# Patient Record
Sex: FEMALE | Race: WHITE | NOT HISPANIC OR LATINO | Employment: FULL TIME | URBAN - METROPOLITAN AREA
[De-identification: names, ages, dates, MRNs, and addresses within clinical notes are randomized per-mention and may not be internally consistent; named-entity substitution may affect disease eponyms.]

---

## 2017-05-01 DIAGNOSIS — R07.9 CHEST PAIN, UNSPECIFIED TYPE: ICD-10-CM

## 2017-05-02 LAB — EKG IMPRESSION: NORMAL

## 2019-11-10 ENCOUNTER — HOSPITAL ENCOUNTER (OUTPATIENT)
Dept: RADIOLOGY | Facility: MEDICAL CENTER | Age: 54
End: 2019-11-10

## 2019-11-10 ENCOUNTER — HOSPITAL ENCOUNTER (INPATIENT)
Facility: MEDICAL CENTER | Age: 54
LOS: 5 days | DRG: 204 | End: 2019-11-16
Attending: EMERGENCY MEDICINE | Admitting: INTERNAL MEDICINE
Payer: COMMERCIAL

## 2019-11-10 ENCOUNTER — APPOINTMENT (OUTPATIENT)
Dept: RADIOLOGY | Facility: MEDICAL CENTER | Age: 54
DRG: 204 | End: 2019-11-10
Attending: EMERGENCY MEDICINE
Payer: COMMERCIAL

## 2019-11-10 DIAGNOSIS — R07.9 ACUTE CHEST PAIN: ICD-10-CM

## 2019-11-10 DIAGNOSIS — Z85.528 H/O RENAL CELL CARCINOMA: ICD-10-CM

## 2019-11-10 DIAGNOSIS — I10 ESSENTIAL HYPERTENSION: ICD-10-CM

## 2019-11-10 DIAGNOSIS — R68.83 CHILLS: ICD-10-CM

## 2019-11-10 DIAGNOSIS — R06.02 SHORTNESS OF BREATH: ICD-10-CM

## 2019-11-10 DIAGNOSIS — R91.8 LUNG MASS: ICD-10-CM

## 2019-11-10 DIAGNOSIS — J44.9 CHRONIC OBSTRUCTIVE PULMONARY DISEASE, UNSPECIFIED COPD TYPE (HCC): Primary | ICD-10-CM

## 2019-11-10 DIAGNOSIS — J96.01 ACUTE RESPIRATORY FAILURE WITH HYPOXIA (HCC): ICD-10-CM

## 2019-11-10 DIAGNOSIS — J06.9 UPPER RESPIRATORY TRACT INFECTION, UNSPECIFIED TYPE: ICD-10-CM

## 2019-11-10 PROCEDURE — 87502 INFLUENZA DNA AMP PROBE: CPT

## 2019-11-10 PROCEDURE — 93005 ELECTROCARDIOGRAM TRACING: CPT | Performed by: EMERGENCY MEDICINE

## 2019-11-10 PROCEDURE — 94760 N-INVAS EAR/PLS OXIMETRY 1: CPT

## 2019-11-10 PROCEDURE — 700105 HCHG RX REV CODE 258: Performed by: EMERGENCY MEDICINE

## 2019-11-10 PROCEDURE — 700101 HCHG RX REV CODE 250: Performed by: EMERGENCY MEDICINE

## 2019-11-10 PROCEDURE — 71045 X-RAY EXAM CHEST 1 VIEW: CPT

## 2019-11-10 PROCEDURE — 99291 CRITICAL CARE FIRST HOUR: CPT

## 2019-11-10 PROCEDURE — 700111 HCHG RX REV CODE 636 W/ 250 OVERRIDE (IP): Performed by: EMERGENCY MEDICINE

## 2019-11-10 RX ORDER — SODIUM CHLORIDE, SODIUM LACTATE, POTASSIUM CHLORIDE, CALCIUM CHLORIDE 600; 310; 30; 20 MG/100ML; MG/100ML; MG/100ML; MG/100ML
30 INJECTION, SOLUTION INTRAVENOUS ONCE
Status: COMPLETED | OUTPATIENT
Start: 2019-11-10 | End: 2019-11-11

## 2019-11-10 RX ORDER — MORPHINE SULFATE 4 MG/ML
4 INJECTION, SOLUTION INTRAMUSCULAR; INTRAVENOUS ONCE
Status: COMPLETED | OUTPATIENT
Start: 2019-11-10 | End: 2019-11-11

## 2019-11-10 RX ORDER — IPRATROPIUM BROMIDE AND ALBUTEROL SULFATE 2.5; .5 MG/3ML; MG/3ML
3 SOLUTION RESPIRATORY (INHALATION) ONCE
Status: COMPLETED | OUTPATIENT
Start: 2019-11-10 | End: 2019-11-10

## 2019-11-10 RX ADMIN — PIPERACILLIN AND TAZOBACTAM 4.5 G: 4; .5 INJECTION, POWDER, LYOPHILIZED, FOR SOLUTION INTRAVENOUS; PARENTERAL at 01:45

## 2019-11-10 RX ADMIN — IPRATROPIUM BROMIDE AND ALBUTEROL SULFATE 3 ML: .5; 3 SOLUTION RESPIRATORY (INHALATION) at 23:43

## 2019-11-11 ENCOUNTER — APPOINTMENT (OUTPATIENT)
Dept: RADIOLOGY | Facility: MEDICAL CENTER | Age: 54
DRG: 204 | End: 2019-11-11
Attending: STUDENT IN AN ORGANIZED HEALTH CARE EDUCATION/TRAINING PROGRAM
Payer: COMMERCIAL

## 2019-11-11 PROBLEM — J96.01 ACUTE RESPIRATORY FAILURE WITH HYPOXIA (HCC): Status: ACTIVE | Noted: 2019-11-11

## 2019-11-11 PROBLEM — Z85.528 H/O RENAL CELL CARCINOMA: Status: ACTIVE | Noted: 2019-11-11

## 2019-11-11 PROBLEM — D64.9 ANEMIA: Status: ACTIVE | Noted: 2019-11-11

## 2019-11-11 PROBLEM — R91.8 MASS OF LEFT LUNG: Status: ACTIVE | Noted: 2019-11-11

## 2019-11-11 PROBLEM — R31.21 ASYMPTOMATIC MICROSCOPIC HEMATURIA: Status: ACTIVE | Noted: 2019-11-11

## 2019-11-11 PROBLEM — I10 HYPERTENSION: Status: ACTIVE | Noted: 2019-11-11

## 2019-11-11 PROBLEM — K21.9 GERD (GASTROESOPHAGEAL REFLUX DISEASE): Status: ACTIVE | Noted: 2019-11-11

## 2019-11-11 PROBLEM — J98.4 CAVITATING MASS IN LEFT UPPER LUNG LOBE: Status: ACTIVE | Noted: 2019-11-11

## 2019-11-11 LAB
ALBUMIN SERPL BCP-MCNC: 3.8 G/DL (ref 3.2–4.9)
ALBUMIN/GLOB SERPL: 1.2 G/DL
ALP SERPL-CCNC: 48 U/L (ref 30–99)
ALT SERPL-CCNC: 15 U/L (ref 2–50)
ANION GAP SERPL CALC-SCNC: 11 MMOL/L (ref 0–11.9)
APPEARANCE UR: ABNORMAL
APTT PPP: 24.9 SEC (ref 24.7–36)
AST SERPL-CCNC: 24 U/L (ref 12–45)
BACTERIA #/AREA URNS HPF: ABNORMAL /HPF
BASOPHILS # BLD AUTO: 0.3 % (ref 0–1.8)
BASOPHILS # BLD: 0.03 K/UL (ref 0–0.12)
BILIRUB SERPL-MCNC: 0.6 MG/DL (ref 0.1–1.5)
BILIRUB UR QL STRIP.AUTO: ABNORMAL
BUN SERPL-MCNC: 13 MG/DL (ref 8–22)
CALCIUM SERPL-MCNC: 8.6 MG/DL (ref 8.5–10.5)
CHLORIDE SERPL-SCNC: 99 MMOL/L (ref 96–112)
CO2 SERPL-SCNC: 24 MMOL/L (ref 20–33)
COLOR UR: ABNORMAL
CORTIS SERPL-MCNC: 14.9 UG/DL (ref 0–23)
CREAT SERPL-MCNC: 1.13 MG/DL (ref 0.5–1.4)
EKG IMPRESSION: NORMAL
EOSINOPHIL # BLD AUTO: 0.01 K/UL (ref 0–0.51)
EOSINOPHIL NFR BLD: 0.1 % (ref 0–6.9)
EPI CELLS #/AREA URNS HPF: ABNORMAL /HPF
ERYTHROCYTE [DISTWIDTH] IN BLOOD BY AUTOMATED COUNT: 43.5 FL (ref 35.9–50)
FERRITIN SERPL-MCNC: 145.4 NG/ML (ref 10–291)
FLUAV RNA SPEC QL NAA+PROBE: NEGATIVE
FLUBV RNA SPEC QL NAA+PROBE: NEGATIVE
GLOBULIN SER CALC-MCNC: 3.2 G/DL (ref 1.9–3.5)
GLUCOSE SERPL-MCNC: 106 MG/DL (ref 65–99)
GLUCOSE UR STRIP.AUTO-MCNC: NEGATIVE MG/DL
HCT VFR BLD AUTO: 38.1 % (ref 37–47)
HGB BLD-MCNC: 11.6 G/DL (ref 12–16)
HGB RETIC QN AUTO: 24.8 PG/CELL (ref 29–35)
IMM GRANULOCYTES # BLD AUTO: 0.06 K/UL (ref 0–0.11)
IMM GRANULOCYTES NFR BLD AUTO: 0.6 % (ref 0–0.9)
IMM RETICS NFR: 16.7 % (ref 9.3–17.4)
INR PPP: 1.07 (ref 0.87–1.13)
IRON SATN MFR SERPL: 3 % (ref 15–55)
IRON SERPL-MCNC: 11 UG/DL (ref 40–170)
KETONES UR STRIP.AUTO-MCNC: NEGATIVE MG/DL
LACTATE BLD-SCNC: 1.3 MMOL/L (ref 0.5–2)
LACTATE BLD-SCNC: 1.3 MMOL/L (ref 0.5–2)
LACTATE BLD-SCNC: 2.3 MMOL/L (ref 0.5–2)
LDH SERPL L TO P-CCNC: 323 U/L (ref 107–266)
LEUKOCYTE ESTERASE UR QL STRIP.AUTO: ABNORMAL
LYMPHOCYTES # BLD AUTO: 1 K/UL (ref 1–4.8)
LYMPHOCYTES NFR BLD: 9.6 % (ref 22–41)
MAGNESIUM SERPL-MCNC: 1.8 MG/DL (ref 1.5–2.5)
MCH RBC QN AUTO: 25.1 PG (ref 27–33)
MCHC RBC AUTO-ENTMCNC: 30.4 G/DL (ref 33.6–35)
MCV RBC AUTO: 82.5 FL (ref 81.4–97.8)
MICRO URNS: ABNORMAL
MONOCYTES # BLD AUTO: 0.99 K/UL (ref 0–0.85)
MONOCYTES NFR BLD AUTO: 9.5 % (ref 0–13.4)
NEUTROPHILS # BLD AUTO: 8.34 K/UL (ref 2–7.15)
NEUTROPHILS NFR BLD: 79.9 % (ref 44–72)
NITRITE UR QL STRIP.AUTO: NEGATIVE
NRBC # BLD AUTO: 0 K/UL
NRBC BLD-RTO: 0 /100 WBC
NT-PROBNP SERPL IA-MCNC: 677 PG/ML (ref 0–125)
PH UR STRIP.AUTO: 5.5 [PH] (ref 5–8)
PLATELET # BLD AUTO: 261 K/UL (ref 164–446)
PMV BLD AUTO: 10.1 FL (ref 9–12.9)
POTASSIUM SERPL-SCNC: 4.9 MMOL/L (ref 3.6–5.5)
PROCALCITONIN SERPL-MCNC: 0.11 NG/ML
PROT SERPL-MCNC: 7 G/DL (ref 6–8.2)
PROT UR QL STRIP: 100 MG/DL
PROTHROMBIN TIME: 14.1 SEC (ref 12–14.6)
RBC # BLD AUTO: 4.62 M/UL (ref 4.2–5.4)
RBC # URNS HPF: ABNORMAL /HPF
RBC UR QL AUTO: ABNORMAL
RETICS # AUTO: 0.05 M/UL (ref 0.04–0.06)
RETICS/RBC NFR: 1.1 % (ref 0.8–2.1)
SODIUM SERPL-SCNC: 134 MMOL/L (ref 135–145)
SP GR UR STRIP.AUTO: 1.03
TIBC SERPL-MCNC: 388 UG/DL (ref 250–450)
TROPONIN T SERPL-MCNC: 10 NG/L (ref 6–19)
TROPONIN T SERPL-MCNC: 9 NG/L (ref 6–19)
UROBILINOGEN UR STRIP.AUTO-MCNC: 1 MG/DL
WBC # BLD AUTO: 10.4 K/UL (ref 4.8–10.8)
WBC #/AREA URNS HPF: ABNORMAL /HPF

## 2019-11-11 PROCEDURE — 85730 THROMBOPLASTIN TIME PARTIAL: CPT

## 2019-11-11 PROCEDURE — 83550 IRON BINDING TEST: CPT

## 2019-11-11 PROCEDURE — 84484 ASSAY OF TROPONIN QUANT: CPT | Mod: 91

## 2019-11-11 PROCEDURE — 700101 HCHG RX REV CODE 250: Performed by: STUDENT IN AN ORGANIZED HEALTH CARE EDUCATION/TRAINING PROGRAM

## 2019-11-11 PROCEDURE — A9270 NON-COVERED ITEM OR SERVICE: HCPCS | Performed by: STUDENT IN AN ORGANIZED HEALTH CARE EDUCATION/TRAINING PROGRAM

## 2019-11-11 PROCEDURE — 90686 IIV4 VACC NO PRSV 0.5 ML IM: CPT | Performed by: INTERNAL MEDICINE

## 2019-11-11 PROCEDURE — 81001 URINALYSIS AUTO W/SCOPE: CPT

## 2019-11-11 PROCEDURE — 700105 HCHG RX REV CODE 258: Performed by: EMERGENCY MEDICINE

## 2019-11-11 PROCEDURE — 83615 LACTATE (LD) (LDH) ENZYME: CPT

## 2019-11-11 PROCEDURE — 83735 ASSAY OF MAGNESIUM: CPT

## 2019-11-11 PROCEDURE — 700111 HCHG RX REV CODE 636 W/ 250 OVERRIDE (IP): Performed by: STUDENT IN AN ORGANIZED HEALTH CARE EDUCATION/TRAINING PROGRAM

## 2019-11-11 PROCEDURE — 3E0234Z INTRODUCTION OF SERUM, TOXOID AND VACCINE INTO MUSCLE, PERCUTANEOUS APPROACH: ICD-10-PCS | Performed by: INTERNAL MEDICINE

## 2019-11-11 PROCEDURE — 51798 US URINE CAPACITY MEASURE: CPT

## 2019-11-11 PROCEDURE — 82728 ASSAY OF FERRITIN: CPT

## 2019-11-11 PROCEDURE — 700105 HCHG RX REV CODE 258: Performed by: STUDENT IN AN ORGANIZED HEALTH CARE EDUCATION/TRAINING PROGRAM

## 2019-11-11 PROCEDURE — 87086 URINE CULTURE/COLONY COUNT: CPT

## 2019-11-11 PROCEDURE — 700102 HCHG RX REV CODE 250 W/ 637 OVERRIDE(OP): Performed by: STUDENT IN AN ORGANIZED HEALTH CARE EDUCATION/TRAINING PROGRAM

## 2019-11-11 PROCEDURE — 90471 IMMUNIZATION ADMIN: CPT

## 2019-11-11 PROCEDURE — 99223 1ST HOSP IP/OBS HIGH 75: CPT | Performed by: INTERNAL MEDICINE

## 2019-11-11 PROCEDURE — 85025 COMPLETE CBC W/AUTO DIFF WBC: CPT

## 2019-11-11 PROCEDURE — 84145 PROCALCITONIN (PCT): CPT

## 2019-11-11 PROCEDURE — 96374 THER/PROPH/DIAG INJ IV PUSH: CPT

## 2019-11-11 PROCEDURE — 770020 HCHG ROOM/CARE - TELE (206)

## 2019-11-11 PROCEDURE — 700111 HCHG RX REV CODE 636 W/ 250 OVERRIDE (IP): Performed by: INTERNAL MEDICINE

## 2019-11-11 PROCEDURE — 82533 TOTAL CORTISOL: CPT

## 2019-11-11 PROCEDURE — 83605 ASSAY OF LACTIC ACID: CPT | Mod: 91

## 2019-11-11 PROCEDURE — 85610 PROTHROMBIN TIME: CPT

## 2019-11-11 PROCEDURE — 700111 HCHG RX REV CODE 636 W/ 250 OVERRIDE (IP): Performed by: EMERGENCY MEDICINE

## 2019-11-11 PROCEDURE — 99223 1ST HOSP IP/OBS HIGH 75: CPT | Mod: GC | Performed by: INTERNAL MEDICINE

## 2019-11-11 PROCEDURE — 83540 ASSAY OF IRON: CPT

## 2019-11-11 PROCEDURE — 87040 BLOOD CULTURE FOR BACTERIA: CPT | Mod: 91

## 2019-11-11 PROCEDURE — 80053 COMPREHEN METABOLIC PANEL: CPT

## 2019-11-11 PROCEDURE — 83010 ASSAY OF HAPTOGLOBIN QUANT: CPT

## 2019-11-11 PROCEDURE — 93970 EXTREMITY STUDY: CPT

## 2019-11-11 PROCEDURE — 85046 RETICYTE/HGB CONCENTRATE: CPT

## 2019-11-11 PROCEDURE — 83880 ASSAY OF NATRIURETIC PEPTIDE: CPT

## 2019-11-11 RX ORDER — BUDESONIDE AND FORMOTEROL FUMARATE DIHYDRATE 80; 4.5 UG/1; UG/1
2 AEROSOL RESPIRATORY (INHALATION)
Status: DISCONTINUED | OUTPATIENT
Start: 2019-11-11 | End: 2019-11-11

## 2019-11-11 RX ORDER — BISACODYL 10 MG
10 SUPPOSITORY, RECTAL RECTAL
Status: DISCONTINUED | OUTPATIENT
Start: 2019-11-11 | End: 2019-11-16 | Stop reason: HOSPADM

## 2019-11-11 RX ORDER — PREDNISONE 10 MG/1
10 TABLET ORAL DAILY
Status: DISCONTINUED | OUTPATIENT
Start: 2019-11-11 | End: 2019-11-16 | Stop reason: HOSPADM

## 2019-11-11 RX ORDER — ACETAMINOPHEN 325 MG/1
650 TABLET ORAL EVERY 6 HOURS PRN
Status: DISCONTINUED | OUTPATIENT
Start: 2019-11-11 | End: 2019-11-16 | Stop reason: HOSPADM

## 2019-11-11 RX ORDER — POLYETHYLENE GLYCOL 3350 17 G/17G
1 POWDER, FOR SOLUTION ORAL
Status: DISCONTINUED | OUTPATIENT
Start: 2019-11-11 | End: 2019-11-16 | Stop reason: HOSPADM

## 2019-11-11 RX ORDER — HYDRALAZINE HYDROCHLORIDE 20 MG/ML
20 INJECTION INTRAMUSCULAR; INTRAVENOUS ONCE
Status: COMPLETED | OUTPATIENT
Start: 2019-11-11 | End: 2019-11-11

## 2019-11-11 RX ORDER — HYDROCODONE BITARTRATE AND ACETAMINOPHEN 5; 325 MG/1; MG/1
1 TABLET ORAL EVERY 4 HOURS PRN
Status: DISCONTINUED | OUTPATIENT
Start: 2019-11-11 | End: 2019-11-16 | Stop reason: HOSPADM

## 2019-11-11 RX ORDER — HYDROCODONE BITARTRATE AND ACETAMINOPHEN 5; 325 MG/1; MG/1
1 TABLET ORAL EVERY 4 HOURS PRN
Refills: 0 | COMMUNITY
Start: 2019-11-10 | End: 2023-07-21

## 2019-11-11 RX ORDER — DOXYCYCLINE 100 MG/1
100 TABLET ORAL EVERY 12 HOURS
Status: DISCONTINUED | OUTPATIENT
Start: 2019-11-11 | End: 2019-11-11

## 2019-11-11 RX ORDER — OMEPRAZOLE 20 MG/1
40 CAPSULE, DELAYED RELEASE ORAL 2 TIMES DAILY
Status: DISCONTINUED | OUTPATIENT
Start: 2019-11-11 | End: 2019-11-16 | Stop reason: HOSPADM

## 2019-11-11 RX ORDER — LISINOPRIL 20 MG/1
20 TABLET ORAL DAILY
Refills: 0 | Status: ON HOLD | COMMUNITY
Start: 2019-08-23 | End: 2023-07-25 | Stop reason: SDUPTHER

## 2019-11-11 RX ORDER — HYDROXYZINE 50 MG/1
50 TABLET, FILM COATED ORAL 3 TIMES DAILY PRN
Status: DISCONTINUED | OUTPATIENT
Start: 2019-11-11 | End: 2019-11-16 | Stop reason: HOSPADM

## 2019-11-11 RX ORDER — PROMETHAZINE HYDROCHLORIDE 25 MG/1
12.5-25 SUPPOSITORY RECTAL EVERY 4 HOURS PRN
Status: DISCONTINUED | OUTPATIENT
Start: 2019-11-11 | End: 2019-11-16 | Stop reason: HOSPADM

## 2019-11-11 RX ORDER — OMEPRAZOLE 40 MG/1
40 CAPSULE, DELAYED RELEASE ORAL DAILY
COMMUNITY

## 2019-11-11 RX ORDER — ONDANSETRON 4 MG/1
4 TABLET, ORALLY DISINTEGRATING ORAL EVERY 4 HOURS PRN
Status: DISCONTINUED | OUTPATIENT
Start: 2019-11-11 | End: 2019-11-16 | Stop reason: HOSPADM

## 2019-11-11 RX ORDER — MORPHINE SULFATE 4 MG/ML
2 INJECTION, SOLUTION INTRAMUSCULAR; INTRAVENOUS
Status: DISCONTINUED | OUTPATIENT
Start: 2019-11-11 | End: 2019-11-14

## 2019-11-11 RX ORDER — BUDESONIDE AND FORMOTEROL FUMARATE DIHYDRATE 80; 4.5 UG/1; UG/1
2 AEROSOL RESPIRATORY (INHALATION) 2 TIMES DAILY
Status: DISCONTINUED | OUTPATIENT
Start: 2019-11-11 | End: 2019-11-16 | Stop reason: HOSPADM

## 2019-11-11 RX ORDER — PROMETHAZINE HYDROCHLORIDE 25 MG/1
12.5-25 TABLET ORAL EVERY 4 HOURS PRN
Status: DISCONTINUED | OUTPATIENT
Start: 2019-11-11 | End: 2019-11-16 | Stop reason: HOSPADM

## 2019-11-11 RX ORDER — PROCHLORPERAZINE EDISYLATE 5 MG/ML
5-10 INJECTION INTRAMUSCULAR; INTRAVENOUS EVERY 4 HOURS PRN
Status: DISCONTINUED | OUTPATIENT
Start: 2019-11-11 | End: 2019-11-16 | Stop reason: HOSPADM

## 2019-11-11 RX ORDER — ONDANSETRON 2 MG/ML
4 INJECTION INTRAMUSCULAR; INTRAVENOUS EVERY 4 HOURS PRN
Status: DISCONTINUED | OUTPATIENT
Start: 2019-11-11 | End: 2019-11-16 | Stop reason: HOSPADM

## 2019-11-11 RX ORDER — LISINOPRIL 20 MG/1
20 TABLET ORAL DAILY
Status: DISCONTINUED | OUTPATIENT
Start: 2019-11-11 | End: 2019-11-16 | Stop reason: HOSPADM

## 2019-11-11 RX ORDER — LABETALOL HYDROCHLORIDE 5 MG/ML
10 INJECTION, SOLUTION INTRAVENOUS EVERY 6 HOURS PRN
Status: DISCONTINUED | OUTPATIENT
Start: 2019-11-11 | End: 2019-11-16 | Stop reason: HOSPADM

## 2019-11-11 RX ORDER — AMOXICILLIN 250 MG
2 CAPSULE ORAL 2 TIMES DAILY
Status: DISCONTINUED | OUTPATIENT
Start: 2019-11-11 | End: 2019-11-16 | Stop reason: HOSPADM

## 2019-11-11 RX ADMIN — LABETALOL HYDROCHLORIDE 10 MG: 5 INJECTION INTRAVENOUS at 05:51

## 2019-11-11 RX ADMIN — HYDRALAZINE HYDROCHLORIDE 20 MG: 20 INJECTION INTRAMUSCULAR; INTRAVENOUS at 11:24

## 2019-11-11 RX ADMIN — INFLUENZA A VIRUS A/BRISBANE/02/2018 IVR-190 (H1N1) ANTIGEN (FORMALDEHYDE INACTIVATED), INFLUENZA A VIRUS A/KANSAS/14/2017 X-327 (H3N2) ANTIGEN (FORMALDEHYDE INACTIVATED), INFLUENZA B VIRUS B/PHUKET/3073/2013 ANTIGEN (FORMALDEHYDE INACTIVATED), AND INFLUENZA B VIRUS B/MARYLAND/15/2016 BX-69A ANTIGEN (FORMALDEHYDE INACTIVATED) 0.5 ML: 15; 15; 15; 15 INJECTION, SUSPENSION INTRAMUSCULAR at 20:04

## 2019-11-11 RX ADMIN — OMEPRAZOLE 40 MG: 20 CAPSULE, DELAYED RELEASE ORAL at 05:59

## 2019-11-11 RX ADMIN — DOXYCYCLINE 100 MG: 100 TABLET, FILM COATED ORAL at 05:51

## 2019-11-11 RX ADMIN — ACETAMINOPHEN 650 MG: 325 TABLET, FILM COATED ORAL at 11:28

## 2019-11-11 RX ADMIN — ENOXAPARIN SODIUM 40 MG: 100 INJECTION SUBCUTANEOUS at 09:38

## 2019-11-11 RX ADMIN — MORPHINE SULFATE 2 MG: 4 INJECTION INTRAVENOUS at 20:03

## 2019-11-11 RX ADMIN — OMEPRAZOLE 40 MG: 20 CAPSULE, DELAYED RELEASE ORAL at 11:24

## 2019-11-11 RX ADMIN — BUDESONIDE AND FORMOTEROL FUMARATE DIHYDRATE 2 PUFF: 80; 4.5 AEROSOL RESPIRATORY (INHALATION) at 20:05

## 2019-11-11 RX ADMIN — MORPHINE SULFATE 4 MG: 4 INJECTION INTRAVENOUS at 00:48

## 2019-11-11 RX ADMIN — MORPHINE SULFATE 2 MG: 4 INJECTION INTRAVENOUS at 12:11

## 2019-11-11 RX ADMIN — SODIUM CHLORIDE, POTASSIUM CHLORIDE, SODIUM LACTATE AND CALCIUM CHLORIDE 4764 ML: 600; 310; 30; 20 INJECTION, SOLUTION INTRAVENOUS at 00:47

## 2019-11-11 RX ADMIN — PROCHLORPERAZINE EDISYLATE 10 MG: 5 INJECTION INTRAMUSCULAR; INTRAVENOUS at 12:32

## 2019-11-11 RX ADMIN — CEFTRIAXONE SODIUM 2 G: 2 INJECTION, POWDER, FOR SOLUTION INTRAMUSCULAR; INTRAVENOUS at 06:38

## 2019-11-11 RX ADMIN — LISINOPRIL 20 MG: 20 TABLET ORAL at 03:32

## 2019-11-11 RX ADMIN — LABETALOL HYDROCHLORIDE 10 MG: 5 INJECTION INTRAVENOUS at 10:12

## 2019-11-11 RX ADMIN — PREDNISONE 10 MG: 10 TABLET ORAL at 09:50

## 2019-11-11 SDOH — HEALTH STABILITY: MENTAL HEALTH: HOW OFTEN DO YOU HAVE A DRINK CONTAINING ALCOHOL?: NEVER

## 2019-11-11 ASSESSMENT — COGNITIVE AND FUNCTIONAL STATUS - GENERAL
WALKING IN HOSPITAL ROOM: A LITTLE
HELP NEEDED FOR BATHING: A LITTLE
DAILY ACTIVITIY SCORE: 22
STANDING UP FROM CHAIR USING ARMS: A LITTLE
SUGGESTED CMS G CODE MODIFIER MOBILITY: CJ
MOBILITY SCORE: 20
MOVING FROM LYING ON BACK TO SITTING ON SIDE OF FLAT BED: A LITTLE
TOILETING: A LITTLE
SUGGESTED CMS G CODE MODIFIER DAILY ACTIVITY: CJ
CLIMB 3 TO 5 STEPS WITH RAILING: A LITTLE

## 2019-11-11 ASSESSMENT — ENCOUNTER SYMPTOMS
BRUISES/BLEEDS EASILY: 1
SENSORY CHANGE: 0
GASTROINTESTINAL NEGATIVE: 1
FEVER: 0
VOMITING: 1
DEPRESSION: 0
MYALGIAS: 1
RESPIRATORY NEGATIVE: 1
HEADACHES: 1
WHEEZING: 1
FOCAL WEAKNESS: 0
DIZZINESS: 0
NECK PAIN: 0
BRUISES/BLEEDS EASILY: 0
VOMITING: 0
SPEECH CHANGE: 0
HEMOPTYSIS: 0
PND: 0
FEVER: 1
ORTHOPNEA: 1
MEMORY LOSS: 0
EYE PAIN: 0
SPUTUM PRODUCTION: 0
CONSTIPATION: 0
CLAUDICATION: 0
ABDOMINAL PAIN: 0
HEADACHES: 0
WHEEZING: 0
MUSCULOSKELETAL NEGATIVE: 1
DIAPHORESIS: 0
CHILLS: 1
CHILLS: 0
BLOOD IN STOOL: 0
ORTHOPNEA: 0
SEIZURES: 0
SHORTNESS OF BREATH: 1
NAUSEA: 0
BACK PAIN: 0
COUGH: 1
LOSS OF CONSCIOUSNESS: 0
WEAKNESS: 1
INSOMNIA: 1
EYES NEGATIVE: 1
NERVOUS/ANXIOUS: 1
PALPITATIONS: 0
DIARRHEA: 0
WEIGHT LOSS: 0
NAUSEA: 1

## 2019-11-11 ASSESSMENT — LIFESTYLE VARIABLES
SUBSTANCE_ABUSE: 0
TOTAL SCORE: 0
EVER_SMOKED: YES
EVER_SMOKED: YES
TOTAL SCORE: 0
HAVE YOU EVER FELT YOU SHOULD CUT DOWN ON YOUR DRINKING: NO
EVER HAD A DRINK FIRST THING IN THE MORNING TO STEADY YOUR NERVES TO GET RID OF A HANGOVER: NO
AVERAGE NUMBER OF DAYS PER WEEK YOU HAVE A DRINK CONTAINING ALCOHOL: 0
HAVE PEOPLE ANNOYED YOU BY CRITICIZING YOUR DRINKING: NO
EVER FELT BAD OR GUILTY ABOUT YOUR DRINKING: NO
ALCOHOL_USE: NO
ON A TYPICAL DAY WHEN YOU DRINK ALCOHOL HOW MANY DRINKS DO YOU HAVE: 0
CONSUMPTION TOTAL: NEGATIVE
HOW MANY TIMES IN THE PAST YEAR HAVE YOU HAD 5 OR MORE DRINKS IN A DAY: 0
TOTAL SCORE: 0

## 2019-11-11 ASSESSMENT — COPD QUESTIONNAIRES
DURING THE PAST 4 WEEKS HOW MUCH DID YOU FEEL SHORT OF BREATH: NONE/LITTLE OF THE TIME
COPD SCREENING SCORE: 4
DO YOU EVER COUGH UP ANY MUCUS OR PHLEGM?: NO/ONLY WITH OCCASIONAL COLDS OR INFECTIONS
HAVE YOU SMOKED AT LEAST 100 CIGARETTES IN YOUR ENTIRE LIFE: YES

## 2019-11-11 ASSESSMENT — PATIENT HEALTH QUESTIONNAIRE - PHQ9
SUM OF ALL RESPONSES TO PHQ9 QUESTIONS 1 AND 2: 0
1. LITTLE INTEREST OR PLEASURE IN DOING THINGS: NOT AT ALL
2. FEELING DOWN, DEPRESSED, IRRITABLE, OR HOPELESS: NOT AT ALL

## 2019-11-11 NOTE — H&P
Internal Medicine Admitting History and Physical    Note Author: Linh Pastrana M.D.     Name Savita Duggan       1965   Age/Sex 54 y.o. female   MRN [id number]   Code Status Full Code     After 5PM or if no immediate response to page, please call for cross-coverage  Attending/Team: Dr. Tyler / Greg See Patient List for primary contact information  Call [phone] to page    1st Call - Day Intern (R1):   Dr. Leger 2nd Call - Day Sr. Resident (R2/R3):   Dr. Taylor     Chief Complaint:  Shortness of breath and chest pain    HPI: Ms. Savita Duggan is a 54-year-old female with past medical history of renal cell carcinoma (s/p right nephrectomy), COPD (on 2 L home oxygen during nights), hypertension, GERD, prior MI, who is transferred from [place], [place], for further evaluation of shortness of breath and chest pain.  Patient complains of worsening shortness of breath while walking since 1 week.  She denies orthopnea or PND.  Associated with chest pain on left side, sharp, constant, 6-10/10 in severity, nonradiating, worsening with body positions, relieving with pain medication (ibuprofen).  She also has non-productive cough, fever and chills, h/o night sweats and generalized body aches. Denies hemoptysis, palpitations, weight loss, urinary symptoms. She initially had nausea since 3 days and one episode of vomiting this morning, but not at this time.  She has a history of smoking half to 1 pack/day for 38 years and quit since 3 years.  She does not drink alcohol.  History of IV drug use for 1 to 2 years during her teens. She was on prednisone 10 mg for 3 years for COPD, no recent antibiotic use. She was went to the ED at [place], [place], where CT chest showed 8.1 cm left upper lobe mass. She was sent to [facility] for further evaluation of left lung mass.    In the ER, patient was hypoxic at 85% on RA, requiring 3L. Labs showed Hb 11.6, normal WBC. CXR showed large opacity in left mid-lung periphery with diffuse interstitial  opacities. She was treated with Duoneb nebulizer, IV Vanc and Unasyn for possible sepsis, LR infusion, IV Morphine .    Review of Systems   Constitutional: Positive for chills and fever. Negative for weight loss.   HENT: Negative.    Eyes: Negative.    Respiratory: Positive for cough and shortness of breath. Negative for hemoptysis, sputum production and wheezing.    Cardiovascular: Positive for chest pain and leg swelling. Negative for palpitations, orthopnea and PND.   Gastrointestinal: Negative for abdominal pain, diarrhea, nausea and vomiting.   Genitourinary: Negative.    Musculoskeletal: Positive for myalgias.   Skin: Negative for itching and rash.   Neurological: Negative for dizziness, sensory change, speech change, focal weakness, seizures, loss of consciousness and headaches.   Endo/Heme/Allergies: Negative.    Psychiatric/Behavioral: Negative for depression, substance abuse and suicidal ideas. The patient is nervous/anxious.         Past Medical History (Chronic medical problem, known complications and current treatment)    Hypertension on lisinopril 20 mg daily  GERD on Omeprazole 40 mg BID  COPD on 2L home oxygen during night   Chronic body aches on Norco PRN    Past Surgical History:  Right nephrectomy for Renal cell carcinoma 3 years ago  Cholecystectomy    Current Outpatient Medications:  Home Medications     Reviewed by Karel Arvizu ([facility]) on 19 at 0014  Med List Status: Complete   Medication Last Dose Status   HYDROcodone-acetaminophen (NORCO) 5-325 MG Tab per tablet  Active   lisinopril (PRINIVIL) 20 MG Tab  Active   omeprazole (PRILOSEC) 40 MG delayed-release capsule 2019 Active              Medication Allergy/Sensitivities:  Allergies   Allergen Reactions   • Codeine Itching   • Penicillins Itching and Swelling     Reports tolerating amoxicillin     Family History (mandatory)   Father  of heart attack at an older age, mother had alcoholic cirrhosis. No significant  "family history of lung cancer or any other cancers.    Social History (mandatory)   Social History     Socioeconomic History   • Marital status: Single     Spouse name: Not on file   • Number of children: Not on file   • Years of education: Not on file   • Highest education level: Not on file   Occupational History   • Not on file   Social Needs   • Financial resource strain: Not on file   • Food insecurity:     Worry: Not on file     Inability: Not on file   • Transportation needs:     Medical: Not on file     Non-medical: Not on file   Tobacco Use   • Smoking status: Not on file   Substance and Sexual Activity   • Alcohol use: Not on file   • Drug use: Not on file   • Sexual activity: Not on file   Lifestyle   • Physical activity:     Days per week: Not on file     Minutes per session: Not on file   • Stress: Not on file   Relationships   • Social connections:     Talks on phone: Not on file     Gets together: Not on file     Attends Taoism service: Not on file     Active member of club or organization: Not on file     Attends meetings of clubs or organizations: Not on file     Relationship status: Not on file   • Intimate partner violence:     Fear of current or ex partner: Not on file     Emotionally abused: Not on file     Physically abused: Not on file     Forced sexual activity: Not on file   Other Topics Concern   • Not on file   Social History Narrative   • Not on file     Living situation: Lives at her home in [place], [place] with her 11 yr old.  PCP : No primary care provider on file.    Physical Exam     Vitals:    11/10/19 2254 11/10/19 2259   BP:  (!) 167/76   Pulse:  72   Resp:  20   Temp:  36.9 °C (98.4 °F)   TempSrc:  Temporal   SpO2:  96%   Weight: (!) 158.8 kg (350 lb)    Height: 1.753 m (5' 9\")      Body mass index is 51.69 kg/m².  O2 therapy: Pulse Oximetry: 96 %    Physical Exam   Constitutional: She is oriented to person, place, and time.   Patient is cooperative, anxious and morbidly obese "   HENT:   Head: Normocephalic and atraumatic.   Mouth/Throat: Oropharynx is clear and moist.   Eyes: Pupils are equal, round, and reactive to light. Conjunctivae and EOM are normal.   Neck: Normal range of motion. Neck supple. No JVD present.   Cardiovascular: Normal rate, regular rhythm and normal heart sounds.   No murmur heard.  Pulmonary/Chest: Effort normal. No respiratory distress. She has no wheezes. She has no rales. She exhibits tenderness.   Tenderness on left lower side of sternum. Diminished breath sounds   Abdominal: Soft. Bowel sounds are normal. There is no tenderness.   Right hypochondriac well healed scar present   Musculoskeletal:         General: No tenderness.      Comments: Bilateral pedal edema +   Lymphadenopathy:     She has no cervical adenopathy.   Neurological: She is alert and oriented to person, place, and time. She has normal reflexes. No cranial nerve deficit. Gait normal.   Skin: Skin is warm and dry. No erythema.   Psychiatric: Mood, memory, affect and judgment normal.     Data Review       Old Records Request:   Deferred  Current Records review/summary: Completed    Lab Data Review:  Recent Results (from the past 24 hour(s))   EKG    Collection Time: 11/10/19 11:11 PM   Result Value Ref Range    Report       [facility] Emergency Dept.    Test Date:  2019-11-10  Pt Name:    DESTINEE VAUGHAN                    Department: ER  MRN:        [id number]                      Room:        21  Gender:     Female                       Technician: [id number]  :        1965                   Requested By:SANDI FRAZIER  Order #:    [id number]                    Reading MD:    Measurements  Intervals                                Axis  Rate:       75                           P:          -3  MA:         136                          QRS:        22  QRSD:       82                           T:          35  QT:         384  QTc:        429    Interpretive Statements  SINUS  RHYTHM  BASELINE WANDER IN LEAD(S) V1  Compared to ECG 04/05/2017 03:00:50  Myocardial infarct finding no longer present         Imaging/Procedures Review:    Independant Imaging Review: Completed  OUTSIDE IMAGES-CT CHEST   Final Result      DX-CHEST-PORTABLE (1 VIEW)   Final Result      Large opacity in the left midlung periphery. Differential possibilities would be focal pneumonia vs a pulmonary mass. Additionally, there are diffuse interstitial opacities, suggesting mild pulmonary edema.        EKG:   EKG Independent Review: Completed  QTc:429, HR: 75, Normal Sinus Rhythm, no ST/T changes     Records reviewed and summarized in current documentation :  Yes  [employer] teaching service handout given to patient:  No       Assessment/Plan     * Acute respiratory failure with hypoxia (HCC)- (present on admission)  Assessment & Plan  - Patient with H/o COPD on 2L home oxygen during nights, on prednisone for 3 years, renal cell carcinoma, presented with shortness of breath and chest pain, with oxygen saturation at 85% on RA. Afebrile with normal WBC count. CXR: Left large mid-lung opacity in the periphery with diffuse interstitial opacities. CT chest at [place], [place] showed a left upper lobe lung mass of 8.1 cm  - COPD exacerbation vs possible malignancy with h/o smoking and RCC vs pneumonia    PLAN:  - Admit to inpatient   - On Telemetry  - On RT protocol with duonebs  - Prednisone 40 mg daily  - Started on ceftriaxone and doxycycline for CAP   - CT chest pending  - Consider Pulmonology consult   - NPO for possible bronchoscopy in AM      H/O renal cell carcinoma s/p nephrectomy- (present on admission)  Assessment & Plan  - Nephrectomy 3 years ago  - Avoid nephrotoxins    Hypertension- (present on admission)  Assessment & Plan  - Continue on lisinopril 20 mg daily    Anemia- (present on admission)  Assessment & Plan  - Hb 11.6  - No h/o bleeding  - Anemia work up: LDH, iron/TIB, ferritin pending  - will monitor    GERD  (gastroesophageal reflux disease)- (present on admission)  Assessment & Plan  - Continue on Omeprazole 40 mg BID      Anticipated Hospital stay:  >2 midnights    Quality Measures  Quality-Core Measures   Reviewed items::  Labs reviewed, EKG reviewed, Medications reviewed and Radiology images reviewed  Krueger catheter::  No Krueger  DVT prophylaxis - mechanical:  SCDs  Ulcer Prophylaxis: On Omperazole.    PCP: No primary care provider on file.

## 2019-11-11 NOTE — DISCHARGE PLANNING
Care Transition Team Assessment    Assessment Complete. RN CM met with the patient at bedside. The patient verified the demographic information in the Facesheet. The patient is admitted for shortness of breath.     The patinet lives with her grandchildren and roommate in a one-story house in [place], [place].  The patient is independent with ADLs. Patient states she wears 2L of oxygen at night but has been using her roommates oxygen. She does not have a DME provider.        Patient uses the [facility] pharmacy in [place], [place].  The patient's PCP is KEVIN Orozco. At this time, the patient's anticipated discharge disposition is home.    Information Source  Orientation : Oriented x 4  Information Given By: Patient  Who is responsible for making decisions for patient? : Patient    Readmission Evaluation  Is this a readmission?: No    Elopement Risk  Legal Hold: No  Ambulatory or Self Mobile in Wheelchair: No-Not an Elopement Risk  Disoriented: No  Psychiatric Symptoms: None  History of Wandering: No  Elopement this Admit: No  Vocalizing Wanting to Leave: No  Displays Behaviors, Body Language Wanting to Leave: No-Not at Risk for Elopement  Elopement Risk: Not at Risk for Elopement    Interdisciplinary Discharge Planning  Does Admitting Nurse Feel This Could be a Complex Discharge?: No  Primary Care Physician: KEVIN Orozco  Lives with - Patient's Self Care Capacity: Child Less than 18 Years of Age, Unrelated Adult  Patient or legal guardian wants to designate a caregiver (see row info): No  Support Systems: Family Member(s), Friends / Neighbors  Housing / Facility: 1 Story House  Do You Take your Prescribed Medications Regularly: Yes  Able to Return to Previous ADL's: Yes  Mobility Issues: No  Prior Services: None  Patient Expects to be Discharged to:: Home  Assistance Needed: Unknown at this Time  Durable Medical Equipment: Home Oxygen  DME Provider / Phone: NA(Patient has been using her roommates oxygen.)    Discharge  Preparedness  What is your plan after discharge?: Home with help  What are your discharge supports?: Child, Other (comment)(Roommate)  Prior Functional Level: Ambulatory, Drives Self, Independent with Activities of Daily Living, Independent with Medication Management  Difficulity with ADLs: None  Difficulity with IADLs: None    Functional Assesment  Prior Functional Level: Ambulatory, Drives Self, Independent with Activities of Daily Living, Independent with Medication Management    Finances  Financial Barriers to Discharge: Yes(Possibly)  Prescription Coverage: Yes    Vision / Hearing Impairment  Vision Impairment : No  Hearing Impairment : No    Values / Beliefs / Concerns  Values / Beliefs Concerns : No    Advance Directive  Advance Directive?: None  Advance Directive offered?: AD Booklet refused    Domestic Abuse  Have you ever been the victim of abuse or violence?: No  Physical Abuse or Sexual Abuse: No  Verbal Abuse or Emotional Abuse: No  Possible Abuse Reported to:: Not Applicable    Psychological Assessment  History of Substance Abuse: None  History of Psychiatric Problems: No  Non-compliant with Treatment: No  Newly Diagnosed Illness: No    Discharge Risks or Barriers  Discharge risks or barriers?: [place]comment[place]  Patient risk factors: Lives alone and no community support, Medicaid pending    Anticipated Discharge Information  Anticipated discharge disposition: Home  Discharge Address:  [address], [place] [address]  Discharge Contact Phone Number: [phone]

## 2019-11-11 NOTE — CARE PLAN
Problem: Safety  Goal: Will remain free from falls  Note:   Educated pt on need to call for assistance to ambulate or use restroom        Problem: Venous Thromboembolism (VTW)/Deep Vein Thrombosis (DVT) Prevention:  Goal: Patient will participate in Venous Thrombosis (VTE)/Deep Vein Thrombosis (DVT)Prevention Measures  Flowsheets (Taken 11/11/2019 0756)  Mechanical Prophylaxis : SCDs, Sequential Compression Device

## 2019-11-11 NOTE — ASSESSMENT & PLAN NOTE
Presented with acute hypoxic respiratory failure saturating 85% on RA, now saturating well on 2L. History of using 02 only at night from a friend. Imaging on presentation was remarkable for a new lung mass. Lung biopsy results returned as reactive changes without acute inflammatory process or malignancy identified. Patient now able to be discharged to have outpatient PET scan with pulmonology. She will need 02 at home which has been put in for her.     Plan:   -Outpatient followup with pulmonology for PET scan   -Sp02, titrate to keep >88%  -Home 02

## 2019-11-11 NOTE — ED NOTES
Placed patient on 3 liters nasal cannula. Room O2 saturation was 85%. Patient states she wears O2 at night when she sleeps.

## 2019-11-11 NOTE — ED NOTES
Report called to floor. Per Ronny RN on floor, plan now to find bariatric bed for patient and appropriate room.

## 2019-11-11 NOTE — ED NOTES
Med rec updated and complete. Allergies reviewed.  Met with pt at beside. No antibiotic use in last 14 days.  Home pharmacy  [facility].

## 2019-11-11 NOTE — PROGRESS NOTES
2 RN skin check complete.   Devices in place oxygen.  Skin assessed under devices intact.  Confirmed pressure ulcers found on n/a.  New potential pressure ulcers noted on n/a. Wound consult placed n/a.  Skin intact, bottom pink but blanching

## 2019-11-11 NOTE — SENIOR ADMIT NOTE
"SENIOR ADMIT NOTE:    Di Hooker  Date & Time note created:    11/11/2019   12:45 AM       Chief Complaint:  Shortness of breath  Chest pain    History of Present Illness:    The patient is a 54 year old female with a history of hypertension, prior MI, renal cell carcinoma s/p nephrectomy (never had chemo or immunotherapy) 3 years ago. She presented to [facility] with a 1 week history of fevers, chills, night sweats. She states that she has had this worsening shortness of breath and cough over the last week that has been progressively been getting worse. She was then nauseous and vomiting for the last 3 days and states that she has not had any appetite to eat. She denies any weight loss, but states that at night time she gets night sweats (does not soak through her sheets, however) and chills. The patient states that she is chronically on prednisone 10mg for COPD. She also has a 38 pack/year smoking history. She denies any hemoptysis. The patient denies any travel outside of the country. She states that she works in [place], [place] and may have been exposed to bird droppings since she is a  at a hotel.     In [place], a CT scan was done that showed an 8.1 cm left upper lobe mass versus pneumonia.  The patient is chronically on prednisone 10 mg, lisinopril, metoprolol, omeprazole.  In the ED here, the patient was requiring 3 L of oxygen.  She states that she only uses 2 L at home at nighttime.  Her labs did not show an elevated white count.  But she is anemic with a hemoglobin of 11.6.    Physical Exam:  Weight/BMI: Body mass index is 51.69 kg/m².  BP (!) 167/76   Pulse 72   Temp 36.9 °C (98.4 °F) (Temporal)   Resp 20   Ht 1.753 m (5' 9\")   Wt (!) 158.8 kg (350 lb)   SpO2 96%   Vitals:    11/10/19 2254 11/10/19 2259   BP:  (!) 167/76   Pulse:  72   Resp:  20   Temp:  36.9 °C (98.4 °F)   TempSrc:  Temporal   SpO2:  96%   Weight: (!) 158.8 kg (350 lb)    Height: 1.753 m (5' 9\")      Oxygen Therapy:  " Pulse Oximetry: 96 %    Physical exam:  Constitutional:  No acute distress. A&O x3  HENMT:  Normocephalic, Atraumatic  Eyes:  PERRLA, EOMI, Conjunctiva normal  Neck:  Supple, Full range of motion, No stridor  Cardiovascular:  Regular rate and rhythm, No murmurs, No rubs, No gallops.   Lungs:  Diminished breath sounds. No wheezing heard, but breath sounds are distant.   Extremities: Good pedal pulses b/l, 1+ edema, 5/5 strength.  Abdomen: Bowel sounds x4, Soft, Non-tender, Non-distended, No guarding, No rebound, No masses.  Neurologic: Good sensations, Cranial nerves II through XII grossly intact. No focal deficits noted.    Imaging  OUTSIDE IMAGES-CT CHEST   Final Result      DX-CHEST-PORTABLE (1 VIEW)   Final Result      Large opacity in the left midlung periphery. Differential possibilities would be focal pneumonia vs a pulmonary mass. Additionally, there are diffuse interstitial opacities, suggesting mild pulmonary edema.            ASSESSMENT/PLAN:  #Acute hypoxic respiratory failure  -Secondary to pneumonia versus lung mass.  Given the patient's history of renal cell carcinoma, no white count, no sputum production, what was seen on the patient's CT is likely malignancy.  However given that she is on chronic immunosuppressive therapy cannot entirely rule out an atypical infection such as aspergillosis, but this is less likely and the patient does not appear that toxic.  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  Given that she has a history of COPD, this could be causing an exacerbation we will treat as such. Since she is not that toxic appearing, I am not inclined to start antifungal therapy at this time.   -RT protocol with duo nebs  -Prednisone 40 mg daily  -Patient was being treated in the emergency room with vancomycin and Zosyn.  We will treat this like a COPD exacerbation secondary to likely community-acquired  pneumonia and will start her on ceftriaxone and doxycycline.  -Would highly recommend a team to consult pulmonology for possible bronch.  - Ordered LDH, iron/TIB, ferritin for anemia workup.  - Lactic acid was high, but the patient has some pulmonary edema on CXR with an elevated BNP so would hold off on fluids for now and encourage PO intake. She already received a sepsis bolus in the ED. The patient does not appear septic and meets 0/4 SIRS.   -N.p.o. at midnight for possible procedure  -Hold DVT prophylaxis for possible procedure    For full details please refer to H&P done by Dr. Zeina Hooker M.D.

## 2019-11-11 NOTE — PROGRESS NOTES
[facility] paged at [phone] regarding persistent HTN despite two PRNs. [facility] returned page at [phone]. Critical care consult placed.

## 2019-11-11 NOTE — ED NOTES
Patient is hard stick for for IV's. Another RN requested to start ultrasound IV to draw 1st set of blood cultures

## 2019-11-11 NOTE — PROGRESS NOTES
[facility] paged at [phone] regarding patient's HTN despite PRN BP medication. [facility] returned page at [phone]. Orders received, see MAR.

## 2019-11-11 NOTE — ASSESSMENT & PLAN NOTE
Hb 11.6, no known baseline. Iron studies show low %sat/iron level with normal ferritin. ?Mixed anemia? She has had heavy periods for a few months which could account for her loss.   -Monitoring H&H, currently stable

## 2019-11-11 NOTE — DIETARY
NUTRITION SERVICES: BMI - Pt with BMI >40 (=Body mass index is 54.5 kg/m².), morbid obesity. Weight loss counseling not appropriate in acute care setting. RECOMMEND - Referral to outpatient nutrition services for weight management after D/C.     RD to monitor per department guidelines.

## 2019-11-11 NOTE — PROGRESS NOTES
Nicardipine not needed. Critical care consult canceled. Tele orders remain in place. [facility] updated.

## 2019-11-11 NOTE — NON-PROVIDER
"      Internal Medicine Medical Student Admitting History and Physical  Note Author: Chhaya Newsome, Student    Name Savita Duggan       1965   Age/Sex 54 y.o. female   MRN [id number]   Code Status full       Chief Complaint:  \"I have a pain behind my left breast\"    HPI:  Savita Duggan is a 55yo F w/ COPD, HTN, morbid obesity and prior MI, renal carcinoma w/ R nephrectomy; who was transferred to [facility] ED from [place] 11/10/19 because of worsening pain \"behind my left breast\" she has also been feeling acutely ill with flu-like symptoms including cough and SOB worse than her baseline COPD; nausea + vomiting; lack of appetite and inability to sleep; whole body aches; hematuria; and new onset dependent edema.    Her flu-like symptoms started about 1 week ago at which time she went to the doctor who said it was not the flu. She then returned to the [facility] ED several times. [facility] ED CT scan was completed 11/10 showing a 8.1cm MANDO lung mass; she was then transferred to [facility].    Her last PCP apt was a month ago and at that time her doctor was concerned about Savita's intermittent hematuria. Despite hx of renal carcinoma and unilateral nephrectomy, this hematuria is new and outside of Savita's menstrual cycle. She has hematuria currently which she says started yesterday.    She has been on 10mg prednisone daily for 3yrs for her COPD, she said \"I use my roomates O2 at home at night.\" She also takes omeprazole for GERD, Lisinopril for HTN. She has chronic \"body aches\" for which she takes Norco 5-325. She attributes her easy bruising to her prednisone as this has been a chronic sx for her. She states that she has never had edema before.      Review of Systems   Constitutional: Positive for malaise/fatigue. Negative for chills, diaphoresis, fever and weight loss.   HENT: Negative.    Eyes: Negative.    Respiratory: Positive for cough, shortness of breath and wheezing.    Cardiovascular: Positive for chest pain, orthopnea and leg " "swelling.   Gastrointestinal: Positive for nausea and vomiting. Negative for blood in stool, constipation and diarrhea.   Genitourinary: Positive for hematuria.   Musculoskeletal: Positive for myalgias.   Skin: Negative.    Neurological: Positive for weakness and headaches. Negative for focal weakness and loss of consciousness.   Endo/Heme/Allergies: Bruises/bleeds easily.   Psychiatric/Behavioral: Negative for depression and memory loss. The patient is nervous/anxious and has insomnia.              Past Medical History  - COPD; 10mg prednisone/d  - Hypertension; lisinopril 20mg/d  - Chronic body aches; norco PRN  - GERD; Omeprazole 40mg BID  - Renal Cell carcinoma    Past Surgical History:  - R Nephrectomy 2/2 RCC  - Cholecystectomy    Medication Allergy/Sensitivities:  - codeine and penicillins: itching  - reports tolerating penicillins      Family History:  - mother  of liver cirrhosis 2/2 chronic ETOH abuse  - father  for heart attack at 89yo  - she is one of 7 children; she has one sister left. One sister  of drug OD, one sister  of liver cirrhosis, one brother  from 4 brennan accident.  - no family hx of cancer  - she currently cares for her nieces children at home  - she is a     Social History:  Smokinpk yr, quit 3 yr ago  Alcohol: no  Illictis: 2yr IV rx use 30yr ago  Other: works in an office  Living situation: has a roommate and 3 of her niece's children living at home      Physical Exam     Vitals:    19 0519 19 0528 19 0533 19 0658   BP: (!) 186/81   155/58   Pulse: 82 80  75   Resp: (!) 21 (!) 22     Temp: 37.8 °C (100 °F)      TempSrc: Temporal      SpO2: 96% 98%  99%   Weight:   (!) 167.4 kg (369 lb 0.8 oz)    Height:         Body mass index is 54.5 kg/m².  /58   Pulse 75   Temp 37.8 °C (100 °F) (Temporal)   Resp (!) 22   Ht 1.753 m (5' 9\")   Wt (!) 167.4 kg (369 lb 0.8 oz)   SpO2 99%   BMI 54.50 kg/m²   O2 therapy: Pulse Oximetry: 99 " %, O2 (LPM): 3, O2 Delivery: Nasal Cannula    Physical Exam   Constitutional: She is oriented to person, place, and time. She appears distressed.   HENT:   Head: Normocephalic and atraumatic.   Eyes: Pupils are equal, round, and reactive to light. Conjunctivae and EOM are normal.   Neck: Normal range of motion.   Cardiovascular: Normal rate and regular rhythm. Exam reveals distant heart sounds and decreased pulses.   Pulmonary/Chest: She has wheezes. She exhibits tenderness.   Abdominal: Soft. Bowel sounds are normal.   Musculoskeletal: Normal range of motion.         General: Tenderness and edema present.   Neurological: She is alert and oriented to person, place, and time.   Skin: Skin is warm and dry.   Psychiatric: Memory, affect and judgment normal. Her mood appears anxious.       Most Recent Labs:    Lab Results   Component Value Date/Time    WBC 10.4 11/11/2019 12:34 AM    RBC 4.62 11/11/2019 12:34 AM    HEMOGLOBIN 11.6 (L) 11/11/2019 12:34 AM    HEMATOCRIT 38.1 11/11/2019 12:34 AM    MCV 82.5 11/11/2019 12:34 AM    MCH 25.1 (L) 11/11/2019 12:34 AM    MCHC 30.4 (L) 11/11/2019 12:34 AM    MPV 10.1 11/11/2019 12:34 AM    NEUTSPOLYS 79.90 (H) 11/11/2019 12:34 AM    LYMPHOCYTES 9.60 (L) 11/11/2019 12:34 AM    MONOCYTES 9.50 11/11/2019 12:34 AM    EOSINOPHILS 0.10 11/11/2019 12:34 AM    BASOPHILS 0.30 11/11/2019 12:34 AM      Lab Results   Component Value Date/Time    SODIUM 134 (L) 11/11/2019 12:34 AM    POTASSIUM 4.9 11/11/2019 12:34 AM    CHLORIDE 99 11/11/2019 12:34 AM    CO2 24 11/11/2019 12:34 AM    GLUCOSE 106 (H) 11/11/2019 12:34 AM    BUN 13 11/11/2019 12:34 AM    CREATININE 1.13 11/11/2019 12:34 AM      Lab Results   Component Value Date/Time    ALTSGPT 15 11/11/2019 12:34 AM    ASTSGOT 24 11/11/2019 12:34 AM    ALKPHOSPHAT 48 11/11/2019 12:34 AM    TBILIRUBIN 0.6 11/11/2019 12:34 AM    ALBUMIN 3.8 11/11/2019 12:34 AM    GLOBULIN 3.2 11/11/2019 12:34 AM    INR 1.07 11/11/2019 12:34 AM     Lab Results      Component Value Date/Time    PROTHROMBTM 14.1 11/11/2019 12:34 AM    INR 1.07 11/11/2019 12:34 AM          Assessment/Plan     Acute respiratory failure:  Differential includes: cancer vs. Infection vs. COPD exacerbation + Obesity hypoventilation syndrome vs. CHF vs. Sepsis  - R/O sepsis: Unlikely as lactic acid remains low at 1.3 and she does not meet SIRS criteria, additionally her SOFA score = 0. WBC remains WNL, this could be suppressed 2/2 chronic steroids; however she is hypertensive and afebrile as well as alert and oriented    - R/O CHF: she has a prior MI and distant heart sounds on exam w/ new onset pulmonary edema and 1+ dependent edema to ankles; ordered ECHO to evaluate EF as ECG, WNL; showed sinus rhythm w/ rate 75 and showed no changes from prior MI. Despite proBNP @677, LFTs are WNL    - COPD +/- obesity hypoventilation syndrome: Likely a underlying contributing factor to her current respiratory distress regardless of additional insults (cancer vs infection). Wheeze on expiration on exam; SpO2 85% on room air during admission. She is also unable to breathe deeply 2/2 body habitus; BMI 54.50, wt:167.4kg     - Cancer Vs. Infection:   - CT scan showing 8.1cm infiltrate in MANDO near L pulm artery with fluid overload and pulmonary edema.    - She has many risk factors for cancer: smoking hx, prior renal carcinoma; she has new onset painless hematuria.   - She has had sick contacts and could have a blunted immune response causing normal WBC and afebrile status 2/2 long-term steroid use. Ordered procalcitonin to continue to evaluate pneumonia, procalcitonin was 0.11.     Plan:  - ECHO  - consulted pulm  - RT COPD protocol  - DVT prophylaxis w/ lovenox; hold overnight for pulm procedure

## 2019-11-11 NOTE — PROGRESS NOTES
Internal Medicine Interval Note  Note Author: Laquita Leger M.D.     Name Savita Duggan 1965   Age/Sex 54 y.o. female   MRN [id number]   Code Status FULL     After 5PM or if no immediate response to page, please call for cross-coverage  Attending/Team: Dr. Stern - Red Team See Patient List for primary contact information  Call [phone] to page    1st Call - Day Intern (R1):   Dr. Leger 2nd Call - Day Sr. Resident (R2/R3):   Dr. Taylor     Reason for interval visit    Shortness of Breath    Interval Problem Daily Status Update    -Patient admitted last night. Hypertensive overnight and through this morning that responded to IV antihypertensives   -Has complaints of shortness of breath and pleuritic chest pain this AM, but feeling better since she came into the hospital. EKG is non-ischemic, troponin trend negative   -New lung consolidation/mass is currently being worked up. Pulmonary is following and recommend stopping antibiotics and a CT guided mass    Review of Systems   Constitutional: Positive for malaise/fatigue. Negative for chills and fever.   HENT: Negative for ear pain and tinnitus.    Eyes: Negative for pain.   Respiratory: Positive for cough and shortness of breath. Negative for sputum production.    Cardiovascular: Positive for chest pain (pleuritic). Negative for palpitations, orthopnea, claudication and leg swelling.   Gastrointestinal: Negative for abdominal pain, blood in stool, constipation, diarrhea, melena, nausea and vomiting.   Genitourinary: Negative for dysuria and urgency.   Musculoskeletal: Positive for myalgias. Negative for back pain and neck pain.   Skin: Negative for itching and rash.   Neurological: Negative for dizziness, loss of consciousness and headaches.   Endo/Heme/Allergies: Negative for environmental allergies. Does not bruise/bleed easily.   Psychiatric/Behavioral: Negative for depression and suicidal ideas.   All other systems reviewed and are  negative.    Disposition/Barriers to discharge:   Continued inpatient evaluation and treatment at this time    Consultants/Specialty  Pulmonary  PCP: In [place], [place]    Quality Measures  Quality-Core Measures   Krueger catheter::  No Krueger  DVT prophylaxis pharmacological::  Enoxaparin (Lovenox)  Antibiotics:  Treating active infection/contamination beyond 24 hours perioperative coverage    Physical Exam     Vitals:    11/11/19 0519 11/11/19 0528 11/11/19 0533 11/11/19 0658   BP: (!) 186/81   155/58   Pulse: 82 80  75   Resp: (!) 21 (!) 22     Temp: 37.8 °C (100 °F)      TempSrc: Temporal      SpO2: 96% 98%  99%   Weight:   (!) 167.4 kg (369 lb 0.8 oz)    Height:         Body mass index is 54.5 kg/m². Weight: (!) 167.4 kg (369 lb 0.8 oz)  Oxygen Therapy:  Pulse Oximetry: 99 %, O2 (LPM): 3, O2 Delivery: Nasal Cannula    Physical Exam   Constitutional: She is oriented to person, place, and time. No distress.   HENT:   Head: Normocephalic and atraumatic.   Mucous membranes are dry     Eyes: Right eye exhibits no discharge. Left eye exhibits no discharge. No scleral icterus.   Conjunctiva are injected   Neck: No JVD present. No tracheal deviation present. No thyromegaly present.   Cardiovascular: Normal rate and regular rhythm.   No murmur heard.  Distant heart sounds   Pulmonary/Chest: Effort normal and breath sounds normal. No respiratory distress. She has no wheezes. She has no rales.   Abdominal: Soft. Bowel sounds are normal. She exhibits no distension. There is no tenderness. There is no rebound and no guarding.   Obese   Musculoskeletal:         General: Edema (Trace ankle edema) present.   Lymphadenopathy:     She has no cervical adenopathy.   Neurological: She is alert and oriented to person, place, and time. No cranial nerve deficit.   Skin: Skin is warm and dry. No rash noted.   Psychiatric: Affect normal.   Nursing note and vitals reviewed.      Assessment/Plan     * Acute respiratory failure with hypoxia (HCC)-  (present on admission)  Assessment & Plan  Presented with acute hypoxic respiratory failure. History of COPD and uses 02 only at night. She presented saturating 85% on RA. Reports cough and malaise x2 days. No WBC count, procalcitonin is 0.11. Imaging is remarkable for a new lung mass vs. large consolidation. Pulmonary has been consulted. We are working her up for a potential lung mass and treating for a COPD exacerbation.     Plan:  -Discontinuing antibiotics per pulmonology recommendation  -CT guided biopsy of lung mass per pulmonology recommendation   -On RT protocol with ventura for COPD exacerbation  -Prednisone 40 mg daily for COPD exacerbation  -BLLE US to assess for DVT   -Echo to rule out new heart failure  -Close monitoring of her respiratory status off antibiotics    H/O renal cell carcinoma s/p nephrectomy- (present on admission)  Assessment & Plan  - Nephrectomy 3 years ago for RCC    Hypertension- (present on admission)  Assessment & Plan  - Continue on lisinopril 20 mg daily    Asymptomatic microscopic hematuria  Assessment & Plan  History of RCC. Hematuria by history x2 months and confirmed urinalysis on admission.   -Monitoring inpatient, may need further workup outpatient given her history    Anemia- (present on admission)  Assessment & Plan  Hb 11.6, no known baseline. Iron studies show low %sat/iron level with normal ferritin. ?Mixed anemia? She has had hematuria for a few months which could account for a possible loss.   -Monitoring H&H    GERD (gastroesophageal reflux disease)- (present on admission)  Assessment & Plan  - Continue on Omeprazole 40 mg BID

## 2019-11-11 NOTE — ED PROVIDER NOTES
ED PROVIDER NOTE     Scribed for Moises Rockwell M.D. by Aurelia Georges. 11/10/2019, 10:55 PM.    CHIEF COMPLAINT  Chief Complaint   Patient presents with   • Shortness of Breath     SOB last few days, fever, chills. Seen at ED @ [facility] and sent here for eval for mass in lung   • Chest Pain       HPI  Primary care provider: None noted.     Means of arrival: Med Flight  History obtained from: Patient  History limited by: None    Savita Duggan is a 54 y.o. female, with a history of hypertension and prior MI, who presents as a transfer from [place] for further evaluation of worsening shortness of breath and chest pain. She additionally endorses fevers, chills, dyspnea, and lower extremity edema. Chest pain is achy, retrosternal, nonradiating, worse when coughing. Chart review indicated patient has been seen 3 times at the [place] ED in the last 24 hours. She was seen for respiratory symptoms and treated for a URI; she returned that day for chest pain and was discharged; she returned again for new onset of fever. CT of chest revealed an 8.1 cm left upper lobe mass. She denies any hemoptysis. She reports she is compliant with medication including metoprolol, lisinopril, and omeprazole. She notes she used to take ibuprofen, but was advised to discontinue use. No alleviating measures noted today. She additionally notes she has been on 10 mg of prednisone for 3 years for copd. She reports an allergy to penicillin and codeine.     REVIEW OF SYSTEMS  Constitutional: Positive: fevers and chills.  Respiratory: Positive: shortness of breath and dyspnea. Negative: hemoptysis   Cardiovascular: Positive: Chest pain. Negative for syncope.   Gastrointestinal: Negative for abdominal pain or vomiting.   Musculoskeletal:  Positive: lower extremity edema  All other systems reviewed and are negative.    PAST MEDICAL HISTORY   History of hypertension, COPD, and MI (no stents).     PAST FAMILY HISTORY  Denies any pertinent past family  "history.     SOCIAL HISTORY  Former smoker. Denies any alcohol use.     SURGICAL HISTORY  patient denies any surgical history    CURRENT MEDICATIONS  Metoprolol  Lisinopril  Omeprazole  Ibuprofen  Prednisone    ALLERGIES  Allergies   Allergen Reactions   • Codeine Itching   • Penicillins Itching and Swelling     Reports tolerating amoxicillin       PHYSICAL EXAM  VITAL SIGNS: BP (!) 167/76   Pulse 72   Temp 36.9 °C (98.4 °F) (Temporal)   Resp 20   Ht 1.753 m (5' 9\")   Wt (!) 158.8 kg (350 lb)   SpO2 96%   BMI 51.69 kg/m²    Pulse ox interpretation: On room air, I interpret this pulse ox as normal.  Constitutional: Chronically ill appearing sitting up in mild distress.   HEENT: Poor dentition, no exudate, Normocephalic, atraumatic. Posterior pharynx clear, mucous membranes dry.  Eyes:  EOMI. Normal sclerae. Pale conjunctivae.   Neck: Supple, nontender.  Chest/Pulmonary: Lungs diminished bilaterally, no focal wheezing or rhonchi  Cardiovascular: Heart sounds distant but regular  Abdomen: Soft, nontender; no rebound, guarding, or masses.  Back: No CVA or midline tenderness.   Musculoskeletal: 1+ symetric lower extremity edema. No tenderness.  Neuro: Clear speech, normal coordination, cranial nerves II-XII grossly intact, no focal asymmetry or sensory deficits.   Psych: Flat affect but cooperative.  Skin: No rashes, warm and dry.      DIAGNOSTIC STUDIES / PROCEDURES    LABS & EKG  Results for orders placed or performed during the hospital encounter of 11/10/19   CBC WITH DIFFERENTIAL   Result Value Ref Range    WBC 10.4 4.8 - 10.8 K/uL    RBC 4.62 4.20 - 5.40 M/uL    Hemoglobin 11.6 (L) 12.0 - 16.0 g/dL    Hematocrit 38.1 37.0 - 47.0 %    MCV 82.5 81.4 - 97.8 fL    MCH 25.1 (L) 27.0 - 33.0 pg    MCHC 30.4 (L) 33.6 - 35.0 g/dL    RDW 43.5 35.9 - 50.0 fL    Platelet Count 261 164 - 446 K/uL    MPV 10.1 9.0 - 12.9 fL    Neutrophils-Polys 79.90 (H) 44.00 - 72.00 %    Lymphocytes 9.60 (L) 22.00 - 41.00 %    Monocytes " 9.50 0.00 - 13.40 %    Eosinophils 0.10 0.00 - 6.90 %    Basophils 0.30 0.00 - 1.80 %    Immature Granulocytes 0.60 0.00 - 0.90 %    Nucleated RBC 0.00 /100 WBC    Neutrophils (Absolute) 8.34 (H) 2.00 - 7.15 K/uL    Lymphs (Absolute) 1.00 1.00 - 4.80 K/uL    Monos (Absolute) 0.99 (H) 0.00 - 0.85 K/uL    Eos (Absolute) 0.01 0.00 - 0.51 K/uL    Baso (Absolute) 0.03 0.00 - 0.12 K/uL    Immature Granulocytes (abs) 0.06 0.00 - 0.11 K/uL    NRBC (Absolute) 0.00 K/uL   COMP METABOLIC PANEL   Result Value Ref Range    Sodium 134 (L) 135 - 145 mmol/L    Potassium 4.9 3.6 - 5.5 mmol/L    Chloride 99 96 - 112 mmol/L    Co2 24 20 - 33 mmol/L    Anion Gap 11.0 0.0 - 11.9    Glucose 106 (H) 65 - 99 mg/dL    Bun 13 8 - 22 mg/dL    Creatinine 1.13 0.50 - 1.40 mg/dL    Calcium 8.6 8.5 - 10.5 mg/dL    AST(SGOT) 24 12 - 45 U/L    ALT(SGPT) 15 2 - 50 U/L    Alkaline Phosphatase 48 30 - 99 U/L    Total Bilirubin 0.6 0.1 - 1.5 mg/dL    Albumin 3.8 3.2 - 4.9 g/dL    Total Protein 7.0 6.0 - 8.2 g/dL    Globulin 3.2 1.9 - 3.5 g/dL    A-G Ratio 1.2 g/dL   LACTIC ACID   Result Value Ref Range    Lactic Acid 2.3 (H) 0.5 - 2.0 mmol/L   LACTIC ACID   Result Value Ref Range    Lactic Acid 1.3 0.5 - 2.0 mmol/L   LACTIC ACID   Result Value Ref Range    Lactic Acid 1.3 0.5 - 2.0 mmol/L   proBrain Natriuretic Peptide, NT   Result Value Ref Range    NT-proBNP 677 (H) 0 - 125 pg/mL   CORTISOL   Result Value Ref Range    Cortisol 14.9 0.0 - 23.0 ug/dL   MAGNESIUM   Result Value Ref Range    Magnesium 1.8 1.5 - 2.5 mg/dL   TROPONIN   Result Value Ref Range    Troponin T 10 6 - 19 ng/L   APTT   Result Value Ref Range    APTT 24.9 24.7 - 36.0 sec   PROTHROMBIN TIME   Result Value Ref Range    PT 14.1 12.0 - 14.6 sec    INR 1.07 0.87 - 1.13   URINALYSIS   Result Value Ref Range    Color DK Yellow     Character Turbid (A)     Specific Gravity 1.031 <1.035    Ph 5.5 5.0 - 8.0    Glucose Negative Negative mg/dL    Ketones Negative Negative mg/dL    Protein 100  (A) Negative mg/dL    Bilirubin Small (A) Negative    Urobilinogen, Urine 1.0 Negative    Nitrite Negative Negative    Leukocyte Esterase Trace (A) Negative    Occult Blood Moderate (A) Negative    Micro Urine Req Microscopic    Influenza A/B By PCR (Adult - Flu Only)   Result Value Ref Range    Influenza virus A RNA Negative Negative    Influenza virus B, PCR Negative Negative   PROCALCITONIN   Result Value Ref Range    Procalcitonin 0.11 <0.25 ng/mL   ESTIMATED GFR   Result Value Ref Range    GFR If African American >60 >60 mL/min/1.73 m 2    GFR If Non African American 50 (A) >60 mL/min/1.73 m 2   LDH   Result Value Ref Range    LDH Total 323 (H) 107 - 266 U/L   IRON/TOTAL IRON BIND   Result Value Ref Range    Iron 11 (L) 40 - 170 ug/dL    Total Iron Binding 388 250 - 450 ug/dL    % Saturation 3 (L) 15 - 55 %   FERRITIN   Result Value Ref Range    Ferritin 145.4 10.0 - 291.0 ng/mL   RETICULOCYTES COUNT   Result Value Ref Range    Reticulocyte Count 1.1 0.8 - 2.1 %    Retic, Absolute 0.05 0.04 - 0.06 M/uL    Imm. Reticulocyte Fraction 16.7 9.3 - 17.4 %    Retic Hgb Equivalent 24.8 (L) 29.0 - 35.0 pg/cell   URINE MICROSCOPIC (W/UA)   Result Value Ref Range    WBC 20-50 (A) /hpf    RBC 10-20 (A) /hpf    Bacteria Moderate (A) None /hpf    Epithelial Cells Many (A) /hpf   TROPONIN   Result Value Ref Range    Troponin T 9 6 - 19 ng/L   EKG   Result Value Ref Range    Report       [facility] Emergency Dept.    Test Date:  2019-11-10  Pt Name:    DESTINEE VAUGHAN                    Department: ER  MRN:        [id number]                      Room:       [facility]  Gender:     Female                       Technician: [id number]  :        1965                   Requested By:MOISES FRAZIER  Order #:    [id number]                    Marya MD: Moises Frazier MD    Measurements  Intervals                                Axis  Rate:       75                           P:          -3  ND:         136                           QRS:        22  QRSD:       82                           T:          35  QT:         384  QTc:        429    Interpretive Statements  SINUS RHYTHM  BASELINE WANDER IN LEAD(S) V1  Compared to ECG 04/05/2017 03:00:50  Myocardial infarct finding no longer present  No STEMI or strain or dysrhythmia  Electronically Signed On 11- 11:33:01 PST by Moises Rockwell MD           RADIOLOGY  OUTSIDE IMAGES-CT CHEST   Final Result      DX-CHEST-PORTABLE (1 VIEW)   Final Result      Large opacity in the left midlung periphery. Differential possibilities would be focal pneumonia vs a pulmonary mass. Additionally, there are diffuse interstitial opacities, suggesting mild pulmonary edema.      EC-ECHOCARDIOGRAM COMPLETE W/O CONT    (Results Pending)   US-EXTREMITY VENOUS LOWER BILAT    (Results Pending)   CT-NEEDLE BX-LUNG-MEDIASTINUM LEFT    (Results Pending)       COURSE & MEDICAL DECISION MAKING    This is a 54 y.o. female who presents with shortness of breath and chest pain.    Differential Diagnosis includes but is not limited to:  Sepsis, pneumoia, malignancy, fungal infection    ED Course:  10:55 PM - Evaluated patient at bedside. I informed the patient the need for labs and radiology to rule out any emergent processes. Currently awaiting results before deciding if intervention is necessary. Patient verbalizes understanding and agreement to this plan of care. Patient will be treated with Duoneb nebulizer solution, lactated ringers infusion, morphine 4 mg/mL, Zosyn 4.5 g in  mL IVPB, and vancomycin 3 g in  mL. Ordered for DX-chest, lactic acid, influenza A/B, prothrombin time, blood culture, UA, urine culture, culture respiratory with gram stain, CBC with diff, CMP, lactic acid, probrain natriuretic peptide, cortisol, magnesium, troponin, APTT, procalcitonin and EKG to evaluate her symptoms.     The patient will receive IV fluids for concerns of dehydration and NPO in case a surgical process is  identified on workup.     Labs mostly stable, outside imaging reviewed shows large lung mass. O2 required to keep sats consistently over 90%. EKG nonischemic trop negative. Given immunosuppressed on prednisone, concern for infection vs malignancy, broad spectrum antibiotics, requires biopsy and possible ID consult. [facility] team consulted, they will kindly admit for further workup and treatment. The patient is hemodynamically stable for admission in guarded condition. Prior to admit patient reassessed, feeling better with fluids, thus I feel she's having a positive response to parenteral rehydration.      Medications   senna-docusate (PERICOLACE or SENOKOT S) 8.6-50 MG per tablet 2 Tab (2 Tabs Oral Refused 11/11/19 0600)     And   polyethylene glycol/lytes (MIRALAX) PACKET 1 Packet (has no administration in time range)     And   magnesium hydroxide (MILK OF MAGNESIA) suspension 30 mL (has no administration in time range)     And   bisacodyl (DULCOLAX) suppository 10 mg (has no administration in time range)   Respiratory Care per Protocol (has no administration in time range)   acetaminophen (TYLENOL) tablet 650 mg (650 mg Oral Given 11/11/19 1128)   ondansetron (ZOFRAN) syringe/vial injection 4 mg (has no administration in time range)   ondansetron (ZOFRAN ODT) dispertab 4 mg (has no administration in time range)   promethazine (PHENERGAN) tablet 12.5-25 mg (has no administration in time range)   promethazine (PHENERGAN) suppository 12.5-25 mg (has no administration in time range)   prochlorperazine (COMPAZINE) injection 5-10 mg (has no administration in time range)   HYDROcodone-acetaminophen (NORCO) 5-325 MG per tablet 1 Tab (has no administration in time range)   lisinopril (PRINIVIL) tablet 20 mg ( Oral Canceled Entry 11/11/19 0600)   omeprazole (PRILOSEC) capsule 40 mg (40 mg Oral Given 11/11/19 1124)   Influenza Vaccine Quad pf injection 0.5 mL (has no administration in time range)    budesonide-formoterol (SYMBICORT) 80-4.5 MCG/ACT inhaler 2 Puff (2 Puffs Inhalation Refused 11/11/19 0600)   albuterol (PROVENTIL) 2.5mg/0.5ml nebulizer solution 2.5 mg (has no administration in time range)   labetalol (NORMODYNE/TRANDATE) injection 10 mg (10 mg Intravenous Given 11/11/19 1012)   enoxaparin (LOVENOX) inj 40 mg (40 mg Subcutaneous Given 11/11/19 0938)   predniSONE (DELTASONE) tablet 10 mg (10 mg Oral Given 11/11/19 0950)   lactated ringers infusion (BOLUS) (0 mL/kg × 158.8 kg Intravenous Stopped 11/11/19 0331)   morphine (pf) 4 MG/ML injection 4 mg (4 mg Intravenous Given 11/11/19 0048)   ipratropium-albuterol (DUONEB) nebulizer solution (3 mL Nebulization Given 11/10/19 2343)   hydrALAZINE (APRESOLINE) injection 20 mg (20 mg Intravenous Given 11/11/19 1124)     FINAL IMPRESSION  1. Upper respiratory tract infection, unspecified type    2. Lung mass    3. Acute respiratory failure with hypoxia (HCC)    4. Essential hypertension    5. H/O renal cell carcinoma s/p nephrectomy    6. Chills    7. Shortness of breath    8. Acute chest pain          -ADMIT-    Pertinent Labs & Imaging studies reviewed and verified by myself, as well as nursing notes and the patient's past medical, family, and social histories (See chart for details).    Results, exam findings, clinical impression, presumed diagnosis, treatment options, and plan for admission were discussed with the patient, and they verbalized understanding, agreed with, and appreciated the plan of care.    Aurelia WADE (Mihaela), am scribing for, and in the presence of, Moises Rockwell M.D..    Electronically signed by: Aurelia Dinh), 11/10/2019    Moises WADE M.D. personally performed the services described in this documentation, as scribed by Aurelia Georges in my presence, and it is both accurate and complete. C    Portions of this record were made with voice recognition software.  Despite my review,  spelling/grammar/context errors may still remain.  Interpretation of this chart should be taken in this context.    The note accurately reflects work and decisions made by me.  Moises Rockwell  11/11/2019  11:41 AM

## 2019-11-11 NOTE — DISCHARGE SUMMARY
Internal Medicine Discharge Summary  Note Author: Garo Taylor M.D.       Name Savita Duggan 1965   Age/Sex 54 y.o. female   MRN [id number]     Admit Date:  11/10/2019       Discharge Date:   19    Service:   R Internal Medicine Red Team  Attending Physician(s):   Dr. Stern       Senior Resident(s):   Dr. Taylor  Otilio Resident(s):   Dr. Leger  PCP: SAMUEL Larios    Primary Diagnosis:   Benign Lung Mass    Secondary Diagnoses:                Principal Problem (Resolved):    Acute respiratory failure with hypoxia (HCC) POA: Yes  Active Problems:    Asymptomatic microscopic hematuria POA: Unknown    Hypertension POA: Yes    H/O renal cell carcinoma s/p nephrectomy POA: Yes    Mass of left lung POA: Unknown    COPD (chronic obstructive pulmonary disease) (HCC) POA: Unknown    GERD (gastroesophageal reflux disease) POA: Yes    Anemia POA: Yes  Resolved Problems:    Constipation POA: Unknown    Situational anxiety POA: Unknown    Hospital Summary (Brief Narrative):         54F, PMH of renal cell carcinoma (s/p right nephrectomy), COPD (on 2 L home oxygen during nights), hypertension, GERD, prior MI; initially transferred from [place], [place], for further evaluation of shortness of breath/Acute respiratory failure with hypoxia. CT done at [facility] showing left upper lobe mass of 8.1cm. CXR here confirming opacity. Patient with 85% O2 in ED and required supplemental O2. Patient initially treated for COPD and PNA with RT/O2 protocol, duonebs, prednisone, ceftriaxone and doxycyline (1 day of abx total). Opacity wedge shaped with concern for PE, US of b/l LE obtained ruling out DVT. The main concern was for malignancy and so pulmonology consulted, recommending CT guided biopsy performed on 19 showing reactive changes without acute inflammatory process or malignancy identified. Patient is to have outpatient PET scan with pulmonology to definitively exclude malignancy. Patient also with  complaints of hematuria so given her history of RCC CT renal study was obtained that showed no new kidney masses. Suspect uterine source of her hematuria as she is currently on her period and it correlates with her period by history. Patient did require supplemental oxygen this hospitalization of 2L constantly, home 02 evaluation revealed a need for ongoing therapy. Previously she borrowed a roommate's oxygen. She is now discharged home on home oxygen. She is obese (54 BMI) leaving obesity hypoventilation syndrome as a possible contributor to etiology. She will have outpatient pulmonary follow up for a PET scan and to possibly investigate this further.     Medication reconciliation as below, follow up arranged with pulmonogy; discussed with patient. Patient discharged in stable condition.    Patient /Hospital Summary (Details -- Problem Oriented) :          Asymptomatic microscopic hematuria  Assessment & Plan  History of RCC. Hematuria by history x2 months and confirmed urinalysis on admission.   -CT scan negative for new mass. Suspect uterine bleeding may be the source of her hematuria as she states usually her hematuria correlates with her period. Patient stable for discharge with outpatient followup.       * Acute respiratory failure with hypoxia (HCC)-resolved as of 11/16/2019  Assessment & Plan  Presented with acute hypoxic respiratory failure saturating 85% on RA, now saturating well on 2L. History of using 02 only at night from a friend. Imaging on presentation was remarkable for a new lung mass. Lung biopsy results returned as reactive changes without acute inflammatory process or malignancy identified. Patient now able to be discharged to have outpatient PET scan with pulmonology. She will need 02 at home which has been put in for her.     Plan:   -Outpatient followup with pulmonology for PET scan   -Sp02, titrate to keep >88%  -Home 02     H/O renal cell carcinoma s/p nephrectomy  Assessment & Plan  -  Nephrectomy 3 years ago for RCC    Hypertension  Assessment & Plan  - Continue on lisinopril 20 mg daily    Constipation-resolved as of 11/16/2019  Assessment & Plan  Resolved yesterday. Patient tolerating a regular diet.   -Will continue bowel protocol and monitoring    Anemia  Assessment & Plan  Hb 11.6, no known baseline. Iron studies show low %sat/iron level with normal ferritin. ?Mixed anemia? She has had heavy periods for a few months which could account for her loss.   -Monitoring H&H, currently stable    GERD (gastroesophageal reflux disease)  Assessment & Plan  -Continue on Omeprazole 40 mg BID    Situational anxiety-resolved as of 11/16/2019  Assessment & Plan  Patient appropriately anxious about hospitalization. Improved now.   -Reassurance    Consultants:     Pulmonology    Procedures:        CT guided lung biopsy done by IR 11/12/19    Imaging/ Testing:      CT-RENAL WITH & W/O   Final Result      No left renal mass is identified.      No renal calculi are seen. No hydronephrosis is noted.      Status post right nephrectomy. No recurrence or metastatic disease is identified.      Status post cholecystectomy.      Small right abdominal incisional hernia containing bowel without evidence of obstruction.      Trace free fluid in the pelvis. The pelvis was not entirely included on the current examination, limiting evaluation.      Atherosclerotic plaque status post cholecystectomy.      Pulmonary nodules on the right for which follow-up CT chest is recommended.                  EZ-FWEAFXR-4 VIEW   Final Result      Nonobstructive bowel gas pattern. Small amount of stool throughout the colon.      EC-ECHOCARDIOGRAM COMPLETE W/O CONT   Final Result      DX-CHEST-PORTABLE (1 VIEW)   Final Result      1.  No pneumothorax.   2.  Persistent ill-defined LEFT mid lung opacity.   3.  Prominence of the pulmonary interstitium again seen.      DX-CHEST-PORTABLE (1 VIEW)   Final Result         No appreciable  pneumothorax status post left lung biopsy.      CT-NEEDLE BX-LUNG-MEDIASTINUM LEFT   Final Result      1.  CT guided left mid lung lung mass biopsy.   2.  A follow up chest radiograph is forthcoming in 3 hours..      US-EXTREMITY VENOUS LOWER BILAT   Final Result      OUTSIDE IMAGES-CT CHEST   Final Result      DX-CHEST-PORTABLE (1 VIEW)   Final Result      Large opacity in the left midlung periphery. Differential possibilities would be focal pneumonia vs a pulmonary mass. Additionally, there are diffuse interstitial opacities, suggesting mild pulmonary edema.        Discharge Medications:         Medication Reconciliation: Completed       Medication List      CONTINUE taking these medications      Instructions   HYDROcodone-acetaminophen 5-325 MG Tabs per tablet  Commonly known as:  NORCO   Take 1 Tab by mouth every four hours as needed. PRN PAIN  Dose:  1 Tab     lisinopril 20 MG Tabs  Commonly known as:  PRINIVIL   Take 20 mg by mouth every day.  Dose:  20 mg     omeprazole 40 MG delayed-release capsule  Commonly known as:  PRILOSEC   Take 40 mg by mouth every day.  Dose:  40 mg          Disposition:   Discharge home in stable condition    Diet:   Regular    Activity:   As tolerated    Instructions:      Follow up with pulmonary      The patient was instructed to return to the ER in the event of worsening symptoms. I have counseled the patient on the importance of compliance and the patient has agreed to proceed with all medical recommendations and follow up plan indicated above.   The patient understands that all medications come with benefits and risks. Risks may include permanent injury or death and these risks can be minimized with close reassessment and monitoring.        Primary Care Provider:    HAM Larios.  Discharge summary faxed to primary care provider:  Completed  Copy of discharge summary given to the patient: Completed      Follow up appointment details :      Pulmonary outpatient to be  arranged by pulmonologist     Pending Studies:        PET scan to be done outpatient    Time spent on discharge day patient visit, preparing discharge paperwork and arranging for patient follow up.    Summary of follow up issues:   Patient to follow-up outpatient for PET scan    Discharge Time (Minutes) :    >35 minutes  Hospital Course Type:  Inpatient Stay >2 midnights      Condition on Discharge  Stable  ______________________________________________________________________    Interval history/exam for day of discharge:     Interval Problem Daily Status Update   -Patient without acute events overnight. Afebrile with stable vital signs.   -Lung biopsy results returned as reactive changes without acute inflammatory process or malignancy identified. Patient to have outpatient followup now pulmonology for a  PET scan.   -CT yesterday showed no renal tumors  -Home 02 evaluation revealed patient does need 02 at home   -Stable for discharge home today     Physical Exam   Constitutional: She is oriented to person, place, and time and well-developed, well-nourished, and in no distress. No distress.   HENT:   Head: Normocephalic and atraumatic.   Eyes: Right eye exhibits no discharge. Left eye exhibits no discharge. No scleral icterus.   Neck: No JVD present. No tracheal deviation present.   Cardiovascular: Normal rate and regular rhythm.   No murmur heard.  Pulmonary/Chest: Effort normal and breath sounds normal. No respiratory distress. She has no wheezes. She has no rales.   Abdominal: Soft. She exhibits no distension. There is no tenderness.   Obese   Musculoskeletal:         General: No edema.   Neurological: She is alert and oriented to person, place, and time. No cranial nerve deficit.   Skin: Skin is warm and dry. No rash noted.   Psychiatric: Affect normal.   Nursing note and vitals reviewed.    Most Recent Labs:    Lab Results   Component Value Date/Time    WBC 10.4 11/11/2019 12:34 AM    RBC 4.62 11/11/2019  12:34 AM    HEMOGLOBIN 11.6 (L) 11/11/2019 12:34 AM    HEMATOCRIT 38.1 11/11/2019 12:34 AM    MCV 82.5 11/11/2019 12:34 AM    MCH 25.1 (L) 11/11/2019 12:34 AM    MCHC 30.4 (L) 11/11/2019 12:34 AM    MPV 10.1 11/11/2019 12:34 AM    NEUTSPOLYS 79.90 (H) 11/11/2019 12:34 AM    LYMPHOCYTES 9.60 (L) 11/11/2019 12:34 AM    MONOCYTES 9.50 11/11/2019 12:34 AM    EOSINOPHILS 0.10 11/11/2019 12:34 AM    BASOPHILS 0.30 11/11/2019 12:34 AM      Lab Results   Component Value Date/Time    SODIUM 134 (L) 11/11/2019 12:34 AM    POTASSIUM 4.9 11/11/2019 12:34 AM    CHLORIDE 99 11/11/2019 12:34 AM    CO2 24 11/11/2019 12:34 AM    GLUCOSE 106 (H) 11/11/2019 12:34 AM    BUN 13 11/11/2019 12:34 AM    CREATININE 1.13 11/11/2019 12:34 AM      Lab Results   Component Value Date/Time    ALTSGPT 15 11/11/2019 12:34 AM    ASTSGOT 24 11/11/2019 12:34 AM    ALKPHOSPHAT 48 11/11/2019 12:34 AM    TBILIRUBIN 0.6 11/11/2019 12:34 AM    ALBUMIN 3.8 11/11/2019 12:34 AM    GLOBULIN 3.2 11/11/2019 12:34 AM    INR 1.07 11/11/2019 12:34 AM     Lab Results   Component Value Date/Time    PROTHROMBTM 14.1 11/11/2019 12:34 AM    INR 1.07 11/11/2019 12:34 AM

## 2019-11-11 NOTE — ASSESSMENT & PLAN NOTE
History of RCC. Hematuria by history x2 months and confirmed urinalysis on admission.   -CT scan negative for new mass. Suspect uterine bleeding may be the source of her hematuria as she states usually her hematuria correlates with her period. Patient stable for discharge with outpatient followup.

## 2019-11-11 NOTE — PROGRESS NOTES
Patient oriented x4. Pain level 5 out of 10 chest pain under breast, states sharp pain. Per patient has been having this pain for a couple of days. per patient better than on admit. mild SOB with movement. Pt on 3L NC.  Fall risk interventions in place, bed in low position. Assessment completed.

## 2019-11-12 ENCOUNTER — APPOINTMENT (OUTPATIENT)
Dept: RADIOLOGY | Facility: MEDICAL CENTER | Age: 54
DRG: 204 | End: 2019-11-12
Attending: STUDENT IN AN ORGANIZED HEALTH CARE EDUCATION/TRAINING PROGRAM
Payer: COMMERCIAL

## 2019-11-12 ENCOUNTER — APPOINTMENT (OUTPATIENT)
Dept: RADIOLOGY | Facility: MEDICAL CENTER | Age: 54
DRG: 204 | End: 2019-11-12
Attending: RADIOLOGY
Payer: COMMERCIAL

## 2019-11-12 ENCOUNTER — APPOINTMENT (OUTPATIENT)
Dept: CARDIOLOGY | Facility: MEDICAL CENTER | Age: 54
DRG: 204 | End: 2019-11-12
Attending: STUDENT IN AN ORGANIZED HEALTH CARE EDUCATION/TRAINING PROGRAM
Payer: COMMERCIAL

## 2019-11-12 PROBLEM — F41.8 SITUATIONAL ANXIETY: Status: ACTIVE | Noted: 2019-11-12

## 2019-11-12 LAB
ANION GAP SERPL CALC-SCNC: 9 MMOL/L (ref 0–11.9)
BASOPHILS # BLD AUTO: 0.4 % (ref 0–1.8)
BASOPHILS # BLD: 0.03 K/UL (ref 0–0.12)
BUN SERPL-MCNC: 13 MG/DL (ref 8–22)
CALCIUM SERPL-MCNC: 8.8 MG/DL (ref 8.5–10.5)
CHLORIDE SERPL-SCNC: 100 MMOL/L (ref 96–112)
CO2 SERPL-SCNC: 27 MMOL/L (ref 20–33)
COMMENT 1642: NORMAL
CREAT SERPL-MCNC: 0.94 MG/DL (ref 0.5–1.4)
EOSINOPHIL # BLD AUTO: 0.08 K/UL (ref 0–0.51)
EOSINOPHIL NFR BLD: 1 % (ref 0–6.9)
ERYTHROCYTE [DISTWIDTH] IN BLOOD BY AUTOMATED COUNT: 44.3 FL (ref 35.9–50)
GLUCOSE SERPL-MCNC: 104 MG/DL (ref 65–99)
HAPTOGLOB SERPL-MCNC: 440 MG/DL (ref 30–200)
HCT VFR BLD AUTO: 37.4 % (ref 37–47)
HGB BLD-MCNC: 11 G/DL (ref 12–16)
HYPOCHROMIA BLD QL SMEAR: ABNORMAL
IMM GRANULOCYTES # BLD AUTO: 0.03 K/UL (ref 0–0.11)
IMM GRANULOCYTES NFR BLD AUTO: 0.4 % (ref 0–0.9)
LYMPHOCYTES # BLD AUTO: 0.72 K/UL (ref 1–4.8)
LYMPHOCYTES NFR BLD: 8.7 % (ref 22–41)
MCH RBC QN AUTO: 24.3 PG (ref 27–33)
MCHC RBC AUTO-ENTMCNC: 29.4 G/DL (ref 33.6–35)
MCV RBC AUTO: 82.7 FL (ref 81.4–97.8)
MONOCYTES # BLD AUTO: 0.94 K/UL (ref 0–0.85)
MONOCYTES NFR BLD AUTO: 11.3 % (ref 0–13.4)
MORPHOLOGY BLD-IMP: NORMAL
NEUTROPHILS # BLD AUTO: 6.51 K/UL (ref 2–7.15)
NEUTROPHILS NFR BLD: 78.2 % (ref 44–72)
NRBC # BLD AUTO: 0 K/UL
NRBC BLD-RTO: 0 /100 WBC
OVALOCYTES BLD QL SMEAR: NORMAL
PATHOLOGY CONSULT NOTE: NORMAL
PLATELET # BLD AUTO: 275 K/UL (ref 164–446)
PLATELET BLD QL SMEAR: NORMAL
PMV BLD AUTO: 10.5 FL (ref 9–12.9)
POIKILOCYTOSIS BLD QL SMEAR: NORMAL
POTASSIUM SERPL-SCNC: 4.1 MMOL/L (ref 3.6–5.5)
RBC # BLD AUTO: 4.52 M/UL (ref 4.2–5.4)
RBC BLD AUTO: PRESENT
SODIUM SERPL-SCNC: 136 MMOL/L (ref 135–145)
WBC # BLD AUTO: 8.3 K/UL (ref 4.8–10.8)

## 2019-11-12 PROCEDURE — 0BBL3ZX EXCISION OF LEFT LUNG, PERCUTANEOUS APPROACH, DIAGNOSTIC: ICD-10-PCS | Performed by: RADIOLOGY

## 2019-11-12 PROCEDURE — 85025 COMPLETE CBC W/AUTO DIFF WBC: CPT

## 2019-11-12 PROCEDURE — 88360 TUMOR IMMUNOHISTOCHEM/MANUAL: CPT

## 2019-11-12 PROCEDURE — 88342 IMHCHEM/IMCYTCHM 1ST ANTB: CPT

## 2019-11-12 PROCEDURE — 71045 X-RAY EXAM CHEST 1 VIEW: CPT

## 2019-11-12 PROCEDURE — 80048 BASIC METABOLIC PNL TOTAL CA: CPT

## 2019-11-12 PROCEDURE — 700111 HCHG RX REV CODE 636 W/ 250 OVERRIDE (IP): Performed by: INTERNAL MEDICINE

## 2019-11-12 PROCEDURE — 700102 HCHG RX REV CODE 250 W/ 637 OVERRIDE(OP): Performed by: RADIOLOGY

## 2019-11-12 PROCEDURE — 88305 TISSUE EXAM BY PATHOLOGIST: CPT

## 2019-11-12 PROCEDURE — 99233 SBSQ HOSP IP/OBS HIGH 50: CPT | Mod: GC | Performed by: INTERNAL MEDICINE

## 2019-11-12 PROCEDURE — 700102 HCHG RX REV CODE 250 W/ 637 OVERRIDE(OP): Performed by: STUDENT IN AN ORGANIZED HEALTH CARE EDUCATION/TRAINING PROGRAM

## 2019-11-12 PROCEDURE — A9270 NON-COVERED ITEM OR SERVICE: HCPCS | Performed by: STUDENT IN AN ORGANIZED HEALTH CARE EDUCATION/TRAINING PROGRAM

## 2019-11-12 PROCEDURE — 700111 HCHG RX REV CODE 636 W/ 250 OVERRIDE (IP): Performed by: STUDENT IN AN ORGANIZED HEALTH CARE EDUCATION/TRAINING PROGRAM

## 2019-11-12 PROCEDURE — 88341 IMHCHEM/IMCYTCHM EA ADD ANTB: CPT

## 2019-11-12 PROCEDURE — 700111 HCHG RX REV CODE 636 W/ 250 OVERRIDE (IP)

## 2019-11-12 PROCEDURE — A9270 NON-COVERED ITEM OR SERVICE: HCPCS | Performed by: RADIOLOGY

## 2019-11-12 PROCEDURE — 770020 HCHG ROOM/CARE - TELE (206)

## 2019-11-12 PROCEDURE — 32405 CT-BIOPSY-LUNG/MEDIASTINUM: CPT | Mod: LT

## 2019-11-12 RX ORDER — ONDANSETRON 2 MG/ML
4 INJECTION INTRAMUSCULAR; INTRAVENOUS PRN
Status: DISCONTINUED | OUTPATIENT
Start: 2019-11-12 | End: 2019-11-12 | Stop reason: HOSPADM

## 2019-11-12 RX ORDER — OXYCODONE HYDROCHLORIDE 5 MG/1
5 TABLET ORAL
Status: DISPENSED | OUTPATIENT
Start: 2019-11-12 | End: 2019-11-13

## 2019-11-12 RX ORDER — ONDANSETRON 2 MG/ML
4 INJECTION INTRAMUSCULAR; INTRAVENOUS EVERY 8 HOURS PRN
Status: ACTIVE | OUTPATIENT
Start: 2019-11-12 | End: 2019-11-13

## 2019-11-12 RX ORDER — ACETAMINOPHEN 500 MG
1000 TABLET ORAL EVERY 6 HOURS
Status: DISPENSED | OUTPATIENT
Start: 2019-11-12 | End: 2019-11-13

## 2019-11-12 RX ORDER — MIDAZOLAM HYDROCHLORIDE 1 MG/ML
.5-2 INJECTION INTRAMUSCULAR; INTRAVENOUS PRN
Status: DISCONTINUED | OUTPATIENT
Start: 2019-11-12 | End: 2019-11-12 | Stop reason: HOSPADM

## 2019-11-12 RX ORDER — MIDAZOLAM HYDROCHLORIDE 1 MG/ML
INJECTION INTRAMUSCULAR; INTRAVENOUS
Status: COMPLETED
Start: 2019-11-12 | End: 2019-11-12

## 2019-11-12 RX ORDER — SODIUM CHLORIDE 9 MG/ML
500 INJECTION, SOLUTION INTRAVENOUS
Status: DISCONTINUED | OUTPATIENT
Start: 2019-11-12 | End: 2019-11-12 | Stop reason: HOSPADM

## 2019-11-12 RX ORDER — OXYCODONE HYDROCHLORIDE 10 MG/1
10 TABLET ORAL
Status: ACTIVE | OUTPATIENT
Start: 2019-11-12 | End: 2019-11-13

## 2019-11-12 RX ORDER — NALOXONE HYDROCHLORIDE 0.4 MG/ML
INJECTION, SOLUTION INTRAMUSCULAR; INTRAVENOUS; SUBCUTANEOUS
Status: COMPLETED
Start: 2019-11-12 | End: 2019-11-12

## 2019-11-12 RX ADMIN — OMEPRAZOLE 40 MG: 20 CAPSULE, DELAYED RELEASE ORAL at 17:00

## 2019-11-12 RX ADMIN — ACETAMINOPHEN 650 MG: 325 TABLET, FILM COATED ORAL at 04:23

## 2019-11-12 RX ADMIN — OMEPRAZOLE 40 MG: 20 CAPSULE, DELAYED RELEASE ORAL at 04:23

## 2019-11-12 RX ADMIN — ACETAMINOPHEN 1000 MG: 500 TABLET ORAL at 17:00

## 2019-11-12 RX ADMIN — PREDNISONE 10 MG: 10 TABLET ORAL at 04:23

## 2019-11-12 RX ADMIN — BUDESONIDE AND FORMOTEROL FUMARATE DIHYDRATE 2 PUFF: 80; 4.5 AEROSOL RESPIRATORY (INHALATION) at 04:23

## 2019-11-12 RX ADMIN — LISINOPRIL 20 MG: 20 TABLET ORAL at 04:23

## 2019-11-12 RX ADMIN — MIDAZOLAM HYDROCHLORIDE 1 MG: 1 INJECTION INTRAMUSCULAR; INTRAVENOUS at 11:14

## 2019-11-12 RX ADMIN — MORPHINE SULFATE 2 MG: 4 INJECTION INTRAVENOUS at 00:58

## 2019-11-12 RX ADMIN — MIDAZOLAM HYDROCHLORIDE 1 MG: 1 INJECTION, SOLUTION INTRAMUSCULAR; INTRAVENOUS at 11:14

## 2019-11-12 RX ADMIN — FENTANYL CITRATE 25 MCG: 50 INJECTION, SOLUTION INTRAMUSCULAR; INTRAVENOUS at 11:14

## 2019-11-12 RX ADMIN — BUDESONIDE AND FORMOTEROL FUMARATE DIHYDRATE 2 PUFF: 80; 4.5 AEROSOL RESPIRATORY (INHALATION) at 17:02

## 2019-11-12 RX ADMIN — MORPHINE SULFATE 2 MG: 4 INJECTION INTRAVENOUS at 04:00

## 2019-11-12 RX ADMIN — SENNOSIDES AND DOCUSATE SODIUM 2 TABLET: 8.6; 5 TABLET ORAL at 04:23

## 2019-11-12 RX ADMIN — HYDROCODONE BITARTRATE AND ACETAMINOPHEN 1 TABLET: 5; 325 TABLET ORAL at 19:25

## 2019-11-12 ASSESSMENT — ENCOUNTER SYMPTOMS
NAUSEA: 0
LOSS OF CONSCIOUSNESS: 0
SPUTUM PRODUCTION: 0
ABDOMINAL PAIN: 0
VOMITING: 0
COUGH: 1
CHILLS: 0
BLOOD IN STOOL: 0
PALPITATIONS: 0
DIZZINESS: 0
DIARRHEA: 0
FEVER: 0
SHORTNESS OF BREATH: 1
EYE PAIN: 0
NECK PAIN: 0
BRUISES/BLEEDS EASILY: 0
DEPRESSION: 0
CONSTIPATION: 0
BACK PAIN: 0
HEADACHES: 0
NERVOUS/ANXIOUS: 1

## 2019-11-12 ASSESSMENT — PATIENT HEALTH QUESTIONNAIRE - PHQ9
SUM OF ALL RESPONSES TO PHQ9 QUESTIONS 1 AND 2: 0
2. FEELING DOWN, DEPRESSED, IRRITABLE, OR HOPELESS: NOT AT ALL
1. LITTLE INTEREST OR PLEASURE IN DOING THINGS: NOT AT ALL

## 2019-11-12 NOTE — PROGRESS NOTES
Report given to IR. Pt to go to CT 4 when IR personnel comes to ICU to get patient. Charge RN to go on transport for Tele monitored patient.

## 2019-11-12 NOTE — PROGRESS NOTES
Patient wishes to not have IV pain medication, and stick to just oral if possible to control any pain.

## 2019-11-12 NOTE — NON-PROVIDER
Internal Medicine Medical Student Note  Note Author: Chhaya Newsome, Student    Name Savita Duggan 1965   Age/Sex 54 y.o. female   MRN [id number]   Code Status full       Reason for interval visit  (Principal Problem)   Acute respiratory failure with hypoxia (HCC)    Interval Problem Daily Status Update    - Continues to be anxious but is has improved since yesterday  - T of 100.6 @0400 which resolved w/ acetaminophen  - BP spike appeared to be 2/2 anxiety attack and pain and has been well controled with lisinopril, hydroxyxzine for anxiety, morphine for pain, and Nicardipine bolus.       Physical Exam       Vitals:    19 2300 19 0000 19 0400 19 0800   BP: 146/58 158/68 157/70    Pulse: 79 83 100    Resp:       Temp:  37.2 °C (98.9 °F) (!) 38.1 °C (100.6 °F)    TempSrc:  Temporal Temporal Temporal   SpO2: 100% 98% 97%    Weight:       Height:         Body mass index is 54.5 kg/m².   Oxygen Therapy:  Pulse Oximetry: 97 %, O2 (LPM): 2, O2 Delivery: Silicone Nasal Cannula    Physical Exam   Constitutional: She is oriented to person, place, and time. Vital signs are normal.   HENT:   Head: Normocephalic and atraumatic.   Eyes: Pupils are equal, round, and reactive to light. Conjunctivae and EOM are normal.   Neck: Normal range of motion.   Cardiovascular: Normal rate and regular rhythm.   Pulmonary/Chest:   Limited by body habitus and inability to move for auscultation of back. No respirations appreciated on side/front.   Abdominal: Soft. Bowel sounds are normal.   Musculoskeletal: Normal range of motion.   Neurological: She is alert and oriented to person, place, and time.   Skin: Skin is warm, dry and intact.   Psychiatric: Her mood appears anxious.       Assessment/Plan     Pulm masses:  - IR biopsy at noon today; will wait on results to change current care  - if she has another fever before biopsy results will consider adding in abx

## 2019-11-12 NOTE — PROGRESS NOTES
Internal Medicine Interval Note  Note Author: Laquita Leger M.D.     Name Savita Duggan 1965   Age/Sex 54 y.o. female   MRN [id number]   Code Status FULL     After 5PM or if no immediate response to page, please call for cross-coverage  Attending/Team: Dr. Stern - Red Team See Patient List for primary contact information  Call [phone] to page    1st Call - Day Intern (R1):   Dr. Leger 2nd Call - Day Sr. Resident (R2/R3):   Dr. Taylor     Reason for interval visit    Shortness of Breath    Interval Problem Daily Status Update    -Patient without acute events overnight. Temperature up to 100.6F that responded to Tylenol, otherwise vitals were stable.    -Pending CT guided lung biopsy today   -Feels appropriately anxious about the situation today, but overall better with less shortness of breath     Review of Systems   Constitutional: Negative for chills and fever.   HENT: Negative for ear pain and tinnitus.    Eyes: Negative for pain.   Respiratory: Positive for cough and shortness of breath. Negative for sputum production.    Cardiovascular: Negative for chest pain, palpitations and leg swelling.   Gastrointestinal: Negative for abdominal pain, blood in stool, constipation, diarrhea, melena, nausea and vomiting.   Genitourinary: Negative for dysuria and urgency.   Musculoskeletal: Negative for back pain and neck pain.   Skin: Negative for rash.   Neurological: Negative for dizziness, loss of consciousness and headaches.   Endo/Heme/Allergies: Negative for environmental allergies. Does not bruise/bleed easily.   Psychiatric/Behavioral: Negative for depression and suicidal ideas. The patient is nervous/anxious.    All other systems reviewed and are negative.    Disposition/Barriers to discharge:   Continued inpatient evaluation and treatment    Consultants/Specialty  Pulmonology   PCP: Radhika Johnson, F.N.P.    Quality Measures  Quality-Core Measures   Krueger catheter::  No Krueger  DVT prophylaxis -  mechanical:  SCDs    Physical Exam     Vitals:    11/11/19 2100 11/11/19 2300 11/12/19 0000 11/12/19 0400   BP:  146/58 158/68 157/70   Pulse:  79 83 100   Resp:       Temp:   37.2 °C (98.9 °F) (!) 38.1 °C (100.6 °F)   TempSrc:   Temporal Temporal   SpO2: 97% 100% 98% 97%   Weight:       Height:         Body mass index is 54.5 kg/m².   Oxygen Therapy:  Pulse Oximetry: 97 %, O2 (LPM): 2, O2 Delivery: Silicone Nasal Cannula    Physical Exam   Constitutional: She is oriented to person, place, and time and well-developed, well-nourished, and in no distress. No distress.   HENT:   Head: Normocephalic and atraumatic.   Injected sclera   Eyes: Right eye exhibits no discharge. Left eye exhibits no discharge. No scleral icterus.   Injected sclera   Neck: No JVD present. No tracheal deviation present.   Cardiovascular: Normal rate and regular rhythm.   No murmur heard.  Pulmonary/Chest: Effort normal and breath sounds normal. No respiratory distress. She has no wheezes. She has no rales.   Abdominal: Soft. She exhibits no distension. There is no tenderness. There is no rebound.   Obese   Musculoskeletal:         General: No edema.   Lymphadenopathy:     She has no cervical adenopathy.   Neurological: She is alert and oriented to person, place, and time. No cranial nerve deficit.   Skin: Skin is warm and dry. No rash noted.   Psychiatric:   Anxious   Nursing note and vitals reviewed.      Assessment/Plan     * Acute respiratory failure with hypoxia (HCC)- (present on admission)  Assessment & Plan  Presented with acute hypoxic respiratory failure. History of COPD and uses 02 only at night. She presented saturating 85% on RA. Reports cough and malaise x2 days. No WBC count and procalcitonin is 0.11, making infection less likely. Imaging is remarkable for a new lung mass vs. large consolidation. We are working her up for a potential lung mass and treating for a COPD exacerbation. Pending a biopsy today.     Plan:  -CT guided  biopsy of lung mass, pending today   -On RT protocol with ventura for COPD exacerbation  -Prednisone 40 mg daily for COPD exacerbation  -Echo to rule out new heart failure  -Close monitoring of her respiratory status off antibiotics, will add back for persistent fevers    H/O renal cell carcinoma s/p nephrectomy- (present on admission)  Assessment & Plan  - Nephrectomy 3 years ago for RCC    Hypertension- (present on admission)  Assessment & Plan  - Continue on lisinopril 20 mg daily    Asymptomatic microscopic hematuria  Assessment & Plan  History of RCC. Hematuria by history x2 months and confirmed urinalysis on admission.   -Monitoring inpatient, may need further workup outpatient given her history    Situational anxiety  Assessment & Plan  Patient appropriately anxious about possibly cancer diagnosis    Plan  -Reassurance  -Atarax TID prn    Anemia- (present on admission)  Assessment & Plan  Hb 11.6, no known baseline. Iron studies show low %sat/iron level with normal ferritin. ?Mixed anemia? She has had hematuria for a few months which could account for a possible loss.   -Monitoring H&H, currently stable    GERD (gastroesophageal reflux disease)- (present on admission)  Assessment & Plan  - Continue on Omeprazole 40 mg BID

## 2019-11-12 NOTE — PROGRESS NOTES
CT Nursing Note:    Patient consented by Dr. Mchugh, all questions answered. Dr. Mchugh performed Left Lung Mass Biopsy with imaging guidance.  Cores x 3 taken by Bladimir HAAS.  The pt appeared to tolerate the procedure well; ETCo2 baseline 39, with consistent waveform during the procedure.  Gauze and tegaderm applied to left armpit, CDI and soft.  Pt alert and verbally appropriate post procedure, vital signs stable during procedure and transport, see flow sheet for vital signs.  Report given to SARAH Posadas.  RN transported pt to [facility] with ICU monitor.

## 2019-11-12 NOTE — CONSULTS
Pulmonary Consultation    Date of consult: 11/11/2019    Referring Physician  Regino Tyler M.D.    Reason for Consultation  Dr. Taylor    History of Presenting Illness  54 y.o. female who presented 11/10/2019 with history of renal cell carcinoma status post right nephrectomy 3 years ago.  Told she did not need any follow-up.Hx of previous MI and HTN.  She also has known COPD on 2 L home oxygen at night as well as reflux.  Presented form [facility] for left lung mass 8.1 cm.  She was also found to be hypoxic at 85% on room air requiring 3 L of oxygen.  Initially presented with left-sided chest pain had progressed over a week and was associated with shortness of breath.  She has had some weight loss and loss of appetite.  No hemoptysis..  Looks after her 3 grandkids  Full-time and is a supervisor for housekeeping.  No pets.  No history of trauma.  Not a current smoker XT pack-year history of smoking quit 3 years ago  Previous IV drug use as a teenager.  No cough.  Procalcitonin 0.11.  Code Status  Full Code    Review of Systems  Review of Systems   Constitutional: Positive for malaise/fatigue.        Loss of appetite   HENT: Negative.    Eyes: Negative.    Respiratory: Negative.    Cardiovascular: Positive for chest pain.        Under her left breast radiating to her arm pit   Gastrointestinal: Negative.    Genitourinary: Negative.    Musculoskeletal: Negative.    Skin: Negative.    Neurological: Positive for weakness.   Endo/Heme/Allergies: Negative.    Psychiatric/Behavioral: The patient is nervous/anxious.        Past Medical History  Reflux  COPD nighttime oxygen  Renal cell carcinoma status post right nephrectomy no chemotherapy    Surgical History  As above    Family History  No family history of lung cancer    Social History   reports that she quit smoking about 3 years ago. Her smoking use included cigarettes. She has a 38.00 pack-year smoking history. She has never used smokeless tobacco. She reports that she  does not drink alcohol.    Medications  Home Medications     Reviewed by Karel Arvizu (Pharmacy [facility]) on 11/11/19 at 0014  Med List Status: Complete   Medication Last Dose Status   HYDROcodone-acetaminophen (NORCO) 5-325 MG Tab per tablet  Active   lisinopril (PRINIVIL) 20 MG Tab  Active   omeprazole (PRILOSEC) 40 MG delayed-release capsule 11/11/2019 Active              Current Facility-Administered Medications   Medication Dose Route Frequency Provider Last Rate Last Dose   • senna-docusate (PERICOLACE or SENOKOT S) 8.6-50 MG per tablet 2 Tab  2 Tab Oral BID Linh Pastrana M.D.        And   • polyethylene glycol/lytes (MIRALAX) PACKET 1 Packet  1 Packet Oral QDAY PRN Linh Pastrana M.D.        And   • magnesium hydroxide (MILK OF MAGNESIA) suspension 30 mL  30 mL Oral QDAY PRN Linh Pastrana M.D.        And   • bisacodyl (DULCOLAX) suppository 10 mg  10 mg Rectal QDAY PRN Linh Pastrana M.D.       • Respiratory Care per Protocol   Nebulization Continuous RT Linh Pastrana M.D.       • acetaminophen (TYLENOL) tablet 650 mg  650 mg Oral Q6HRS PRN Linh Pastrana M.D.   650 mg at 11/11/19 1128   • ondansetron (ZOFRAN) syringe/vial injection 4 mg  4 mg Intravenous Q4HRS PRN Linh Pastrana M.D.       • ondansetron (ZOFRAN ODT) dispertab 4 mg  4 mg Oral Q4HRS PRN Linh Pastrana M.D.       • promethazine (PHENERGAN) tablet 12.5-25 mg  12.5-25 mg Oral Q4HRS PRN Linh Pastrana M.D.       • promethazine (PHENERGAN) suppository 12.5-25 mg  12.5-25 mg Rectal Q4HRS PRN Linh Pastrana M.D.       • prochlorperazine (COMPAZINE) injection 5-10 mg  5-10 mg Intravenous Q4HRS PRN Linh Pastrana M.D.   10 mg at 11/11/19 1232   • HYDROcodone-acetaminophen (NORCO) 5-325 MG per tablet 1 Tab  1 Tab Oral Q4HRS PRN Linh Pastrana M.D.       • lisinopril (PRINIVIL) tablet 20 mg  20 mg Oral DAILY Linh Pastrana M.D.   20 mg at 11/11/19 0332   • omeprazole (PRILOSEC) capsule 40 mg  40 mg Oral BID Linh Pastrana  M.D.   40 mg at 11/11/19 1124   • Influenza Vaccine Quad pf injection 0.5 mL  0.5 mL Intramuscular Once PRN Chris Ruiz M.D.       • budesonide-formoterol (SYMBICORT) 80-4.5 MCG/ACT inhaler 2 Puff  2 Puff Inhalation BID (RT) Linh Pastrana M.D.       • albuterol (PROVENTIL) 2.5mg/0.5ml nebulizer solution 2.5 mg  2.5 mg Nebulization Q4H PRN (RT) Linh Pastrana M.D.       • labetalol (NORMODYNE/TRANDATE) injection 10 mg  10 mg Intravenous Q6HRS PRN Linh Pastrana M.D.   10 mg at 11/11/19 1012   • predniSONE (DELTASONE) tablet 10 mg  10 mg Oral DAILY Randy Nelson M.D.   10 mg at 11/11/19 0950   • morphine (pf) 4 MG/ML injection 2 mg  2 mg Intravenous Q3HRS PRN Laquita Leger M.D.   2 mg at 11/11/19 1211   • hydrOXYzine HCl (ATARAX) tablet 50 mg  50 mg Oral TID PRN Laquita Leger M.D.           Allergies  Allergies   Allergen Reactions   • Codeine Itching   • Penicillins Itching and Swelling     Reports tolerating amoxicillin       Vital Signs last 24 hours  Temp:  [36.5 °C (97.7 °F)-38.2 °C (100.7 °F)] 36.6 °C (97.8 °F)  Pulse:  [] 92  Resp:  [20-22] 20  BP: (140-203)/(57-88) 140/57  SpO2:  [95 %-100 %] 96 %    Physical Exam  Physical Exam  Constitutional:       Appearance: Normal appearance.   HENT:      Head: Normocephalic and atraumatic.      Nose: No congestion or rhinorrhea.      Mouth/Throat:      Mouth: Mucous membranes are moist.      Pharynx: Oropharynx is clear. No oropharyngeal exudate or posterior oropharyngeal erythema.   Eyes:      General: No scleral icterus.        Right eye: No discharge.         Left eye: No discharge.      Extraocular Movements: Extraocular movements intact.      Conjunctiva/sclera: Conjunctivae normal.      Pupils: Pupils are equal, round, and reactive to light.   Neck:      Musculoskeletal: Normal range of motion and neck supple.   Cardiovascular:      Rate and Rhythm: Normal rate and regular rhythm.   Pulmonary:      Effort: Pulmonary effort is normal.       Comments: Diminished bilaterally and equal  Abdominal:      General: Bowel sounds are normal.      Comments: Obese unable to palpate any masses   Musculoskeletal:         General: Tenderness present.      Comments: She is tender across her left breast chest wall area  No Erythema   Skin:     General: Skin is warm and dry.   Neurological:      General: No focal deficit present.      Mental Status: She is alert and oriented to person, place, and time.   Psychiatric:         Judgment: Judgment normal.      Comments: Very emotional         Fluids    Intake/Output Summary (Last 24 hours) at 2019 1648  Last data filed at 2019 1400  Gross per 24 hour   Intake 1450 ml   Output 450 ml   Net 1000 ml       Laboratory  Recent Results (from the past 48 hour(s))   Influenza A/B By PCR (Adult - Flu Only)    Collection Time: 11/10/19 12:55 AM   Result Value Ref Range    Influenza virus A RNA Negative Negative    Influenza virus B, PCR Negative Negative   EKG    Collection Time: 11/10/19 11:11 PM   Result Value Ref Range    Report       [facility] Emergency Dept.    Test Date:  2019-11-10  Pt Name:    DESTINEE VAUGHAN                    Department: ER  MRN:        [id number]                      Room:       [facility]  Gender:     Female                       Technician: [id number]  :        1965                   Requested By:MOISES FRAZIER  Order #:    [id number]                    Reading MD: Moises Frazier MD    Measurements  Intervals                                Axis  Rate:       75                           P:          -3  LA:         136                          QRS:        22  QRSD:       82                           T:          35  QT:         384  QTc:        429    Interpretive Statements  SINUS RHYTHM  BASELINE WANDER IN LEAD(S) V1  Compared to ECG 2017 03:00:50  Myocardial infarct finding no longer present  No STEMI or strain or dysrhythmia  Electronically Signed On 2019 11:33:01  PST by Moises Rockwell MD     CBC WITH DIFFERENTIAL    Collection Time: 11/11/19 12:34 AM   Result Value Ref Range    WBC 10.4 4.8 - 10.8 K/uL    RBC 4.62 4.20 - 5.40 M/uL    Hemoglobin 11.6 (L) 12.0 - 16.0 g/dL    Hematocrit 38.1 37.0 - 47.0 %    MCV 82.5 81.4 - 97.8 fL    MCH 25.1 (L) 27.0 - 33.0 pg    MCHC 30.4 (L) 33.6 - 35.0 g/dL    RDW 43.5 35.9 - 50.0 fL    Platelet Count 261 164 - 446 K/uL    MPV 10.1 9.0 - 12.9 fL    Neutrophils-Polys 79.90 (H) 44.00 - 72.00 %    Lymphocytes 9.60 (L) 22.00 - 41.00 %    Monocytes 9.50 0.00 - 13.40 %    Eosinophils 0.10 0.00 - 6.90 %    Basophils 0.30 0.00 - 1.80 %    Immature Granulocytes 0.60 0.00 - 0.90 %    Nucleated RBC 0.00 /100 WBC    Neutrophils (Absolute) 8.34 (H) 2.00 - 7.15 K/uL    Lymphs (Absolute) 1.00 1.00 - 4.80 K/uL    Monos (Absolute) 0.99 (H) 0.00 - 0.85 K/uL    Eos (Absolute) 0.01 0.00 - 0.51 K/uL    Baso (Absolute) 0.03 0.00 - 0.12 K/uL    Immature Granulocytes (abs) 0.06 0.00 - 0.11 K/uL    NRBC (Absolute) 0.00 K/uL   COMP METABOLIC PANEL    Collection Time: 11/11/19 12:34 AM   Result Value Ref Range    Sodium 134 (L) 135 - 145 mmol/L    Potassium 4.9 3.6 - 5.5 mmol/L    Chloride 99 96 - 112 mmol/L    Co2 24 20 - 33 mmol/L    Anion Gap 11.0 0.0 - 11.9    Glucose 106 (H) 65 - 99 mg/dL    Bun 13 8 - 22 mg/dL    Creatinine 1.13 0.50 - 1.40 mg/dL    Calcium 8.6 8.5 - 10.5 mg/dL    AST(SGOT) 24 12 - 45 U/L    ALT(SGPT) 15 2 - 50 U/L    Alkaline Phosphatase 48 30 - 99 U/L    Total Bilirubin 0.6 0.1 - 1.5 mg/dL    Albumin 3.8 3.2 - 4.9 g/dL    Total Protein 7.0 6.0 - 8.2 g/dL    Globulin 3.2 1.9 - 3.5 g/dL    A-G Ratio 1.2 g/dL   LACTIC ACID    Collection Time: 11/11/19 12:34 AM   Result Value Ref Range    Lactic Acid 2.3 (H) 0.5 - 2.0 mmol/L   proBrain Natriuretic Peptide, NT    Collection Time: 11/11/19 12:34 AM   Result Value Ref Range    NT-proBNP 677 (H) 0 - 125 pg/mL   CORTISOL    Collection Time: 11/11/19 12:34 AM   Result Value Ref Range    Cortisol  14.9 0.0 - 23.0 ug/dL   MAGNESIUM    Collection Time: 11/11/19 12:34 AM   Result Value Ref Range    Magnesium 1.8 1.5 - 2.5 mg/dL   TROPONIN    Collection Time: 11/11/19 12:34 AM   Result Value Ref Range    Troponin T 10 6 - 19 ng/L   APTT    Collection Time: 11/11/19 12:34 AM   Result Value Ref Range    APTT 24.9 24.7 - 36.0 sec   PROTHROMBIN TIME    Collection Time: 11/11/19 12:34 AM   Result Value Ref Range    PT 14.1 12.0 - 14.6 sec    INR 1.07 0.87 - 1.13   PROCALCITONIN    Collection Time: 11/11/19 12:34 AM   Result Value Ref Range    Procalcitonin 0.11 <0.25 ng/mL   ESTIMATED GFR    Collection Time: 11/11/19 12:34 AM   Result Value Ref Range    GFR If African American >60 >60 mL/min/1.73 m 2    GFR If Non African American 50 (A) >60 mL/min/1.73 m 2   LDH    Collection Time: 11/11/19 12:34 AM   Result Value Ref Range    LDH Total 323 (H) 107 - 266 U/L   IRON/TOTAL IRON BIND    Collection Time: 11/11/19 12:34 AM   Result Value Ref Range    Iron 11 (L) 40 - 170 ug/dL    Total Iron Binding 388 250 - 450 ug/dL    % Saturation 3 (L) 15 - 55 %   FERRITIN    Collection Time: 11/11/19 12:34 AM   Result Value Ref Range    Ferritin 145.4 10.0 - 291.0 ng/mL   RETICULOCYTES COUNT    Collection Time: 11/11/19 12:34 AM   Result Value Ref Range    Reticulocyte Count 1.1 0.8 - 2.1 %    Retic, Absolute 0.05 0.04 - 0.06 M/uL    Imm. Reticulocyte Fraction 16.7 9.3 - 17.4 %    Retic Hgb Equivalent 24.8 (L) 29.0 - 35.0 pg/cell   LACTIC ACID    Collection Time: 11/11/19  3:05 AM   Result Value Ref Range    Lactic Acid 1.3 0.5 - 2.0 mmol/L   URINALYSIS    Collection Time: 11/11/19  3:40 AM   Result Value Ref Range    Color DK Yellow     Character Turbid (A)     Specific Gravity 1.031 <1.035    Ph 5.5 5.0 - 8.0    Glucose Negative Negative mg/dL    Ketones Negative Negative mg/dL    Protein 100 (A) Negative mg/dL    Bilirubin Small (A) Negative    Urobilinogen, Urine 1.0 Negative    Nitrite Negative Negative    Leukocyte Esterase  Trace (A) Negative    Occult Blood Moderate (A) Negative    Micro Urine Req Microscopic    URINE MICROSCOPIC (W/UA)    Collection Time: 11/11/19  3:40 AM   Result Value Ref Range    WBC 20-50 (A) /hpf    RBC 10-20 (A) /hpf    Bacteria Moderate (A) None /hpf    Epithelial Cells Many (A) /hpf   LACTIC ACID    Collection Time: 11/11/19  4:26 AM   Result Value Ref Range    Lactic Acid 1.3 0.5 - 2.0 mmol/L   TROPONIN    Collection Time: 11/11/19  5:55 AM   Result Value Ref Range    Troponin T 9 6 - 19 ng/L       Imaging  Outside CT imaging 11/10/2019 reviewed with the 8 cm left upper lobe mass    Assessment/Plan  Mass of left lung  Assessment & Plan  8 cm lung mass associated with chest pain and minimal symptoms supportive of pneumonia.  With her history of smoking as well as her previous history of malignancy very concerning for malignancy but this would not be norm for renal cell carcinoma to be so large as a metastasis.  Concerning for lung cancer versus sarcoma.  Would recommend CT-guided biopsy due to the peripheral nature of the mass  Reviewed with patient as well as Dr. Taylor  We will follow-up post biopsy  This does not appear pneumonic with no air bronchograms coming from the airway.  Does not appear to be an abscess.  Thus do not recommend antibiotics      Discussed patient condition with Dr. Taylor

## 2019-11-12 NOTE — OR SURGEON
Immediate Post- Operative Note        PostOp Diagnosis: LEFT LUNG MASS      Procedure(s): CT BIOPSY    Estimated Blood Loss: Less than 5 ml        Complications: None            11/12/2019     11:29 AM     Bret Mchugh

## 2019-11-12 NOTE — FLOWSHEET NOTE
Respiratory Therapy Update    Interdisciplinary Plan of Care-Goals (Indications)  Bronchodilator Indications: History / Diagnosis (11/11/19 0528)                                              O2 (LPM): 2 (11/12/19 0800)  O2 Daily Delivery Respiratory : Room Air with O2 Available (11/12/19 0750)    Breath Sounds  RUL Breath Sounds: Clear (11/11/19 2000)  RML Breath Sounds: Diminished (11/11/19 2000)  RLL Breath Sounds: Diminished (11/11/19 2000)  MANDO Breath Sounds: Diminished (11/11/19 2000)  LLL Breath Sounds: Diminished (11/11/19 2000)      Events/Summary/Plan: MDI was given by RN at 0423.  A spacer was given and all parts were tagged (11/12/19 0750)

## 2019-11-12 NOTE — ASSESSMENT & PLAN NOTE
"8 cm lung mass associated with chest pain and minimal symptoms supportive of pneumonia.  With her history of smoking as well as her previous history of malignancy very concerning for malignancy but this would not be norm for renal cell carcinoma to be so large as a metastasis.  Concerning for lung cancer versus sarcoma.  But bx proved otherwise  \"Cores of lung parenchyma demonstrating a benign          lymphohistiocytic inflammatory infiltrate, fibrin deposition          and other reactive-type changes (see comment).         Fragments of benign skeletal muscle also present.         No acute inflammatory process or malignancy identified.    Comment: The differential diagnosis for these histologic findings  includes, but is not limited to, a resolving infectious or hemorrhagic  process. \"    The concerning part is she has NO history of trauma  I reviewed with the patient that this is somewhat unusual and would need to ensure not enlarging and assess if she needs surgical intervention or if there are  Components that are more prominent on PET scan that should be bx  She is being discharged tomorrow so will set up follow up in clinic with a PET scan    D/w Dr. Leger  "

## 2019-11-13 ENCOUNTER — APPOINTMENT (OUTPATIENT)
Dept: RADIOLOGY | Facility: MEDICAL CENTER | Age: 54
DRG: 204 | End: 2019-11-13
Attending: STUDENT IN AN ORGANIZED HEALTH CARE EDUCATION/TRAINING PROGRAM
Payer: COMMERCIAL

## 2019-11-13 ENCOUNTER — APPOINTMENT (OUTPATIENT)
Dept: CARDIOLOGY | Facility: MEDICAL CENTER | Age: 54
DRG: 204 | End: 2019-11-13
Attending: STUDENT IN AN ORGANIZED HEALTH CARE EDUCATION/TRAINING PROGRAM
Payer: COMMERCIAL

## 2019-11-13 PROBLEM — K59.00 CONSTIPATION: Status: ACTIVE | Noted: 2019-11-13

## 2019-11-13 LAB
ANION GAP SERPL CALC-SCNC: 8 MMOL/L (ref 0–11.9)
BACTERIA UR CULT: NORMAL
BASOPHILS # BLD AUTO: 0.5 % (ref 0–1.8)
BASOPHILS # BLD: 0.03 K/UL (ref 0–0.12)
BUN SERPL-MCNC: 12 MG/DL (ref 8–22)
CALCIUM SERPL-MCNC: 8.5 MG/DL (ref 8.5–10.5)
CHLORIDE SERPL-SCNC: 101 MMOL/L (ref 96–112)
CO2 SERPL-SCNC: 27 MMOL/L (ref 20–33)
CREAT SERPL-MCNC: 0.89 MG/DL (ref 0.5–1.4)
EOSINOPHIL # BLD AUTO: 0.21 K/UL (ref 0–0.51)
EOSINOPHIL NFR BLD: 3.2 % (ref 0–6.9)
ERYTHROCYTE [DISTWIDTH] IN BLOOD BY AUTOMATED COUNT: 44.4 FL (ref 35.9–50)
GLUCOSE SERPL-MCNC: 111 MG/DL (ref 65–99)
HCT VFR BLD AUTO: 36.3 % (ref 37–47)
HGB BLD-MCNC: 10.9 G/DL (ref 12–16)
IMM GRANULOCYTES # BLD AUTO: 0.03 K/UL (ref 0–0.11)
IMM GRANULOCYTES NFR BLD AUTO: 0.5 % (ref 0–0.9)
LV EJECT FRACT  99904: 65
LYMPHOCYTES # BLD AUTO: 0.91 K/UL (ref 1–4.8)
LYMPHOCYTES NFR BLD: 14 % (ref 22–41)
MCH RBC QN AUTO: 24.7 PG (ref 27–33)
MCHC RBC AUTO-ENTMCNC: 30 G/DL (ref 33.6–35)
MCV RBC AUTO: 82.1 FL (ref 81.4–97.8)
MONOCYTES # BLD AUTO: 0.8 K/UL (ref 0–0.85)
MONOCYTES NFR BLD AUTO: 12.3 % (ref 0–13.4)
NEUTROPHILS # BLD AUTO: 4.5 K/UL (ref 2–7.15)
NEUTROPHILS NFR BLD: 69.5 % (ref 44–72)
NRBC # BLD AUTO: 0 K/UL
NRBC BLD-RTO: 0 /100 WBC
PLATELET # BLD AUTO: 275 K/UL (ref 164–446)
PMV BLD AUTO: 10.4 FL (ref 9–12.9)
POTASSIUM SERPL-SCNC: 3.7 MMOL/L (ref 3.6–5.5)
RBC # BLD AUTO: 4.42 M/UL (ref 4.2–5.4)
SIGNIFICANT IND 70042: NORMAL
SITE SITE: NORMAL
SODIUM SERPL-SCNC: 136 MMOL/L (ref 135–145)
SOURCE SOURCE: NORMAL
WBC # BLD AUTO: 6.5 K/UL (ref 4.8–10.8)

## 2019-11-13 PROCEDURE — 85025 COMPLETE CBC W/AUTO DIFF WBC: CPT

## 2019-11-13 PROCEDURE — 93306 TTE W/DOPPLER COMPLETE: CPT | Mod: 26 | Performed by: INTERNAL MEDICINE

## 2019-11-13 PROCEDURE — 700111 HCHG RX REV CODE 636 W/ 250 OVERRIDE (IP): Performed by: STUDENT IN AN ORGANIZED HEALTH CARE EDUCATION/TRAINING PROGRAM

## 2019-11-13 PROCEDURE — 99232 SBSQ HOSP IP/OBS MODERATE 35: CPT | Mod: GC | Performed by: INTERNAL MEDICINE

## 2019-11-13 PROCEDURE — A9270 NON-COVERED ITEM OR SERVICE: HCPCS | Performed by: RADIOLOGY

## 2019-11-13 PROCEDURE — 700111 HCHG RX REV CODE 636 W/ 250 OVERRIDE (IP): Performed by: INTERNAL MEDICINE

## 2019-11-13 PROCEDURE — 93306 TTE W/DOPPLER COMPLETE: CPT

## 2019-11-13 PROCEDURE — 700102 HCHG RX REV CODE 250 W/ 637 OVERRIDE(OP): Performed by: STUDENT IN AN ORGANIZED HEALTH CARE EDUCATION/TRAINING PROGRAM

## 2019-11-13 PROCEDURE — 770006 HCHG ROOM/CARE - MED/SURG/GYN SEMI*

## 2019-11-13 PROCEDURE — 80048 BASIC METABOLIC PNL TOTAL CA: CPT

## 2019-11-13 PROCEDURE — 700102 HCHG RX REV CODE 250 W/ 637 OVERRIDE(OP): Performed by: RADIOLOGY

## 2019-11-13 PROCEDURE — 700101 HCHG RX REV CODE 250: Performed by: STUDENT IN AN ORGANIZED HEALTH CARE EDUCATION/TRAINING PROGRAM

## 2019-11-13 PROCEDURE — 74018 RADEX ABDOMEN 1 VIEW: CPT

## 2019-11-13 PROCEDURE — A9270 NON-COVERED ITEM OR SERVICE: HCPCS | Performed by: STUDENT IN AN ORGANIZED HEALTH CARE EDUCATION/TRAINING PROGRAM

## 2019-11-13 RX ORDER — LIDOCAINE 50 MG/G
1 PATCH TOPICAL EVERY 24 HOURS
Status: DISCONTINUED | OUTPATIENT
Start: 2019-11-13 | End: 2019-11-16 | Stop reason: HOSPADM

## 2019-11-13 RX ORDER — GUAIFENESIN 600 MG/1
600 TABLET, EXTENDED RELEASE ORAL EVERY 12 HOURS
Status: DISCONTINUED | OUTPATIENT
Start: 2019-11-13 | End: 2019-11-16 | Stop reason: HOSPADM

## 2019-11-13 RX ADMIN — ACETAMINOPHEN 500 MG: 500 TABLET ORAL at 04:39

## 2019-11-13 RX ADMIN — PREDNISONE 10 MG: 10 TABLET ORAL at 06:27

## 2019-11-13 RX ADMIN — SENNOSIDES AND DOCUSATE SODIUM 2 TABLET: 8.6; 5 TABLET ORAL at 17:40

## 2019-11-13 RX ADMIN — ENOXAPARIN SODIUM 40 MG: 100 INJECTION SUBCUTANEOUS at 07:33

## 2019-11-13 RX ADMIN — LISINOPRIL 20 MG: 20 TABLET ORAL at 06:27

## 2019-11-13 RX ADMIN — HYDROCODONE BITARTRATE AND ACETAMINOPHEN 1 TABLET: 5; 325 TABLET ORAL at 06:31

## 2019-11-13 RX ADMIN — ACETAMINOPHEN 1000 MG: 500 TABLET ORAL at 09:36

## 2019-11-13 RX ADMIN — OMEPRAZOLE 40 MG: 20 CAPSULE, DELAYED RELEASE ORAL at 17:39

## 2019-11-13 RX ADMIN — BUDESONIDE AND FORMOTEROL FUMARATE DIHYDRATE 2 PUFF: 80; 4.5 AEROSOL RESPIRATORY (INHALATION) at 17:42

## 2019-11-13 RX ADMIN — HYDROCODONE BITARTRATE AND ACETAMINOPHEN 1 TABLET: 5; 325 TABLET ORAL at 00:38

## 2019-11-13 RX ADMIN — LIDOCAINE 1 PATCH: 50 PATCH TOPICAL at 09:36

## 2019-11-13 RX ADMIN — HYDROCODONE BITARTRATE AND ACETAMINOPHEN 1 TABLET: 5; 325 TABLET ORAL at 15:46

## 2019-11-13 RX ADMIN — OMEPRAZOLE 40 MG: 20 CAPSULE, DELAYED RELEASE ORAL at 06:27

## 2019-11-13 RX ADMIN — BUDESONIDE AND FORMOTEROL FUMARATE DIHYDRATE 2 PUFF: 80; 4.5 AEROSOL RESPIRATORY (INHALATION) at 06:28

## 2019-11-13 ASSESSMENT — ENCOUNTER SYMPTOMS
FEVER: 0
NAUSEA: 0
BLOOD IN STOOL: 0
HEADACHES: 0
SHORTNESS OF BREATH: 1
COUGH: 1
EYE PAIN: 0
CHILLS: 0
DIZZINESS: 0
VOMITING: 0
PALPITATIONS: 0
LOSS OF CONSCIOUSNESS: 0
ABDOMINAL PAIN: 0
BACK PAIN: 0
CONSTIPATION: 0
SPUTUM PRODUCTION: 0
DIARRHEA: 0

## 2019-11-13 NOTE — PROGRESS NOTES
Patient with medical transfer orders. Transferring to room S624-2. Report with SARAH Carter. All belongings sent with patient. Patient called and notified family members about changing rooms.

## 2019-11-13 NOTE — NON-PROVIDER
"       Internal Medicine Medical Student Note  Note Author: Chhaya Newsome, Student    Name Savita Duggan       1965   Age/Sex 54 y.o. female   MRN [id number]   Code Status Full       Reason for interval visit   Acute respiratory failure with hypoxia (HCC)    Interval Problem Daily Status Update   - Green sputum production at night and midline-central chest \"tightness\" relieved by cough  - brief epitaxis related to NC O2  - otherwise feeling better than yesterday  - able to talk with family yesterday  - has not had a BM while in hospital; 1725mL urine output  - pt refused morphine overnight because she was beginning to feel dependent    Physical Exam       Vitals:    19 1925 19 2000 19 0700 19 0730   BP:  146/69 138/64 139/62   Pulse: 93 81 75 76   Resp: 20 20     Temp:  36.6 °C (97.8 °F)  36.9 °C (98.4 °F)   TempSrc:  Temporal  Temporal   SpO2:  94%  93%   Weight:       Height:         Body mass index is 54.5 kg/m².   Oxygen Therapy:  Pulse Oximetry: 93 %, O2 (LPM): 2, O2 Delivery: Silicone Nasal Cannula    Physical Exam   Constitutional: She is oriented to person, place, and time and well-developed, well-nourished, and in no distress.   HENT:   Head: Normocephalic and atraumatic.   Eyes: Pupils are equal, round, and reactive to light. Conjunctivae and EOM are normal.   Neck: Normal range of motion.   Cardiovascular: Normal rate and regular rhythm.   Pulmonary/Chest: Effort normal and breath sounds normal.   Abdominal: Soft. Bowel sounds are normal.   Musculoskeletal: Normal range of motion.   Neurological: She is alert and oriented to person, place, and time. GCS score is 15.   Skin: Skin is warm and dry.   Psychiatric: Mood, memory, affect and judgment normal.   Nursing note and vitals reviewed.      Assessment/Plan     - started guaifenesin ER to loosen sputum and ease passage  - requested humidified NC O2  - ordered Lidocaine patch for biopsy area as morphine wears off.  - she is " going to advance diet and will start laxative protocol if she does not have BM w/in 24hr of increased solid food intake.  - able to take off tele, as she has been stable, will move her to medicine floor  - current management remains until biopsy results

## 2019-11-13 NOTE — PROGRESS NOTES
Internal Medicine Interval Note  Note Author: Laquita Leger M.D.     Name Savita Duggan 1965   Age/Sex 54 y.o. female   MRN [id number]   Code Status FULL     After 5PM or if no immediate response to page, please call for cross-coverage  Attending/Team: Dr. Stern - Red Team See Patient List for primary contact information  Call [phone] to page    1st Call - Day Intern (R1):   Dr. Leger 2nd Call - Day Sr. Resident (R2/R3):   Dr. Taylor     Reason for interval visit   Shortness of Breath    Interval Problem Daily Status Update  -Patient without acute events overnight. Afebrile with stable vital signs.   -S/p lung biopsy yesterday with results pending at this time     -Complaints of pain at the biopsy site and a productive cough today, otherwise feels overall improved since coming to the hospital with less shortness of breath  -Has not had a bowel movement in many days, but has had very poor PO intake which is improving now    Review of Systems   Constitutional: Negative for chills and fever.   HENT: Negative for ear pain.    Eyes: Negative for pain.   Respiratory: Positive for cough and shortness of breath. Negative for sputum production.    Cardiovascular: Negative for chest pain, palpitations and leg swelling.   Gastrointestinal: Negative for abdominal pain, blood in stool, constipation, diarrhea, melena, nausea and vomiting.   Genitourinary: Negative for dysuria.   Musculoskeletal: Negative for back pain.   Skin: Negative for rash.   Neurological: Negative for dizziness, loss of consciousness and headaches.   All other systems reviewed and are negative.    Disposition/Barriers to discharge:   Continued inpatient evaluation and treatment     Consultants/Specialty  Pulmonology  PCP: Radhika Johnson, F.N.P.    Quality Measures  Quality-Core Measures   Krueger catheter::  No Krueger  DVT prophylaxis pharmacological::  Enoxaparin (Lovenox)  DVT prophylaxis - mechanical:  SCDs    Physical Exam     Vitals:     11/12/19 1800 11/12/19 1900 11/12/19 1925 11/12/19 2000   BP:    146/69   Pulse: 86 93 93 81   Resp:   20 20   Temp: 37.2 °C (99 °F)   36.6 °C (97.8 °F)   TempSrc: Temporal   Temporal   SpO2: 96% 95%  94%   Weight:       Height:         Body mass index is 54.5 kg/m².   Oxygen Therapy:  Pulse Oximetry: 94 %, O2 (LPM): 2, O2 Delivery: Silicone Nasal Cannula    Physical Exam   Constitutional: She is oriented to person, place, and time and well-developed, well-nourished, and in no distress. No distress.   HENT:   Head: Normocephalic and atraumatic.   Mouth/Throat: Oropharynx is clear and moist. No oropharyngeal exudate.   Eyes: Right eye exhibits no discharge. Left eye exhibits no discharge. No scleral icterus.   Neck: No JVD present. No tracheal deviation present.   Cardiovascular: Normal rate and regular rhythm.   No murmur heard.  Pulmonary/Chest: Effort normal and breath sounds normal. No respiratory distress. She has no wheezes. She has no rales.   Abdominal: Soft. She exhibits no distension. There is no tenderness.   Obese   Musculoskeletal:         General: No edema.   Lymphadenopathy:     She has no cervical adenopathy.   Neurological: She is alert and oriented to person, place, and time. No cranial nerve deficit.   Skin: Skin is warm and dry. No rash noted.   Psychiatric: Mood, memory, affect and judgment normal.   Nursing note and vitals reviewed.      Assessment/Plan     * Acute respiratory failure with hypoxia (HCC)- (present on admission)  Assessment & Plan  Presented with acute hypoxic respiratory failure with cough and malaise x2 days. History of COPD and uses 02 only at night. She presented saturating 85% on RA. No WBC count and procalcitonin is 0.11, making infection less likely. Patient has been observed off antibiotics and is doing well. Imaging is remarkable for a new lung mass vs. large consolidation. She is now s/p lung biopsy with results pending at this time.     Plan:   -Pending lung biopsy  results  -Continue RT protocol, ventura for COPD  -Continue home prednisone 10 mg    -Mucinex for cough  -Doing well off antibiotics. Will add back for persistent fevers    H/O renal cell carcinoma s/p nephrectomy- (present on admission)  Assessment & Plan  - Nephrectomy 3 years ago for RCC    Hypertension- (present on admission)  Assessment & Plan  - Continue on lisinopril 20 mg daily    Constipation  Assessment & Plan  No bowel movement for several days in the setting of poor PO intake due to decreased appetite. Appetite is now improving with improved PO intake.     Plan:  -KUB to assess stool burdern  -Will begin bowel regimen depending on the results     Asymptomatic microscopic hematuria  Assessment & Plan  History of RCC. Hematuria by history x2 months and confirmed urinalysis on admission.   -Monitoring inpatient, may need further workup outpatient given her history    Situational anxiety  Assessment & Plan  Patient appropriately anxious about possibly cancer diagnosis    Plan  -Reassurance  -Atarax TID prn    Anemia- (present on admission)  Assessment & Plan  Hb 11.6, no known baseline. Iron studies show low %sat/iron level with normal ferritin. ?Mixed anemia? She has had hematuria for a few months which could account for a possible loss.   -Monitoring H&H, currently stable    GERD (gastroesophageal reflux disease)- (present on admission)  Assessment & Plan  - Continue on Omeprazole 40 mg BID

## 2019-11-13 NOTE — ASSESSMENT & PLAN NOTE
Resolved yesterday. Patient tolerating a regular diet.   -Will continue bowel protocol and monitoring

## 2019-11-13 NOTE — PROGRESS NOTES
Patient's PIV infiltrated. I removed patient's only PIV and asked another nurse to try to get an ultrasound IV. 2 RNs attempted and were unsuccessful at obtaining PIV access. Dr. Leger notified. Instructed it is okay for patient to be without PIV.

## 2019-11-13 NOTE — CARE PLAN
Problem: Knowledge Deficit  Goal: Knowledge of disease process/condition, treatment plan, diagnostic tests, and medications will improve  Outcome: PROGRESSING AS EXPECTED  Goal: Knowledge of the prescribed therapeutic regimen will improve  Outcome: PROGRESSING AS EXPECTED     Problem: Pain Management  Goal: Pain level will decrease to patient's comfort goal  Outcome: PROGRESSING AS EXPECTED     Problem: Respiratory:  Goal: Respiratory status will improve  Outcome: PROGRESSING AS EXPECTED

## 2019-11-14 LAB
ERYTHROCYTE [DISTWIDTH] IN BLOOD BY AUTOMATED COUNT: 41.3 FL (ref 35.9–50)
HCT VFR BLD AUTO: 36 % (ref 37–47)
HGB BLD-MCNC: 11.3 G/DL (ref 12–16)
MCH RBC QN AUTO: 24.9 PG (ref 27–33)
MCHC RBC AUTO-ENTMCNC: 31.4 G/DL (ref 33.6–35)
MCV RBC AUTO: 79.5 FL (ref 81.4–97.8)
PLATELET # BLD AUTO: 301 K/UL (ref 164–446)
PMV BLD AUTO: 10.2 FL (ref 9–12.9)
RBC # BLD AUTO: 4.53 M/UL (ref 4.2–5.4)
WBC # BLD AUTO: 6.4 K/UL (ref 4.8–10.8)

## 2019-11-14 PROCEDURE — 36415 COLL VENOUS BLD VENIPUNCTURE: CPT

## 2019-11-14 PROCEDURE — 700111 HCHG RX REV CODE 636 W/ 250 OVERRIDE (IP): Performed by: STUDENT IN AN ORGANIZED HEALTH CARE EDUCATION/TRAINING PROGRAM

## 2019-11-14 PROCEDURE — 700111 HCHG RX REV CODE 636 W/ 250 OVERRIDE (IP): Performed by: INTERNAL MEDICINE

## 2019-11-14 PROCEDURE — 770006 HCHG ROOM/CARE - MED/SURG/GYN SEMI*

## 2019-11-14 PROCEDURE — 85027 COMPLETE CBC AUTOMATED: CPT

## 2019-11-14 PROCEDURE — 700102 HCHG RX REV CODE 250 W/ 637 OVERRIDE(OP): Performed by: STUDENT IN AN ORGANIZED HEALTH CARE EDUCATION/TRAINING PROGRAM

## 2019-11-14 PROCEDURE — A9270 NON-COVERED ITEM OR SERVICE: HCPCS | Performed by: STUDENT IN AN ORGANIZED HEALTH CARE EDUCATION/TRAINING PROGRAM

## 2019-11-14 PROCEDURE — 700101 HCHG RX REV CODE 250: Performed by: STUDENT IN AN ORGANIZED HEALTH CARE EDUCATION/TRAINING PROGRAM

## 2019-11-14 PROCEDURE — 99232 SBSQ HOSP IP/OBS MODERATE 35: CPT | Mod: GC | Performed by: INTERNAL MEDICINE

## 2019-11-14 RX ORDER — NYSTATIN 100000 [USP'U]/G
POWDER TOPICAL 2 TIMES DAILY
Status: DISCONTINUED | OUTPATIENT
Start: 2019-11-14 | End: 2019-11-16 | Stop reason: HOSPADM

## 2019-11-14 RX ADMIN — HYDROCODONE BITARTRATE AND ACETAMINOPHEN 1 TABLET: 5; 325 TABLET ORAL at 20:19

## 2019-11-14 RX ADMIN — PREDNISONE 10 MG: 10 TABLET ORAL at 06:03

## 2019-11-14 RX ADMIN — OMEPRAZOLE 40 MG: 20 CAPSULE, DELAYED RELEASE ORAL at 06:03

## 2019-11-14 RX ADMIN — BUDESONIDE AND FORMOTEROL FUMARATE DIHYDRATE 2 PUFF: 80; 4.5 AEROSOL RESPIRATORY (INHALATION) at 06:03

## 2019-11-14 RX ADMIN — OMEPRAZOLE 40 MG: 20 CAPSULE, DELAYED RELEASE ORAL at 17:37

## 2019-11-14 RX ADMIN — HYDROCODONE BITARTRATE AND ACETAMINOPHEN 1 TABLET: 5; 325 TABLET ORAL at 06:07

## 2019-11-14 RX ADMIN — SENNOSIDES AND DOCUSATE SODIUM 2 TABLET: 8.6; 5 TABLET ORAL at 17:37

## 2019-11-14 RX ADMIN — ACETAMINOPHEN 650 MG: 325 TABLET, FILM COATED ORAL at 12:04

## 2019-11-14 RX ADMIN — SENNOSIDES AND DOCUSATE SODIUM 2 TABLET: 8.6; 5 TABLET ORAL at 06:03

## 2019-11-14 RX ADMIN — BUDESONIDE AND FORMOTEROL FUMARATE DIHYDRATE 2 PUFF: 80; 4.5 AEROSOL RESPIRATORY (INHALATION) at 17:39

## 2019-11-14 RX ADMIN — NYSTATIN: 100000 POWDER TOPICAL at 17:39

## 2019-11-14 RX ADMIN — LISINOPRIL 20 MG: 20 TABLET ORAL at 06:03

## 2019-11-14 RX ADMIN — ENOXAPARIN SODIUM 40 MG: 100 INJECTION SUBCUTANEOUS at 06:03

## 2019-11-14 RX ADMIN — LIDOCAINE 1 PATCH: 50 PATCH TOPICAL at 08:45

## 2019-11-14 RX ADMIN — HYDROCODONE BITARTRATE AND ACETAMINOPHEN 1 TABLET: 5; 325 TABLET ORAL at 02:02

## 2019-11-14 ASSESSMENT — ENCOUNTER SYMPTOMS
BLOOD IN STOOL: 0
SPUTUM PRODUCTION: 0
CHILLS: 0
SHORTNESS OF BREATH: 0
CONSTIPATION: 0
DIZZINESS: 0
NAUSEA: 0
LOSS OF CONSCIOUSNESS: 0
COUGH: 1
FEVER: 0
PALPITATIONS: 0
HEADACHES: 0
DIARRHEA: 0
BACK PAIN: 0
VOMITING: 0
EYE PAIN: 0
ABDOMINAL PAIN: 0

## 2019-11-14 NOTE — CARE PLAN
Problem: Knowledge Deficit  Goal: Knowledge of disease process/condition, treatment plan, diagnostic tests, and medications will improve  Outcome: PROGRESSING AS EXPECTED  Note:   Patient verbalizes understanding for the plan of care for results from biopsy      Problem: Pain Management  Goal: Pain level will decrease to patient's comfort goal  Outcome: PROGRESSING AS EXPECTED  Note:   Norco PRN, patient has lidocaine patch on, tylenol given

## 2019-11-14 NOTE — PROGRESS NOTES
Internal Medicine Interval Note  Note Author: Laquita Leger M.D.     Name Savita Duggan 1965   Age/Sex 54 y.o. female   MRN [id number]   Code Status FULl     After 5PM or if no immediate response to page, please call for cross-coverage  Attending/Team: Dr. Stern - Red Team See Patient List for primary contact information  Call [phone] to page    1st Call - Day Intern (R1):   Dr. Leger 2nd Call - Day Sr. Resident (R2/R3):   Dr. Taylor     Reason for interval visit   Shortness of Breath    Interval Problem Daily Status Update   -Patient without acute events overnight. Afebrile with stable vital signs.   -S/p lung biopsy yesterday with results pending at this time, will attempt to check in with pathology today   -States Lidoderm patch improves the pain from her biopsy site. Cough is improving. She otherwise has no complaints this AM. No shortness of breath today.   -Had a bowel movement yesterday and is tolerating a regular diet without difficulty     Review of Systems   Constitutional: Negative for chills and fever.   HENT: Negative for ear pain.    Eyes: Negative for pain.   Respiratory: Positive for cough. Negative for sputum production and shortness of breath.    Cardiovascular: Positive for chest pain (From biopsy site). Negative for palpitations and leg swelling.   Gastrointestinal: Negative for abdominal pain, blood in stool, constipation, diarrhea, melena, nausea and vomiting.   Genitourinary: Negative for dysuria.   Musculoskeletal: Negative for back pain.   Skin: Negative for rash.   Neurological: Negative for dizziness, loss of consciousness and headaches.   All other systems reviewed and are negative.    Disposition/Barriers to discharge:   Continued inpatient evaluation and treatment, waiting on pathology results     Consultants/Specialty  Pulmonology   PCP: Radhika Johnson F.N.P.    Quality Measures  Quality-Core Measures   Krueger catheter::  No Krueger  DVT prophylaxis pharmacological::   Enoxaparin (Lovenox)  Ulcer Prophylaxis::  No      Physical Exam     Vitals:    11/13/19 1546 11/13/19 1700 11/13/19 1859 11/14/19 0348   BP: 140/63 149/79 129/57 155/72   Pulse: 71 73 86 73   Resp: 19 17 19 19   Temp: 35.9 °C (96.6 °F) 36.2 °C (97.2 °F) 36.6 °C (97.8 °F) 36.5 °C (97.7 °F)   TempSrc: Temporal Temporal Temporal Temporal   SpO2: 94% 95% 98% 97%   Weight:       Height:         Body mass index is 54.5 kg/m².   Oxygen Therapy:  Pulse Oximetry: 97 %, O2 (LPM): 2.5, O2 Delivery: Silicone Nasal Cannula    Physical Exam   Constitutional: She is oriented to person, place, and time and well-developed, well-nourished, and in no distress. No distress.   HENT:   Head: Normocephalic and atraumatic.   Eyes: Right eye exhibits no discharge. Left eye exhibits no discharge. No scleral icterus.   Neck: No JVD present. No tracheal deviation present.   Cardiovascular: Normal rate and regular rhythm.   No murmur heard.  Pulmonary/Chest: Effort normal and breath sounds normal. No respiratory distress. She has no wheezes. She has no rales.   Abdominal: Soft. She exhibits no distension. There is no tenderness.   Obese   Musculoskeletal:         General: No edema.   Neurological: She is alert and oriented to person, place, and time. No cranial nerve deficit.   Skin: Skin is warm and dry. No rash noted.   Psychiatric:   Anxious about situation, but easily redirected    Nursing note and vitals reviewed.      Assessment/Plan     * Acute respiratory failure with hypoxia (HCC)- (present on admission)  Assessment & Plan  Presented with acute hypoxic respiratory failure. She presented saturating 85% on RA, now saturating well on 2L. History of using 02 only at night. Imaging is remarkable for a new lung mass vs. large consolidation. Concern for malignancy given her history of RCC. Patient has been observed off antibiotics and other treatment and is doing well. Low concern for infection at this point. She is s/p lung biopsy with  results pending at this time.      Plan:   -Pending lung biopsy results  -Continue RT protocol, duonebs for COPD as needed  -Continue home prednisone 10 mg    -Doing well off antibiotics. Will add back for persistent fevers    H/O renal cell carcinoma s/p nephrectomy- (present on admission)  Assessment & Plan  - Nephrectomy 3 years ago for RCC    Hypertension- (present on admission)  Assessment & Plan  - Continue on lisinopril 20 mg daily    Constipation  Assessment & Plan  Resolved yesterday. Patient tolerating a regular diet.   -Will continue bowel protocol and monitoring    Asymptomatic microscopic hematuria  Assessment & Plan  History of RCC. Hematuria by history x2 months and confirmed urinalysis on admission.   -Monitoring inpatient, concern for recurrence given her new lung mass. Biopsy results pending    Situational anxiety  Assessment & Plan  Patient appropriately anxious about possibly cancer diagnosis    Plan  -Reassurance  -Atarax TID prn    Anemia- (present on admission)  Assessment & Plan  Hb 11.6, no known baseline. Iron studies show low %sat/iron level with normal ferritin. ?Mixed anemia? She has had hematuria for a few months which could account for a possible loss.   -Monitoring H&H, currently stable    GERD (gastroesophageal reflux disease)- (present on admission)  Assessment & Plan  -Continue on Omeprazole 40 mg BID

## 2019-11-14 NOTE — PROGRESS NOTES
"Patient has pain 6/10 today after two Norco's last night, educated patient to call us when she is in pain. Patient states \"I don't want to  bother anyone\" Encouraged patient to call for help and that it is our job to help her and that the longer she waits for pain medications the longer it will take for the medication to help relieve the pain. Lidocaine patch ordered at 9 and patient says that helps the best. Bed locked and in lowest position, all needs met at this time, will continue to hourly round   "

## 2019-11-14 NOTE — CARE PLAN
Problem: Communication  Goal: The ability to communicate needs accurately and effectively will improve  Outcome: PROGRESSING AS EXPECTED  Note:   Pt able to effectively communicate needs to staff members. Continue to encourage communication with staff as well as anticipate possible future needs       Problem: Knowledge Deficit  Goal: Knowledge of disease process/condition, treatment plan, diagnostic tests, and medications will improve  Outcome: PROGRESSING AS EXPECTED  Note:   Pt able to express knowledge of disease process, treatment plan and medications. Continue to assess for learning opportunities on these topics and educate as necessary

## 2019-11-14 NOTE — PROGRESS NOTES
Patient transferred to floor, no complaints of pain, lidocaine patch removed during skin check. All needs met at this time, bed locked and in lowest position

## 2019-11-14 NOTE — PROGRESS NOTES
2 RN skin check with Zainab   Skin intact, generalized bruising/ redness  Buttocks red blanchable   No new pressure ulcers

## 2019-11-14 NOTE — NON-PROVIDER
Internal Medicine Medical Student Note  Note Author: Chhaya Newsome, Student    Name Savita Duggan       1965   Age/Sex 54 y.o. female   MRN [id number]   Code Status full       Reason for interval visit   Acute respiratory failure with hypoxia (HCC)    Interval Problem Daily Status Update   - moved to medicine floor - waiting for biopsy results to change current management  - pt refused guaifenesin for mucous saying it dries her out too much  - pt reports excellent pain relief w/ lido patch but said it rolled off before the 12hr rylee and so she may need them at a greater frequency  - continues to require 2L O2 to maintain SpO2 @ 95%  - BM overnight  - pt is otherwise comfortable and doing well       Physical Exam       Vitals:    19 1700 19 1859 19 0348 19 0710   BP: 149/79 129/57 155/72 156/75   Pulse: 73 86 73 80   Resp:    Temp: 36.2 °C (97.2 °F) 36.6 °C (97.8 °F) 36.5 °C (97.7 °F) 36.2 °C (97.1 °F)   TempSrc: Temporal Temporal Temporal Temporal   SpO2: 95% 98% 97% 95%   Weight:       Height:         Body mass index is 54.5 kg/m².   Oxygen Therapy:  Pulse Oximetry: 95 %, O2 (LPM): 2, O2 Delivery: Silicone Nasal Cannula    Physical Exam   Constitutional: She is oriented to person, place, and time and well-developed, well-nourished, and in no distress.   HENT:   Head: Normocephalic and atraumatic.   Eyes: Pupils are equal, round, and reactive to light. Conjunctivae and EOM are normal.   Neck: Normal range of motion.   Cardiovascular: Normal rate and regular rhythm.   Pulmonary/Chest: Effort normal and breath sounds normal.   Abdominal: Soft. Bowel sounds are normal.   Musculoskeletal: Normal range of motion.   Neurological: She is alert and oriented to person, place, and time. GCS score is 15.   Skin: Skin is warm, dry and intact.   Psychiatric: Mood, memory, affect and judgment normal.   Nursing note and vitals reviewed.      Assessment/Plan     Left lung mass: waiting  on biopsy; continue symptomatic tx at this time

## 2019-11-15 ENCOUNTER — TELEPHONE (OUTPATIENT)
Dept: SLEEP MEDICINE | Facility: MEDICAL CENTER | Age: 54
End: 2019-11-15

## 2019-11-15 ENCOUNTER — APPOINTMENT (OUTPATIENT)
Dept: RADIOLOGY | Facility: MEDICAL CENTER | Age: 54
DRG: 204 | End: 2019-11-15
Attending: STUDENT IN AN ORGANIZED HEALTH CARE EDUCATION/TRAINING PROGRAM
Payer: COMMERCIAL

## 2019-11-15 DIAGNOSIS — R91.8 LUNG MASS: ICD-10-CM

## 2019-11-15 PROCEDURE — 700101 HCHG RX REV CODE 250: Performed by: STUDENT IN AN ORGANIZED HEALTH CARE EDUCATION/TRAINING PROGRAM

## 2019-11-15 PROCEDURE — 700117 HCHG RX CONTRAST REV CODE 255: Performed by: STUDENT IN AN ORGANIZED HEALTH CARE EDUCATION/TRAINING PROGRAM

## 2019-11-15 PROCEDURE — 700111 HCHG RX REV CODE 636 W/ 250 OVERRIDE (IP): Performed by: INTERNAL MEDICINE

## 2019-11-15 PROCEDURE — 99232 SBSQ HOSP IP/OBS MODERATE 35: CPT | Mod: GC | Performed by: INTERNAL MEDICINE

## 2019-11-15 PROCEDURE — 700102 HCHG RX REV CODE 250 W/ 637 OVERRIDE(OP): Performed by: STUDENT IN AN ORGANIZED HEALTH CARE EDUCATION/TRAINING PROGRAM

## 2019-11-15 PROCEDURE — 99231 SBSQ HOSP IP/OBS SF/LOW 25: CPT | Performed by: INTERNAL MEDICINE

## 2019-11-15 PROCEDURE — 700111 HCHG RX REV CODE 636 W/ 250 OVERRIDE (IP): Performed by: STUDENT IN AN ORGANIZED HEALTH CARE EDUCATION/TRAINING PROGRAM

## 2019-11-15 PROCEDURE — 770006 HCHG ROOM/CARE - MED/SURG/GYN SEMI*

## 2019-11-15 PROCEDURE — A9270 NON-COVERED ITEM OR SERVICE: HCPCS | Performed by: STUDENT IN AN ORGANIZED HEALTH CARE EDUCATION/TRAINING PROGRAM

## 2019-11-15 PROCEDURE — 74170 CT ABD WO CNTRST FLWD CNTRST: CPT

## 2019-11-15 RX ADMIN — BUDESONIDE AND FORMOTEROL FUMARATE DIHYDRATE 2 PUFF: 80; 4.5 AEROSOL RESPIRATORY (INHALATION) at 17:28

## 2019-11-15 RX ADMIN — LIDOCAINE 1 PATCH: 50 PATCH TOPICAL at 09:08

## 2019-11-15 RX ADMIN — PREDNISONE 10 MG: 10 TABLET ORAL at 04:05

## 2019-11-15 RX ADMIN — IOHEXOL 75 ML: 350 INJECTION, SOLUTION INTRAVENOUS at 17:05

## 2019-11-15 RX ADMIN — HYDROCODONE BITARTRATE AND ACETAMINOPHEN 1 TABLET: 5; 325 TABLET ORAL at 03:46

## 2019-11-15 RX ADMIN — SENNOSIDES AND DOCUSATE SODIUM 2 TABLET: 8.6; 5 TABLET ORAL at 17:27

## 2019-11-15 RX ADMIN — LISINOPRIL 20 MG: 20 TABLET ORAL at 04:11

## 2019-11-15 RX ADMIN — OMEPRAZOLE 40 MG: 20 CAPSULE, DELAYED RELEASE ORAL at 17:28

## 2019-11-15 RX ADMIN — HYDROCODONE BITARTRATE AND ACETAMINOPHEN 1 TABLET: 5; 325 TABLET ORAL at 20:57

## 2019-11-15 RX ADMIN — BUDESONIDE AND FORMOTEROL FUMARATE DIHYDRATE 2 PUFF: 80; 4.5 AEROSOL RESPIRATORY (INHALATION) at 04:08

## 2019-11-15 RX ADMIN — OMEPRAZOLE 40 MG: 20 CAPSULE, DELAYED RELEASE ORAL at 04:06

## 2019-11-15 RX ADMIN — SENNOSIDES AND DOCUSATE SODIUM 2 TABLET: 8.6; 5 TABLET ORAL at 04:11

## 2019-11-15 RX ADMIN — ENOXAPARIN SODIUM 40 MG: 100 INJECTION SUBCUTANEOUS at 04:06

## 2019-11-15 ASSESSMENT — COGNITIVE AND FUNCTIONAL STATUS - GENERAL
SUGGESTED CMS G CODE MODIFIER DAILY ACTIVITY: CI
SUGGESTED CMS G CODE MODIFIER MOBILITY: CK
MOBILITY SCORE: 19
CLIMB 3 TO 5 STEPS WITH RAILING: A LITTLE
MOVING TO AND FROM BED TO CHAIR: A LITTLE
DAILY ACTIVITIY SCORE: 23
MOVING FROM LYING ON BACK TO SITTING ON SIDE OF FLAT BED: A LITTLE
DRESSING REGULAR LOWER BODY CLOTHING: A LITTLE
STANDING UP FROM CHAIR USING ARMS: A LITTLE
TURNING FROM BACK TO SIDE WHILE IN FLAT BAD: A LITTLE

## 2019-11-15 ASSESSMENT — ENCOUNTER SYMPTOMS
LOSS OF CONSCIOUSNESS: 0
CONSTIPATION: 0
GASTROINTESTINAL NEGATIVE: 1
SHORTNESS OF BREATH: 1
BLOOD IN STOOL: 0
PALPITATIONS: 0
DIARRHEA: 0
COUGH: 0
EYE PAIN: 0
FEVER: 0
DIZZINESS: 0
EYES NEGATIVE: 1
NAUSEA: 0
VOMITING: 0
ABDOMINAL PAIN: 0
PSYCHIATRIC NEGATIVE: 1
HEADACHES: 0
SPUTUM PRODUCTION: 0
BACK PAIN: 0
NEUROLOGICAL NEGATIVE: 1
SHORTNESS OF BREATH: 0
CONSTITUTIONAL NEGATIVE: 1
CHILLS: 0

## 2019-11-15 NOTE — DISCHARGE PLANNING
Anticipated Discharge Disposition: home no needs    Action: Reviewed pt chart. Pt ambulatory with no assistance required. Per [facility] medical team further acute care POC dependent on biopsy results. Result still pending as of 11/15 at 1015.     Barriers to Discharge: biopsy results     Plan: continue to access pt for DC needs     Length of Stay: 4

## 2019-11-15 NOTE — PROGRESS NOTES
Patient alert/oriented x4,on 2L of oxygen pnc,ambulatory,denies pain,assumed care given at start of the shift,call light and personal  Belongings within reach.

## 2019-11-15 NOTE — PROGRESS NOTES
Spiritual Care Note    Patient Information     Patient's Name: Savita Duggan   MRN: [id number]    YOB: 1965   Age and Gender: 54 y.o. female   Service Area: Orthopedics   Room (and Bed): [place] [address]   Ethnicity or Nationality:     Primary Language: English   Scientology/Spiritual preference: Gnosticist   Place of Residence: [place], [place]   Family/Friends/Others Present: No   Clinical Team Present: No   Medical Diagnosis(-es)/Procedure(s): Shortness of Breath   Code Status: Full Code    Date of Admission: 11/10/2019   Length of Stay: 4 days        Spiritual Care Provider Information:  Name of Spiritual Care Provider: Marissa Coronado  Title of Spiritual Care Provider: Associate   Phone Number: [phone]  E-mail: [email or web address]  Total time : 15 minutes    Spiritual Screen Results:    Gen Nursing  Spiritual Screen  Is your spiritual health or inner well-being important to you as you cope with your medical condition?: No  Would you like to receive a visit from our Spiritual Care team or your own Jainism or spiritual leader?: No  Was spiritual care education provided to the patient?: Declined     Palliative Care  PC Scientology/Spiritual Screening  Was spiritual care education provided to the patient?: Declined      Encounter/Request Information  Encounter/Request Type   Visited With: Patient  Nature of the Visit: Initial  General Visit: Yes  Referral From/ Origin of Request: Epic nursing    Religous Needs/Values  Scientology Needs Visit  Scientology Needs: Prayer    Spiritual Assessment     Spiritual Care Encounters    Observations/Symptoms: Accepting, Thankfulness    Interaction/Conversation: Pt said she hadn't requested a spiritual-care visit, but was happy to have one.  She recently learned that she does not have lung cancer and is very grateful.  The  offered a prayer of thanksgiving, and the pt thanked her.    Assessment: Need    Need: Seeking Spiritual Assistance and  Support    Interventions: Prayer, conversation.    Outcomes: Connectedness with the Holy/with God, Hope/Peace/Daniela/Trust/Gratitude/Beauty, Spiritual Comfort    Plan: Visit Upon Request    Notes:

## 2019-11-15 NOTE — PROGRESS NOTES
Patient been sitting in the chair at bedside d/t patient is very uncomfortable in bed with oxygen on,call light and personal belongings within reach.

## 2019-11-15 NOTE — CARE PLAN
Problem: Communication  Goal: The ability to communicate needs accurately and effectively will improve  Outcome: PROGRESSING AS EXPECTED     Problem: Safety  Goal: Will remain free from falls  Outcome: PROGRESSING AS EXPECTED   Call light and personal belongings within reach and bed in low position.  Problem: Respiratory:  Goal: Respiratory status will improve  Outcome: PROGRESSING SLOWER THAN EXPECTED

## 2019-11-15 NOTE — PROGRESS NOTES
Assumed care of PT A&O 4. Pt sitting up in chair at bedside with no signs of labored breathing. On 2L via NC. Call light within reach, bed in lowest position, upper bed rails up. Pt was updated on plan of care for the day. Pt slightly anxious about diagnosis per NOC RN; placed spiritual care consult. Will continue to monitor.

## 2019-11-15 NOTE — PROGRESS NOTES
Internal Medicine Interval Note  Note Author: Laquita Leger M.D.     Name Savita Duggan 1965   Age/Sex 54 y.o. female   MRN [id number]   Code Status FULL     After 5PM or if no immediate response to page, please call for cross-coverage  Attending/Team: Dr. Taylor  See Patient List for primary contact information  Call [phone] to page    1st Call - Day Intern (R1):   Dr. Leger 2nd Call - Day Sr. Resident (R2/R3):   Dr. Taylor     Reason for interval visit    Shortness of Breath    Interval Problem Daily Status Update   -Patient without acute events overnight. Afebrile with stable vital signs.   -Lung biopsy results returned as reactive changes without acute inflammatory process or malignancy identified. Patient to have outpatient followup now pulmonology for a  PET scan.   -Pending CT today for evaluation of her hematuria   -Pending home 02 evaluation today     Review of Systems   Constitutional: Negative for chills and fever.   HENT: Negative for ear pain.    Eyes: Negative for pain.   Respiratory: Negative for cough, sputum production and shortness of breath.    Cardiovascular: Negative for chest pain, palpitations and leg swelling.   Gastrointestinal: Negative for abdominal pain, blood in stool, constipation, diarrhea, melena, nausea and vomiting.   Genitourinary: Negative for dysuria.   Musculoskeletal: Negative for back pain.   Skin: Negative for rash.   Neurological: Negative for dizziness, loss of consciousness and headaches.   All other systems reviewed and are negative.    Disposition/Barriers to discharge:   Pending CT scan today for evaluation of hematuria. If clear and patient's home 02 is set up, she can be discharged tomorrow with outpatient follow up.     Consultants/Specialty  Pulmonology   PCP: Radhika Johnson F.N.P.    Quality Measures  Quality-Core Measures   Krueger catheter::  No Krueger  DVT prophylaxis pharmacological::  Enoxaparin (Lovenox)    Physical Exam       Vitals:     11/14/19 0710 11/14/19 1736 11/14/19 1935 11/15/19 0325   BP: 156/75  131/72 106/75   Pulse: 80  75 77   Resp: 17  18 16   Temp: 36.2 °C (97.1 °F)  36.5 °C (97.7 °F) 36.7 °C (98 °F)   TempSrc: Temporal  Temporal Temporal   SpO2: 95% 94% 94% 96%   Weight:       Height:         Body mass index is 54.5 kg/m².   Oxygen Therapy:  Pulse Oximetry: 96 %, O2 (LPM): 2, O2 Delivery: Silicone Nasal Cannula    Physical Exam   Constitutional: She is oriented to person, place, and time and well-developed, well-nourished, and in no distress. No distress.   HENT:   Head: Normocephalic and atraumatic.   Eyes: Right eye exhibits no discharge. Left eye exhibits no discharge. No scleral icterus.   Neck: No JVD present. No tracheal deviation present.   Cardiovascular: Normal rate and regular rhythm.   No murmur heard.  Pulmonary/Chest: Effort normal and breath sounds normal. No respiratory distress. She has no wheezes. She has no rales.   Abdominal: Soft. She exhibits no distension. There is no tenderness.   Obese   Musculoskeletal:         General: No edema.   Lymphadenopathy:     She has no cervical adenopathy.   Neurological: She is alert and oriented to person, place, and time. No cranial nerve deficit.   Skin: Skin is warm and dry. No rash noted.   Psychiatric: Affect normal.       Assessment/Plan     * Acute respiratory failure with hypoxia (HCC)- (present on admission)  Assessment & Plan  Presented with acute hypoxic respiratory failure saturating 85% on RA, now saturating well on 2L. History of using 02 only at night from a friend. Imaging on presentation was remarkable for a new lung mass. Lung biopsy results returned as reactive changes without acute inflammatory process or malignancy identified. Patient now able to be discharged to have outpatient PET scan with pulmonology. We will also do a home 02 evaluation to see if she needs to be sent home with oxygen.     Plan:   -Outpatient followup with pulmonology for PET scan    -Sp02 as needed, titrate to keep >88%  -Home 02 evaluation today    Asymptomatic microscopic hematuria  Assessment & Plan  History of RCC. Hematuria by history x2 months and confirmed urinalysis on admission.   -CT scan today to evaluate for new mass. If negative then patient will be able to be discharged.     H/O renal cell carcinoma s/p nephrectomy- (present on admission)  Assessment & Plan  - Nephrectomy 3 years ago for RCC    Hypertension- (present on admission)  Assessment & Plan  - Continue on lisinopril 20 mg daily    Constipation  Assessment & Plan  Resolved yesterday. Patient tolerating a regular diet.   -Will continue bowel protocol and monitoring    Situational anxiety  Assessment & Plan  Patient appropriately anxious about hospitalization. Improved now.   -Reassurance    Anemia- (present on admission)  Assessment & Plan  Hb 11.6, no known baseline. Iron studies show low %sat/iron level with normal ferritin. ?Mixed anemia? She has had hematuria for a few months which could account for a possible loss.   -Monitoring H&H, currently stable    GERD (gastroesophageal reflux disease)- (present on admission)  Assessment & Plan  -Continue on Omeprazole 40 mg BID

## 2019-11-16 VITALS
OXYGEN SATURATION: 95 % | HEART RATE: 74 BPM | DIASTOLIC BLOOD PRESSURE: 67 MMHG | TEMPERATURE: 97 F | WEIGHT: 293 LBS | BODY MASS INDEX: 43.4 KG/M2 | SYSTOLIC BLOOD PRESSURE: 146 MMHG | HEIGHT: 69 IN | RESPIRATION RATE: 18 BRPM

## 2019-11-16 PROBLEM — F41.8 SITUATIONAL ANXIETY: Status: RESOLVED | Noted: 2019-11-12 | Resolved: 2019-11-16

## 2019-11-16 PROBLEM — J44.9 COPD (CHRONIC OBSTRUCTIVE PULMONARY DISEASE) (HCC): Status: ACTIVE | Noted: 2019-11-16

## 2019-11-16 PROBLEM — K59.00 CONSTIPATION: Status: RESOLVED | Noted: 2019-11-13 | Resolved: 2019-11-16

## 2019-11-16 PROBLEM — J96.01 ACUTE RESPIRATORY FAILURE WITH HYPOXIA (HCC): Status: RESOLVED | Noted: 2019-11-11 | Resolved: 2019-11-16

## 2019-11-16 LAB
BACTERIA BLD CULT: NORMAL
BACTERIA BLD CULT: NORMAL
SIGNIFICANT IND 70042: NORMAL
SIGNIFICANT IND 70042: NORMAL
SITE SITE: NORMAL
SITE SITE: NORMAL
SOURCE SOURCE: NORMAL
SOURCE SOURCE: NORMAL

## 2019-11-16 PROCEDURE — A9270 NON-COVERED ITEM OR SERVICE: HCPCS | Performed by: STUDENT IN AN ORGANIZED HEALTH CARE EDUCATION/TRAINING PROGRAM

## 2019-11-16 PROCEDURE — 700102 HCHG RX REV CODE 250 W/ 637 OVERRIDE(OP): Performed by: STUDENT IN AN ORGANIZED HEALTH CARE EDUCATION/TRAINING PROGRAM

## 2019-11-16 PROCEDURE — 700111 HCHG RX REV CODE 636 W/ 250 OVERRIDE (IP): Performed by: INTERNAL MEDICINE

## 2019-11-16 PROCEDURE — 99239 HOSP IP/OBS DSCHRG MGMT >30: CPT | Mod: GC | Performed by: INTERNAL MEDICINE

## 2019-11-16 PROCEDURE — 700111 HCHG RX REV CODE 636 W/ 250 OVERRIDE (IP): Performed by: STUDENT IN AN ORGANIZED HEALTH CARE EDUCATION/TRAINING PROGRAM

## 2019-11-16 RX ADMIN — HYDROCODONE BITARTRATE AND ACETAMINOPHEN 1 TABLET: 5; 325 TABLET ORAL at 05:14

## 2019-11-16 RX ADMIN — OMEPRAZOLE 40 MG: 20 CAPSULE, DELAYED RELEASE ORAL at 05:06

## 2019-11-16 RX ADMIN — ENOXAPARIN SODIUM 40 MG: 100 INJECTION SUBCUTANEOUS at 05:07

## 2019-11-16 RX ADMIN — PREDNISONE 10 MG: 10 TABLET ORAL at 05:06

## 2019-11-16 RX ADMIN — LISINOPRIL 20 MG: 20 TABLET ORAL at 05:06

## 2019-11-16 RX ADMIN — BUDESONIDE AND FORMOTEROL FUMARATE DIHYDRATE 2 PUFF: 80; 4.5 AEROSOL RESPIRATORY (INHALATION) at 05:05

## 2019-11-16 ASSESSMENT — ENCOUNTER SYMPTOMS
LOSS OF CONSCIOUSNESS: 0
DIZZINESS: 0
HEADACHES: 0
COUGH: 0
SHORTNESS OF BREATH: 0
NAUSEA: 0
ABDOMINAL PAIN: 0
CHILLS: 0
BACK PAIN: 0
FEVER: 0
PALPITATIONS: 0
DIARRHEA: 0
SPUTUM PRODUCTION: 0
CONSTIPATION: 0
VOMITING: 0
BLOOD IN STOOL: 0
EYE PAIN: 0

## 2019-11-16 NOTE — DISCHARGE PLANNING
Anticipated Discharge Disposition: home with 02    Action: Spoke with pt at bedside and obtained choice for DME: [facility]. Faxed to [facility] at [phone].     Barriers to Discharge: DME acceptance and delivery.     Plan: Follow up with DME [facility] for acceptance and delivery. Pt planning to discharge today.

## 2019-11-16 NOTE — PROGRESS NOTES
Patient alert/oriented x4,ambulatory ,pain med given,educated patient how to use incentive spirometer and the purpose,patient verbalized understanding,call light and personal belongings within reach and bed in low position,patient walked around half of the unit without oxygen,tolerated it well but got tired and went back to her room,oxygen saturation was 92% room air,after the walk,patient was c/o sob ,patient was using oxygen 2L  after walking and went to bed.

## 2019-11-16 NOTE — PROGRESS NOTES
"Pulmonary Progress Note    Date of admission  11/10/2019    Chief Complaint  54 y.o. female admitted 11/10/2019 with SOB and chest pain    Hospital Course    54 y.o. female who presented 11/10/2019 with history of renal cell carcinoma status post right nephrectomy 3 years ago.  Told she did not need any follow-up.Hx of previous MI and HTN.  She also has known COPD on 2 L home oxygen at night as well as reflux.  Presented form [facility] for left lung mass 8.1 cm.  She was also found to be hypoxic at 85% on room air requiring 3 L of oxygen.  Initially presented with left-sided chest pain had progressed over a week and was associated with shortness of breath.  She has had some weight loss and loss of appetite.  No hemoptysis..  Looks after her 3 grandkids  Full-time and is a supervisor for housekeeping.  No pets.  No history of trauma.  Not a current smoker 30 pack-year history of smoking quit 3 years ago  Previous IV drug use as a teenager.  No cough.  Procalcitonin 0.11.        CT scan done on 11/10/19 8 cm lung mass associated with chest pain and minimal symptoms supportive of pneumonia.  With her history of smoking as well as her previous history of malignancy very concerning for malignancy but this would not be norm for renal cell carcinoma to be so large as a metastasis.  Concerning for lung cancer versus sarcoma.    Fortunately biopsy proved otherwise  \"Cores of lung parenchyma demonstrating a benign          lymphohistiocytic inflammatory infiltrate, fibrin deposition          and other reactive-type changes (see comment).         Fragments of benign skeletal muscle also present.         No acute inflammatory process or malignancy identified.    Comment: The differential diagnosis for these histologic findings  includes, but is not limited to, a resolving infectious or hemorrhagic  process. This case has been reviewed in conjunction with Dr. Alvarado,  who agrees with the above diagnosis.\"    Interval Problem " Update  Reviewed last 24 hour events:  As above    Review of Systems  Review of Systems   Constitutional: Negative.    HENT: Negative.    Eyes: Negative.    Respiratory: Positive for shortness of breath.    Cardiovascular: Positive for chest pain.        Left side but says its better   Gastrointestinal: Negative.    Genitourinary: Negative.    Musculoskeletal: Positive for joint pain.   Skin: Negative.    Neurological: Negative.    Endo/Heme/Allergies: Negative.    Psychiatric/Behavioral: Negative.    All other systems reviewed and are negative.       Vital Signs for last 24 hours   Temp:  [36.1 °C (96.9 °F)-36.7 °C (98 °F)] 36.6 °C (97.9 °F)  Pulse:  [72-86] 86  Resp:  [16-20] 18  BP: (106-158)/(72-79) 158/79  SpO2:  [94 %-96 %] 96 %          Physical Exam   Physical Exam  Constitutional:       Appearance: She is obese. She is ill-appearing.   HENT:      Head: Normocephalic and atraumatic.      Mouth/Throat:      Mouth: Mucous membranes are dry.      Pharynx: Oropharynx is clear.   Eyes:      Extraocular Movements: Extraocular movements intact.      Conjunctiva/sclera: Conjunctivae normal.      Pupils: Pupils are equal, round, and reactive to light.   Neck:      Musculoskeletal: Normal range of motion and neck supple.   Cardiovascular:      Rate and Rhythm: Normal rate.      Heart sounds: No murmur.   Pulmonary:      Effort: Pulmonary effort is normal.      Breath sounds: Normal breath sounds.      Comments:  over left chest wall  Abdominal:      General: Bowel sounds are normal.      Palpations: Abdomen is soft.   Musculoskeletal: Normal range of motion.      Comments: Trace edema b/l   Skin:     General: Skin is warm and dry.   Neurological:      Mental Status: She is alert.   Psychiatric:         Mood and Affect: Mood normal.         Thought Content: Thought content normal.         Judgment: Judgment normal.         Medications  Current Facility-Administered Medications   Medication Dose Route  Frequency Provider Last Rate Last Dose   • nystatin (MYCOSTATIN) powder   Topical BID Laquita Leger M.D.       • enoxaparin (LOVENOX) inj 40 mg  40 mg Subcutaneous DAILY Laquita Leger M.D.   40 mg at 11/15/19 0406   • guaiFENesin ER (MUCINEX) tablet 600 mg  600 mg Oral Q12HRS Laquita Leger M.D.       • lidocaine (LIDODERM) 5 % 1 Patch  1 Patch Transdermal Q24HR Laquita Leger M.D.   1 Patch at 11/15/19 0908   • senna-docusate (PERICOLACE or SENOKOT S) 8.6-50 MG per tablet 2 Tab  2 Tab Oral BID Linh Pastrana M.D.   2 Tab at 11/15/19 0411    And   • polyethylene glycol/lytes (MIRALAX) PACKET 1 Packet  1 Packet Oral QDAY PRN Linh Pastrana M.D.        And   • magnesium hydroxide (MILK OF MAGNESIA) suspension 30 mL  30 mL Oral QDAY PRN Linh Pastrana M.D.        And   • bisacodyl (DULCOLAX) suppository 10 mg  10 mg Rectal QDAY PRN Linh Pastrana M.D.       • Respiratory Care per Protocol   Nebulization Continuous RT Linh Pastrana M.D.       • acetaminophen (TYLENOL) tablet 650 mg  650 mg Oral Q6HRS PRN Linh Pastrana M.D.   650 mg at 11/14/19 1204   • ondansetron (ZOFRAN) syringe/vial injection 4 mg  4 mg Intravenous Q4HRS PRN Linh Pastrana M.D.       • ondansetron (ZOFRAN ODT) dispertab 4 mg  4 mg Oral Q4HRS PRN Linh Pastrana M.D.       • promethazine (PHENERGAN) tablet 12.5-25 mg  12.5-25 mg Oral Q4HRS PRN Linh Pastrana M.D.       • promethazine (PHENERGAN) suppository 12.5-25 mg  12.5-25 mg Rectal Q4HRS PRN Linh Pastrana M.D.       • prochlorperazine (COMPAZINE) injection 5-10 mg  5-10 mg Intravenous Q4HRS PRN Linh Pastrana M.D.   10 mg at 11/11/19 1232   • HYDROcodone-acetaminophen (NORCO) 5-325 MG per tablet 1 Tab  1 Tab Oral Q4HRS PRN Linh Pastrana M.D.   1 Tab at 11/15/19 0346   • lisinopril (PRINIVIL) tablet 20 mg  20 mg Oral DAILY Linh Pastrana M.D.   20 mg at 11/15/19 0411   • omeprazole (PRILOSEC) capsule 40 mg  40 mg Oral BID Linh Pastrana M.D.   40 mg at 11/15/19  "0406   • albuterol (PROVENTIL) 2.5mg/0.5ml nebulizer solution 2.5 mg  2.5 mg Nebulization Q4H PRN (RT) Linh Pastrana M.D.       • labetalol (NORMODYNE/TRANDATE) injection 10 mg  10 mg Intravenous Q6HRS PRN Linh Pastrana M.D.   10 mg at 11/11/19 1012   • predniSONE (DELTASONE) tablet 10 mg  10 mg Oral DAILY Randy Nelson M.D.   10 mg at 11/15/19 0405   • hydrOXYzine HCl (ATARAX) tablet 50 mg  50 mg Oral TID PRN Laquita Leger M.D.       • budesonide-formoterol (SYMBICORT) 80-4.5 MCG/ACT inhaler 2 Puff  2 Puff Inhalation BID Linh Pastrana M.D.   2 Puff at 11/15/19 0408       Fluids    Intake/Output Summary (Last 24 hours) at 11/15/2019 1706  Last data filed at 11/15/2019 1230  Gross per 24 hour   Intake 1200 ml   Output --   Net 1200 ml       Laboratory          Recent Labs     11/13/19  0625   SODIUM 136   POTASSIUM 3.7   CHLORIDE 101   CO2 27   BUN 12   CREATININE 0.89   CALCIUM 8.5     Recent Labs     11/13/19  0625   GLUCOSE 111*     Recent Labs     11/13/19  0625 11/14/19  0235   WBC 6.5 6.4   NEUTSPOLYS 69.50  --    LYMPHOCYTES 14.00*  --    MONOCYTES 12.30  --    EOSINOPHILS 3.20  --    BASOPHILS 0.50  --      Recent Labs     11/13/19  0625 11/14/19  0235   RBC 4.42 4.53   HEMOGLOBIN 10.9* 11.3*   HEMATOCRIT 36.3* 36.0*   PLATELETCT 275 301        Assessment/Plan  Mass of left lung  Assessment & Plan  8 cm lung mass associated with chest pain and minimal symptoms supportive of pneumonia.  With her history of smoking as well as her previous history of malignancy very concerning for malignancy but this would not be norm for renal cell carcinoma to be so large as a metastasis.  Concerning for lung cancer versus sarcoma.  But bx proved otherwise  \"Cores of lung parenchyma demonstrating a benign          lymphohistiocytic inflammatory infiltrate, fibrin deposition          and other reactive-type changes (see comment).         Fragments of benign skeletal muscle also present.         No acute inflammatory " "process or malignancy identified.    Comment: The differential diagnosis for these histologic findings  includes, but is not limited to, a resolving infectious or hemorrhagic  process. \"    The concerning part is she has NO history of trauma  I reviewed with the patient that this is somewhat unusual and would need to ensure not enlarging and assess if she needs surgical intervention or if there are  Components that are more prominent on PET scan that should be bx  She is being discharged tomorrow so will set up follow up in clinic with a PET scan    D/w Dr. Leger     "

## 2019-11-16 NOTE — PROGRESS NOTES
Internal Medicine Interval Note  Note Author: Laquita Leger M.D.     Name Savita Duggan 1965   Age/Sex 54 y.o. female   MRN [id number]   Code Status FULL     After 5PM or if no immediate response to page, please call for cross-coverage  Attending/Team: Dr. Stern - Red Team See Patient List for primary contact information  Call [phone] to page    1st Call - Day Intern (R1):   Dr. Leger 2nd Call - Day Sr. Resident (R2/R3):   Dr. Taylor     Reason for interval visit    Shortness of Breath    Interval Problem Daily Status Update   -Patient without acute events overnight. Afebrile with stable vital signs.   -Lung biopsy results returned as reactive changes without acute inflammatory process or malignancy identified. Patient to have outpatient followup now pulmonology for a  PET scan.   -CT yesterday showed no renal tumors  -Home 02 evaluation revealed patient does need 02 at home   -Stable for discharge home today     Review of Systems   Constitutional: Negative for chills and fever.   HENT: Negative for ear pain.    Eyes: Negative for pain.   Respiratory: Negative for cough, sputum production and shortness of breath.    Cardiovascular: Negative for chest pain, palpitations and leg swelling.   Gastrointestinal: Negative for abdominal pain, blood in stool, constipation, diarrhea, melena, nausea and vomiting.   Genitourinary: Negative for dysuria.   Musculoskeletal: Negative for back pain.   Skin: Negative for rash.   Neurological: Negative for dizziness, loss of consciousness and headaches.   All other systems reviewed and are negative.    Disposition/Barriers to discharge:   Stable for discharge home today    Consultants/Specialty  Pulmonology   PCP: Radhika Johnson, F.N.P.    Quality Measures  Quality-Core Measures   Krueger catheter::  No Krueger  DVT prophylaxis pharmacological::  Enoxaparin (Lovenox)    Physical Exam       Vitals:    11/15/19 1530 11/15/19 1908 11/15/19 2038 19 0319   BP: 158/79  (!) 163/92 144/80 150/74   Pulse: 86 89 78 80   Resp: 18 18  18   Temp: 36.6 °C (97.9 °F) 36.3 °C (97.3 °F)  36.9 °C (98.5 °F)   TempSrc: Temporal Temporal  Temporal   SpO2: 96% 91% 93% 97%   Weight:       Height:         Body mass index is 54.5 kg/m².   Oxygen Therapy:  Pulse Oximetry: 97 %, O2 (LPM): 1, O2 Delivery: Silicone Nasal Cannula    Physical Exam   Constitutional: She is oriented to person, place, and time and well-developed, well-nourished, and in no distress. No distress.   HENT:   Head: Normocephalic and atraumatic.   Eyes: Right eye exhibits no discharge. Left eye exhibits no discharge. No scleral icterus.   Neck: No JVD present. No tracheal deviation present.   Cardiovascular: Normal rate and regular rhythm.   No murmur heard.  Pulmonary/Chest: Effort normal and breath sounds normal. No respiratory distress. She has no wheezes. She has no rales.   Abdominal: Soft. She exhibits no distension. There is no tenderness.   Obese   Musculoskeletal:         General: No edema.   Neurological: She is alert and oriented to person, place, and time. No cranial nerve deficit.   Skin: Skin is warm and dry. No rash noted.   Psychiatric: Affect normal.   Nursing note and vitals reviewed.      Assessment/Plan     * Acute respiratory failure with hypoxia (HCC)- (present on admission)  Assessment & Plan  Presented with acute hypoxic respiratory failure saturating 85% on RA, now saturating well on 2L. History of using 02 only at night from a friend. Imaging on presentation was remarkable for a new lung mass. Lung biopsy results returned as reactive changes without acute inflammatory process or malignancy identified. Patient now able to be discharged to have outpatient PET scan with pulmonology. She will need 02 at home which has been put in for her.     Plan:   -Outpatient followup with pulmonology for PET scan   -Sp02, titrate to keep >88%  -Home 02     Asymptomatic microscopic hematuria  Assessment & Plan  History of  RCC. Hematuria by history x2 months and confirmed urinalysis on admission.   -CT scan negative for new mass. Suspect uterine bleeding may be the source of her hematuria as she states usually her hematuria correlates with her period. Patient stable for discharge with outpatient followup.       H/O renal cell carcinoma s/p nephrectomy- (present on admission)  Assessment & Plan  - Nephrectomy 3 years ago for RCC    Hypertension- (present on admission)  Assessment & Plan  - Continue on lisinopril 20 mg daily    Constipation  Assessment & Plan  Resolved yesterday. Patient tolerating a regular diet.   -Will continue bowel protocol and monitoring    Situational anxiety  Assessment & Plan  Patient appropriately anxious about hospitalization. Improved now.   -Reassurance    Anemia- (present on admission)  Assessment & Plan  Hb 11.6, no known baseline. Iron studies show low %sat/iron level with normal ferritin. ?Mixed anemia? She has had heavy periods for a few months which could account for her loss.   -Monitoring H&H, currently stable    GERD (gastroesophageal reflux disease)- (present on admission)  Assessment & Plan  -Continue on Omeprazole 40 mg BID

## 2019-11-16 NOTE — DISCHARGE PLANNING
Received Choice form at 0800  Agency/Facility Name: [facility]  Referral sent per Choice form @ 0850     This [facility] paged on-call  notifying them of referral. Requested a call back.

## 2019-11-16 NOTE — CARE PLAN
Problem: Communication  Goal: The ability to communicate needs accurately and effectively will improve  Outcome: MET     Problem: Safety  Goal: Will remain free from injury  Outcome: MET  Goal: Will remain free from falls  Outcome: MET     Problem: Infection  Goal: Will remain free from infection  Outcome: MET     Problem: Venous Thromboembolism (VTW)/Deep Vein Thrombosis (DVT) Prevention:  Goal: Patient will participate in Venous Thrombosis (VTE)/Deep Vein Thrombosis (DVT)Prevention Measures  Outcome: MET     Problem: Bowel/Gastric:  Goal: Normal bowel function is maintained or improved  Outcome: MET  Goal: Will not experience complications related to bowel motility  Outcome: MET     Problem: Knowledge Deficit  Goal: Knowledge of disease process/condition, treatment plan, diagnostic tests, and medications will improve  Outcome: MET  Goal: Knowledge of the prescribed therapeutic regimen will improve  Outcome: MET     Problem: Discharge Barriers/Planning  Goal: Patient's continuum of care needs will be met  Outcome: MET     Problem: Pain Management  Goal: Pain level will decrease to patient's comfort goal  Outcome: MET     Problem: Respiratory:  Goal: Respiratory status will improve  Outcome: MET  Home with home 02     Problem: Fluid Volume:  Goal: Will maintain balanced intake and output  Outcome: MET     Problem: Psychosocial Needs:  Goal: Level of anxiety will decrease  Outcome: MET     Problem: Skin Integrity  Goal: Risk for impaired skin integrity will decrease  Outcome: MET

## 2019-11-16 NOTE — DISCHARGE INSTRUCTIONS
Discharge Instructions    Discharged to home by medical transportation with escort. Discharged via wheelchair, hospital escort: Yes.  Special equipment needed: Oxygen    Be sure to schedule a follow-up appointment with your primary care doctor or any specialists as instructed.     Discharge Plan:   Influenza Vaccine Indication: Indicated: 9 to 64 years of age  Influenza Vaccine Given - only chart on this line when given: Influenza Vaccine Given (See MAR)    I understand that a diet low in cholesterol, fat, and sodium is recommended for good health. Unless I have been given specific instructions below for another diet, I accept this instruction as my diet prescription.   Other diet: Advance as tolerated    Discharge Instructions    Discharged to home by car with relative. Discharged via wheelchair, hospital escort: Yes.  Special equipment needed: Oxygen    Be sure to schedule a follow-up appointment with your primary care doctor or any specialists as instructed.     Discharge Plan:   Influenza Vaccine Indication: Indicated: 9 to 64 years of age  Influenza Vaccine Given - only chart on this line when given: Influenza Vaccine Given (See MAR)    I understand that a diet low in cholesterol, fat, and sodium is recommended for good health. Unless I have been given specific instructions below for another diet, I accept this instruction as my diet prescription.   Other diet: na    Special Instructions: None    · Is patient discharged on Warfarin / Coumadin?   No     Depression / Suicide Risk    As you are discharged from this [facility] facility, it is important to learn how to keep safe from harming yourself.    Recognize the warning signs:  · Abrupt changes in personality, positive or negative- including increase in energy   · Giving away possessions  · Change in eating patterns- significant weight changes-  positive or negative  · Change in sleeping patterns- unable to sleep or sleeping all the time   · Unwillingness  or inability to communicate  · Depression  · Unusual sadness, discouragement and loneliness  · Talk of wanting to die  · Neglect of personal appearance   · Rebelliousness- reckless behavior  · Withdrawal from people/activities they love  · Confusion- inability to concentrate     If you or a loved one observes any of these behaviors or has concerns about self-harm, here's what you can do:  · Talk about it- your feelings and reasons for harming yourself  · Remove any means that you might use to hurt yourself (examples: pills, rope, extension cords, firearm)  · Get professional help from the community (Mental Health, Substance Abuse, psychological counseling)  · Do not be alone:Call your Safe Contact- someone whom you trust who will be there for you.  · Call your local CRISIS HOTLINE 083-7262 or 675-022-4271  · Call your local Children's Mobile Crisis Response Team [place] (289) 722-5217 or www.[employer]  · Call the toll free National Suicide Prevention Hotlines   · National Suicide Prevention Lifeline 758-503-KWJV (9571)  · National Hope Line Network 800-SUICIDE (294-6979)        Special Instructions: None    · Is patient discharged on Warfarin / Coumadin?   No       Acute Respiratory Failure, Adult  Acute respiratory failure occurs when there is not enough oxygen passing from your lungs to your body. When this happens, your lungs have trouble removing carbon dioxide from the blood. This causes your blood oxygen level to drop too low as carbon dioxide builds up.  Acute respiratory failure is a medical emergency. It can develop quickly, but it is temporary if treated promptly. Your lung capacity, or how much air your lungs can hold, may improve with time, exercise, and treatment.  What are the causes?  There are many possible causes of acute respiratory failure, including:  · Lung injury.  · Chest injury or damage to the ribs or tissues near the lungs.  · Lung conditions that affect the flow of air and  blood into and out of the lungs, such as pneumonia, acute respiratory distress syndrome, and cystic fibrosis.  · Medical conditions, such as strokes or spinal cord injuries, that affect the muscles and nerves that control breathing.  · Blood infection (sepsis).  · Inflammation of the pancreas (pancreatitis).  · A blood clot in the lungs (pulmonary embolism).  · A large-volume blood transfusion.  · Burns.  · Near-drowning.  · Seizure.  · Smoke inhalation.  · Reaction to medicines.  · Alcohol or drug overdose.  What increases the risk?  This condition is more likely to develop in people who have:  · A blocked airway.  · Asthma.  · A condition or disease that damages or weakens the muscles, nerves, bones, or tissues that are involved in breathing.  · A serious infection.  · A health problem that blocks the unconscious reflex that is involved in breathing, such as hypothyroidism or sleep apnea.  · A lung injury or trauma.  What are the signs or symptoms?  Trouble breathing is the main symptom of acute respiratory failure. Symptoms may also include:  · Rapid breathing.  · Restlessness or anxiety.  · Skin, lips, or fingernails that appear blue (cyanosis).  · Rapid heart rate.  · Abnormal heart rhythms (arrhythmias).  · Confusion or changes in behavior.  · Tiredness or loss of energy.  · Feeling sleepy or having a loss of consciousness.  How is this diagnosed?  Your health care provider can diagnose acute respiratory failure with a medical history and physical exam. During the exam, your health care provider will listen to your heart and check for crackling or wheezing sounds in your lungs. Your may also have tests to confirm the diagnosis and determine what is causing respiratory failure. These tests may include:  · Measuring the amount of oxygen in your blood (pulse oximetry). The measurement comes from a small device that is placed on your finger, earlobe, or toe.  · Other blood tests to measure blood gases and to look  for signs of infection.  · Sampling your cerebral spinal fluid or tracheal fluid to check for infections.  · Chest X-ray to look for fluid in spaces that should be filled with air.  · Electrocardiogram (ECG) to look at the heart's electrical activity.  How is this treated?  Treatment for this condition usually takes places in a hospital intensive care unit (ICU). Treatment depends on what is causing the condition. It may include one or more treatments until your symptoms improve. Treatment may include:  · Supplemental oxygen. Extra oxygen is given through a tube in the nose, a face mask, or a reyes.  · A device such as a continuous positive airway pressure (CPAP) or bi-level positive airway pressure (BiPAP or BPAP) machine. This treatment uses mild air pressure to keep the airways open. A mask or other device will be placed over your nose or mouth. A tube that is connected to a motor will deliver oxygen through the mask.  · Ventilator. This treatment helps move air into and out of the lungs. This may be done with a bag and mask or a machine. For this treatment, a tube is placed in your windpipe (trachea) so air and oxygen can flow to the lungs.  · Extracorporeal membrane oxygenation (ECMO). This treatment temporarily takes over the function of the heart and lungs, supplying oxygen and removing carbon dioxide. ECMO gives the lungs a chance to recover. It may be used if a ventilator is not effective.  · Tracheostomy. This is a procedure that creates a hole in the neck to insert a breathing tube.  · Receiving fluids and medicines.  · Rocking the bed to help breathing.  Follow these instructions at home:  · Take over-the-counter and prescription medicines only as told by your health care provider.  · Return to normal activities as told by your health care provider. Ask your health care provider what activities are safe for you.  · Keep all follow-up visits as told by your health care provider. This is important.  How is  this prevented?  Treating infections and medical conditions that may lead to acute respiratory failure can help prevent the condition from developing.  Contact a health care provider if:  · You have a fever.  · Your symptoms do not improve or they get worse.  Get help right away if:  · You are having trouble breathing.  · You lose consciousness.  · Your have cyanosis or turn blue.  · You develop a rapid heart rate.  · You are confused.  These symptoms may represent a serious problem that is an emergency. Do not wait to see if the symptoms will go away. Get medical help right away. Call your local emergency services (911 in the [place] Do not drive yourself to the hospital.   This information is not intended to replace advice given to you by your health care provider. Make sure you discuss any questions you have with your health care provider.  Document Released: 12/23/2014 Document Revised: 07/15/2017 Document Reviewed: 07/05/2017  [employer] Patient Education © 2017 [employer]      Depression / Suicide Risk    As you are discharged from this [facility] facility, it is important to learn how to keep safe from harming yourself.    Recognize the warning signs:  · Abrupt changes in personality, positive or negative- including increase in energy   · Giving away possessions  · Change in eating patterns- significant weight changes-  positive or negative  · Change in sleeping patterns- unable to sleep or sleeping all the time   · Unwillingness or inability to communicate  · Depression  · Unusual sadness, discouragement and loneliness  · Talk of wanting to die  · Neglect of personal appearance   · Rebelliousness- reckless behavior  · Withdrawal from people/activities they love  · Confusion- inability to concentrate     If you or a loved one observes any of these behaviors or has concerns about self-harm, here's what you can do:  · Talk about it- your feelings and reasons for harming yourself  · Remove any  means that you might use to hurt yourself (examples: pills, rope, extension cords, firearm)  · Get professional help from the community (Mental Health, Substance Abuse, psychological counseling)  · Do not be alone:Call your Safe Contact- someone whom you trust who will be there for you.  · Call your local CRISIS HOTLINE 507-6876 or 782-951-5147  · Call your local Children's Mobile Crisis Response Team [place] (578) 735-7431 or www.[employer]  · Call the toll free National Suicide Prevention Hotlines   · National Suicide Prevention Lifeline 708-345-EOXX (9861)  · National Hope Line Network 800-SUICIDE (354-5054)

## 2019-11-16 NOTE — CARE PLAN
Problem: Communication  Goal: The ability to communicate needs accurately and effectively will improve  Outcome: PROGRESSING AS EXPECTED     Problem: Pain Management  Goal: Pain level will decrease to patient's comfort goal  Outcome: PROGRESSING AS EXPECTED   Pain meds given with relief  Problem: Respiratory:  Goal: Respiratory status will improve  Outcome: PROGRESSING SLOWER THAN EXPECTED   Patient is on 2L of oxygen ,incentive spirometer given with education.

## 2019-11-16 NOTE — DISCHARGE PLANNING
Agency/Facility Name: [facility]  Spoke To: Nic   Outcome: Patient approved. Ok to release portable tanks. Will contact patient to coordinate concentrator delivery.     This CCA contacted respiratory [facility] [phone]  to have portable tanks delivered to bedside. Chuyita checked with RT, two tanks will last patient 8 hours.     SARAH Gabriel notified.

## 2019-11-16 NOTE — FACE TO FACE
"Face to Face Note  -  Durable Medical Equipment    Laquita Leger M.D. - NPI: [id number]  I certify that this patient is under my care and that they had a durable medical equipment(DME)face to face encounter by myself that meets the physician DME face-to-face encounter requirements with this patient on:    Date of encounter:   Patient:                    MRN:                       YOB: 2020  Savita Duggan  [id number]  1965     The encounter with the patient was in whole, or in part, for the following medical condition, which is the primary reason for durable medical equipment:  COPD    I certify that, based on my findings, the following durable medical equipment is medically necessary:  Oxygen.    HOME O2 Saturation Measurements:(Values must be present for Home Oxygen orders)    Room air sat at rest: 94  Room air sat with amb: 83  With liters of O2: 2, O2 sat at rest with O2: 96  With Liters of O2: 2, O2 sat with amb with O2 : 95  Is the patient mobile?: Yes    My Clinical findings support the need for the above equipment due to:  Hypoxia    Supporting Symptoms: The patient requires supplemental oxygen, as the following interventions have been tried with limited or no improvement: \"Bronchodilators and/or steroid inhalers, \"Positive expiratory pressure therapies, \"Oral and/or IV steroids, \"Ambulation with oximetry and \"Incentive spirometry    "

## 2019-11-16 NOTE — PROGRESS NOTES
Discharge instructions reviewed with patient. No new medications prescribed, continue taking medications as prior to hospitalization. 2 tanks from [facility]. Patient educated on use. WC assist to exit where family is waiting to transport home.

## 2023-07-21 ENCOUNTER — HOSPITAL ENCOUNTER (INPATIENT)
Facility: MEDICAL CENTER | Age: 58
LOS: 4 days | DRG: 291 | End: 2023-07-25
Attending: EMERGENCY MEDICINE | Admitting: STUDENT IN AN ORGANIZED HEALTH CARE EDUCATION/TRAINING PROGRAM
Payer: COMMERCIAL

## 2023-07-21 ENCOUNTER — APPOINTMENT (OUTPATIENT)
Dept: RADIOLOGY | Facility: MEDICAL CENTER | Age: 58
DRG: 291 | End: 2023-07-21
Attending: EMERGENCY MEDICINE
Payer: COMMERCIAL

## 2023-07-21 DIAGNOSIS — I48.91 ATRIAL FIBRILLATION WITH RAPID VENTRICULAR RESPONSE (HCC): ICD-10-CM

## 2023-07-21 DIAGNOSIS — I50.21 ACUTE SYSTOLIC HEART FAILURE (HCC): ICD-10-CM

## 2023-07-21 DIAGNOSIS — E83.42 HYPOMAGNESEMIA: ICD-10-CM

## 2023-07-21 DIAGNOSIS — J96.01 ACUTE RESPIRATORY FAILURE WITH HYPOXIA (HCC): ICD-10-CM

## 2023-07-21 DIAGNOSIS — J81.0 ACUTE PULMONARY EDEMA (HCC): ICD-10-CM

## 2023-07-21 DIAGNOSIS — M19.90 INFLAMMATORY ARTHRITIS: ICD-10-CM

## 2023-07-21 DIAGNOSIS — R10.84 GENERALIZED ABDOMINAL PAIN: ICD-10-CM

## 2023-07-21 PROBLEM — J81.1 PULMONARY EDEMA: Status: ACTIVE | Noted: 2023-07-21

## 2023-07-21 PROBLEM — I16.1 HYPERTENSIVE EMERGENCY: Status: ACTIVE | Noted: 2019-11-11

## 2023-07-21 LAB
ALBUMIN SERPL BCP-MCNC: 3.6 G/DL (ref 3.2–4.9)
ALBUMIN/GLOB SERPL: 1.1 G/DL
ALP SERPL-CCNC: 58 U/L (ref 30–99)
ALT SERPL-CCNC: 30 U/L (ref 2–50)
ANION GAP SERPL CALC-SCNC: 12 MMOL/L (ref 7–16)
ANISOCYTOSIS BLD QL SMEAR: ABNORMAL
APTT PPP: 26.3 SEC (ref 24.7–36)
AST SERPL-CCNC: 18 U/L (ref 12–45)
BASOPHILS # BLD AUTO: 0.3 % (ref 0–1.8)
BASOPHILS # BLD: 0.03 K/UL (ref 0–0.12)
BILIRUB SERPL-MCNC: 0.6 MG/DL (ref 0.1–1.5)
BLOOD CULTURE HOLD CXBCH: NORMAL
BUN SERPL-MCNC: 25 MG/DL (ref 8–22)
CALCIUM ALBUM COR SERPL-MCNC: 9.5 MG/DL (ref 8.5–10.5)
CALCIUM SERPL-MCNC: 9.2 MG/DL (ref 8.5–10.5)
CHLORIDE SERPL-SCNC: 102 MMOL/L (ref 96–112)
CO2 SERPL-SCNC: 27 MMOL/L (ref 20–33)
COMMENT 1642: NORMAL
CREAT SERPL-MCNC: 0.96 MG/DL (ref 0.5–1.4)
EKG IMPRESSION: NORMAL
EOSINOPHIL # BLD AUTO: 0.05 K/UL (ref 0–0.51)
EOSINOPHIL NFR BLD: 0.5 % (ref 0–6.9)
ERYTHROCYTE [DISTWIDTH] IN BLOOD BY AUTOMATED COUNT: 45.6 FL (ref 35.9–50)
GFR SERPLBLD CREATININE-BSD FMLA CKD-EPI: 68 ML/MIN/1.73 M 2
GLOBULIN SER CALC-MCNC: 3.3 G/DL (ref 1.9–3.5)
GLUCOSE SERPL-MCNC: 123 MG/DL (ref 65–99)
HCT VFR BLD AUTO: 40.5 % (ref 37–47)
HGB BLD-MCNC: 12.1 G/DL (ref 12–16)
HYPOCHROMIA BLD QL SMEAR: ABNORMAL
IMM GRANULOCYTES # BLD AUTO: 0.03 K/UL (ref 0–0.11)
IMM GRANULOCYTES NFR BLD AUTO: 0.3 % (ref 0–0.9)
INR PPP: 1.29 (ref 0.87–1.13)
LYMPHOCYTES # BLD AUTO: 0.86 K/UL (ref 1–4.8)
LYMPHOCYTES NFR BLD: 9.1 % (ref 22–41)
MCH RBC QN AUTO: 23.8 PG (ref 27–33)
MCHC RBC AUTO-ENTMCNC: 29.9 G/DL (ref 32.2–35.5)
MCV RBC AUTO: 79.6 FL (ref 81.4–97.8)
MICROCYTES BLD QL SMEAR: ABNORMAL
MONOCYTES # BLD AUTO: 0.32 K/UL (ref 0–0.85)
MONOCYTES NFR BLD AUTO: 3.4 % (ref 0–13.4)
MORPHOLOGY BLD-IMP: NORMAL
NEUTROPHILS # BLD AUTO: 8.14 K/UL (ref 1.82–7.42)
NEUTROPHILS NFR BLD: 86.4 % (ref 44–72)
NRBC # BLD AUTO: 0 K/UL
NRBC BLD-RTO: 0 /100 WBC (ref 0–0.2)
NT-PROBNP SERPL IA-MCNC: 2592 PG/ML (ref 0–125)
OVALOCYTES BLD QL SMEAR: NORMAL
PLATELET # BLD AUTO: 337 K/UL (ref 164–446)
PLATELET BLD QL SMEAR: NORMAL
PMV BLD AUTO: 10.3 FL (ref 9–12.9)
POIKILOCYTOSIS BLD QL SMEAR: NORMAL
POTASSIUM SERPL-SCNC: 4.1 MMOL/L (ref 3.6–5.5)
PROCALCITONIN SERPL-MCNC: <0.05 NG/ML
PROT SERPL-MCNC: 6.9 G/DL (ref 6–8.2)
PROTHROMBIN TIME: 15.9 SEC (ref 12–14.6)
RBC # BLD AUTO: 5.09 M/UL (ref 4.2–5.4)
RBC BLD AUTO: PRESENT
SODIUM SERPL-SCNC: 141 MMOL/L (ref 135–145)
TROPONIN T SERPL-MCNC: 10 NG/L (ref 6–19)
WBC # BLD AUTO: 9.4 K/UL (ref 4.8–10.8)

## 2023-07-21 PROCEDURE — 96375 TX/PRO/DX INJ NEW DRUG ADDON: CPT

## 2023-07-21 PROCEDURE — 770020 HCHG ROOM/CARE - TELE (206)

## 2023-07-21 PROCEDURE — 93005 ELECTROCARDIOGRAM TRACING: CPT

## 2023-07-21 PROCEDURE — 99223 1ST HOSP IP/OBS HIGH 75: CPT | Performed by: STUDENT IN AN ORGANIZED HEALTH CARE EDUCATION/TRAINING PROGRAM

## 2023-07-21 PROCEDURE — 80053 COMPREHEN METABOLIC PANEL: CPT

## 2023-07-21 PROCEDURE — 99285 EMERGENCY DEPT VISIT HI MDM: CPT

## 2023-07-21 PROCEDURE — 71045 X-RAY EXAM CHEST 1 VIEW: CPT

## 2023-07-21 PROCEDURE — 85730 THROMBOPLASTIN TIME PARTIAL: CPT

## 2023-07-21 PROCEDURE — 700105 HCHG RX REV CODE 258: Performed by: STUDENT IN AN ORGANIZED HEALTH CARE EDUCATION/TRAINING PROGRAM

## 2023-07-21 PROCEDURE — 96366 THER/PROPH/DIAG IV INF ADDON: CPT

## 2023-07-21 PROCEDURE — 83880 ASSAY OF NATRIURETIC PEPTIDE: CPT

## 2023-07-21 PROCEDURE — 36415 COLL VENOUS BLD VENIPUNCTURE: CPT

## 2023-07-21 PROCEDURE — A9270 NON-COVERED ITEM OR SERVICE: HCPCS

## 2023-07-21 PROCEDURE — 85025 COMPLETE CBC W/AUTO DIFF WBC: CPT

## 2023-07-21 PROCEDURE — 84145 PROCALCITONIN (PCT): CPT

## 2023-07-21 PROCEDURE — A9270 NON-COVERED ITEM OR SERVICE: HCPCS | Performed by: STUDENT IN AN ORGANIZED HEALTH CARE EDUCATION/TRAINING PROGRAM

## 2023-07-21 PROCEDURE — 74177 CT ABD & PELVIS W/CONTRAST: CPT

## 2023-07-21 PROCEDURE — 700102 HCHG RX REV CODE 250 W/ 637 OVERRIDE(OP): Performed by: STUDENT IN AN ORGANIZED HEALTH CARE EDUCATION/TRAINING PROGRAM

## 2023-07-21 PROCEDURE — 84484 ASSAY OF TROPONIN QUANT: CPT

## 2023-07-21 PROCEDURE — 700117 HCHG RX CONTRAST REV CODE 255: Performed by: EMERGENCY MEDICINE

## 2023-07-21 PROCEDURE — 700102 HCHG RX REV CODE 250 W/ 637 OVERRIDE(OP)

## 2023-07-21 PROCEDURE — 700105 HCHG RX REV CODE 258: Performed by: EMERGENCY MEDICINE

## 2023-07-21 PROCEDURE — 85610 PROTHROMBIN TIME: CPT

## 2023-07-21 PROCEDURE — 700111 HCHG RX REV CODE 636 W/ 250 OVERRIDE (IP): Mod: JZ | Performed by: EMERGENCY MEDICINE

## 2023-07-21 PROCEDURE — 700111 HCHG RX REV CODE 636 W/ 250 OVERRIDE (IP): Mod: JZ | Performed by: STUDENT IN AN ORGANIZED HEALTH CARE EDUCATION/TRAINING PROGRAM

## 2023-07-21 PROCEDURE — 96365 THER/PROPH/DIAG IV INF INIT: CPT

## 2023-07-21 RX ORDER — ONDANSETRON 2 MG/ML
4 INJECTION INTRAMUSCULAR; INTRAVENOUS EVERY 4 HOURS PRN
Status: DISCONTINUED | OUTPATIENT
Start: 2023-07-21 | End: 2023-07-25 | Stop reason: HOSPADM

## 2023-07-21 RX ORDER — OXYCODONE HYDROCHLORIDE 5 MG/1
2.5 TABLET ORAL
Status: DISCONTINUED | OUTPATIENT
Start: 2023-07-21 | End: 2023-07-22

## 2023-07-21 RX ORDER — ONDANSETRON 4 MG/1
4 TABLET, ORALLY DISINTEGRATING ORAL EVERY 4 HOURS PRN
Status: DISCONTINUED | OUTPATIENT
Start: 2023-07-21 | End: 2023-07-25 | Stop reason: HOSPADM

## 2023-07-21 RX ORDER — OXYCODONE HYDROCHLORIDE 5 MG/1
5 TABLET ORAL
Status: DISCONTINUED | OUTPATIENT
Start: 2023-07-21 | End: 2023-07-22

## 2023-07-21 RX ORDER — OMEPRAZOLE 20 MG/1
40 CAPSULE, DELAYED RELEASE ORAL EVERY EVENING
Status: DISCONTINUED | OUTPATIENT
Start: 2023-07-21 | End: 2023-07-25 | Stop reason: HOSPADM

## 2023-07-21 RX ORDER — FUROSEMIDE 10 MG/ML
40 INJECTION INTRAMUSCULAR; INTRAVENOUS
Status: DISCONTINUED | OUTPATIENT
Start: 2023-07-22 | End: 2023-07-23

## 2023-07-21 RX ORDER — AMOXICILLIN 250 MG
2 CAPSULE ORAL 2 TIMES DAILY
Status: DISCONTINUED | OUTPATIENT
Start: 2023-07-21 | End: 2023-07-25 | Stop reason: HOSPADM

## 2023-07-21 RX ORDER — HYDROMORPHONE HYDROCHLORIDE 1 MG/ML
0.25 INJECTION, SOLUTION INTRAMUSCULAR; INTRAVENOUS; SUBCUTANEOUS
Status: DISCONTINUED | OUTPATIENT
Start: 2023-07-21 | End: 2023-07-22

## 2023-07-21 RX ORDER — POLYETHYLENE GLYCOL 3350 17 G/17G
1 POWDER, FOR SOLUTION ORAL
Status: DISCONTINUED | OUTPATIENT
Start: 2023-07-21 | End: 2023-07-25 | Stop reason: HOSPADM

## 2023-07-21 RX ORDER — PROMETHAZINE HYDROCHLORIDE 25 MG/1
12.5-25 SUPPOSITORY RECTAL EVERY 4 HOURS PRN
Status: DISCONTINUED | OUTPATIENT
Start: 2023-07-21 | End: 2023-07-25 | Stop reason: HOSPADM

## 2023-07-21 RX ORDER — FUROSEMIDE 10 MG/ML
40 INJECTION INTRAMUSCULAR; INTRAVENOUS ONCE
Status: COMPLETED | OUTPATIENT
Start: 2023-07-21 | End: 2023-07-21

## 2023-07-21 RX ORDER — ACETAMINOPHEN 325 MG/1
650 TABLET ORAL EVERY 6 HOURS PRN
Status: DISCONTINUED | OUTPATIENT
Start: 2023-07-21 | End: 2023-07-25 | Stop reason: HOSPADM

## 2023-07-21 RX ORDER — LABETALOL HYDROCHLORIDE 5 MG/ML
10 INJECTION, SOLUTION INTRAVENOUS EVERY 4 HOURS PRN
Status: DISCONTINUED | OUTPATIENT
Start: 2023-07-21 | End: 2023-07-25 | Stop reason: HOSPADM

## 2023-07-21 RX ORDER — METOPROLOL TARTRATE 50 MG/1
100 TABLET, FILM COATED ORAL 2 TIMES DAILY
Status: DISCONTINUED | OUTPATIENT
Start: 2023-07-21 | End: 2023-07-22

## 2023-07-21 RX ORDER — PROCHLORPERAZINE EDISYLATE 5 MG/ML
5-10 INJECTION INTRAMUSCULAR; INTRAVENOUS EVERY 4 HOURS PRN
Status: DISCONTINUED | OUTPATIENT
Start: 2023-07-21 | End: 2023-07-25 | Stop reason: HOSPADM

## 2023-07-21 RX ORDER — PREDNISONE 10 MG/1
15 TABLET ORAL DAILY
Status: DISCONTINUED | OUTPATIENT
Start: 2023-07-21 | End: 2023-07-21

## 2023-07-21 RX ORDER — PROMETHAZINE HYDROCHLORIDE 25 MG/1
12.5-25 TABLET ORAL EVERY 4 HOURS PRN
Status: DISCONTINUED | OUTPATIENT
Start: 2023-07-21 | End: 2023-07-25 | Stop reason: HOSPADM

## 2023-07-21 RX ORDER — BISACODYL 10 MG
10 SUPPOSITORY, RECTAL RECTAL
Status: DISCONTINUED | OUTPATIENT
Start: 2023-07-21 | End: 2023-07-25 | Stop reason: HOSPADM

## 2023-07-21 RX ORDER — METOPROLOL TARTRATE 100 MG/1
100 TABLET ORAL 2 TIMES DAILY
Status: ON HOLD | COMMUNITY
End: 2023-07-25

## 2023-07-21 RX ORDER — LISINOPRIL 20 MG/1
20 TABLET ORAL EVERY EVENING
Status: DISCONTINUED | OUTPATIENT
Start: 2023-07-21 | End: 2023-07-25 | Stop reason: HOSPADM

## 2023-07-21 RX ORDER — FUROSEMIDE 10 MG/ML
40 INJECTION INTRAMUSCULAR; INTRAVENOUS
Status: DISCONTINUED | OUTPATIENT
Start: 2023-07-22 | End: 2023-07-21

## 2023-07-21 RX ORDER — PREDNISONE 5 MG/1
15 TABLET ORAL DAILY
Status: ON HOLD | COMMUNITY
End: 2023-07-25

## 2023-07-21 RX ORDER — ONDANSETRON 4 MG/1
4 TABLET, ORALLY DISINTEGRATING ORAL EVERY 6 HOURS PRN
COMMUNITY

## 2023-07-21 RX ADMIN — METOPROLOL TARTRATE 100 MG: 50 TABLET, FILM COATED ORAL at 18:12

## 2023-07-21 RX ADMIN — FUROSEMIDE 40 MG: 10 INJECTION INTRAMUSCULAR; INTRAVENOUS at 17:21

## 2023-07-21 RX ADMIN — DILTIAZEM HYDROCHLORIDE 10 MG/HR: 5 INJECTION INTRAVENOUS at 20:16

## 2023-07-21 RX ADMIN — IOHEXOL 80 ML: 350 INJECTION, SOLUTION INTRAVENOUS at 16:21

## 2023-07-21 RX ADMIN — OXYCODONE HYDROCHLORIDE 5 MG: 5 TABLET ORAL at 22:39

## 2023-07-21 RX ADMIN — OMEPRAZOLE 40 MG: 20 CAPSULE, DELAYED RELEASE ORAL at 18:12

## 2023-07-21 RX ADMIN — APIXABAN 5 MG: 5 TABLET, FILM COATED ORAL at 18:13

## 2023-07-21 RX ADMIN — LISINOPRIL 20 MG: 20 TABLET ORAL at 18:12

## 2023-07-21 RX ADMIN — DILTIAZEM HYDROCHLORIDE 10 MG/HR: 5 INJECTION INTRAVENOUS at 12:48

## 2023-07-21 ASSESSMENT — ENCOUNTER SYMPTOMS
HEADACHES: 0
SORE THROAT: 0
BLURRED VISION: 0
SENSORY CHANGE: 1
DEPRESSION: 1
DOUBLE VISION: 0
SHORTNESS OF BREATH: 1
ABDOMINAL PAIN: 1
VOMITING: 0
MYALGIAS: 0
ORTHOPNEA: 1
NAUSEA: 0
PALPITATIONS: 1
NERVOUS/ANXIOUS: 0
WEAKNESS: 1
FEVER: 0
DIARRHEA: 0

## 2023-07-21 ASSESSMENT — CHA2DS2 SCORE
CHA2DS2 VASC SCORE: 5
PRIOR STROKE OR TIA OR THROMBOEMBOLISM: YES
VASCULAR DISEASE: YES
HYPERTENSION: YES
DIABETES: YES
AGE 75 OR GREATER: NO
AGE 65 TO 74: NO
CHF OR LEFT VENTRICULAR DYSFUNCTION: NO
SEX: MALE

## 2023-07-21 ASSESSMENT — LIFESTYLE VARIABLES: SUBSTANCE_ABUSE: 0

## 2023-07-21 ASSESSMENT — PAIN DESCRIPTION - PAIN TYPE: TYPE: ACUTE PAIN;CHRONIC PAIN

## 2023-07-21 NOTE — ED NOTES
Pt arrived with diltiazem running at 10 mg/hr in L forearm PIV. PIV found to be infiltrated. Pharmacist and ERP notified. Pt not complaining of increased pain at site. Per pharmacist, no further actions are needed regarding infiltration. New PIV established. Blood collected and sent.

## 2023-07-21 NOTE — ED NOTES
Pt resting in gurney on 2L O2.  Pt connected to monitor, pt hypertensive and tachycardic.  MD aware.  No acute distress at this time. Call light in reach.

## 2023-07-21 NOTE — ED NOTES
Med Rec completed per patient   Allergies reviewed  No ORAL antibiotics in last 30 days    Patient states that she takes her Eliquis ONCE a day

## 2023-07-21 NOTE — ED NOTES
Pt sleeping in gurney.  Visible chest rise noted.  Pt connected to monitor. No acute distress at this time.

## 2023-07-21 NOTE — ED PROVIDER NOTES
ED Provider Note    CHIEF COMPLAINT  Chief Complaint   Patient presents with    Abdominal Pain     Pt went to outside facility for abdominal pain and dyspnea, diagnosed with Afib -170    Shortness of Breath       EXTERNAL RECORDS REVIEWED  Limited records, reviewed laboratory and records from transferring facility    HPI/ROS  LIMITATION TO HISTORY   None  OUTSIDE HISTORIAN(S):  EMS provided additional history    Savita Duggan is a 57 y.o. female who presents from [facility] as a transfer for higher level of care.  The patient is a very chronically ill complex 57-year-old female with a history of diabetes hypertension atrial fibrillation morbid obesity deconditioning who presented to [facility] last night with abdominal pain and was noted to be in rapid atrial fibrillation she also had evidence of acute heart failure.  She had extensive evaluation including blood test chest x-ray and was started on a diltiazem drip for rapid atrial fibrillation.  Due to the complexity of her case the patient was excepted as a transfer here.  She arrives with a diltiazem drip infusing.  Patient is on chronic steroids for inflammatory arthritis as well as Eliquis for atrial fibrillation.  She has not seen a cardiologist for several years.  On arrival here she reports her abdominal pain is improved.  No black or bloody stools hematemesis or hematochezia    PAST MEDICAL HISTORY   Hypertension diabetes inflammatory arthritis atrial fibrillation morbid obesity chronic CHF GERD obstructive sleep apnea    SURGICAL HISTORY  patient denies any surgical history  Right nephrectomy appendectomy cholecystectomy multiple lung biopsies  FAMILY HISTORY  No family history on file.    SOCIAL HISTORY  Social History     Tobacco Use    Smoking status: Former     Packs/day: 1.00     Years: 38.00     Pack years: 38.00     Types: Cigarettes     Quit date: 2016     Years since quittin.6    Smokeless tobacco: Never   Vaping  "Use    Vaping Use: Never used   Substance and Sexual Activity    Alcohol use: Never    Drug use: Not on file    Sexual activity: Not on file     Lives in [place]  CURRENT MEDICATIONS  Patient arrives on a diltiazem infusion.  She already received metoprolol IV Lasix.  Metoprolol 50 mg daily Eliquis 5 mg twice daily 40 mg omeprazole daily lisinopril 20 mg daily albuterol as needed nitroglycerin as needed, famotidine as needed, Carafate as needed      ALLERGIES  Allergies   Allergen Reactions    Codeine Itching    Penicillins Itching and Swelling     Reports tolerating amoxicillin       PHYSICAL EXAM  VITAL SIGNS: BP (!) 163/124   Pulse (!) 101   Temp 36.1 °C (96.9 °F) (Temporal)   Resp 20   Ht 1.753 m (5' 9.02\")   Wt (!) 167 kg (368 lb 2.7 oz)   SpO2 99%   BMI 54.34 kg/m²    Pulse ox interpretation: I interpret this pulse ox as normal.  Constitutional: Alert and oriented x 3, moderate distress patient appears acutely ill  HEENT: Atraumatic normocephalic, pupils are equal round reactive to light extraocular movements are intact. The nares is clear, external ears are normal, mouth shows moist mucous membranes normal dentition for age  Neck: Supple, no JVD no tracheal deviation  Cardiovascular: Tachycardic irregularly irregular no murmur rub or gallop 2+ pulses peripherally x4  Thorax & Lungs: Bibasilar rales  GI: Diffuse moderate tenderness without rebound or guarding no palpable hernia or mass  Skin: Warm dry no acute rash or lesion  Musculoskeletal: Moving all extremities with full range and 5 of 5 strength no acute  deformity  Neurologic: Cranial nerves III through XII are grossly intact no sensory deficit no cerebellar dysfunction   Psychiatric: Appropriate affect for situation at this time          DIAGNOSTIC STUDIES / PROCEDURES      LABS  Results for orders placed or performed during the hospital encounter of 07/21/23   CBC WITH DIFFERENTIAL   Result Value Ref Range    WBC 9.4 4.8 - 10.8 K/uL    RBC 5.09 " 4.20 - 5.40 M/uL    Hemoglobin 12.1 12.0 - 16.0 g/dL    Hematocrit 40.5 37.0 - 47.0 %    MCV 79.6 (L) 81.4 - 97.8 fL    MCH 23.8 (L) 27.0 - 33.0 pg    MCHC 29.9 (L) 32.2 - 35.5 g/dL    RDW 45.6 35.9 - 50.0 fL    Platelet Count 337 164 - 446 K/uL    MPV 10.3 9.0 - 12.9 fL    Neutrophils-Polys 86.40 (H) 44.00 - 72.00 %    Lymphocytes 9.10 (L) 22.00 - 41.00 %    Monocytes 3.40 0.00 - 13.40 %    Eosinophils 0.50 0.00 - 6.90 %    Basophils 0.30 0.00 - 1.80 %    Immature Granulocytes 0.30 0.00 - 0.90 %    Nucleated RBC 0.00 0.00 - 0.20 /100 WBC    Neutrophils (Absolute) 8.14 (H) 1.82 - 7.42 K/uL    Lymphs (Absolute) 0.86 (L) 1.00 - 4.80 K/uL    Monos (Absolute) 0.32 0.00 - 0.85 K/uL    Eos (Absolute) 0.05 0.00 - 0.51 K/uL    Baso (Absolute) 0.03 0.00 - 0.12 K/uL    Immature Granulocytes (abs) 0.03 0.00 - 0.11 K/uL    NRBC (Absolute) 0.00 K/uL    Hypochromia 1+     Anisocytosis 1+     Microcytosis 1+    Comp Metabolic Panel   Result Value Ref Range    Sodium 141 135 - 145 mmol/L    Potassium 4.1 3.6 - 5.5 mmol/L    Chloride 102 96 - 112 mmol/L    Co2 27 20 - 33 mmol/L    Anion Gap 12.0 7.0 - 16.0    Glucose 123 (H) 65 - 99 mg/dL    Bun 25 (H) 8 - 22 mg/dL    Creatinine 0.96 0.50 - 1.40 mg/dL    Calcium 9.2 8.5 - 10.5 mg/dL    Correct Calcium 9.5 8.5 - 10.5 mg/dL    AST(SGOT) 18 12 - 45 U/L    ALT(SGPT) 30 2 - 50 U/L    Alkaline Phosphatase 58 30 - 99 U/L    Total Bilirubin 0.6 0.1 - 1.5 mg/dL    Albumin 3.6 3.2 - 4.9 g/dL    Total Protein 6.9 6.0 - 8.2 g/dL    Globulin 3.3 1.9 - 3.5 g/dL    A-G Ratio 1.1 g/dL   TROPONIN   Result Value Ref Range    Troponin T 10 6 - 19 ng/L   Prothrombin Time   Result Value Ref Range    PT 15.9 (H) 12.0 - 14.6 sec    INR 1.29 (H) 0.87 - 1.13   APTT   Result Value Ref Range    APTT 26.3 24.7 - 36.0 sec   proBrain Natriuretic Peptide, NT   Result Value Ref Range    NT-proBNP 2592 (H) 0 - 125 pg/mL   ESTIMATED GFR   Result Value Ref Range    GFR (CKD-EPI) 68 >60 mL/min/1.73 m 2   PLATELET  ESTIMATE   Result Value Ref Range    Plt Estimation Normal    MORPHOLOGY   Result Value Ref Range    RBC Morphology Present     Poikilocytosis 1+     Ovalocytes 1+    PERIPHERAL SMEAR REVIEW   Result Value Ref Range    Peripheral Smear Review see below    DIFFERENTIAL COMMENT   Result Value Ref Range    Comments-Diff see below    EKG   Result Value Ref Range    Report       [facility] Emergency Dept.    Test Date:  2023  Pt Name:    DESTINEE VAUGHAN                    Department: ER  MRN:        [id number]                      Room:        04  Gender:     Female                       Technician: [id number]  :        [id number]                   Requested By:KATRIN BURRELL  Order #:    [id number]                    Reading MD:    Measurements  Intervals                                Axis  Rate:       133                          P:          0  MS:         0                            QRS:        18  QRSD:       78                           T:          43  QT:         318  QTc:        474    Interpretive Statements  Atrial fibrillation  Probable anteroseptal infarct, old  Compared to ECG 11/10/2019 23:11:35  Myocardial infarct finding now present  Sinus rhythm no longer present        12-lead EKG interpretation by me rate 133 rhythm atrial fibrillation old anteroseptal infarct.  No pathological T wave inversions or classic STEMI ST segment elevation.  No evidence of heart block.    RADIOLOGY  I have independently interpreted the diagnostic imaging associated with this visit and am waiting the final reading from the radiologist.   My preliminary interpretation is as follows: Heart failure pattern noted  Radiologist interpretation:   CT-ABDOMEN-PELVIS WITH   Final Result      1.  Patchy bilateral lower lobe groundglass opacities which could represent edema and/or infection.   2.  Hepatomegaly.   3.  Apparent surgical absence of the right kidney.   4.  Atherosclerosis including coronary artery disease.    5.  Status post cholecystectomy.      DX-CHEST-PORTABLE (1 VIEW)   Final Result      Patchy bilateral midlung and lower lobe interstitial opacities which could be seen in the setting of edema and/or infection.      EC-ECHOCARDIOGRAM COMPLETE W/ CONT    (Results Pending)       COURSE & MEDICAL DECISION MAKING    ED Observation Status? No; Patient does not meet criteria for ED Observation.     INITIAL ASSESSMENT, COURSE AND PLAN  Care Narrative:     This is a very pleasant but ill-appearing 57-year-old transferred from [facility] for abdominal pain acute heart failure rapid atrial fibrillation.  Differential diagnosis was extensive.  She initially presented with abdominal pain to the outlying facility.  Her laboratory studies are reassuring but she had rapid atrial fibrillation that required boluses of metoprolol and subsequently started on a diltiazem drip.  She did seem to tolerate the diltiazem drip and therefore we reinitiated it.  Here her CBC was repeated as well as metabolic panel which is reassuring.  I did perform a CT scan of the abdomen pelvis which did not demonstrate any acute surgical process such as mesenteric ischemia bowel obstruction abscess etc.  I will administer some IV Lasix and continue her on a diltiazem drip.  She will be admitted to the telemetry unit possible upgrade to stepdown unit.  The patient has real chance of deterioration      ADDITIONAL PROBLEM LIST    DISPOSITION AND DISCUSSIONS  I have discussed management of the patient with the following physicians and MAME's: Discussed with admitting hospitalist    Discussion of management with other Q or appropriate source(s): Discussed with pharmacist    Escalation of care considered, and ultimately not performed: Not applicable    Barriers to care at this time, including but not limited to: None none    Decision tools and prescription drugs considered including, but not limited to: none    FINAL DIAGNOSIS  1. Atrial fibrillation  with rapid ventricular response (HCC)    2. Acute systolic heart failure (HCC)    3. Acute respiratory failure with hypoxia (HCC)         CRITICAL CARE  The very real possibilty of a deterioration of this patient's condition required the highest level of my preparedness for sudden, emergent intervention.  I provided critical care services, which included medication orders, frequent reevaluations of the patient's condition and response to treatment, ordering and reviewing test results, and discussing the case with various consultants.  The critical care time associated with the care of the patient was 40 minutes. Review chart for interventions. This time is exclusive of any other billable procedures.     Electronically signed by: Dillon Patel M.D., 7/21/2023 12:34 PM

## 2023-07-21 NOTE — ED NOTES
Pt updated on POC. Pt reports continued pain in hands.  No new needs or acute distress at this time.

## 2023-07-21 NOTE — ED NOTES
Pt resting in [facility]. Reports having difficulty breathing, O2 restarted at 2L.  After reports increased comfort with breathing. No acute distress at this time.

## 2023-07-21 NOTE — ED TRIAGE NOTES
Chief Complaint   Patient presents with    Abdominal Pain     Pt went to outside facility for abdominal pain and dyspnea, diagnosed with Afib -170    Shortness of Breath     Pt BIBA for above complaint.  Sudden onset of epigastric pain, with SOB. She was also found to have bloating. Pt found to have Afib with RVR in the 120-170 range, pt has history of Afib.  Pt received 100mg metoprolol, 10mg diltiazem, 20mg IV lasix. Pt has archibald catheter and 22G PIV in R forearm and L chest, both removed on arrival.

## 2023-07-22 ENCOUNTER — APPOINTMENT (OUTPATIENT)
Dept: RADIOLOGY | Facility: MEDICAL CENTER | Age: 58
DRG: 291 | End: 2023-07-22
Attending: HOSPITALIST
Payer: COMMERCIAL

## 2023-07-22 ENCOUNTER — APPOINTMENT (OUTPATIENT)
Dept: CARDIOLOGY | Facility: MEDICAL CENTER | Age: 58
DRG: 291 | End: 2023-07-22
Attending: STUDENT IN AN ORGANIZED HEALTH CARE EDUCATION/TRAINING PROGRAM
Payer: COMMERCIAL

## 2023-07-22 PROBLEM — E83.42 HYPOMAGNESEMIA: Status: ACTIVE | Noted: 2023-07-22

## 2023-07-22 PROBLEM — G47.33 OBSTRUCTIVE SLEEP APNEA: Status: ACTIVE | Noted: 2023-07-22

## 2023-07-22 LAB
ALBUMIN SERPL BCP-MCNC: 3.4 G/DL (ref 3.2–4.9)
ALBUMIN SERPL BCP-MCNC: 3.4 G/DL (ref 3.2–4.9)
ALBUMIN/GLOB SERPL: 1 G/DL
ALBUMIN/GLOB SERPL: 1.1 G/DL
ALP SERPL-CCNC: 52 U/L (ref 30–99)
ALP SERPL-CCNC: 53 U/L (ref 30–99)
ALT SERPL-CCNC: 26 U/L (ref 2–50)
ALT SERPL-CCNC: 30 U/L (ref 2–50)
ANION GAP SERPL CALC-SCNC: 11 MMOL/L (ref 7–16)
ANION GAP SERPL CALC-SCNC: 16 MMOL/L (ref 7–16)
AST SERPL-CCNC: 22 U/L (ref 12–45)
AST SERPL-CCNC: 24 U/L (ref 12–45)
BASOPHILS # BLD AUTO: 0.4 % (ref 0–1.8)
BASOPHILS # BLD: 0.05 K/UL (ref 0–0.12)
BILIRUB SERPL-MCNC: 0.7 MG/DL (ref 0.1–1.5)
BILIRUB SERPL-MCNC: 0.8 MG/DL (ref 0.1–1.5)
BUN SERPL-MCNC: 22 MG/DL (ref 8–22)
BUN SERPL-MCNC: 22 MG/DL (ref 8–22)
CALCIUM ALBUM COR SERPL-MCNC: 9.5 MG/DL (ref 8.5–10.5)
CALCIUM ALBUM COR SERPL-MCNC: 9.7 MG/DL (ref 8.5–10.5)
CALCIUM SERPL-MCNC: 9 MG/DL (ref 8.5–10.5)
CALCIUM SERPL-MCNC: 9.2 MG/DL (ref 8.5–10.5)
CHLORIDE SERPL-SCNC: 96 MMOL/L (ref 96–112)
CHLORIDE SERPL-SCNC: 97 MMOL/L (ref 96–112)
CHOLEST SERPL-MCNC: 154 MG/DL (ref 100–199)
CO2 SERPL-SCNC: 23 MMOL/L (ref 20–33)
CO2 SERPL-SCNC: 29 MMOL/L (ref 20–33)
CREAT SERPL-MCNC: 0.9 MG/DL (ref 0.5–1.4)
CREAT SERPL-MCNC: 0.99 MG/DL (ref 0.5–1.4)
CRP SERPL HS-MCNC: 6.04 MG/DL (ref 0–0.75)
EKG IMPRESSION: NORMAL
EKG IMPRESSION: NORMAL
EOSINOPHIL # BLD AUTO: 0.16 K/UL (ref 0–0.51)
EOSINOPHIL NFR BLD: 1.4 % (ref 0–6.9)
ERYTHROCYTE [DISTWIDTH] IN BLOOD BY AUTOMATED COUNT: 45.9 FL (ref 35.9–50)
ERYTHROCYTE [SEDIMENTATION RATE] IN BLOOD BY WESTERGREN METHOD: 12 MM/HOUR (ref 0–25)
GFR SERPLBLD CREATININE-BSD FMLA CKD-EPI: 66 ML/MIN/1.73 M 2
GFR SERPLBLD CREATININE-BSD FMLA CKD-EPI: 74 ML/MIN/1.73 M 2
GLOBULIN SER CALC-MCNC: 3.1 G/DL (ref 1.9–3.5)
GLOBULIN SER CALC-MCNC: 3.3 G/DL (ref 1.9–3.5)
GLUCOSE SERPL-MCNC: 108 MG/DL (ref 65–99)
GLUCOSE SERPL-MCNC: 161 MG/DL (ref 65–99)
HCT VFR BLD AUTO: 39.4 % (ref 37–47)
HDLC SERPL-MCNC: 29 MG/DL
HGB BLD-MCNC: 11.6 G/DL (ref 12–16)
IMM GRANULOCYTES # BLD AUTO: 0.06 K/UL (ref 0–0.11)
IMM GRANULOCYTES NFR BLD AUTO: 0.5 % (ref 0–0.9)
LDLC SERPL CALC-MCNC: 101 MG/DL
LV EJECT FRACT  99904: 60
LV EJECT FRACT MOD 4C 99902: 67.07
LYMPHOCYTES # BLD AUTO: 1.13 K/UL (ref 1–4.8)
LYMPHOCYTES NFR BLD: 10 % (ref 22–41)
MAGNESIUM SERPL-MCNC: 1.7 MG/DL (ref 1.5–2.5)
MAGNESIUM SERPL-MCNC: 1.8 MG/DL (ref 1.5–2.5)
MCH RBC QN AUTO: 23.3 PG (ref 27–33)
MCHC RBC AUTO-ENTMCNC: 29.4 G/DL (ref 32.2–35.5)
MCV RBC AUTO: 79.3 FL (ref 81.4–97.8)
MONOCYTES # BLD AUTO: 0.72 K/UL (ref 0–0.85)
MONOCYTES NFR BLD AUTO: 6.4 % (ref 0–13.4)
NEUTROPHILS # BLD AUTO: 9.13 K/UL (ref 1.82–7.42)
NEUTROPHILS NFR BLD: 81.3 % (ref 44–72)
NRBC # BLD AUTO: 0 K/UL
NRBC BLD-RTO: 0 /100 WBC (ref 0–0.2)
PLATELET # BLD AUTO: 336 K/UL (ref 164–446)
PMV BLD AUTO: 10.7 FL (ref 9–12.9)
POTASSIUM SERPL-SCNC: 3.6 MMOL/L (ref 3.6–5.5)
POTASSIUM SERPL-SCNC: 3.9 MMOL/L (ref 3.6–5.5)
PROT SERPL-MCNC: 6.5 G/DL (ref 6–8.2)
PROT SERPL-MCNC: 6.7 G/DL (ref 6–8.2)
RBC # BLD AUTO: 4.97 M/UL (ref 4.2–5.4)
RHEUMATOID FACT SER IA-ACNC: <10 IU/ML (ref 0–14)
SODIUM SERPL-SCNC: 136 MMOL/L (ref 135–145)
SODIUM SERPL-SCNC: 136 MMOL/L (ref 135–145)
TRIGL SERPL-MCNC: 122 MG/DL (ref 0–149)
URATE SERPL-MCNC: 7.9 MG/DL (ref 1.9–8.2)
WBC # BLD AUTO: 11.3 K/UL (ref 4.8–10.8)

## 2023-07-22 PROCEDURE — 84550 ASSAY OF BLOOD/URIC ACID: CPT

## 2023-07-22 PROCEDURE — 99233 SBSQ HOSP IP/OBS HIGH 50: CPT | Performed by: HOSPITALIST

## 2023-07-22 PROCEDURE — 700111 HCHG RX REV CODE 636 W/ 250 OVERRIDE (IP): Mod: JZ

## 2023-07-22 PROCEDURE — 700102 HCHG RX REV CODE 250 W/ 637 OVERRIDE(OP): Performed by: INTERNAL MEDICINE

## 2023-07-22 PROCEDURE — 700105 HCHG RX REV CODE 258: Performed by: HOSPITALIST

## 2023-07-22 PROCEDURE — 73130 X-RAY EXAM OF HAND: CPT | Mod: RT

## 2023-07-22 PROCEDURE — 93010 ELECTROCARDIOGRAM REPORT: CPT | Mod: 76 | Performed by: INTERNAL MEDICINE

## 2023-07-22 PROCEDURE — 80061 LIPID PANEL: CPT

## 2023-07-22 PROCEDURE — 700111 HCHG RX REV CODE 636 W/ 250 OVERRIDE (IP): Performed by: INTERNAL MEDICINE

## 2023-07-22 PROCEDURE — 93306 TTE W/DOPPLER COMPLETE: CPT | Mod: 26 | Performed by: INTERNAL MEDICINE

## 2023-07-22 PROCEDURE — 93005 ELECTROCARDIOGRAM TRACING: CPT | Performed by: HOSPITALIST

## 2023-07-22 PROCEDURE — 73130 X-RAY EXAM OF HAND: CPT | Mod: LT

## 2023-07-22 PROCEDURE — 93306 TTE W/DOPPLER COMPLETE: CPT

## 2023-07-22 PROCEDURE — 86431 RHEUMATOID FACTOR QUANT: CPT

## 2023-07-22 PROCEDURE — 86038 ANTINUCLEAR ANTIBODIES: CPT

## 2023-07-22 PROCEDURE — 700102 HCHG RX REV CODE 250 W/ 637 OVERRIDE(OP)

## 2023-07-22 PROCEDURE — 770020 HCHG ROOM/CARE - TELE (206)

## 2023-07-22 PROCEDURE — 85652 RBC SED RATE AUTOMATED: CPT

## 2023-07-22 PROCEDURE — 700111 HCHG RX REV CODE 636 W/ 250 OVERRIDE (IP): Mod: JZ | Performed by: HOSPITALIST

## 2023-07-22 PROCEDURE — 86140 C-REACTIVE PROTEIN: CPT

## 2023-07-22 PROCEDURE — 700111 HCHG RX REV CODE 636 W/ 250 OVERRIDE (IP): Performed by: STUDENT IN AN ORGANIZED HEALTH CARE EDUCATION/TRAINING PROGRAM

## 2023-07-22 PROCEDURE — 700102 HCHG RX REV CODE 250 W/ 637 OVERRIDE(OP): Performed by: STUDENT IN AN ORGANIZED HEALTH CARE EDUCATION/TRAINING PROGRAM

## 2023-07-22 PROCEDURE — 80053 COMPREHEN METABOLIC PANEL: CPT

## 2023-07-22 PROCEDURE — 85025 COMPLETE CBC W/AUTO DIFF WBC: CPT

## 2023-07-22 PROCEDURE — 93005 ELECTROCARDIOGRAM TRACING: CPT | Performed by: INTERNAL MEDICINE

## 2023-07-22 PROCEDURE — 700105 HCHG RX REV CODE 258

## 2023-07-22 PROCEDURE — A9270 NON-COVERED ITEM OR SERVICE: HCPCS | Performed by: INTERNAL MEDICINE

## 2023-07-22 PROCEDURE — A9270 NON-COVERED ITEM OR SERVICE: HCPCS | Performed by: STUDENT IN AN ORGANIZED HEALTH CARE EDUCATION/TRAINING PROGRAM

## 2023-07-22 PROCEDURE — A9270 NON-COVERED ITEM OR SERVICE: HCPCS

## 2023-07-22 PROCEDURE — 99223 1ST HOSP IP/OBS HIGH 75: CPT | Performed by: INTERNAL MEDICINE

## 2023-07-22 PROCEDURE — 36415 COLL VENOUS BLD VENIPUNCTURE: CPT

## 2023-07-22 PROCEDURE — 83735 ASSAY OF MAGNESIUM: CPT

## 2023-07-22 PROCEDURE — 94664 DEMO&/EVAL PT USE INHALER: CPT

## 2023-07-22 PROCEDURE — 700111 HCHG RX REV CODE 636 W/ 250 OVERRIDE (IP)

## 2023-07-22 PROCEDURE — 86039 ANTINUCLEAR ANTIBODIES (ANA): CPT

## 2023-07-22 PROCEDURE — 700117 HCHG RX CONTRAST REV CODE 255: Performed by: INTERNAL MEDICINE

## 2023-07-22 PROCEDURE — 51798 US URINE CAPACITY MEASURE: CPT

## 2023-07-22 RX ORDER — POTASSIUM CHLORIDE 20 MEQ/1
40 TABLET, EXTENDED RELEASE ORAL
Status: DISPENSED | OUTPATIENT
Start: 2023-07-22 | End: 2023-07-23

## 2023-07-22 RX ORDER — OXYCODONE HYDROCHLORIDE 5 MG/1
2.5 TABLET ORAL
Status: DISCONTINUED | OUTPATIENT
Start: 2023-07-22 | End: 2023-07-25 | Stop reason: HOSPADM

## 2023-07-22 RX ORDER — MAGNESIUM SULFATE HEPTAHYDRATE 40 MG/ML
2 INJECTION, SOLUTION INTRAVENOUS ONCE
Status: COMPLETED | OUTPATIENT
Start: 2023-07-22 | End: 2023-07-22

## 2023-07-22 RX ORDER — MAGNESIUM SULFATE HEPTAHYDRATE 40 MG/ML
4 INJECTION, SOLUTION INTRAVENOUS ONCE
Status: DISCONTINUED | OUTPATIENT
Start: 2023-07-22 | End: 2023-07-22

## 2023-07-22 RX ORDER — OXYCODONE HYDROCHLORIDE 5 MG/1
5 TABLET ORAL
Status: DISCONTINUED | OUTPATIENT
Start: 2023-07-22 | End: 2023-07-25 | Stop reason: HOSPADM

## 2023-07-22 RX ORDER — PREDNISONE 10 MG/1
10 TABLET ORAL DAILY
Status: DISCONTINUED | OUTPATIENT
Start: 2023-07-23 | End: 2023-07-25 | Stop reason: HOSPADM

## 2023-07-22 RX ORDER — SOTALOL HYDROCHLORIDE 80 MG/1
120 TABLET ORAL TWICE DAILY
Status: DISCONTINUED | OUTPATIENT
Start: 2023-07-22 | End: 2023-07-25 | Stop reason: HOSPADM

## 2023-07-22 RX ORDER — MAGNESIUM SULFATE 1 G/100ML
1 INJECTION INTRAVENOUS
Status: DISPENSED | OUTPATIENT
Start: 2023-07-22 | End: 2023-07-23

## 2023-07-22 RX ORDER — NYSTATIN 100000 [USP'U]/G
POWDER TOPICAL 2 TIMES DAILY
Status: DISCONTINUED | OUTPATIENT
Start: 2023-07-22 | End: 2023-07-25 | Stop reason: HOSPADM

## 2023-07-22 RX ORDER — HYDROMORPHONE HYDROCHLORIDE 1 MG/ML
0.5 INJECTION, SOLUTION INTRAMUSCULAR; INTRAVENOUS; SUBCUTANEOUS
Status: DISCONTINUED | OUTPATIENT
Start: 2023-07-22 | End: 2023-07-25 | Stop reason: HOSPADM

## 2023-07-22 RX ORDER — SIMETHICONE 125 MG
125 TABLET,CHEWABLE ORAL 3 TIMES DAILY PRN
Status: DISCONTINUED | OUTPATIENT
Start: 2023-07-22 | End: 2023-07-25 | Stop reason: HOSPADM

## 2023-07-22 RX ADMIN — FUROSEMIDE 40 MG: 10 INJECTION INTRAMUSCULAR; INTRAVENOUS at 05:03

## 2023-07-22 RX ADMIN — HUMAN ALBUMIN MICROSPHERES AND PERFLUTREN 3 ML: 10; .22 INJECTION, SOLUTION INTRAVENOUS at 11:30

## 2023-07-22 RX ADMIN — POTASSIUM CHLORIDE 40 MEQ: 1500 TABLET, EXTENDED RELEASE ORAL at 13:52

## 2023-07-22 RX ADMIN — APIXABAN 5 MG: 5 TABLET, FILM COATED ORAL at 17:58

## 2023-07-22 RX ADMIN — DILTIAZEM HYDROCHLORIDE 15 MG/HR: 5 INJECTION INTRAVENOUS at 15:57

## 2023-07-22 RX ADMIN — FUROSEMIDE 40 MG: 10 INJECTION INTRAMUSCULAR; INTRAVENOUS at 15:53

## 2023-07-22 RX ADMIN — OMEPRAZOLE 40 MG: 20 CAPSULE, DELAYED RELEASE ORAL at 17:57

## 2023-07-22 RX ADMIN — MAGNESIUM SULFATE HEPTAHYDRATE 1 G: 10 INJECTION, SOLUTION INTRAVENOUS at 13:56

## 2023-07-22 RX ADMIN — APIXABAN 5 MG: 5 TABLET, FILM COATED ORAL at 04:35

## 2023-07-22 RX ADMIN — SOTALOL HYDROCHLORIDE 120 MG: 80 TABLET ORAL at 13:57

## 2023-07-22 RX ADMIN — METOPROLOL TARTRATE 100 MG: 50 TABLET, FILM COATED ORAL at 04:35

## 2023-07-22 RX ADMIN — OXYCODONE HYDROCHLORIDE 5 MG: 5 TABLET ORAL at 21:20

## 2023-07-22 RX ADMIN — MAGNESIUM SULFATE HEPTAHYDRATE 2 G: 2 INJECTION, SOLUTION INTRAVENOUS at 15:54

## 2023-07-22 RX ADMIN — PREDNISONE 15 MG: 5 TABLET ORAL at 04:35

## 2023-07-22 RX ADMIN — HYDROMORPHONE HYDROCHLORIDE 0.5 MG: 1 INJECTION, SOLUTION INTRAMUSCULAR; INTRAVENOUS; SUBCUTANEOUS at 04:43

## 2023-07-22 RX ADMIN — SENNOSIDES AND DOCUSATE SODIUM 2 TABLET: 50; 8.6 TABLET ORAL at 17:58

## 2023-07-22 RX ADMIN — NYSTATIN: 100000 POWDER TOPICAL at 17:57

## 2023-07-22 RX ADMIN — DILTIAZEM HYDROCHLORIDE 10 MG/HR: 5 INJECTION INTRAVENOUS at 09:00

## 2023-07-22 RX ADMIN — DILTIAZEM HYDROCHLORIDE 15 MG/HR: 5 INJECTION INTRAVENOUS at 23:21

## 2023-07-22 RX ADMIN — LISINOPRIL 20 MG: 20 TABLET ORAL at 17:58

## 2023-07-22 ASSESSMENT — COGNITIVE AND FUNCTIONAL STATUS - GENERAL
WALKING IN HOSPITAL ROOM: A LOT
SUGGESTED CMS G CODE MODIFIER MOBILITY: CL
TOILETING: A LOT
DRESSING REGULAR UPPER BODY CLOTHING: A LOT
TURNING FROM BACK TO SIDE WHILE IN FLAT BAD: A LOT
STANDING UP FROM CHAIR USING ARMS: A LOT
DRESSING REGULAR LOWER BODY CLOTHING: A LOT
DAILY ACTIVITIY SCORE: 16
MOVING TO AND FROM BED TO CHAIR: A LOT
MOVING FROM LYING ON BACK TO SITTING ON SIDE OF FLAT BED: A LOT
SUGGESTED CMS G CODE MODIFIER DAILY ACTIVITY: CK
CLIMB 3 TO 5 STEPS WITH RAILING: A LOT
HELP NEEDED FOR BATHING: A LOT
MOBILITY SCORE: 12

## 2023-07-22 ASSESSMENT — PATIENT HEALTH QUESTIONNAIRE - PHQ9
3. TROUBLE FALLING OR STAYING ASLEEP OR SLEEPING TOO MUCH: SEVERAL DAYS
SUM OF ALL RESPONSES TO PHQ9 QUESTIONS 1 AND 2: 2
4. FEELING TIRED OR HAVING LITTLE ENERGY: SEVERAL DAYS
5. POOR APPETITE OR OVEREATING: MORE THAN HALF THE DAYS
7. TROUBLE CONCENTRATING ON THINGS, SUCH AS READING THE NEWSPAPER OR WATCHING TELEVISION: NOT AT ALL
6. FEELING BAD ABOUT YOURSELF - OR THAT YOU ARE A FAILURE OR HAVE LET YOURSELF OR YOUR FAMILY DOWN: NOT AL ALL
9. THOUGHTS THAT YOU WOULD BE BETTER OFF DEAD, OR OF HURTING YOURSELF: NOT AT ALL
2. FEELING DOWN, DEPRESSED, IRRITABLE, OR HOPELESS: MORE THAN HALF THE DAYS
1. LITTLE INTEREST OR PLEASURE IN DOING THINGS: NOT AT ALL
8. MOVING OR SPEAKING SO SLOWLY THAT OTHER PEOPLE COULD HAVE NOTICED. OR THE OPPOSITE, BEING SO FIGETY OR RESTLESS THAT YOU HAVE BEEN MOVING AROUND A LOT MORE THAN USUAL: NOT AT ALL
SUM OF ALL RESPONSES TO PHQ QUESTIONS 1-9: 6

## 2023-07-22 ASSESSMENT — LIFESTYLE VARIABLES
HAVE PEOPLE ANNOYED YOU BY CRITICIZING YOUR DRINKING: NO
ON A TYPICAL DAY WHEN YOU DRINK ALCOHOL HOW MANY DRINKS DO YOU HAVE: 0
EVER FELT BAD OR GUILTY ABOUT YOUR DRINKING: NO
AVERAGE NUMBER OF DAYS PER WEEK YOU HAVE A DRINK CONTAINING ALCOHOL: 0
EVER HAD A DRINK FIRST THING IN THE MORNING TO STEADY YOUR NERVES TO GET RID OF A HANGOVER: NO
TOTAL SCORE: 0
HAVE YOU EVER FELT YOU SHOULD CUT DOWN ON YOUR DRINKING: NO
CONSUMPTION TOTAL: NEGATIVE
TOTAL SCORE: 0
ALCOHOL_USE: NO
HOW MANY TIMES IN THE PAST YEAR HAVE YOU HAD 5 OR MORE DRINKS IN A DAY: 0
TOTAL SCORE: 0

## 2023-07-22 ASSESSMENT — PAIN DESCRIPTION - PAIN TYPE
TYPE: ACUTE PAIN
TYPE: ACUTE PAIN

## 2023-07-22 ASSESSMENT — ENCOUNTER SYMPTOMS
VOMITING: 0
CHILLS: 0
ABDOMINAL PAIN: 0
SHORTNESS OF BREATH: 1
NAUSEA: 0
FEVER: 0

## 2023-07-22 ASSESSMENT — FIBROSIS 4 INDEX
FIB4 SCORE: 0.81
FIB4 SCORE: 0.69

## 2023-07-22 NOTE — CONSULTS
Cardiology Consult Note    DOS: 2023    Consulting physician: Lucho Pozo    Chief complaint/Reason for consult: AF with RVR    HPI:  Pt is a morbidly obese 58 yo F. She lives out by [place]. Sister lives nearby but takes care of her grandkids out there. She does not get regular medical care. Has history of HTN, COPD, and says she has had palpitations and arrhythmia for months. She has been taking Eliquis. Presented with abdominal pain and increased peripheral edema and palpitations at OSH again found to be in AF with RVR. Thinks she may have been this way for months. Here diuresed and breathing easier but remains in AF with rates difficult to control despite IV dilt and PO BB. Cardiology asked to help with rhythm control.    ROS (+ highlighted in red):  Constitutional: Fevers/chills/fatigue/weightloss  HEENT: Blurry vision/eye pain/sore throat/hearing loss  Respiratory: Shortness of breath/cough  Cardiovascular: Chest pain/palpitations/edema/orthopnea/syncope  GI: Nausea/vomitting/diarrhea  MSK: Arthralgias/myagias/muscle weakness  Skin: Rash/sores  Neurological: Numbness/tremors/vertigo  Endocrine: Excessive thirst/polyuria/cold intolerance/heat intolerance  Psych: Depression/anxiety    Past Medical History:   Diagnosis Date    Anxiety     Arrhythmia     Cancer (HCC)     COPD (chronic obstructive pulmonary disease) (HCC)     GERD (gastroesophageal reflux disease)     Heart attack (HCC)     Hyperlipidemia     Hypertension        History reviewed. No pertinent surgical history.    Social History     Socioeconomic History    Marital status: Single     Spouse name: Not on file    Number of children: Not on file    Years of education: Not on file    Highest education level: Not on file   Occupational History    Not on file   Tobacco Use    Smoking status: Former     Packs/day: 1.00     Years: 38.00     Pack years: 38.00     Types: Cigarettes     Quit date: 2016     Years since quittin.6    Smokeless  tobacco: Never   Vaping Use    Vaping Use: Never used   Substance and Sexual Activity    Alcohol use: Never    Drug use: Not on file    Sexual activity: Not on file   Other Topics Concern    Not on file   Social History Narrative    Not on file     Social Determinants of Health     Financial Resource Strain: Not on file   Food Insecurity: Not on file   Transportation Needs: Not on file   Physical Activity: Not on file   Stress: Not on file   Social Connections: Not on file   Intimate Partner Violence: Not on file   Housing Stability: Not on file       History reviewed. No pertinent family history.    Allergies   Allergen Reactions    Codeine Itching    Penicillins Itching and Swelling     Reports tolerating amoxicillin       Current Facility-Administered Medications   Medication Dose Route Frequency Provider Last Rate Last Admin    oxyCODONE immediate-release (Roxicodone) tablet 2.5 mg  2.5 mg Oral Q3HRS PRN April Lind, A.P.R.N.        Or    oxyCODONE immediate-release (Roxicodone) tablet 5 mg  5 mg Oral Q3HRS PRN April Lind, A.P.R.N.        Or    HYDROmorphone (Dilaudid) injection 0.5 mg  0.5 mg Intravenous Q3HRS PRN April Lind, A.P.R.N.   0.5 mg at 07/22/23 0443    nystatin (Mycostatin) powder   Topical BID April Lind, A.P.R.N.        magnesium sulfate in D5W IVPB premix 1 g  1 g Intravenous Once PRN Shining Sun, M.D.        potassium chloride SA (Kdur) tablet 40 mEq  40 mEq Oral Once PRN Shining Sun, M.D.        sotalol (Betapace) tablet 120 mg  120 mg Oral TWICE DAILY Shining Sun MIrishDIrish        dilTIAZem (Cardizem) 100 mg in dextrose 5% 100 mL Infusion  15 mg/hr Intravenous Continuous Lucho Pozo M.D. 15 mL/hr at 07/22/23 1010 15 mg/hr at 07/22/23 1010    senna-docusate (Pericolace Or Senokot S) 8.6-50 MG per tablet 2 Tablet  2 Tablet Oral BID Clarissa Lancaster M.D.        And    polyethylene glycol/lytes (Miralax) PACKET 1 Packet  1 Packet Oral QDAY PRN Clarissa Lancaster M.D.        And     magnesium hydroxide (Milk Of Magnesia) suspension 30 mL  30 mL Oral QDAY PRN Clarissa Lancaster M.D.        And    bisacodyl (Dulcolax) suppository 10 mg  10 mg Rectal QDAY PRN Clarissa Lancaster M.D.        Respiratory Therapy Consult   Nebulization Continuous RT Clarissa Lancaster M.D.        acetaminophen (Tylenol) tablet 650 mg  650 mg Oral Q6HRS PRN Clarissa Lancaster M.D.        ondansetron (Zofran) syringe/vial injection 4 mg  4 mg Intravenous Q4HRS PRN Clarissa Lancaster M.D.        ondansetron (Zofran ODT) dispertab 4 mg  4 mg Oral Q4HRS PRN Clarissa Lancaster M.D.        promethazine (Phenergan) tablet 12.5-25 mg  12.5-25 mg Oral Q4HRS PRN Clarissa Lancaster M.D.        promethazine (Phenergan) suppository 12.5-25 mg  12.5-25 mg Rectal Q4HRS PRN Clarissa Lancaster M.D.        prochlorperazine (Compazine) injection 5-10 mg  5-10 mg Intravenous Q4HRS PRN Clarissa Lancaster M.D.        labetalol (Normodyne/Trandate) injection 10 mg  10 mg Intravenous Q4HRS PRN Clarissa Lancaster M.D.        apixaban (Eliquis) tablet 5 mg  5 mg Oral BID Clarissa Lancaster M.D.   5 mg at 07/22/23 0435    lisinopril (Prinivil) tablet 20 mg  20 mg Oral Q EVENING Clarissa Lancaster M.D.   20 mg at 07/21/23 1812    omeprazole (PriLOSEC) capsule 40 mg  40 mg Oral Q EVENING Clarissa Lancaster M.D.   40 mg at 07/21/23 1812    predniSONE (Deltasone) tablet 15 mg  15 mg Oral DAILY Clarissa Lancaster M.D.   15 mg at 07/22/23 0435    furosemide (Lasix) injection 40 mg  40 mg Intravenous BID DIURETIC Clarissa Lancaster M.D.   40 mg at 07/22/23 0503    Pharmacy Consult Request ...Pain Management Review 1 Each  1 Each Other PHARMACY TO DOSE GAETANO Henson           Physical Exam:  Vitals:    07/22/23 0806 07/22/23 0955 07/22/23 1010 07/22/23 1231   BP: 127/63  115/59 118/68   Pulse: 83 (!) 142 (!) 128 80   Resp: 18   18   Temp: 36.2 °C (97.2 °F)   36.6 °C (97.9 °F)   TempSrc: Temporal   Temporal   SpO2: 93%   97%   Weight: (!) 166 kg (367 lb 1.1  "oz)      Height: 1.753 m (5' 9\")        General appearance: Obese, NAD, conversant   Eyes: anicteric sclerae, moist conjunctivae; no lid-lag; PERRLA  HENT: Atraumatic; oropharynx clear with moist mucous membranes and no mucosal ulcerations; normal hard and soft palate  Neck: Trachea midline; FROM, supple, no thyromegaly or lymphadenopathy  Lungs: CTA, with normal respiratory effort and no intercostal retractions  CV: irregularly irregular, tachy, no MRGs, difficult to  JVD   Abdomen: Soft, non-tender; no masses or HSM  Extremities: 1+ bilateral peripheral edema or extremity lymphadenopathy  Skin: Normal temperature, turgor and texture; no rash, ulcers or subcutaneous nodules  Psych: Appropriate affect, alert and oriented to person, place and time    Data:  Labs reviewed    Prior echo/stress results reviewed:  Physiologic effusion, preserved LV function    CXR interpreted by me:  Mild pulmonary congestion    EKG interpreted by me:   AF w RVR    Impression/Plan:  1) Persistent AF with RVR  2) HFPEF  3) High risk medication initiation    -Sounds like over the last 6 mo or so progressed from paroxysmal now to persistent AF, poorly rate controlled resulting in HF symptoms  -Difficult to rate control despite luz elena agents   -Will attempt rhythm control  -Initiate sotalol at 120 dose tonight, probably stop the dilt gtt tomorrow after second dose, if not converting by sotalol load alone, plan on cardioversion next week  -Continue OAC    Car Bauman MD    "

## 2023-07-22 NOTE — ASSESSMENT & PLAN NOTE
Improved, unclear etiology, possible gastritis ? Pt on NSAIDs and prednisone   CT abdomen neg for acute pathology  Denies urinary symptoms   Continue PPI, avoid NSAIDs   Symptoms improved

## 2023-07-22 NOTE — ASSESSMENT & PLAN NOTE
Suspected inflammatory arthritis per pt, RA workup neg ? Per pt   Has been on steroids for prolonged period, has been trying to taper       X-ray of the hands consistent with osteoarthritis  No clear indication for steroids at this time I tapered dose to 10 mg and she will need slow taper  outpatient follow-up  Tylenol as needed  Topical Voltaren gel  Avoid systemic NSAIDs

## 2023-07-22 NOTE — ED NOTES
Pt transported off unit on portable monitor with Floor RN. Pt awake and breathing with even, unlabored breaths on 2L. All belongings with Pt.

## 2023-07-22 NOTE — PROGRESS NOTES
4 Eyes Skin Assessment Completed by SARAH Hutchins and SARAH Yeboah.    Head WDL  Ears Redness and Blanching  Nose WDL  Mouth WDL  Neck WDL  Breast/Chest Redness and Excoriation  Shoulder Blades Redness and Blanching  Spine Redness and Blanching  (R) Arm/Elbow/Hand Redness and Blanching  (L) Arm/Elbow/Hand Redness and Blanching  Abdomen Excoriation  Groin Excoriation and Rash  Scrotum/Coccyx/Buttocks Redness and Excoriation  (R) Leg Redness, Blanching, and Swelling  (L) Leg Redness, Blanching, and Swelling, wound  (R) Heel/Foot/Toe Redness, Blanching, and Swelling  (L) Heel/Foot/Toe Redness, Blanching, and Swelling          Devices In Places Tele Box, Blood Pressure Cuff, Pulse Ox, and Nasal Cannula      Interventions In Place Gray Ear Foams, Pillows, and Pressure Redistribution Mattress    Possible Skin Injury Yes    Pictures Uploaded Into Epic Yes  Wound Consult Placed Yes  RN Wound Prevention Protocol Ordered Yes

## 2023-07-22 NOTE — ED NOTES
Per ERP, OK for pt to have water. Provided pt with ice water. Pt able to swallow without difficulty. Pt denies any additional need at this time.

## 2023-07-22 NOTE — CARE PLAN
The patient is Stable - Low risk of patient condition declining or worsening    Shift Goals  Clinical Goals: monitor HR and BP  Patient Goals: pain control  Family Goals: na    Progress made toward(s) clinical / shift goals:    Problem: Care Map:  Admission Optimal Outcome for the Heart Failure Patient  Goal: Admission:  Optimal Care of the heart failure patient  Outcome: Progressing  Note: I/Os are being monitored, patient is adhering to fluid restriction, patient is on diuretics, patient is scheduled for an Echo.        Patient is not progressing towards the following goals:

## 2023-07-22 NOTE — ED NOTES
Inquired with Dr. Lancaster via voalte to see if archibald catheter is still needed. Awaiting response.

## 2023-07-22 NOTE — PROGRESS NOTES
0700 - Report received from Liset SMITH at patient's bedside. Patient resting in bed quietly with no complaints at this time. Telemetry monitor intact et functioning. Call light and belongings within reach, safety measures intact, white board updated.     1340 - Spoke with pharmacist about mag/potassium doses prior to sotalol and EKG post sotalol.     1610 - Reviewed EKG with provider

## 2023-07-22 NOTE — ASSESSMENT & PLAN NOTE
Continue sotalol I discussed with cardiology plan is for PETER cardioversion today   continue Eliquis reviewed with patient importance of complying with twice daily dosing for stroke prevention  Telemetry monitoring  Echo normal EF technically difficult study  Continue IV Lasix

## 2023-07-22 NOTE — RESPIRATORY CARE
"  COPD EDUCATION by COPD CLINICAL EDUCATOR  7/22/2023  at  10:58 AM by Rod Yarbrough, RRT     Reviewed RT medications.  Confirmed oxygen provider is [facility] (2 lpm NOC) for [facility] since unable to tolerate PAP device.  Declines material at this time, action plan updated.       COPD Assessment  COPD Clinical Specialists ONLY  COPD Education Initiated: Yes--Short Intervention (Patient says she was diagnosed with COPD years ago.)  DME Company: [email or web address]  DME Equipment Type: oxygen 2 lpm NOC  Physician Name: BOB LariosNREJI  Pulmonologist Name: none per patient  $ Demo/Eval of SVN's, MDI's and Aerosols: Yes (Reviewed RT medications)  (OP) Pulmonary Function Testing:  (none found in EMR)  Interdisciplinary Rounds: Attendance at Rounds (30 Min)    PFT Results    No results found for: PFT    Meds to Beds  Would the patient like to opt in for Bedside Medication Delivery at Discharge?: Yes, interested     MY COPD ACTION PLAN     It is recommended that patients and physicians /healthcare providers complete this action plan together. This plan should be discussed at each physician visit and updated as needed.    The green, yellow and red zones show groups of symptoms of COPD. This list of symptoms is not comprehensive, and you may experience other symptoms. In the \"Actions\" column, your healthcare provider has recommended actions for you to take based on your symptoms.    Patient Name: Savita Duggan   YOB: 1965   Last Updated on: 7/22/2023 10:58 AM   Green Zone:  I am doing well today Actions     Usual activitiy and exercise level   Take daily medications     Usual amounts of cough and phlegm/mucus   Use oxygen as prescribed     Sleep well at night   Continue regular exercise/diet plan     Appetite is good   At all times avoid cigarette smoke, inhaled irritants     Daily Medications (these medications are taken every day):   Budesonide-Formoterol Fumarate (Symbicort) 2 Puffs Twice daily     Additional " "Information:  Rinse mouth and spit after using Symbicort    Yellow Zone:  I am having a bad day or a COPD flare Actions     More breathless than usual   Continue daily medications     I have less energy for my daily activities   Use quick relief inhaler as ordered     Increased or thicker phlegm/mucus   Use oxygen as prescribed     Using quick relief inhaler/nebulizer more often   Get plenty of rest     Swelling of ankles more than usual   Use pursed lip breathing     More coughing than usual   At all times avoid cigarette smoke, inhaled irritants     I feel like I have a \"chest cold\"     Poor sleep and my symptoms woke me up     My appetite is not good     My medicine is not helping      Call provider immediately if symptoms don’t improve     Continue daily medications, add rescue medications:   Albuterol 2 Puffs         Medications to be used during a flare up, (as Discussed with Provider):           Additional Information:  Use spacer with this inhaler and as prescribed by your doctor    Red Zone:  I need urgent medical care Actions     Severe shortness of breath even at rest   Call 911 or seek medical care immediately     Not able to do any activity because of breathing      Fever or shaking chills      Feeling confused or very drowsy       Chest pains      Coughing up blood                  "

## 2023-07-22 NOTE — ASSESSMENT & PLAN NOTE
Hypertensive emergency with A.fib RVR and pulmonary edema   Blood pressure controlled monitor with current medical therapy and diuresis

## 2023-07-22 NOTE — PROGRESS NOTES
Received pt from ED. Patient A&O x 4. 's- 160's. Currently requiring 2 L via NC. Afib on telemetry monitor. No complaints of pain at this time. POC discussed with patient. Pt verbalizes understanding. Call light and belongings with in reach. Bed locked and in lowest position, alarm and fall precautions in place.

## 2023-07-22 NOTE — PROGRESS NOTES
Hospital Medicine Daily Progress Note    Date of Service  7/22/2023    Chief Complaint  Savita Duggan is a 57 y.o. female admitted 7/21/2023 with dyspnea and palpitations    Hospital Course  Savita Duggan is a 57 y.o. female with a past medical history of morbid obesity, hypertension, atrial fibrillation on anticoagulation, history of renal cell carcinoma status post nephrectomy, GERD, history of COPD(on 2 L home nocturnal oxygen), suspected inflammatory arthritis on chronic corticosteroids who presented 7/21/2023 with abdominal pain, increasing shortness of breath and palpitations.  Patient was seen outside hospital was found to be in atrial fibrillation with RVR with heart rates in the 170s not responsive to metoprolol pushes.  She was started on a diltiazem drip and transferred to [facility] for further evaluation.    Interval Problem Update    Patient remains in A-fib with RVR  On Cardizem drip I increased rate to 15 mg  She remained tachycardic I consulted Dr. Bauman from cardiology and discussed with him plan is to initiate sotalol and possible cardioversion  I reviewed CBC and CMP  Patient reports history of inflammatory arthritis has not been able to see rheumatology [place] I ordered inflammatory markers and x-ray of hands    I have discussed this patient's plan of care and discharge plan at IDT rounds today with Case Management, Nursing, Nursing leadership, and other members of the IDT team.    Consultants/Specialty  cardiology    Code Status  Full Code    Disposition  The patient is not medically cleared for discharge to home or a post-acute facility.  Anticipate discharge to: home with close outpatient follow-up    I have placed the appropriate orders for post-discharge needs.    Review of Systems  Review of Systems   Constitutional:  Positive for malaise/fatigue. Negative for chills and fever.   Respiratory:  Positive for shortness of breath.     Cardiovascular:  Positive for leg swelling. Negative for chest pain.   Gastrointestinal:  Negative for abdominal pain, nausea and vomiting.   All other systems reviewed and are negative.       Physical Exam  Temp:  [36.1 °C (96.9 °F)-36.9 °C (98.4 °F)] 36.2 °C (97.2 °F)  Pulse:  [] 83  Resp:  [12-20] 18  BP: (109-193)/() 127/63  SpO2:  [92 %-100 %] 93 %    Physical Exam  Vitals and nursing note reviewed.   Constitutional:       General: She is not in acute distress.     Appearance: She is obese.   HENT:      Head: Normocephalic and atraumatic.      Nose: Nose normal. No rhinorrhea.      Mouth/Throat:      Pharynx: No oropharyngeal exudate or posterior oropharyngeal erythema.   Eyes:      General: No scleral icterus.        Right eye: No discharge.         Left eye: No discharge.   Cardiovascular:      Rate and Rhythm: Tachycardia present. Rhythm irregular.      Heart sounds: Normal heart sounds. No murmur heard.     No friction rub. No gallop.   Pulmonary:      Effort: Pulmonary effort is normal. No respiratory distress.      Breath sounds: No stridor. Decreased breath sounds present. No wheezing, rhonchi or rales.   Chest:      Chest wall: No tenderness.   Abdominal:      General: Bowel sounds are normal. There is no distension.      Palpations: Abdomen is soft. There is no mass.      Tenderness: There is no abdominal tenderness. There is no rebound.   Musculoskeletal:         General: Swelling present. No tenderness.      Cervical back: Neck supple. No rigidity.   Skin:     General: Skin is warm and dry.      Coloration: Skin is not cyanotic or jaundiced.      Nails: There is no clubbing.   Neurological:      General: No focal deficit present.      Mental Status: She is alert and oriented to person, place, and time.      Cranial Nerves: No cranial nerve deficit.      Motor: No weakness.   Psychiatric:         Mood and Affect: Mood normal.         Behavior: Behavior normal.          Fluids    Intake/Output Summary (Last 24 hours) at 7/22/2023 1019  Last data filed at 7/22/2023 0900  Gross per 24 hour   Intake --   Output 4200 ml   Net -4200 ml       Laboratory  Recent Labs     07/21/23  1240 07/22/23  0733   WBC 9.4 11.3*   RBC 5.09 4.97   HEMOGLOBIN 12.1 11.6*   HEMATOCRIT 40.5 39.4   MCV 79.6* 79.3*   MCH 23.8* 23.3*   MCHC 29.9* 29.4*   RDW 45.6 45.9   PLATELETCT 337 336   MPV 10.3 10.7     Recent Labs     07/21/23  1240 07/22/23  0733   SODIUM 141 136   POTASSIUM 4.1 3.6   CHLORIDE 102 96   CO2 27 29   GLUCOSE 123* 108*   BUN 25* 22   CREATININE 0.96 0.90   CALCIUM 9.2 9.0     Recent Labs     07/21/23  1240   APTT 26.3   INR 1.29*         Recent Labs     07/22/23  0733   TRIGLYCERIDE 122   HDL 29*   *       Imaging  EC-ECHOCARDIOGRAM COMPLETE W/ CONT         DX-HAND 3+ RIGHT   Final Result      Multiple sites of osteoarthritis      DX-HAND 3+ LEFT   Final Result      1.  Negative for fracture or malalignment      2.  osteoarthritis      CT-ABDOMEN-PELVIS WITH   Final Result      1.  Patchy bilateral lower lobe groundglass opacities which could represent edema and/or infection.   2.  Hepatomegaly.   3.  Apparent surgical absence of the right kidney.   4.  Atherosclerosis including coronary artery disease.   5.  Status post cholecystectomy.      DX-CHEST-PORTABLE (1 VIEW)   Final Result      Patchy bilateral midlung and lower lobe interstitial opacities which could be seen in the setting of edema and/or infection.           Assessment/Plan  * Atrial fibrillation with rapid ventricular response (HCC)- (present on admission)  Assessment & Plan  Remains tachycardic despite Cardizem drip and high-dose metoprolol  Continue Eliquis  I consulted cardiology Dr. Bauman and plan is to start sotalol and consider cardioversion   I ordered repeat chemistry panel electrolytes  Continue telemetry monitoring  Follow-up on echocardiogram    Hypomagnesemia  Assessment & Plan  I ordered 2 g of IV  magnesium sulfate and repeat magnesium    Obstructive sleep apnea  Assessment & Plan  Patient intolerant of CPAP therapy on nocturnal oxygen    Generalized abdominal pain- (present on admission)  Assessment & Plan  Improved, unclear etiology, possible gastritis ? Pt on NSAIDs and prednisone   CT abdomen neg for acute pathology  Denies urinary symptoms   Continue PPI, avoid NSAIDs   Supportive care     Pulmonary edema  Assessment & Plan  Chest xray personally reviewed, Pulmonary edema present, possibly rate related vs hypertensive vs HF  - given 40 mg IV lasix In ER, will continue with 40 mg IV BID  - monitor volume status   - rate control as above  - check echo   - cardiac, low salt diet, 2L fluid restriction     Inflammatory arthritis- (present on admission)  Assessment & Plan  Suspected inflammatory arthritis per pt, RA workup neg ? Per pt   Has been on steroids for prolonged period, has been trying to taper   - currently on 15 mg daily,     X-ray of the hands consistent with osteoarthritis  No clear indication for steroids at this time  Would recommend slow taper and outpatient follow-up      COPD (chronic obstructive pulmonary disease) (HCC)- (present on admission)  Assessment & Plan  History of, stable, do not suspect acute exacerbation   - currently on 2L, only on nocturnal oxygen per pt   - continue home inhaler therapy   - O2 per protocol   - wean as able     H/O renal cell carcinoma s/p nephrectomy- (present on admission)  Assessment & Plan  Hx of, stable per pt   Renal function stable, close monitoring of renal function with diuresis, given one kidney, increased risk for injury     Hypertensive emergency- (present on admission)  Assessment & Plan  Hypertensive emergency with A.fib RVR and pulmonary edema   Continue lisinopril  Metoprolol switched to sotalol  Continue IV Lasix and monitor blood pressure         VTE prophylaxis: therapeutic anticoagulation with Eliquis    I have performed a physical exam and  reviewed and updated ROS and Plan today (7/22/2023). In review of yesterday's note (7/21/2023), there are no changes except as documented above.

## 2023-07-22 NOTE — ASSESSMENT & PLAN NOTE
History of, stable, do not suspect acute exacerbation   - currently on 2L, only on nocturnal oxygen per pt   - continue home inhaler therapy   - O2 per protocol   - wean as able

## 2023-07-22 NOTE — CARE PLAN
The patient is Stable - Low risk of patient condition declining or worsening    Shift Goals  Clinical Goals: monitor HR and BP  Patient Goals: pain control  Family Goals: na    Progress made toward(s) clinical / shift goals:  Diuresis, sotalol intiation.       Problem: Pain - Standard  Goal: Alleviation of pain or a reduction in pain to the patient’s comfort goal  7/22/2023 1546 by Kathy Maynard R.N.  Outcome: Progressing  7/22/2023 1545 by Kathy Maynard R.N.  Outcome: Progressing  7/22/2023 1545 by Kathy Maynard R.N.  Outcome: Progressing     Problem: Knowledge Deficit - Standard  Goal: Patient and family/care givers will demonstrate understanding of plan of care, disease process/condition, diagnostic tests and medications  7/22/2023 1546 by Kathy Maynard R.N.  Outcome: Progressing  7/22/2023 1545 by Kathy Maynard R.N.  Outcome: Progressing  7/22/2023 1545 by Kathy Maynard R.N.  Outcome: Progressing     Problem: Fall Risk  Goal: Patient will remain free from falls  7/22/2023 1546 by Kathy Maynard R.N.  Outcome: Progressing  7/22/2023 1545 by Kathy Maynard RIrishN.  Outcome: Progressing  7/22/2023 1545 by Kathy Maynard RIrishN.  Outcome: Progressing     Problem: Care Map:  Admission Optimal Outcome for the Heart Failure Patient  Goal: Admission:  Optimal Care of the heart failure patient  7/22/2023 1545 by Kathy Maynard R.N.  Outcome: Progressing  7/22/2023 1545 by Kathy Maynard R.N.  Outcome: Progressing     Problem: Care Map:  Day 1 Optimal Outcome for the Heart Failure Patient  Goal: Day 1:  Optimal Care of the heart failure patient  7/22/2023 1546 by Kathy Maynard R.N.  Outcome: Progressing  7/22/2023 1545 by Kathy Maynard R.N.  Outcome: Progressing  7/22/2023 1545 by Kathy Maynard R.N.  Outcome: Progressing        Patient is not progressing towards the following goals:

## 2023-07-22 NOTE — H&P
Hospital Medicine History & Physical Note    Date of Service  7/21/2023    Primary Care Physician  HAM Larios.    Consultants  NA    Specialist Names: NA    Code Status  Full Code    Chief Complaint  Chief Complaint   Patient presents with    Abdominal Pain     Pt went to outside facility for abdominal pain and dyspnea, diagnosed with Afib -170    Shortness of Breath       History of Presenting Illness  Savita Duggan is a 57 y.o. female with a past medical history of morbid obesity, hypertension, atrial fibrillation on anticoagulation, history of renal cell carcinoma status post nephrectomy, GERD, history of COPD(on 2 L home nocturnal oxygen), suspected inflammatory arthritis on chronic corticosteroids who presented 7/21/2023 with abdominal pain, increasing shortness of breath and palpitations.  Patient was seen outside hospital was found to be in atrial fibrillation with RVR with heart rates in the 170s not responsive to metoprolol pushes.  She was started on a diltiazem drip and transferred to [facility] for further evaluation.    On evaluation patient continues to be tachycardic however her heart rate is much improved ranging from 10 3-1 28 on diltiazem GGT. blood pressure is elevated -190's, saturating >95% on 2L.   Labs remarkable for normal WBC with left shift electrolytes are stable, glucose 123 BUN mildly elevated at 25 creatinine 0.96, troponin 10, BNP 2592, INR 1.29.    Chest x-ray is personally reviewed which shows bilateral pulmonary interstitial infiltrates consistent with pulmonary edema.       I discussed the plan of care with patient.    Review of Systems  Review of Systems   Constitutional:  Positive for malaise/fatigue. Negative for fever.   HENT:  Negative for sore throat.    Eyes:  Negative for blurred vision and double vision.   Respiratory:  Positive for shortness of breath.    Cardiovascular:  Positive for palpitations, orthopnea and leg swelling. Negative for chest pain.    Gastrointestinal:  Positive for abdominal pain. Negative for diarrhea, nausea and vomiting.   Genitourinary:  Negative for dysuria, frequency and urgency.   Musculoskeletal:  Positive for joint pain. Negative for myalgias.   Neurological:  Positive for sensory change and weakness. Negative for headaches.   Psychiatric/Behavioral:  Positive for depression. Negative for substance abuse. The patient is not nervous/anxious.        Past Medical History   has a past medical history of Anxiety, Arrhythmia, Cancer ([facility]), COPD (chronic obstructive pulmonary disease) ([facility]), GERD (gastroesophageal reflux disease), Heart attack ([facility]), Hyperlipidemia, and Hypertension.    Surgical History   has no past surgical history on file.     Family History  family history is not on file.   Family history reviewed with patient. There is no family history that is pertinent to the chief complaint.     Social History   reports that she quit smoking about 6 years ago. Her smoking use included cigarettes. She has a 38.00 pack-year smoking history. She has never used smokeless tobacco. She reports that she does not drink alcohol.    Allergies  Allergies   Allergen Reactions    Codeine Itching    Penicillins Itching and Swelling     Reports tolerating amoxicillin       Medications  Prior to Admission Medications   Prescriptions Last Dose Informant Patient Reported? Taking?   apixaban (ELIQUIS) 5mg Tab 7/20/2023 at PM Patient Yes Yes   Sig: Take 5 mg by mouth every day.   lisinopril (PRINIVIL) 20 MG Tab 7/20/2023 at PM Patient Yes No   Sig: Take 20 mg by mouth every day.   metoprolol tartrate (LOPRESSOR) 100 MG Tab 7/20/2023 at PM Patient Yes Yes   Sig: Take 100 mg by mouth 2 times a day.   omeprazole (PRILOSEC) 40 MG delayed-release capsule 7/20/2023 at PM Patient Yes No   Sig: Take 40 mg by mouth every day.   ondansetron (ZOFRAN ODT) 4 MG TABLET DISPERSIBLE FEW DAYS AGO at [facility] Patient Yes Yes   Sig: Take 4 mg by mouth every 6 hours as needed  for Nausea/Vomiting.   predniSONE (DELTASONE) 5 MG Tab 7/20/2023 at AM Patient Yes Yes   Sig: Take 15 mg by mouth every day.      Facility-Administered Medications: None       Physical Exam  Temp:  [36.1 °C (96.9 °F)-36.1 °C (97 °F)] 36.1 °C (97 °F)  Pulse:  [] 84  Resp:  [12-20] 18  BP: (109-193)/() 157/92  SpO2:  [92 %-99 %] 96 %  Blood Pressure: (!) 163/124   Temperature: 36.1 °C (96.9 °F)   Pulse: (!) 101   Respiration: 20   Pulse Oximetry: 99 %       Physical Exam  Vitals and nursing note reviewed.   Constitutional:       Appearance: She is obese. She is ill-appearing. She is not toxic-appearing.   HENT:      Head: Normocephalic and atraumatic.      Mouth/Throat:      Mouth: Mucous membranes are moist.   Eyes:      Extraocular Movements: Extraocular movements intact.      Conjunctiva/sclera: Conjunctivae normal.   Cardiovascular:      Rate and Rhythm: Tachycardia present. Rhythm irregular.      Heart sounds: No murmur heard.  Pulmonary:      Breath sounds: Rales present.      Comments: Rales bilaterally, diminished in bases, exam limited by body habitus   Abdominal:      General: There is distension (abdominal obesity).      Tenderness: There is no abdominal tenderness. There is no guarding or rebound.   Musculoskeletal:      Right lower leg: Edema present.      Left lower leg: Edema present.      Comments: Pitting edema bilaterally. Reduced flexion/extension of bilateral wrists and digits    Skin:     General: Skin is warm.      Capillary Refill: Capillary refill takes 2 to 3 seconds.      Comments: Venous stasis skin changes, scattered abrasions and bruising.    Neurological:      General: No focal deficit present.      Mental Status: She is alert and oriented to person, place, and time. Mental status is at baseline.   Psychiatric:         Mood and Affect: Mood normal.         Behavior: Behavior normal.         Thought Content: Thought content normal.         Judgment: Judgment normal.          Laboratory:  Recent Labs     07/21/23  1240   WBC 9.4   RBC 5.09   HEMOGLOBIN 12.1   HEMATOCRIT 40.5   MCV 79.6*   MCH 23.8*   MCHC 29.9*   RDW 45.6   PLATELETCT 337   MPV 10.3     Recent Labs     07/21/23  1240   SODIUM 141   POTASSIUM 4.1   CHLORIDE 102   CO2 27   GLUCOSE 123*   BUN 25*   CREATININE 0.96   CALCIUM 9.2     Recent Labs     07/21/23  1240   ALTSGPT 30   ASTSGOT 18   ALKPHOSPHAT 58   TBILIRUBIN 0.6   GLUCOSE 123*     Recent Labs     07/21/23  1240   APTT 26.3   INR 1.29*     Recent Labs     07/21/23  1240   NTPROBNP 2592*         Recent Labs     07/21/23  1240   TROPONINT 10       Imaging:  CT-ABDOMEN-PELVIS WITH   Final Result      1.  Patchy bilateral lower lobe groundglass opacities which could represent edema and/or infection.   2.  Hepatomegaly.   3.  Apparent surgical absence of the right kidney.   4.  Atherosclerosis including coronary artery disease.   5.  Status post cholecystectomy.      DX-CHEST-PORTABLE (1 VIEW)   Final Result      Patchy bilateral midlung and lower lobe interstitial opacities which could be seen in the setting of edema and/or infection.      EC-ECHOCARDIOGRAM COMPLETE W/ CONT    (Results Pending)       X-Ray:  I have personally reviewed the images and compared with prior images.  EKG:  I have personally reviewed the images and compared with prior images.    Assessment/Plan:  Justification for Admission Status  I anticipate this patient will require at least two midnights for appropriate medical management, necessitating inpatient admission because patient presents with atrial fibrillation with rapid ventricular response currently on IV diltiazem which require close titration and monitoring of her heart rate with transition to oral medications patient is also volume overloaded require careful diuresis.    Patient will need a Telemetry bed on MEDICAL service .  The need is secondary to A.fib RVR.    * Atrial fibrillation with rapid ventricular response (HCC)-  (present on admission)  Assessment & Plan  A.fib with RVR, started on diltiazem drip as not responding to IV pushes, suspect this may be due to volume overload vs uncontrolled blood pressure   Resume home metoprolol 100 mg BID   Continue Dilt ggt at 10 mg/Hr, MD will need to titrate as HR improves, goal HR <110  Will get echocardiogram   Monitor on tele   Continue home Eliquis     Generalized abdominal pain- (present on admission)  Assessment & Plan  Abdominal pain, unclear etiology, possible gastritis ? Pt on NSAIDs and prednisone   CT abdomen neg for acute pathology  Denies urinary symptoms   Continue PPI, avoid NSAIDs   Supportive care     Pulmonary edema  Assessment & Plan  Chest xray personally reviewed, Pulmonary edema present, possibly rate related vs hypertensive vs HF  - given 40 mg IV lasix In ER, will continue with 40 mg IV BID  - monitor volume status   - rate control as above  - check echo   - cardiac, low salt diet, 2L fluid restriction     Inflammatory arthritis- (present on admission)  Assessment & Plan  Suspected inflammatory arthritis per pt, RA workup neg ? Per pt   Has been on steroids for prolonged period, has been trying to taper   - currently on 15 mg daily, will cut to 10 mg daily and monitor   - would benefit from referral to possible rheumatologist, will not order workup at this time as she has been on steroids and will likely be neg   - supportive care with pain control       COPD (chronic obstructive pulmonary disease) (HCC)- (present on admission)  Assessment & Plan  History of, stable, do not suspect acute exacerbation   - currently on 2L, only on nocturnal oxygen per pt   - continue home inhaler therapy   - O2 per protocol   - wean as able     H/O renal cell carcinoma s/p nephrectomy- (present on admission)  Assessment & Plan  Hx of, stable per pt   Renal function stable, close monitoring of renal function with diuresis, given one kidney, increased risk for injury     Hypertensive  emergency- (present on admission)  Assessment & Plan  Hypertensive emergency with A.fib RVR and pulmonary edema   - resume home metoprolol 100 mg BID, lisinopril 20 mg   - start lasix 40 mg BID   - currently on dilt ggt at 10 mg/hr   - trend and adjust medications as needed   - labetalol prn         VTE prophylaxis: therapeutic anticoagulation with Eliquis

## 2023-07-22 NOTE — HOSPITAL COURSE
Savita Duggan is a 57 y.o. female with a past medical history of morbid obesity, hypertension, atrial fibrillation on anticoagulation, history of renal cell carcinoma status post nephrectomy, GERD, history of COPD(on 2 L home nocturnal oxygen), suspected inflammatory arthritis on chronic corticosteroids who presented 7/21/2023 with abdominal pain, increasing shortness of breath and palpitations.  Patient was seen outside hospital was found to be in atrial fibrillation with RVR with heart rates in the 170s not responsive to metoprolol pushes.  She was started on a diltiazem drip and transferred to [facility] for further evaluation.

## 2023-07-22 NOTE — ASSESSMENT & PLAN NOTE
Hx of, stable per pt   Renal function stable, close monitoring of renal function with diuresis, given one kidney, increased risk for injury

## 2023-07-23 LAB
ANION GAP SERPL CALC-SCNC: 11 MMOL/L (ref 7–16)
BUN SERPL-MCNC: 24 MG/DL (ref 8–22)
CALCIUM SERPL-MCNC: 8.7 MG/DL (ref 8.5–10.5)
CHLORIDE SERPL-SCNC: 96 MMOL/L (ref 96–112)
CO2 SERPL-SCNC: 29 MMOL/L (ref 20–33)
CREAT SERPL-MCNC: 1.15 MG/DL (ref 0.5–1.4)
EKG IMPRESSION: NORMAL
ERYTHROCYTE [DISTWIDTH] IN BLOOD BY AUTOMATED COUNT: 44.7 FL (ref 35.9–50)
GFR SERPLBLD CREATININE-BSD FMLA CKD-EPI: 55 ML/MIN/1.73 M 2
GLUCOSE SERPL-MCNC: 94 MG/DL (ref 65–99)
HCT VFR BLD AUTO: 37.7 % (ref 37–47)
HGB BLD-MCNC: 11.4 G/DL (ref 12–16)
MAGNESIUM SERPL-MCNC: 2.2 MG/DL (ref 1.5–2.5)
MCH RBC QN AUTO: 23.6 PG (ref 27–33)
MCHC RBC AUTO-ENTMCNC: 30.2 G/DL (ref 32.2–35.5)
MCV RBC AUTO: 78.1 FL (ref 81.4–97.8)
PHOSPHATE SERPL-MCNC: 3.6 MG/DL (ref 2.5–4.5)
PLATELET # BLD AUTO: 309 K/UL (ref 164–446)
PMV BLD AUTO: 10.4 FL (ref 9–12.9)
POTASSIUM SERPL-SCNC: 3.8 MMOL/L (ref 3.6–5.5)
RBC # BLD AUTO: 4.83 M/UL (ref 4.2–5.4)
SODIUM SERPL-SCNC: 136 MMOL/L (ref 135–145)
WBC # BLD AUTO: 8.6 K/UL (ref 4.8–10.8)

## 2023-07-23 PROCEDURE — 83735 ASSAY OF MAGNESIUM: CPT

## 2023-07-23 PROCEDURE — 80048 BASIC METABOLIC PNL TOTAL CA: CPT

## 2023-07-23 PROCEDURE — A9270 NON-COVERED ITEM OR SERVICE: HCPCS | Performed by: INTERNAL MEDICINE

## 2023-07-23 PROCEDURE — 700111 HCHG RX REV CODE 636 W/ 250 OVERRIDE (IP): Performed by: HOSPITALIST

## 2023-07-23 PROCEDURE — 700102 HCHG RX REV CODE 250 W/ 637 OVERRIDE(OP)

## 2023-07-23 PROCEDURE — 770020 HCHG ROOM/CARE - TELE (206)

## 2023-07-23 PROCEDURE — 700111 HCHG RX REV CODE 636 W/ 250 OVERRIDE (IP): Mod: JZ | Performed by: STUDENT IN AN ORGANIZED HEALTH CARE EDUCATION/TRAINING PROGRAM

## 2023-07-23 PROCEDURE — 700102 HCHG RX REV CODE 250 W/ 637 OVERRIDE(OP): Performed by: INTERNAL MEDICINE

## 2023-07-23 PROCEDURE — 36415 COLL VENOUS BLD VENIPUNCTURE: CPT

## 2023-07-23 PROCEDURE — 93005 ELECTROCARDIOGRAM TRACING: CPT | Performed by: HOSPITALIST

## 2023-07-23 PROCEDURE — 85027 COMPLETE CBC AUTOMATED: CPT

## 2023-07-23 PROCEDURE — 84100 ASSAY OF PHOSPHORUS: CPT

## 2023-07-23 PROCEDURE — 99232 SBSQ HOSP IP/OBS MODERATE 35: CPT | Performed by: HOSPITALIST

## 2023-07-23 PROCEDURE — 700102 HCHG RX REV CODE 250 W/ 637 OVERRIDE(OP): Performed by: STUDENT IN AN ORGANIZED HEALTH CARE EDUCATION/TRAINING PROGRAM

## 2023-07-23 PROCEDURE — 99232 SBSQ HOSP IP/OBS MODERATE 35: CPT | Performed by: INTERNAL MEDICINE

## 2023-07-23 PROCEDURE — A9270 NON-COVERED ITEM OR SERVICE: HCPCS

## 2023-07-23 PROCEDURE — 700102 HCHG RX REV CODE 250 W/ 637 OVERRIDE(OP): Mod: JZ | Performed by: HOSPITALIST

## 2023-07-23 PROCEDURE — A9270 NON-COVERED ITEM OR SERVICE: HCPCS | Performed by: STUDENT IN AN ORGANIZED HEALTH CARE EDUCATION/TRAINING PROGRAM

## 2023-07-23 PROCEDURE — A9270 NON-COVERED ITEM OR SERVICE: HCPCS | Mod: JZ | Performed by: HOSPITALIST

## 2023-07-23 PROCEDURE — 93010 ELECTROCARDIOGRAM REPORT: CPT | Performed by: INTERNAL MEDICINE

## 2023-07-23 RX ORDER — POTASSIUM CHLORIDE 20 MEQ/1
20 TABLET, EXTENDED RELEASE ORAL ONCE
Status: DISCONTINUED | OUTPATIENT
Start: 2023-07-23 | End: 2023-07-23

## 2023-07-23 RX ORDER — FUROSEMIDE 10 MG/ML
40 INJECTION INTRAMUSCULAR; INTRAVENOUS
Status: DISCONTINUED | OUTPATIENT
Start: 2023-07-24 | End: 2023-07-24

## 2023-07-23 RX ORDER — SPIRONOLACTONE 25 MG/1
25 TABLET ORAL
Status: DISCONTINUED | OUTPATIENT
Start: 2023-07-23 | End: 2023-07-25 | Stop reason: HOSPADM

## 2023-07-23 RX ORDER — POTASSIUM CHLORIDE 20 MEQ/1
20 TABLET, EXTENDED RELEASE ORAL DAILY
Status: DISCONTINUED | OUTPATIENT
Start: 2023-07-24 | End: 2023-07-24

## 2023-07-23 RX ORDER — POTASSIUM CHLORIDE 20 MEQ/1
20 TABLET, EXTENDED RELEASE ORAL 2 TIMES DAILY
Status: DISCONTINUED | OUTPATIENT
Start: 2023-07-23 | End: 2023-07-23

## 2023-07-23 RX ORDER — METOPROLOL SUCCINATE 25 MG/1
25 TABLET, EXTENDED RELEASE ORAL 2 TIMES DAILY
Status: DISCONTINUED | OUTPATIENT
Start: 2023-07-23 | End: 2023-07-25 | Stop reason: HOSPADM

## 2023-07-23 RX ORDER — LORAZEPAM 0.5 MG/1
0.5 TABLET ORAL EVERY 6 HOURS PRN
Status: DISCONTINUED | OUTPATIENT
Start: 2023-07-23 | End: 2023-07-25 | Stop reason: HOSPADM

## 2023-07-23 RX ADMIN — APIXABAN 5 MG: 5 TABLET, FILM COATED ORAL at 16:55

## 2023-07-23 RX ADMIN — SOTALOL HYDROCHLORIDE 120 MG: 80 TABLET ORAL at 03:22

## 2023-07-23 RX ADMIN — FUROSEMIDE 40 MG: 10 INJECTION INTRAMUSCULAR; INTRAVENOUS at 06:10

## 2023-07-23 RX ADMIN — SENNOSIDES AND DOCUSATE SODIUM 2 TABLET: 50; 8.6 TABLET ORAL at 06:10

## 2023-07-23 RX ADMIN — OXYCODONE HYDROCHLORIDE 5 MG: 5 TABLET ORAL at 06:10

## 2023-07-23 RX ADMIN — POTASSIUM CHLORIDE 20 MEQ: 1500 TABLET, EXTENDED RELEASE ORAL at 09:30

## 2023-07-23 RX ADMIN — OXYCODONE HYDROCHLORIDE 5 MG: 5 TABLET ORAL at 22:30

## 2023-07-23 RX ADMIN — PREDNISONE 10 MG: 10 TABLET ORAL at 06:10

## 2023-07-23 RX ADMIN — OXYCODONE HYDROCHLORIDE 5 MG: 5 TABLET ORAL at 02:18

## 2023-07-23 RX ADMIN — LISINOPRIL 20 MG: 20 TABLET ORAL at 16:55

## 2023-07-23 RX ADMIN — ACETAMINOPHEN 650 MG: 325 TABLET, FILM COATED ORAL at 16:56

## 2023-07-23 RX ADMIN — OMEPRAZOLE 40 MG: 20 CAPSULE, DELAYED RELEASE ORAL at 16:56

## 2023-07-23 RX ADMIN — APIXABAN 5 MG: 5 TABLET, FILM COATED ORAL at 06:10

## 2023-07-23 RX ADMIN — NYSTATIN: 100000 POWDER TOPICAL at 06:10

## 2023-07-23 RX ADMIN — NYSTATIN: 100000 POWDER TOPICAL at 16:58

## 2023-07-23 RX ADMIN — SPIRONOLACTONE 25 MG: 25 TABLET ORAL at 11:36

## 2023-07-23 RX ADMIN — SENNOSIDES AND DOCUSATE SODIUM 2 TABLET: 50; 8.6 TABLET ORAL at 16:55

## 2023-07-23 RX ADMIN — SOTALOL HYDROCHLORIDE 120 MG: 80 TABLET ORAL at 15:26

## 2023-07-23 RX ADMIN — METOPROLOL SUCCINATE 25 MG: 25 TABLET, EXTENDED RELEASE ORAL at 19:09

## 2023-07-23 ASSESSMENT — ENCOUNTER SYMPTOMS
SHORTNESS OF BREATH: 0
ABDOMINAL PAIN: 0
SHORTNESS OF BREATH: 1
CHEST TIGHTNESS: 0
PALPITATIONS: 0
DIZZINESS: 0
NAUSEA: 0
HEADACHES: 0

## 2023-07-23 ASSESSMENT — PAIN DESCRIPTION - PAIN TYPE
TYPE: ACUTE PAIN
TYPE: ACUTE PAIN

## 2023-07-23 ASSESSMENT — FIBROSIS 4 INDEX: FIB4 SCORE: 0.88

## 2023-07-23 NOTE — PROGRESS NOTES
Monitor Summary:   Rhythm: Atrial fibrillation  Rate: 100-152  Measurement: ../.06/..  Ectopy: Rare PVC, rare couplet    12 hour chart check

## 2023-07-23 NOTE — PROGRESS NOTES
Assumed care of patient, received bedside report from Kathy SMITH. Patient is A&O X 4. Pain 7/10, on 2 L of oxygen NC. On tele monitor, Afib 96. POC discussed with patient. Patient verbalized understanding. All questions answered. Call light within reach and fall precautions in place. Bed alarm on, bed locked and in lowest position.

## 2023-07-23 NOTE — PROGRESS NOTES
Hospital Medicine Daily Progress Note    Date of Service  7/23/2023    Chief Complaint  Savita Duggan is a 57 y.o. female admitted 7/21/2023 with dyspnea and palpitations    Hospital Course  Savita Duggan is a 57 y.o. female with a past medical history of morbid obesity, hypertension, atrial fibrillation on anticoagulation, history of renal cell carcinoma status post nephrectomy, GERD, history of COPD(on 2 L home nocturnal oxygen), suspected inflammatory arthritis on chronic corticosteroids who presented 7/21/2023 with abdominal pain, increasing shortness of breath and palpitations.  Patient was seen outside hospital was found to be in atrial fibrillation with RVR with heart rates in the 170s not responsive to metoprolol pushes.  She was started on a diltiazem drip and transferred to [facility] for further evaluation.    Interval Problem Update    Patient remains in A-fib with improved rate control  Good diuresis urine output 3200 mL  I discussed with cardiology  I reviewed BMP and magnesium and CBC BUN 24 creatinine 1.15 Lasix decreased to once daily  I reviewed echocardiogram but grossly normal LV function technically difficult study    I have discussed this patient's plan of care and discharge plan at IDT rounds today with Case Management, Nursing, Nursing leadership, and other members of the IDT team.    Consultants/Specialty  cardiology    Code Status  Full Code    Disposition  Medically Cleared  I have placed the appropriate orders for post-discharge needs.    Review of Systems  Review of Systems   Constitutional:  Positive for malaise/fatigue.   Respiratory:  Positive for shortness of breath.    Cardiovascular:  Positive for leg swelling.   Musculoskeletal:  Positive for joint pain.   All other systems reviewed and are negative.       Physical Exam  Temp:  [36.3 °C (97.3 °F)-36.7 °C (98.1 °F)] 36.3 °C (97.3 °F)  Pulse:  [] 60  Resp:  [18] 18  BP: (107-135)/() 135/72  SpO2:  [96 %-99 %] 97 %    Physical  Exam  Vitals and nursing note reviewed.   Constitutional:       General: She is not in acute distress.     Appearance: She is obese.   HENT:      Head: Normocephalic and atraumatic.      Nose: Nose normal. No rhinorrhea.      Mouth/Throat:      Pharynx: No oropharyngeal exudate or posterior oropharyngeal erythema.   Eyes:      General: No scleral icterus.        Right eye: No discharge.         Left eye: No discharge.   Cardiovascular:      Rate and Rhythm: Tachycardia present. Rhythm irregular.      Heart sounds: Normal heart sounds. No murmur heard.     No friction rub. No gallop.   Pulmonary:      Effort: Pulmonary effort is normal. No respiratory distress.      Breath sounds: No stridor. Decreased breath sounds present. No wheezing, rhonchi or rales.   Chest:      Chest wall: No tenderness.   Abdominal:      General: Bowel sounds are normal. There is no distension.      Palpations: Abdomen is soft. There is no mass.      Tenderness: There is no abdominal tenderness. There is no rebound.   Musculoskeletal:         General: Swelling and tenderness present.      Cervical back: Neck supple. No rigidity.   Skin:     General: Skin is warm and dry.      Coloration: Skin is not cyanotic or jaundiced.      Nails: There is no clubbing.   Neurological:      General: No focal deficit present.      Mental Status: She is alert and oriented to person, place, and time.      Cranial Nerves: No cranial nerve deficit.      Motor: No weakness.   Psychiatric:         Mood and Affect: Mood normal.         Behavior: Behavior normal.         Fluids    Intake/Output Summary (Last 24 hours) at 7/23/2023 1637  Last data filed at 7/23/2023 1100  Gross per 24 hour   Intake 719.01 ml   Output 2700 ml   Net -1980.99 ml         Laboratory  Recent Labs     07/21/23  1240 07/22/23  0733 07/23/23  0216   WBC 9.4 11.3* 8.6   RBC 5.09 4.97 4.83   HEMOGLOBIN 12.1 11.6* 11.4*   HEMATOCRIT 40.5 39.4 37.7   MCV 79.6* 79.3* 78.1*   MCH 23.8* 23.3* 23.6*    MCHC 29.9* 29.4* 30.2*   RDW 45.6 45.9 44.7   PLATELETCT 337 336 309   MPV 10.3 10.7 10.4       Recent Labs     07/22/23  0733 07/22/23  1039 07/23/23  0216   SODIUM 136 136 136   POTASSIUM 3.6 3.9 3.8   CHLORIDE 96 97 96   CO2 29 23 29   GLUCOSE 108* 161* 94   BUN 22 22 24*   CREATININE 0.90 0.99 1.15   CALCIUM 9.0 9.2 8.7       Recent Labs     07/21/23  1240   APTT 26.3   INR 1.29*           Recent Labs     07/22/23  0733   TRIGLYCERIDE 122   HDL 29*   *         Imaging  EC-ECHOCARDIOGRAM COMPLETE W/ CONT   Final Result      DX-HAND 3+ RIGHT   Final Result      Multiple sites of osteoarthritis      DX-HAND 3+ LEFT   Final Result      1.  Negative for fracture or malalignment      2.  osteoarthritis      CT-ABDOMEN-PELVIS WITH   Final Result      1.  Patchy bilateral lower lobe groundglass opacities which could represent edema and/or infection.   2.  Hepatomegaly.   3.  Apparent surgical absence of the right kidney.   4.  Atherosclerosis including coronary artery disease.   5.  Status post cholecystectomy.      DX-CHEST-PORTABLE (1 VIEW)   Final Result      Patchy bilateral midlung and lower lobe interstitial opacities which could be seen in the setting of edema and/or infection.             Assessment/Plan  * Atrial fibrillation with rapid ventricular response (HCC)- (present on admission)  Assessment & Plan  Continue sotalol I discussed with cardiology and reviewed EKG  Continue Eliquis  Telemetry monitoring  Echo normal EF technically difficult study  Decrease Lasix to once a day  Plan is for PETER cardioversion    Obstructive sleep apnea  Assessment & Plan  Patient intolerant of CPAP therapy on nocturnal oxygen    Generalized abdominal pain- (present on admission)  Assessment & Plan  Improved, unclear etiology, possible gastritis ? Pt on NSAIDs and prednisone   CT abdomen neg for acute pathology  Denies urinary symptoms   Continue PPI, avoid NSAIDs   Supportive care     Pulmonary edema  Assessment &  Plan  Improved decrease Lasix to once a day and continue close clinical monitoring  I ordered repeat BMP    Inflammatory arthritis- (present on admission)  Assessment & Plan  Suspected inflammatory arthritis per pt, RA workup neg ? Per pt   Has been on steroids for prolonged period, has been trying to taper       X-ray of the hands consistent with osteoarthritis  No clear indication for steroids at this time I taper dose to 10 mg and she will need slow taper  outpatient follow-up  Tylenol as needed  Topical Voltaren gel  Avoid systemic NSAIDs        COPD (chronic obstructive pulmonary disease) (HCC)- (present on admission)  Assessment & Plan  History of, stable, do not suspect acute exacerbation   - currently on 2L, only on nocturnal oxygen per pt   - continue home inhaler therapy   - O2 per protocol   - wean as able     H/O renal cell carcinoma s/p nephrectomy- (present on admission)  Assessment & Plan  Hx of, stable per pt   Renal function stable, close monitoring of renal function with diuresis, given one kidney, increased risk for injury     Hypertensive emergency- (present on admission)  Assessment & Plan  Hypertensive emergency with A.fib RVR and pulmonary edema   Continue lisinopril  Metoprolol switched to sotalol  Continue IV Lasix and monitor blood pressure         VTE prophylaxis: therapeutic anticoagulation with Eliquis    I have performed a physical exam and reviewed and updated ROS and Plan today (7/23/2023). In review of yesterday's note (7/22/2023), there are no changes except as documented above.

## 2023-07-23 NOTE — DIETARY
"Nutrition services: Day 2 of admit.  Savita Duggan is a 57 y.o. female with admitting DX of atrial fibrillation with rapid ventricular response.    Consult received for unsure wt loss per admit screen; pt with BMI >40 (=Body mass index is 54.86 kg/m².), Class III obesity. Weight loss counseling not appropriate in acute care setting. UBW per pt report is 335 lb. No wt loss noted from bedscale wt taken 7/22.    Assessment:  Height: 175.3 cm (5' 9\")  Weight: (!) 168 kg (371 lb 7.6 oz)  Body mass index is 54.86 kg/m²., BMI classification: obese class III  Diet/Intake: Consistent CHO, 2 gram sodium with 2000 mL fluid restriction; PO % of meals thus far    Evaluation:   Admitted with abdominal pain and shortness of breath.  PMH: anxiety, arrhythmia, cancer, COPD, GERD, heart attach, hyperlipidemia, hypertension.    Malnutrition Risk: Does not meet criteria per ASPEN guidelines at this time.    Recommendations/Plan:  Encourage intake of meals.  Document intake of all meals as % taken in ADLs to provide interdisciplinary communication across all shifts.   Monitor weight.  Nutrition rep will continue to see patient for ongoing meal and snack preferences.   If appropriate, and pt desires, at DC please refer to outpatient nutrition services for weight management.      RD will follow per dept guidelines.      "

## 2023-07-23 NOTE — CARE PLAN
The patient is Stable - Low risk of patient condition declining or worsening    Shift Goals  Clinical Goals: Rate and rhythm control  Patient Goals: pain control  Family Goals: na    Progress made toward(s) clinical / shift goals:    Problem: Care Map:  Day 2 Optimal Outcome for the Heart Failure Patient  Goal: Day 2:  Optimal Care of the heart failure patient  Outcome: Progressing  Note: Patient is getting weighed daily, patient is receiving diuretics, patient's I/Os are being recorded, patient is adhering to diet restrictions.        Patient is not progressing towards the following goals:      Problem: Urinary Elimination  Goal: Establish and maintain regular urinary output  Outcome: Not Progressing  Note: Patient is retaining fluid, patient unable to void. Patient straight cathed once.

## 2023-07-23 NOTE — PROGRESS NOTES
0700 - Report received from Liset SMITH at patient's bedside. Patient resting in bed quietly with no complaints at this time. Telemetry monitor intact et functioning. Call light and belongings within reach, safety measures intact, white board updated.     0900 - Patient out of bed to chair. Strength great, low tolerance for chair. Needs recliner.     1100 - Notified provider of elevated heart rate, asymptomatic. NNO's    1720 - Notified Dr. Edda Pozo of patient heart rate sustaining 120 - 140, normal BP, asymptomatic. Provider asked RN to notify cardiology.     1750 - Reported heart rate, BP, and qtc results from post sotalol EKG to Evelio ARREDONDO, who asked RN to report to Dr. Chacko.     1758 - Message sent to Dr. Chacko.     1910 - Reported off to Mindi SMITH

## 2023-07-23 NOTE — PROGRESS NOTES
"Cardiology Follow Up Progress Note    Date of Service  7/23/2023    Attending Physician  Lucho oPzo M.D.    SARABJIT Duggan is a 57 y.o. female from [place], [place] admitted 7/21/2023 with hypertension, diffuse arthritic polyarthralgia on chronic prednisone, hypertension, \"aortic scarring\", chronic anticoagulation on apixaban (once a day) x1 year, hypertension, S/P right nephrectomy for kidney cancer 2016 ([place], [place]), prior tobacco use, COPD (nocturnal O2) admitted for atrial fibrillation and HFpEF.    Interim Events  7/23: /78.  .  Atrial fibrillation.  UO 3200.  No specific cardiac symptoms.  Previously seen by cardiologist a year ago in [place] for \"aortic scarring\".  Not familiar with term atrial fibrillation.    Review of Systems  Review of Systems   Respiratory:  Negative for chest tightness and shortness of breath.    Cardiovascular:  Negative for palpitations.   Gastrointestinal:  Negative for abdominal pain and nausea.   Neurological:  Negative for dizziness and headaches.       Vital signs in last 24 hours  Temp:  [36.4 °C (97.5 °F)-36.7 °C (98.1 °F)] 36.6 °C (97.9 °F)  Pulse:  [] 76  Resp:  [18] 18  BP: (107-118)/(59-80) 114/78  SpO2:  [96 %-99 %] 99 %    Physical Exam  Physical Exam  Constitutional:       General: She is not in acute distress.  HENT:      Head: Normocephalic.   Eyes:      General: No scleral icterus.     Extraocular Movements: Extraocular movements intact.   Cardiovascular:      Rate and Rhythm: Normal rate. Rhythm irregular.      Heart sounds: Normal heart sounds, S1 normal and S2 normal. No murmur heard.     No friction rub. No gallop.   Pulmonary:      Effort: Pulmonary effort is normal.      Breath sounds: Normal breath sounds. No wheezing, rhonchi or rales.   Chest:      Comments: Increased AP diameter  Abdominal:      Comments: Obese   Musculoskeletal:      Right lower leg: No edema.      Left lower leg: No edema.      Comments: Bilateral hand " swelling right greater than left   Skin:     General: Skin is warm and dry.   Neurological:      Mental Status: She is alert and oriented to person, place, and time.   Psychiatric:         Behavior: Behavior normal.         Lab Review  Lab Results   Component Value Date/Time    WBC 8.6 07/23/2023 02:16 AM    RBC 4.83 07/23/2023 02:16 AM    HEMOGLOBIN 11.4 (L) 07/23/2023 02:16 AM    HEMATOCRIT 37.7 07/23/2023 02:16 AM    MCV 78.1 (L) 07/23/2023 02:16 AM    MCH 23.6 (L) 07/23/2023 02:16 AM    MCHC 30.2 (L) 07/23/2023 02:16 AM    MPV 10.4 07/23/2023 02:16 AM      Lab Results   Component Value Date/Time    SODIUM 136 07/23/2023 02:16 AM    POTASSIUM 3.8 07/23/2023 02:16 AM    CHLORIDE 96 07/23/2023 02:16 AM    CO2 29 07/23/2023 02:16 AM    GLUCOSE 94 07/23/2023 02:16 AM    BUN 24 (H) 07/23/2023 02:16 AM    CREATININE 1.15 07/23/2023 02:16 AM      Lab Results   Component Value Date/Time    ASTSGOT 24 07/22/2023 10:39 AM    ALTSGPT 26 07/22/2023 10:39 AM     Lab Results   Component Value Date/Time    CHOLSTRLTOT 154 07/22/2023 07:33 AM     (H) 07/22/2023 07:33 AM    HDL 29 (A) 07/22/2023 07:33 AM    TRIGLYCERIDE 122 07/22/2023 07:33 AM    TROPONINT 10 07/21/2023 12:40 PM       Recent Labs     07/21/23  1240   NTPROBNP 2592*       Cardiac Imaging and Procedures Review  Rhythm: My personal interpretation of the rhythm dated 7/23/2023 is atrial fibrillation    Echocardiogram: 7/22/2023  Technically very difficult study. Grossly normal LV systolic function.   Cannot assess diastolic function with atrial fibrillation. Valves are   not well visualized but no signfiicant abnormality seen.  Estimated left ventricular ejection fraction 60%       Imaging  Chest X-Ray: 7/22/2023  Patchy bilateral midlung and lower lobe interstitial opacities which could be seen in the setting of edema and/or infection.    Assessment  HFpEF  Atrial fibrillation  Anticoagulation on apixaban, subtherapeutic dosing  Antiarrhythmic therapy with  sotalol  Hypertension  COPD on nocturnal O2  Morbid obesity  Suspected sleep disorder, sleep apnea  Polyarticular osteoarthritis  Right nephrectomy for cancer 2016.    Recommendations Discussion  Clinically progressing with effective diuresis on IV furosemide  Renal function slightly worse would reduce daily diuretic to once a day.  Add spironolactone.  Empagliflozin can be considered depending on patient's financial circumstances.  Continue sotalol protocol  Notably the patient was taking apixaban only once a day as she states she had been instructed by her physician.  I explained to the patient that apixaban is a twice daily dose medication.  Under the circumstances the patient will require a PETER at the time of DCCV due to suboptimal anticoagulation.  I had a detailed discussion with the patient concerning the nature of her current cardiac condition and the treatment plan including the procedure PETER/DCCV.    I personally reviewed the patient's treatment plan with Lucho Pozo MD.    Thank you for allowing me to participate in the care of this patient.    Please contact me with any questions.    Chris Escalante M.D.   Cardiologist, [facility]  [phone]

## 2023-07-23 NOTE — CARE PLAN
The patient is Stable - Low risk of patient condition declining or worsening    Shift Goals  Clinical Goals: Rate and rhythm control  Patient Goals: pain control  Family Goals: na    Progress made toward(s) clinical / shift goals:  Diuresing - voiding after straight cath. Sotalol dosing, EKG post Qtc mildly elevated.       Problem: Pain - Standard  Goal: Alleviation of pain or a reduction in pain to the patient’s comfort goal  Outcome: Progressing  Flowsheets (Taken 7/23/2023 0813)  Non Verbal Scale: Calm  Pain Rating Scale (NPRS): 3     Problem: Knowledge Deficit - Standard  Goal: Patient and family/care givers will demonstrate understanding of plan of care, disease process/condition, diagnostic tests and medications  Outcome: Progressing     Problem: Fall Risk  Goal: Patient will remain free from falls  Outcome: Progressing     Problem: Care Map:  Day 2 Optimal Outcome for the Heart Failure Patient  Goal: Day 2:  Optimal Care of the heart failure patient  Outcome: Progressing     Problem: Skin Integrity  Goal: Skin integrity is maintained or improved  Outcome: Progressing     Problem: Urinary Elimination  Goal: Establish and maintain regular urinary output  Outcome: Progressing       Patient is not progressing towards the following goals:

## 2023-07-24 ENCOUNTER — ANESTHESIA EVENT (OUTPATIENT)
Dept: CARDIOLOGY | Facility: MEDICAL CENTER | Age: 58
DRG: 291 | End: 2023-07-24
Payer: COMMERCIAL

## 2023-07-24 ENCOUNTER — ANESTHESIA (OUTPATIENT)
Dept: CARDIOLOGY | Facility: MEDICAL CENTER | Age: 58
DRG: 291 | End: 2023-07-24
Payer: COMMERCIAL

## 2023-07-24 ENCOUNTER — APPOINTMENT (OUTPATIENT)
Dept: CARDIOLOGY | Facility: MEDICAL CENTER | Age: 58
DRG: 291 | End: 2023-07-24
Attending: INTERNAL MEDICINE
Payer: COMMERCIAL

## 2023-07-24 ENCOUNTER — PATIENT OUTREACH (OUTPATIENT)
Dept: SCHEDULING | Facility: IMAGING CENTER | Age: 58
End: 2023-07-24

## 2023-07-24 LAB
ANION GAP SERPL CALC-SCNC: 10 MMOL/L (ref 7–16)
BUN SERPL-MCNC: 22 MG/DL (ref 8–22)
CALCIUM SERPL-MCNC: 9.1 MG/DL (ref 8.5–10.5)
CHLORIDE SERPL-SCNC: 98 MMOL/L (ref 96–112)
CO2 SERPL-SCNC: 30 MMOL/L (ref 20–33)
CREAT SERPL-MCNC: 0.98 MG/DL (ref 0.5–1.4)
EKG IMPRESSION: NORMAL
GFR SERPLBLD CREATININE-BSD FMLA CKD-EPI: 67 ML/MIN/1.73 M 2
GLUCOSE SERPL-MCNC: 81 MG/DL (ref 65–99)
MAGNESIUM SERPL-MCNC: 2.1 MG/DL (ref 1.5–2.5)
NUCLEAR IGG SER QL IA: DETECTED
POTASSIUM SERPL-SCNC: 4 MMOL/L (ref 3.6–5.5)
SODIUM SERPL-SCNC: 138 MMOL/L (ref 135–145)

## 2023-07-24 PROCEDURE — 700111 HCHG RX REV CODE 636 W/ 250 OVERRIDE (IP): Mod: JZ | Performed by: STUDENT IN AN ORGANIZED HEALTH CARE EDUCATION/TRAINING PROGRAM

## 2023-07-24 PROCEDURE — 93010 ELECTROCARDIOGRAM REPORT: CPT | Performed by: INTERNAL MEDICINE

## 2023-07-24 PROCEDURE — 00500 ANES ALL PX ON ESOPHAGUS: CPT | Performed by: STUDENT IN AN ORGANIZED HEALTH CARE EDUCATION/TRAINING PROGRAM

## 2023-07-24 PROCEDURE — 5A2204Z RESTORATION OF CARDIAC RHYTHM, SINGLE: ICD-10-PCS | Performed by: INTERNAL MEDICINE

## 2023-07-24 PROCEDURE — 700105 HCHG RX REV CODE 258: Mod: JZ | Performed by: STUDENT IN AN ORGANIZED HEALTH CARE EDUCATION/TRAINING PROGRAM

## 2023-07-24 PROCEDURE — A9270 NON-COVERED ITEM OR SERVICE: HCPCS | Performed by: INTERNAL MEDICINE

## 2023-07-24 PROCEDURE — A9270 NON-COVERED ITEM OR SERVICE: HCPCS | Performed by: HOSPITALIST

## 2023-07-24 PROCEDURE — 36415 COLL VENOUS BLD VENIPUNCTURE: CPT

## 2023-07-24 PROCEDURE — 99232 SBSQ HOSP IP/OBS MODERATE 35: CPT | Mod: 25 | Performed by: INTERNAL MEDICINE

## 2023-07-24 PROCEDURE — A9270 NON-COVERED ITEM OR SERVICE: HCPCS | Performed by: STUDENT IN AN ORGANIZED HEALTH CARE EDUCATION/TRAINING PROGRAM

## 2023-07-24 PROCEDURE — 770020 HCHG ROOM/CARE - TELE (206)

## 2023-07-24 PROCEDURE — 700102 HCHG RX REV CODE 250 W/ 637 OVERRIDE(OP)

## 2023-07-24 PROCEDURE — 93312 ECHO TRANSESOPHAGEAL: CPT | Mod: 26,59 | Performed by: STUDENT IN AN ORGANIZED HEALTH CARE EDUCATION/TRAINING PROGRAM

## 2023-07-24 PROCEDURE — 700102 HCHG RX REV CODE 250 W/ 637 OVERRIDE(OP): Performed by: STUDENT IN AN ORGANIZED HEALTH CARE EDUCATION/TRAINING PROGRAM

## 2023-07-24 PROCEDURE — 700102 HCHG RX REV CODE 250 W/ 637 OVERRIDE(OP): Performed by: HOSPITALIST

## 2023-07-24 PROCEDURE — B24BZZ4 ULTRASONOGRAPHY OF HEART WITH AORTA, TRANSESOPHAGEAL: ICD-10-PCS | Performed by: INTERNAL MEDICINE

## 2023-07-24 PROCEDURE — 93312 ECHO TRANSESOPHAGEAL: CPT | Mod: 26 | Performed by: INTERNAL MEDICINE

## 2023-07-24 PROCEDURE — A9270 NON-COVERED ITEM OR SERVICE: HCPCS

## 2023-07-24 PROCEDURE — 700102 HCHG RX REV CODE 250 W/ 637 OVERRIDE(OP): Performed by: INTERNAL MEDICINE

## 2023-07-24 PROCEDURE — 160035 HCHG PACU - 1ST 60 MINS PHASE I

## 2023-07-24 PROCEDURE — 700101 HCHG RX REV CODE 250: Performed by: STUDENT IN AN ORGANIZED HEALTH CARE EDUCATION/TRAINING PROGRAM

## 2023-07-24 PROCEDURE — 92960 CARDIOVERSION ELECTRIC EXT: CPT

## 2023-07-24 PROCEDURE — 93005 ELECTROCARDIOGRAM TRACING: CPT | Performed by: INTERNAL MEDICINE

## 2023-07-24 PROCEDURE — 99232 SBSQ HOSP IP/OBS MODERATE 35: CPT | Performed by: HOSPITALIST

## 2023-07-24 PROCEDURE — 700111 HCHG RX REV CODE 636 W/ 250 OVERRIDE (IP): Performed by: HOSPITALIST

## 2023-07-24 PROCEDURE — 93005 ELECTROCARDIOGRAM TRACING: CPT | Performed by: HOSPITALIST

## 2023-07-24 PROCEDURE — 700111 HCHG RX REV CODE 636 W/ 250 OVERRIDE (IP): Mod: JZ | Performed by: PHYSICIAN ASSISTANT

## 2023-07-24 PROCEDURE — 80048 BASIC METABOLIC PNL TOTAL CA: CPT

## 2023-07-24 PROCEDURE — A9270 NON-COVERED ITEM OR SERVICE: HCPCS | Performed by: PHYSICIAN ASSISTANT

## 2023-07-24 PROCEDURE — 93325 DOPPLER ECHO COLOR FLOW MAPG: CPT | Mod: 26 | Performed by: STUDENT IN AN ORGANIZED HEALTH CARE EDUCATION/TRAINING PROGRAM

## 2023-07-24 PROCEDURE — 160002 HCHG RECOVERY MINUTES (STAT)

## 2023-07-24 PROCEDURE — 93320 DOPPLER ECHO COMPLETE: CPT | Mod: 26 | Performed by: STUDENT IN AN ORGANIZED HEALTH CARE EDUCATION/TRAINING PROGRAM

## 2023-07-24 PROCEDURE — 700102 HCHG RX REV CODE 250 W/ 637 OVERRIDE(OP): Performed by: PHYSICIAN ASSISTANT

## 2023-07-24 PROCEDURE — 700111 HCHG RX REV CODE 636 W/ 250 OVERRIDE (IP): Performed by: STUDENT IN AN ORGANIZED HEALTH CARE EDUCATION/TRAINING PROGRAM

## 2023-07-24 PROCEDURE — 83735 ASSAY OF MAGNESIUM: CPT

## 2023-07-24 PROCEDURE — 93320 DOPPLER ECHO COMPLETE: CPT | Mod: 26 | Performed by: INTERNAL MEDICINE

## 2023-07-24 PROCEDURE — 93325 DOPPLER ECHO COLOR FLOW MAPG: CPT

## 2023-07-24 PROCEDURE — 92960 CARDIOVERSION ELECTRIC EXT: CPT | Performed by: INTERNAL MEDICINE

## 2023-07-24 RX ORDER — FUROSEMIDE 10 MG/ML
40 INJECTION INTRAMUSCULAR; INTRAVENOUS
Status: DISCONTINUED | OUTPATIENT
Start: 2023-07-24 | End: 2023-07-25

## 2023-07-24 RX ORDER — HYDROMORPHONE HYDROCHLORIDE 1 MG/ML
0.2 INJECTION, SOLUTION INTRAMUSCULAR; INTRAVENOUS; SUBCUTANEOUS
Status: DISCONTINUED | OUTPATIENT
Start: 2023-07-24 | End: 2023-07-24 | Stop reason: HOSPADM

## 2023-07-24 RX ORDER — HYDRALAZINE HYDROCHLORIDE 20 MG/ML
5 INJECTION INTRAMUSCULAR; INTRAVENOUS
Status: DISCONTINUED | OUTPATIENT
Start: 2023-07-24 | End: 2023-07-24 | Stop reason: HOSPADM

## 2023-07-24 RX ORDER — DIPHENHYDRAMINE HYDROCHLORIDE 50 MG/ML
12.5 INJECTION INTRAMUSCULAR; INTRAVENOUS
Status: DISCONTINUED | OUTPATIENT
Start: 2023-07-24 | End: 2023-07-24 | Stop reason: HOSPADM

## 2023-07-24 RX ORDER — SODIUM CHLORIDE, SODIUM LACTATE, POTASSIUM CHLORIDE, CALCIUM CHLORIDE 600; 310; 30; 20 MG/100ML; MG/100ML; MG/100ML; MG/100ML
INJECTION, SOLUTION INTRAVENOUS CONTINUOUS
Status: DISCONTINUED | OUTPATIENT
Start: 2023-07-24 | End: 2023-07-24 | Stop reason: HOSPADM

## 2023-07-24 RX ORDER — OXYCODONE HCL 5 MG/5 ML
10 SOLUTION, ORAL ORAL
Status: DISCONTINUED | OUTPATIENT
Start: 2023-07-24 | End: 2023-07-24 | Stop reason: HOSPADM

## 2023-07-24 RX ORDER — POTASSIUM CHLORIDE 20 MEQ/1
20 TABLET, EXTENDED RELEASE ORAL 2 TIMES DAILY
Status: DISCONTINUED | OUTPATIENT
Start: 2023-07-24 | End: 2023-07-25 | Stop reason: HOSPADM

## 2023-07-24 RX ORDER — ONDANSETRON 2 MG/ML
4 INJECTION INTRAMUSCULAR; INTRAVENOUS
Status: DISCONTINUED | OUTPATIENT
Start: 2023-07-24 | End: 2023-07-24 | Stop reason: HOSPADM

## 2023-07-24 RX ORDER — LABETALOL HYDROCHLORIDE 5 MG/ML
5 INJECTION, SOLUTION INTRAVENOUS
Status: DISCONTINUED | OUTPATIENT
Start: 2023-07-24 | End: 2023-07-24 | Stop reason: HOSPADM

## 2023-07-24 RX ORDER — LIDOCAINE HYDROCHLORIDE 20 MG/ML
INJECTION, SOLUTION EPIDURAL; INFILTRATION; INTRACAUDAL; PERINEURAL PRN
Status: DISCONTINUED | OUTPATIENT
Start: 2023-07-24 | End: 2023-07-24 | Stop reason: SURG

## 2023-07-24 RX ORDER — HALOPERIDOL 5 MG/ML
1 INJECTION INTRAMUSCULAR
Status: DISCONTINUED | OUTPATIENT
Start: 2023-07-24 | End: 2023-07-24 | Stop reason: HOSPADM

## 2023-07-24 RX ORDER — SODIUM CHLORIDE, SODIUM LACTATE, POTASSIUM CHLORIDE, CALCIUM CHLORIDE 600; 310; 30; 20 MG/100ML; MG/100ML; MG/100ML; MG/100ML
INJECTION, SOLUTION INTRAVENOUS
Status: DISCONTINUED | OUTPATIENT
Start: 2023-07-24 | End: 2023-07-24 | Stop reason: SURG

## 2023-07-24 RX ORDER — ATORVASTATIN CALCIUM 20 MG/1
20 TABLET, FILM COATED ORAL
Status: DISCONTINUED | OUTPATIENT
Start: 2023-07-24 | End: 2023-07-25 | Stop reason: HOSPADM

## 2023-07-24 RX ORDER — OXYCODONE HCL 5 MG/5 ML
5 SOLUTION, ORAL ORAL
Status: DISCONTINUED | OUTPATIENT
Start: 2023-07-24 | End: 2023-07-24 | Stop reason: HOSPADM

## 2023-07-24 RX ORDER — HYDROMORPHONE HYDROCHLORIDE 1 MG/ML
0.4 INJECTION, SOLUTION INTRAMUSCULAR; INTRAVENOUS; SUBCUTANEOUS
Status: DISCONTINUED | OUTPATIENT
Start: 2023-07-24 | End: 2023-07-24 | Stop reason: HOSPADM

## 2023-07-24 RX ORDER — HYDROMORPHONE HYDROCHLORIDE 1 MG/ML
0.1 INJECTION, SOLUTION INTRAMUSCULAR; INTRAVENOUS; SUBCUTANEOUS
Status: DISCONTINUED | OUTPATIENT
Start: 2023-07-24 | End: 2023-07-24 | Stop reason: HOSPADM

## 2023-07-24 RX ORDER — MEPERIDINE HYDROCHLORIDE 25 MG/ML
12.5 INJECTION INTRAMUSCULAR; INTRAVENOUS; SUBCUTANEOUS
Status: DISCONTINUED | OUTPATIENT
Start: 2023-07-24 | End: 2023-07-24 | Stop reason: HOSPADM

## 2023-07-24 RX ADMIN — APIXABAN 5 MG: 5 TABLET, FILM COATED ORAL at 05:37

## 2023-07-24 RX ADMIN — METOPROLOL SUCCINATE 25 MG: 25 TABLET, EXTENDED RELEASE ORAL at 05:37

## 2023-07-24 RX ADMIN — FUROSEMIDE 40 MG: 10 INJECTION INTRAMUSCULAR; INTRAVENOUS at 05:37

## 2023-07-24 RX ADMIN — SENNOSIDES AND DOCUSATE SODIUM 2 TABLET: 50; 8.6 TABLET ORAL at 17:55

## 2023-07-24 RX ADMIN — POTASSIUM CHLORIDE 20 MEQ: 1500 TABLET, EXTENDED RELEASE ORAL at 17:55

## 2023-07-24 RX ADMIN — POTASSIUM CHLORIDE 20 MEQ: 1500 TABLET, EXTENDED RELEASE ORAL at 05:37

## 2023-07-24 RX ADMIN — PREDNISONE 10 MG: 10 TABLET ORAL at 05:37

## 2023-07-24 RX ADMIN — METOPROLOL SUCCINATE 25 MG: 25 TABLET, EXTENDED RELEASE ORAL at 17:55

## 2023-07-24 RX ADMIN — OMEPRAZOLE 40 MG: 20 CAPSULE, DELAYED RELEASE ORAL at 17:55

## 2023-07-24 RX ADMIN — SPIRONOLACTONE 25 MG: 25 TABLET ORAL at 05:37

## 2023-07-24 RX ADMIN — APIXABAN 5 MG: 5 TABLET, FILM COATED ORAL at 17:55

## 2023-07-24 RX ADMIN — LISINOPRIL 20 MG: 20 TABLET ORAL at 17:55

## 2023-07-24 RX ADMIN — NYSTATIN: 100000 POWDER TOPICAL at 05:37

## 2023-07-24 RX ADMIN — PROPOFOL 50 MG: 10 INJECTION, EMULSION INTRAVENOUS at 12:33

## 2023-07-24 RX ADMIN — ONDANSETRON 4 MG: 2 INJECTION INTRAMUSCULAR; INTRAVENOUS at 07:44

## 2023-07-24 RX ADMIN — SODIUM CHLORIDE, POTASSIUM CHLORIDE, SODIUM LACTATE AND CALCIUM CHLORIDE: 600; 310; 30; 20 INJECTION, SOLUTION INTRAVENOUS at 12:29

## 2023-07-24 RX ADMIN — PROPOFOL 50 MG: 10 INJECTION, EMULSION INTRAVENOUS at 12:35

## 2023-07-24 RX ADMIN — SOTALOL HYDROCHLORIDE 120 MG: 80 TABLET ORAL at 05:38

## 2023-07-24 RX ADMIN — LORAZEPAM 0.5 MG: 0.5 TABLET ORAL at 07:44

## 2023-07-24 RX ADMIN — SOTALOL HYDROCHLORIDE 120 MG: 80 TABLET ORAL at 15:20

## 2023-07-24 RX ADMIN — OXYCODONE HYDROCHLORIDE 5 MG: 5 TABLET ORAL at 05:38

## 2023-07-24 RX ADMIN — OXYCODONE HYDROCHLORIDE 5 MG: 5 TABLET ORAL at 22:21

## 2023-07-24 RX ADMIN — NYSTATIN: 100000 POWDER TOPICAL at 17:55

## 2023-07-24 RX ADMIN — LIDOCAINE HYDROCHLORIDE 100 MG: 20 INJECTION, SOLUTION EPIDURAL; INFILTRATION; INTRACAUDAL at 12:33

## 2023-07-24 RX ADMIN — FUROSEMIDE 40 MG: 10 INJECTION INTRAMUSCULAR; INTRAVENOUS at 15:21

## 2023-07-24 RX ADMIN — ATORVASTATIN CALCIUM 20 MG: 20 TABLET, FILM COATED ORAL at 22:21

## 2023-07-24 RX ADMIN — SENNOSIDES AND DOCUSATE SODIUM 2 TABLET: 50; 8.6 TABLET ORAL at 05:37

## 2023-07-24 ASSESSMENT — ENCOUNTER SYMPTOMS
SHORTNESS OF BREATH: 1
WEAKNESS: 1
DIZZINESS: 0
COUGH: 0
ABDOMINAL PAIN: 0
NERVOUS/ANXIOUS: 1
PALPITATIONS: 1

## 2023-07-24 ASSESSMENT — PAIN DESCRIPTION - PAIN TYPE: TYPE: ACUTE PAIN;CHRONIC PAIN

## 2023-07-24 ASSESSMENT — FIBROSIS 4 INDEX: FIB4 SCORE: 0.88

## 2023-07-24 NOTE — PROGRESS NOTES
Monitor Summary:   Rhythm: atrial fibrillation with intermittent RVR  Rate: 99 -126  Measurement: ../.07/..  Ectopy: 4 beats VT up to 183, Rare PVC, rare couplet, rare triplet, PSVT rate up to 191    12 hour chart check

## 2023-07-24 NOTE — PROGRESS NOTES
Cardiology Progress Note    Name:   Savita Duggan     YOB: 1965  Age:   57 y.o.  female   MRN:   [id number]    Attending Cardiologist: Dr. Groves  Outpatient Cardiologist: N/A    CC: Abdominal Pain (Pt went to outside facility for abdominal pain and dyspnea, diagnosed with Afib -170) and Shortness of Breath       History of Present Illness  56 yo female presents with ongoing palpitations, shortness of breath, swelling, abdominal pain. Transferred from outside hospital for AF with RVR and HFpEF exacerbation.    Medical problems including atrial fibrillation (paroxysmal?) on apixaban though only taking once a day, polyarthritis on long term prednisone, kidney cancer s/p R nephrectomy 2016.     Interim Events   Savita is tearful, can't sit up in bed due to feeling weak, difficulty using hands due to pain. Continues to have intermittent palpitations. Breathing at rest is better, she has not gotten out of bed this hospitalization. She is urinating frequently.      Review of Systems   Constitutional:  Positive for malaise/fatigue.   Respiratory:  Positive for shortness of breath. Negative for cough.    Cardiovascular:  Positive for palpitations. Negative for chest pain and leg swelling.   Gastrointestinal:  Negative for abdominal pain.   Musculoskeletal:  Positive for joint pain.   Neurological:  Positive for weakness (generalized). Negative for dizziness.   Psychiatric/Behavioral:  The patient is nervous/anxious.        Medical History  History reviewed. No pertinent surgical history.    History reviewed. No pertinent family history.    Social History     Tobacco Use   Smoking Status Former    Packs/day: 1.00    Years: 38.00    Pack years: 38.00    Types: Cigarettes    Quit date: 2016    Years since quittin.7   Smokeless Tobacco Never       Allergies   Allergen Reactions    Coconut Oil     Codeine Itching    Penicillins Itching and Swelling     Reports tolerating amoxicillin  "        Medications   Scheduled Medications   Medication Dose Frequency    spironolactone  25 mg Q DAY    furosemide  40 mg Q DAY    potassium chloride SA  20 mEq DAILY    metoprolol SR  25 mg BID    nystatin   BID    sotalol  120 mg TWICE DAILY    predniSONE  10 mg DAILY    senna-docusate  2 Tablet BID    apixaban  5 mg BID    lisinopril  20 mg Q EVENING    omeprazole  40 mg Q EVENING    Pharmacy Consult Request  1 Each PHARMACY TO DOSE           Physical Exam  BP (!) 110/91   Pulse (!) 101   Temp 36.6 °C (97.9 °F) (Temporal)   Resp 18   Ht 1.753 m (5' 9\")   Wt (!) 169 kg (371 lb 14.7 oz)   SpO2 98%     Physical Exam  Vitals reviewed.   Constitutional:       Appearance: Normal appearance. She is obese.      Comments: Weak, unable to sit up in bed   HENT:      Head: Normocephalic and atraumatic.   Cardiovascular:      Rate and Rhythm: Tachycardia present. Rhythm irregular.      Heart sounds: No murmur heard.  Pulmonary:      Effort: Pulmonary effort is normal.      Comments: Unable to auscultate due to body position, habitus. No rales noted on brief exam  Abdominal:      Comments: Large abdominal circumference   Musculoskeletal:      Right lower leg: No edema.      Left lower leg: No edema.      Comments: Bilateral MCP joints are swollen   Skin:     General: Skin is warm and dry.   Neurological:      Mental Status: She is alert.   Psychiatric:         Judgment: Judgment normal.           Labs (personally reviewed):     Lab Results   Component Value Date/Time    SODIUM 138 07/24/2023 01:54 AM    POTASSIUM 4.0 07/24/2023 01:54 AM    CHLORIDE 98 07/24/2023 01:54 AM    CO2 30 07/24/2023 01:54 AM    GLUCOSE 81 07/24/2023 01:54 AM    BUN 22 07/24/2023 01:54 AM    CREATININE 0.98 07/24/2023 01:54 AM     Lab Results   Component Value Date/Time    CHOLSTRLTOT 154 07/22/2023 07:33 AM     (H) 07/22/2023 07:33 AM    HDL 29 (A) 07/22/2023 07:33 AM    TRIGLYCERIDE 122 07/22/2023 07:33 AM     No results found for: " BNPBTYPENAT      Cardiac Imaging and Procedures Review      Telemetry Review  AF rates 120s-140s this morning        Assessment and Medical Decision Making:  Atrial Fibrillation, suspected persistent  - in AF with RVR at outside hospital. Started on sotalol 120mg bid by our EP service 7/22  - remains in AF despite 4th dose this morning  - NPO today for PETER guided DCCV  - continue apixaban 5mg bid  - cont sotalol 120mg bid. QTc is 500ms. Check EKG this morning  - cont Metop SR for now, monitor rates if converts to sinus    Acute HFpEF Stage C NYHA III on admission  With concomitant obesity and AF  - continue IV diuresis, increase to twice daily today after cardioversion, increase potassium to bid.     - spironolactone 25mg  - start SGLT2i when closer to euvolemia, I will check outpatient pricing in mean time  - Monitor Mag daily    HTN, controlled  - cont lisinopril    Atherosclerosis of coronaries on nongated CT  - check A1c with AM labs, assess need for statin    In summary:   Cardioversion today  Cont IV diuresis and optimized HF meds    Please see Dr Groves's attestation for additions and further recommendations.    Laurie Carrillo PA-C  [facility]    I personally spent a total of 20 minutes which includes face-to-face time and non-face-to-face time spent on preparing to see the patient, reviewing hospital notes and tests, obtaining history from the patient, performing a medically appropriate exam, counseling and educating the patient, ordering medications/tests/procedures/referrals as clinically indicated, and documenting information in the electronic medical record.

## 2023-07-24 NOTE — ANESTHESIA PREPROCEDURE EVALUATION
Date/Time: 07/24/23 1230    Scheduled providers: Jarad Groves M.D.    Procedures:       EC-PETER W/O CONT      CL-CARDIOVERSION    Diagnosis:             Atrial fibrillation with rapid ventricular response (HCC) [I48.91]            Atrial fibrillation with rapid ventricular response (HCC) [I48.91]            Atrial fibrillation with rapid ventricular response (HCC) [I48.91]    Indications: See Assoicated Dx    Location: [facility] - Echocardiology - [facility]        56 yo F w/ BMI 55, COPD, HTN, arthritis on prednisone, h/o kidney Ca s/p nephrectomy, afib  Relevant Problems   ANESTHESIA   (positive) Obstructive sleep apnea      PULMONARY   (positive) COPD (chronic obstructive pulmonary disease) (HCC)      CARDIAC   (positive) Atrial fibrillation with rapid ventricular response (HCC)   (positive) Hypertensive emergency      GI   (positive) GERD (gastroesophageal reflux disease)      Other   (positive) Inflammatory arthritis       Physical Exam    Airway   Mallampati: IV  TM distance: >3 FB  Neck ROM: full       Cardiovascular - normal exam  Rhythm: regular  Rate: normal  (-) murmur     Dental   Comments: edentulous         Pulmonary - normal exam  Breath sounds clear to auscultation     Abdominal   (+) obese     Neurological - normal exam                 Anesthesia Plan    ASA 3   ASA physical status 3 criteria: morbid obesity - BMI greater than or equal to 40    Plan - MAC               Induction: intravenous    Postoperative Plan: Postoperative administration of opioids is intended.    Pertinent diagnostic labs and testing reviewed    Informed Consent:    Anesthetic plan and risks discussed with patient.    Use of blood products discussed with: patient whom consented to blood products.

## 2023-07-24 NOTE — CARE PLAN
Problem: Pain - Standard  Goal: Alleviation of pain or a reduction in pain to the patient’s comfort goal  Outcome: Progressing     Problem: Knowledge Deficit - Standard  Goal: Patient and family/care givers will demonstrate understanding of plan of care, disease process/condition, diagnostic tests and medications  Outcome: Progressing     Problem: Fall Risk  Goal: Patient will remain free from falls  Outcome: Progressing     Problem: Skin Integrity  Goal: Skin integrity is maintained or improved  Outcome: Progressing   The patient is Stable - Low risk of patient condition declining or worsening    Shift Goals  Clinical Goals: Successful PETER/CV, pain control  Patient Goals: Pain control, rest  Family Goals: KIANA    Progress made toward(s) clinical / shift goals:      Pt educated on plan of care, pt verbalizes understanding, reinforcement needed. Pt educated on pain/anxiety scales, alleviating factors, pain/anxiety medications and side effects, and need for pain/anxiety reassessment, Pt pain/anxiety controlled at this time, reinforcement needed. Pt educated on the need to reposition frequently and remain dry to avoid skin breakdown, reinforcement needed, no skin breakdown during shift. Fall risk education provided to pt, pt educated about the need to call for assistance while attempting to ambulate, reinforcement needed, pt remains fall free this shift.

## 2023-07-24 NOTE — DISCHARGE PLANNING
Case Management Discharge Planning    Admission Date: 7/21/2023  GMLOS: 3.9  ALOS: 3    6-Clicks ADL Score: 16  6-Clicks Mobility Score: 12  PT and/or OT Eval ordered: No    Patient in PACU. PT/OT requested No order placed yet. Until s/p Cardioversion  Post-acute Referrals Ordered: No  Post-acute Choice Obtained: No  Has referral(s) been sent to post-acute provider:  NA      Anticipated Discharge Dispo: Discharge Disposition: Discharged to home/self care (01)  Discharge Address: [address]  [place] [address]    DME Needed: No    Action(s) Taken: DC Assessment Complete (See below)    Escalations Completed: None    Medically Clear: No    Next Steps: complicated home situation raising 4 grand -nephews ages 9.14.16 and 23. Sister is raising the other 4 . Sister Lives across the street from Patient and they work together on raising children. Share cooking cleaning shopping responsibilities Patient drives to work everyday states she is totally independent. Pt/OT to be order post procedure. Patient uses a cane n am for stiffness in am. Patient plans to return home and hopefully back to work.     Barriers to Discharge: Medical clearance    Is the patient up for discharge tomorrow: No    Care Transition Team Assessment  56 y/o female admitted with afib. Dx 2 y/o ago on Eliquis. Case  management role explained to and understanding acknowledged by patient. Demographics verified. Pt to have a cardioversion today. Advance directives packet at bedside.     Information Source  Orientation Level: Oriented X4  Information Given By: Patient  Informant's Name: niels Duggan  Who is responsible for making decisions for patient? : Patient    Readmission Evaluation  Is this a readmission?: No    Elopement Risk  Legal Hold: No  Ambulatory or Self Mobile in Wheelchair: Yes  Disoriented: No  Psychiatric Symptoms: None  Elopement Risk: Not at Risk for Elopement    Interdisciplinary Discharge Planning  Does Admitting Nurse Feel This Could be a  Complex Discharge?: No  Lives with - Patient's Self Care Capacity: Child Less than 18 Years of Age  Patient or legal guardian wants to designate a caregiver: No  Support Systems: Family Member(s), Friends / Neighbors  Housing / Facility: 1 Story House  Do You Take your Prescribed Medications Regularly: Yes  Able to Return to Previous ADL's: Yes  Mobility Issues: Yes (per nursing pt. does little for self in hospital. per patient she is independant , works full time and is able to care for self.)  Patient Prefers to be Discharged to:: home (Lives in a transitional housing situation)  Assistance Needed: Yes  Durable Medical Equipment: Home Oxygen, Other - Specify (2 Liters baseline at night. can occasionally)    Discharge Preparedness  What is your plan after discharge?: Home with help (states I need to return to work to take care of the children she has guardianship off.)  What are your discharge supports?: Sibling (Sister lives across the street. They are raising 8 children together and share home responsibilities, cooking cleaning etc. Works fulltime but uses a cane in am to get moving.)  Prior Functional Level: Ambulatory, Independent with Activities of Daily Living  Difficulity with ADLs: Walking (stiff in morning will use a cane for mobility)    Functional Assesment  Prior Functional Level: Ambulatory, Independent with Activities of Daily Living         Vision / Hearing Impairment  Vision Impairment : No  Hearing Impairment : No         Advance Directive  Advance Directive?: None (would like her sister Yuri Anglin [phone] and sister Marcelo Cheek [phone] to make any decisions for her when appropriate)  Advance Directive offered?:  (packet given for further needs.)    Domestic Abuse  Have you ever been the victim of abuse or violence?: No  Physical Abuse or Sexual Abuse: No  Verbal Abuse or Emotional Abuse: No  Possible Abuse/Neglect Reported to:: Not Applicable    Psychological Assessment  History of  Substance Abuse: None  History of Psychiatric Problems: No  Non-compliant with Treatment: No  Newly Diagnosed Illness: No (pt. states she is on Eliquis for the new diagnosis of afib 1 yr. ago.)    Discharge Risks or Barriers  Discharge risks or barriers?: No    Anticipated Discharge Information  Discharge Disposition: Discharged to home/self care (01)  Discharge Address: [address] [place] [address]  [place] [address]

## 2023-07-24 NOTE — PROCEDURES
Electrical Cardioversion    Date/Time: 7/24/2023 12:47 PM    Performed by: Jarad Groves M.D.  Authorized by: Jarad Groves M.D.    Consent:     Consent obtained:  Written    Consent given by:  Patient    Risks discussed:  Induced arrhythmia and pain    Alternatives discussed:  No treatment, rate-control medication and delayed treatment  Sedation:     Patient sedated: Yes      Sedation type:  Per anesthesia  Pre-procedure details:     Cardioversion basis:  Elective    Rhythm:  Atrial fibrillation    Electrode placement:  Anterior-posterior    Anticoagulation status:  Apixaban  Attempt one:     Cardioversion mode:  Synchronous    Waveform:  Biphasic    Shock (Joules):  200    Shock outcome:  Conversion to normal sinus rhythm  Post-procedure details:     Patient status:  Awake    Patient tolerance of procedure:  Tolerated well, no immediate complications

## 2023-07-24 NOTE — PROGRESS NOTES
Monitor Summary:   Rhythm: afib  Rate: 104-121  Measurement: .-/.06/.30  Ectopy: pvc (r), coup (r)

## 2023-07-24 NOTE — CARE PLAN
The patient is Stable - Low risk of patient condition declining or worsening    Shift Goals  Clinical Goals: rate control and pain management  Patient Goals: pain control and comfort  Family Goals: na    Progress made toward(s) clinical / shift goals:      Problem: Pain - Standard  Goal: Alleviation of pain or a reduction in pain to the patient’s comfort goal  Outcome: Progressing  Note: Pain assessed using the 0-10 pain scale. Pt medicated per MAR and experiencing pain in both wrists. Pt educated on non-pharm interventions including heat, repositioning and distraction.      Problem: Knowledge Deficit - Standard  Goal: Patient and family/care givers will demonstrate understanding of plan of care, disease process/condition, diagnostic tests and medications  Outcome: Progressing  Note: Pt educated on POC and disease processes. Pt verbalized understanding of medication regimen and interventions.

## 2023-07-24 NOTE — ANESTHESIA POSTPROCEDURE EVALUATION
Patient: Savita Duggan    Procedure Summary     Date: 07/24/23 Room / Location: [facility] Echocardiology [facility]    Anesthesia Start: 1229 Anesthesia Stop: 1255    Procedures:       EC-PETER W/O CONT      CL-CARDIOVERSION Diagnosis:             Atrial fibrillation with rapid ventricular response (HCC)            Atrial fibrillation with rapid ventricular response (HCC)            Atrial fibrillation with rapid ventricular response (HCC)      (See Assoicated Dx)    Scheduled Providers: Jarad Groves M.D.; Trent Ceja M.D. Responsible Provider: Trent Ceja M.D.    Anesthesia Type: MAC ASA Status: 3          Final Anesthesia Type: MAC  Last vitals  BP   Blood Pressure: 108/87    Temp   36.6 °C (97.9 °F)    Pulse   68   Resp   18    SpO2   96 %      Anesthesia Post Evaluation    Patient location during evaluation: PACU  Patient participation: complete - patient participated  Level of consciousness: awake and alert    Airway patency: patent  Anesthetic complications: no  Cardiovascular status: hemodynamically stable  Respiratory status: acceptable  Hydration status: euvolemic    PONV: none          No notable events documented.     Nurse Pain Score: 5 (NPRS)

## 2023-07-24 NOTE — ANESTHESIA TIME REPORT
Anesthesia Start and Stop Event Times     Date Time Event    7/24/2023 1229 Anesthesia Start     1232 Ready for Procedure     1255 Anesthesia Stop        Responsible Staff  07/24/23    Name Role Begin End    Trent Ceja M.D. Anesth 1229 1255        Overtime Reason:  no overtime (within assigned shift)    Comments:

## 2023-07-24 NOTE — PROGRESS NOTES
Report received from night nurse at 0700. Pt seen at bedside and pt care assumed. Pt is A&Ox4 on 2L NC, and states pain is 5/10. PIV flushed and intact. PT is Afib on telemetry monitor. Pt is up x2 max assist.     Plan of care reviewed with pt, call light, phone, and personal belongings within reach. Bed alarm on, and bed in low locked position. All pt's needs met at this time.

## 2023-07-24 NOTE — PROGRESS NOTES
Assumed care of patient and received report from RN. Tele monitor in place and patient in afib. VS stable. A&Ox4. Pain 3/10 and declines. POC discussed with patient and verbalized understanding. Call light in reach. Fall precautions in place. Bed locked in lowest position.

## 2023-07-24 NOTE — OR NURSING
Pt recovered in PACU post Afib ablation under anesthesia. Pt is awake and alert; no pain or nausea. Returned to room via virgilrjess with RN. Report to SARAH Carranza.

## 2023-07-24 NOTE — PROGRESS NOTES
Hospital Medicine Daily Progress Note    Date of Service  7/24/2023    Chief Complaint  Savita Duggan is a 57 y.o. female admitted 7/21/2023 with dyspnea and palpitations    Hospital Course  Savita Duggan is a 57 y.o. female with a past medical history of morbid obesity, hypertension, atrial fibrillation on anticoagulation, history of renal cell carcinoma status post nephrectomy, GERD, history of COPD(on 2 L home nocturnal oxygen), suspected inflammatory arthritis on chronic corticosteroids who presented 7/21/2023 with abdominal pain, increasing shortness of breath and palpitations.  Patient was seen outside hospital was found to be in atrial fibrillation with RVR with heart rates in the 170s not responsive to metoprolol pushes.  She was started on a diltiazem drip and transferred to [facility] for further evaluation.    Interval Problem Update    Patient remains in A-fib   On 2 L nasal cannula  Dyspnea and edema reviewed  I reviewed BMP and magnesium levels  Urine output 2650-7.3 L since admit  I discussed with cardiology patient scheduled for PETER cardioversion today      I have discussed this patient's plan of care and discharge plan at IDT rounds today with Case Management, Nursing, Nursing leadership, and other members of the IDT team.    Consultants/Specialty  cardiology    Code Status  Full Code    Disposition  The patient is not medically cleared for discharge to home or a post-acute facility.      I have placed the appropriate orders for post-discharge needs.    Review of Systems  Review of Systems   Constitutional:  Positive for malaise/fatigue.   Respiratory:  Positive for shortness of breath.    Cardiovascular:  Positive for leg swelling.   Musculoskeletal:  Positive for joint pain.   All other systems reviewed and are negative.       Physical Exam  Temp:  [36.3 °C (97.3 °F)-36.6 °C (97.9 °F)] 36.6 °C (97.9 °F)  Pulse:  [] 68  Resp:  [14-24] 19  BP: ()/(53-91) 114/70  SpO2:  [90 %-100 %] 90 %    Physical  Exam  Vitals and nursing note reviewed.   Constitutional:       General: She is not in acute distress.     Appearance: She is obese.   HENT:      Head: Normocephalic and atraumatic.      Nose: Nose normal. No rhinorrhea.      Mouth/Throat:      Pharynx: No oropharyngeal exudate or posterior oropharyngeal erythema.   Eyes:      General: No scleral icterus.        Right eye: No discharge.         Left eye: No discharge.   Cardiovascular:      Rate and Rhythm: Tachycardia present. Rhythm irregular.      Heart sounds: Normal heart sounds. No murmur heard.     No friction rub. No gallop.   Pulmonary:      Effort: Pulmonary effort is normal. No respiratory distress.      Breath sounds: No stridor. Decreased breath sounds present. No wheezing, rhonchi or rales.   Chest:      Chest wall: No tenderness.   Abdominal:      General: Bowel sounds are normal. There is no distension.      Palpations: Abdomen is soft. There is no mass.      Tenderness: There is no abdominal tenderness. There is no rebound.   Musculoskeletal:         General: Swelling and tenderness present.      Cervical back: Neck supple. No rigidity.   Skin:     General: Skin is warm and dry.      Coloration: Skin is not cyanotic or jaundiced.      Nails: There is no clubbing.   Neurological:      General: No focal deficit present.      Mental Status: She is alert and oriented to person, place, and time.      Cranial Nerves: No cranial nerve deficit.      Motor: No weakness.   Psychiatric:         Mood and Affect: Mood normal.         Behavior: Behavior normal.         Fluids    Intake/Output Summary (Last 24 hours) at 7/24/2023 1339  Last data filed at 7/24/2023 1247  Gross per 24 hour   Intake 590 ml   Output 2550 ml   Net -1960 ml         Laboratory  Recent Labs     07/22/23  0733 07/23/23  0216   WBC 11.3* 8.6   RBC 4.97 4.83   HEMOGLOBIN 11.6* 11.4*   HEMATOCRIT 39.4 37.7   MCV 79.3* 78.1*   MCH 23.3* 23.6*   MCHC 29.4* 30.2*   RDW 45.9 44.7   PLATELETCT 336  309   MPV 10.7 10.4       Recent Labs     07/22/23  1039 07/23/23  0216 07/24/23  0154   SODIUM 136 136 138   POTASSIUM 3.9 3.8 4.0   CHLORIDE 97 96 98   CO2 23 29 30   GLUCOSE 161* 94 81   BUN 22 24* 22   CREATININE 0.99 1.15 0.98   CALCIUM 9.2 8.7 9.1                 Recent Labs     07/22/23  0733   TRIGLYCERIDE 122   HDL 29*   *         Imaging  EC-ECHOCARDIOGRAM COMPLETE W/ CONT   Final Result      DX-HAND 3+ RIGHT   Final Result      Multiple sites of osteoarthritis      DX-HAND 3+ LEFT   Final Result      1.  Negative for fracture or malalignment      2.  osteoarthritis      CT-ABDOMEN-PELVIS WITH   Final Result      1.  Patchy bilateral lower lobe groundglass opacities which could represent edema and/or infection.   2.  Hepatomegaly.   3.  Apparent surgical absence of the right kidney.   4.  Atherosclerosis including coronary artery disease.   5.  Status post cholecystectomy.      DX-CHEST-PORTABLE (1 VIEW)   Final Result      Patchy bilateral midlung and lower lobe interstitial opacities which could be seen in the setting of edema and/or infection.      EC-EPTER W/O CONT    (Results Pending)   CL-CARDIOVERSION    (Results Pending)          Assessment/Plan  * Atrial fibrillation with rapid ventricular response (HCC)- (present on admission)  Assessment & Plan  Continue sotalol I discussed with cardiology plan is for PETER cardioversion today   continue Eliquis reviewed with patient importance of complying with twice daily dosing for stroke prevention  Telemetry monitoring  Echo normal EF technically difficult study  Continue IV Lasix    Obstructive sleep apnea  Assessment & Plan  Patient intolerant of CPAP therapy on nocturnal oxygen    Generalized abdominal pain- (present on admission)  Assessment & Plan  Improved, unclear etiology, possible gastritis ? Pt on NSAIDs and prednisone   CT abdomen neg for acute pathology  Denies urinary symptoms   Continue PPI, avoid NSAIDs   Symptoms improved    Pulmonary  edema  Assessment & Plan  Improved  Continue IV Lasix and monitor I ordered repeat chemistry panel    Inflammatory arthritis- (present on admission)  Assessment & Plan  Suspected inflammatory arthritis per pt, RA workup neg ? Per pt   Has been on steroids for prolonged period, has been trying to taper       X-ray of the hands consistent with osteoarthritis  No clear indication for steroids at this time I tapered dose to 10 mg and she will need slow taper  outpatient follow-up  Tylenol as needed  Topical Voltaren gel  Avoid systemic NSAIDs        COPD (chronic obstructive pulmonary disease) (HCC)- (present on admission)  Assessment & Plan  History of, stable, do not suspect acute exacerbation   - currently on 2L, only on nocturnal oxygen per pt   - continue home inhaler therapy   - O2 per protocol   - wean as able     H/O renal cell carcinoma s/p nephrectomy- (present on admission)  Assessment & Plan  Hx of, stable per pt   Renal function stable, close monitoring of renal function with diuresis, given one kidney, increased risk for injury     Hypertensive emergency- (present on admission)  Assessment & Plan  Hypertensive emergency with A.fib RVR and pulmonary edema   Blood pressure controlled monitor with current medical therapy and diuresis         VTE prophylaxis: therapeutic anticoagulation with Eliquis    I have performed a physical exam and reviewed and updated ROS and Plan today (7/24/2023). In review of yesterday's note (7/23/2023), there are no changes except as documented above.

## 2023-07-25 ENCOUNTER — PHARMACY VISIT (OUTPATIENT)
Dept: PHARMACY | Facility: MEDICAL CENTER | Age: 58
End: 2023-07-25
Payer: COMMERCIAL

## 2023-07-25 VITALS
DIASTOLIC BLOOD PRESSURE: 62 MMHG | HEART RATE: 64 BPM | WEIGHT: 293 LBS | BODY MASS INDEX: 43.4 KG/M2 | OXYGEN SATURATION: 92 % | HEIGHT: 69 IN | SYSTOLIC BLOOD PRESSURE: 112 MMHG | RESPIRATION RATE: 18 BRPM | TEMPERATURE: 97.8 F

## 2023-07-25 PROBLEM — I16.1 HYPERTENSIVE EMERGENCY: Status: RESOLVED | Noted: 2019-11-11 | Resolved: 2023-07-25

## 2023-07-25 PROBLEM — J81.1 PULMONARY EDEMA: Status: RESOLVED | Noted: 2023-07-21 | Resolved: 2023-07-25

## 2023-07-25 PROBLEM — J96.11 CHRONIC HYPOXEMIC RESPIRATORY FAILURE (HCC): Status: ACTIVE | Noted: 2019-11-11

## 2023-07-25 PROBLEM — I48.91 ATRIAL FIBRILLATION WITH RAPID VENTRICULAR RESPONSE (HCC): Status: RESOLVED | Noted: 2023-07-21 | Resolved: 2023-07-25

## 2023-07-25 PROBLEM — E83.42 HYPOMAGNESEMIA: Status: RESOLVED | Noted: 2023-07-22 | Resolved: 2023-07-25

## 2023-07-25 PROBLEM — R10.84 GENERALIZED ABDOMINAL PAIN: Status: RESOLVED | Noted: 2023-07-21 | Resolved: 2023-07-25

## 2023-07-25 LAB
ANION GAP SERPL CALC-SCNC: 9 MMOL/L (ref 7–16)
BASOPHILS # BLD AUTO: 0.4 % (ref 0–1.8)
BASOPHILS # BLD: 0.03 K/UL (ref 0–0.12)
BUN SERPL-MCNC: 23 MG/DL (ref 8–22)
CALCIUM SERPL-MCNC: 8.9 MG/DL (ref 8.5–10.5)
CHLORIDE SERPL-SCNC: 97 MMOL/L (ref 96–112)
CO2 SERPL-SCNC: 31 MMOL/L (ref 20–33)
CREAT SERPL-MCNC: 1.26 MG/DL (ref 0.5–1.4)
EKG IMPRESSION: NORMAL
EOSINOPHIL # BLD AUTO: 0.2 K/UL (ref 0–0.51)
EOSINOPHIL NFR BLD: 2.6 % (ref 0–6.9)
ERYTHROCYTE [DISTWIDTH] IN BLOOD BY AUTOMATED COUNT: 44.1 FL (ref 35.9–50)
EST. AVERAGE GLUCOSE BLD GHB EST-MCNC: 137 MG/DL
GFR SERPLBLD CREATININE-BSD FMLA CKD-EPI: 49 ML/MIN/1.73 M 2
GLUCOSE SERPL-MCNC: 87 MG/DL (ref 65–99)
HBA1C MFR BLD: 6.4 % (ref 4–5.6)
HCT VFR BLD AUTO: 40.2 % (ref 37–47)
HGB BLD-MCNC: 11.9 G/DL (ref 12–16)
IMM GRANULOCYTES # BLD AUTO: 0.03 K/UL (ref 0–0.11)
IMM GRANULOCYTES NFR BLD AUTO: 0.4 % (ref 0–0.9)
LYMPHOCYTES # BLD AUTO: 2.18 K/UL (ref 1–4.8)
LYMPHOCYTES NFR BLD: 28.3 % (ref 22–41)
MAGNESIUM SERPL-MCNC: 2 MG/DL (ref 1.5–2.5)
MCH RBC QN AUTO: 23.5 PG (ref 27–33)
MCHC RBC AUTO-ENTMCNC: 29.6 G/DL (ref 32.2–35.5)
MCV RBC AUTO: 79.3 FL (ref 81.4–97.8)
MONOCYTES # BLD AUTO: 0.67 K/UL (ref 0–0.85)
MONOCYTES NFR BLD AUTO: 8.7 % (ref 0–13.4)
NEUTROPHILS # BLD AUTO: 4.6 K/UL (ref 1.82–7.42)
NEUTROPHILS NFR BLD: 59.6 % (ref 44–72)
NRBC # BLD AUTO: 0 K/UL
NRBC BLD-RTO: 0 /100 WBC (ref 0–0.2)
PLATELET # BLD AUTO: 352 K/UL (ref 164–446)
PMV BLD AUTO: 10.6 FL (ref 9–12.9)
POTASSIUM SERPL-SCNC: 4 MMOL/L (ref 3.6–5.5)
RBC # BLD AUTO: 5.07 M/UL (ref 4.2–5.4)
SODIUM SERPL-SCNC: 137 MMOL/L (ref 135–145)
WBC # BLD AUTO: 7.7 K/UL (ref 4.8–10.8)

## 2023-07-25 PROCEDURE — RXMED WILLOW AMBULATORY MEDICATION CHARGE: Performed by: HOSPITALIST

## 2023-07-25 PROCEDURE — A9270 NON-COVERED ITEM OR SERVICE: HCPCS

## 2023-07-25 PROCEDURE — 700111 HCHG RX REV CODE 636 W/ 250 OVERRIDE (IP): Mod: JZ | Performed by: PHYSICIAN ASSISTANT

## 2023-07-25 PROCEDURE — 93005 ELECTROCARDIOGRAM TRACING: CPT | Performed by: HOSPITALIST

## 2023-07-25 PROCEDURE — 83036 HEMOGLOBIN GLYCOSYLATED A1C: CPT

## 2023-07-25 PROCEDURE — 93005 ELECTROCARDIOGRAM TRACING: CPT | Performed by: PHYSICIAN ASSISTANT

## 2023-07-25 PROCEDURE — A9270 NON-COVERED ITEM OR SERVICE: HCPCS | Performed by: INTERNAL MEDICINE

## 2023-07-25 PROCEDURE — 700102 HCHG RX REV CODE 250 W/ 637 OVERRIDE(OP): Performed by: INTERNAL MEDICINE

## 2023-07-25 PROCEDURE — 85025 COMPLETE CBC W/AUTO DIFF WBC: CPT

## 2023-07-25 PROCEDURE — 700102 HCHG RX REV CODE 250 W/ 637 OVERRIDE(OP)

## 2023-07-25 PROCEDURE — 93010 ELECTROCARDIOGRAM REPORT: CPT | Mod: 76 | Performed by: INTERNAL MEDICINE

## 2023-07-25 PROCEDURE — 700102 HCHG RX REV CODE 250 W/ 637 OVERRIDE(OP): Performed by: PHYSICIAN ASSISTANT

## 2023-07-25 PROCEDURE — 36415 COLL VENOUS BLD VENIPUNCTURE: CPT

## 2023-07-25 PROCEDURE — 99239 HOSP IP/OBS DSCHRG MGMT >30: CPT | Performed by: HOSPITALIST

## 2023-07-25 PROCEDURE — 93010 ELECTROCARDIOGRAM REPORT: CPT | Performed by: INTERNAL MEDICINE

## 2023-07-25 PROCEDURE — 97602 WOUND(S) CARE NON-SELECTIVE: CPT

## 2023-07-25 PROCEDURE — 97162 PT EVAL MOD COMPLEX 30 MIN: CPT

## 2023-07-25 PROCEDURE — 97535 SELF CARE MNGMENT TRAINING: CPT

## 2023-07-25 PROCEDURE — 99232 SBSQ HOSP IP/OBS MODERATE 35: CPT | Mod: FS | Performed by: PHYSICIAN ASSISTANT

## 2023-07-25 PROCEDURE — 80048 BASIC METABOLIC PNL TOTAL CA: CPT

## 2023-07-25 PROCEDURE — A9270 NON-COVERED ITEM OR SERVICE: HCPCS | Performed by: STUDENT IN AN ORGANIZED HEALTH CARE EDUCATION/TRAINING PROGRAM

## 2023-07-25 PROCEDURE — 83735 ASSAY OF MAGNESIUM: CPT

## 2023-07-25 PROCEDURE — 700111 HCHG RX REV CODE 636 W/ 250 OVERRIDE (IP): Performed by: HOSPITALIST

## 2023-07-25 PROCEDURE — 700102 HCHG RX REV CODE 250 W/ 637 OVERRIDE(OP): Performed by: STUDENT IN AN ORGANIZED HEALTH CARE EDUCATION/TRAINING PROGRAM

## 2023-07-25 PROCEDURE — A9270 NON-COVERED ITEM OR SERVICE: HCPCS | Performed by: PHYSICIAN ASSISTANT

## 2023-07-25 PROCEDURE — 700101 HCHG RX REV CODE 250: Performed by: HOSPITALIST

## 2023-07-25 RX ORDER — NYSTATIN 100000 [USP'U]/G
1 POWDER TOPICAL 2 TIMES DAILY
Qty: 30 G | Refills: 0 | Status: SHIPPED | OUTPATIENT
Start: 2023-07-25 | End: 2023-07-28

## 2023-07-25 RX ORDER — OXYCODONE HYDROCHLORIDE 5 MG/1
5 TABLET ORAL EVERY 8 HOURS PRN
Qty: 9 TABLET | Refills: 0 | Status: SHIPPED | OUTPATIENT
Start: 2023-07-25 | End: 2023-07-28

## 2023-07-25 RX ORDER — FUROSEMIDE 40 MG/1
40 TABLET ORAL 2 TIMES DAILY
Qty: 60 TABLET | Refills: 0 | Status: SHIPPED | OUTPATIENT
Start: 2023-07-25 | End: 2023-08-24

## 2023-07-25 RX ORDER — ATORVASTATIN CALCIUM 20 MG/1
20 TABLET, FILM COATED ORAL
Qty: 30 TABLET | Refills: 0 | Status: SHIPPED | OUTPATIENT
Start: 2023-07-25

## 2023-07-25 RX ORDER — PREDNISONE 10 MG/1
10 TABLET ORAL DAILY
Qty: 30 TABLET | Refills: 0 | Status: SHIPPED | OUTPATIENT
Start: 2023-07-25

## 2023-07-25 RX ORDER — DAPAGLIFLOZIN 10 MG/1
10 TABLET, FILM COATED ORAL DAILY
Qty: 30 TABLET | Refills: 0 | Status: SHIPPED | OUTPATIENT
Start: 2023-07-26

## 2023-07-25 RX ORDER — FUROSEMIDE 40 MG/1
40 TABLET ORAL
Status: DISCONTINUED | OUTPATIENT
Start: 2023-07-25 | End: 2023-07-25 | Stop reason: HOSPADM

## 2023-07-25 RX ORDER — POTASSIUM CHLORIDE 20 MEQ/1
20 TABLET, EXTENDED RELEASE ORAL DAILY
Qty: 30 TABLET | Refills: 0 | Status: SHIPPED | OUTPATIENT
Start: 2023-07-25 | End: 2023-08-24

## 2023-07-25 RX ORDER — SPIRONOLACTONE 25 MG/1
25 TABLET ORAL DAILY
Qty: 30 TABLET | Refills: 0 | Status: SHIPPED | OUTPATIENT
Start: 2023-07-26

## 2023-07-25 RX ORDER — DAPAGLIFLOZIN 10 MG/1
10 TABLET, FILM COATED ORAL DAILY
Status: DISCONTINUED | OUTPATIENT
Start: 2023-07-25 | End: 2023-07-25 | Stop reason: HOSPADM

## 2023-07-25 RX ORDER — LANOLIN ALCOHOL/MO/W.PET/CERES
200 CREAM (GRAM) TOPICAL DAILY
Status: DISCONTINUED | OUTPATIENT
Start: 2023-07-25 | End: 2023-07-25 | Stop reason: HOSPADM

## 2023-07-25 RX ORDER — SOTALOL HYDROCHLORIDE 120 MG/1
120 TABLET ORAL 2 TIMES DAILY
Qty: 60 TABLET | Refills: 0 | Status: SHIPPED | OUTPATIENT
Start: 2023-07-25

## 2023-07-25 RX ORDER — METOPROLOL SUCCINATE 25 MG/1
25 TABLET, EXTENDED RELEASE ORAL 2 TIMES DAILY
Qty: 60 TABLET | Refills: 0 | Status: SHIPPED | OUTPATIENT
Start: 2023-07-25 | End: 2023-08-24

## 2023-07-25 RX ORDER — LISINOPRIL 20 MG/1
20 TABLET ORAL DAILY
Qty: 30 TABLET | Refills: 0 | Status: SHIPPED | OUTPATIENT
Start: 2023-07-25

## 2023-07-25 RX ADMIN — DICLOFENAC SODIUM 2 G: 10 GEL TOPICAL at 04:23

## 2023-07-25 RX ADMIN — NYSTATIN: 100000 POWDER TOPICAL at 04:35

## 2023-07-25 RX ADMIN — SOTALOL HYDROCHLORIDE 120 MG: 80 TABLET ORAL at 03:08

## 2023-07-25 RX ADMIN — FUROSEMIDE 40 MG: 10 INJECTION INTRAMUSCULAR; INTRAVENOUS at 04:34

## 2023-07-25 RX ADMIN — PREDNISONE 10 MG: 10 TABLET ORAL at 04:33

## 2023-07-25 RX ADMIN — FUROSEMIDE 40 MG: 40 TABLET ORAL at 16:06

## 2023-07-25 RX ADMIN — APIXABAN 5 MG: 5 TABLET, FILM COATED ORAL at 17:30

## 2023-07-25 RX ADMIN — LORAZEPAM 0.5 MG: 0.5 TABLET ORAL at 04:34

## 2023-07-25 RX ADMIN — POTASSIUM CHLORIDE 20 MEQ: 1500 TABLET, EXTENDED RELEASE ORAL at 17:30

## 2023-07-25 RX ADMIN — METOPROLOL SUCCINATE 25 MG: 25 TABLET, EXTENDED RELEASE ORAL at 04:34

## 2023-07-25 RX ADMIN — SOTALOL HYDROCHLORIDE 120 MG: 80 TABLET ORAL at 16:06

## 2023-07-25 RX ADMIN — APIXABAN 5 MG: 5 TABLET, FILM COATED ORAL at 04:34

## 2023-07-25 RX ADMIN — POTASSIUM CHLORIDE 20 MEQ: 1500 TABLET, EXTENDED RELEASE ORAL at 04:34

## 2023-07-25 RX ADMIN — SPIRONOLACTONE 25 MG: 25 TABLET ORAL at 04:34

## 2023-07-25 RX ADMIN — OXYCODONE HYDROCHLORIDE 5 MG: 5 TABLET ORAL at 05:11

## 2023-07-25 RX ADMIN — Medication 200 MG: at 12:53

## 2023-07-25 RX ADMIN — ATORVASTATIN CALCIUM 20 MG: 20 TABLET, FILM COATED ORAL at 17:43

## 2023-07-25 RX ADMIN — SENNOSIDES AND DOCUSATE SODIUM 2 TABLET: 50; 8.6 TABLET ORAL at 04:34

## 2023-07-25 RX ADMIN — OMEPRAZOLE 40 MG: 20 CAPSULE, DELAYED RELEASE ORAL at 17:30

## 2023-07-25 RX ADMIN — DAPAGLIFLOZIN 10 MG: 10 TABLET, FILM COATED ORAL at 12:53

## 2023-07-25 RX ADMIN — NYSTATIN: 100000 POWDER TOPICAL at 18:00

## 2023-07-25 RX ADMIN — LISINOPRIL 20 MG: 20 TABLET ORAL at 17:30

## 2023-07-25 RX ADMIN — METOPROLOL SUCCINATE 25 MG: 25 TABLET, EXTENDED RELEASE ORAL at 17:30

## 2023-07-25 ASSESSMENT — COGNITIVE AND FUNCTIONAL STATUS - GENERAL
STANDING UP FROM CHAIR USING ARMS: A LITTLE
WALKING IN HOSPITAL ROOM: A LOT
MOVING FROM LYING ON BACK TO SITTING ON SIDE OF FLAT BED: A LOT
MOVING TO AND FROM BED TO CHAIR: UNABLE
MOBILITY SCORE: 12
SUGGESTED CMS G CODE MODIFIER MOBILITY: CL
CLIMB 3 TO 5 STEPS WITH RAILING: A LOT
TURNING FROM BACK TO SIDE WHILE IN FLAT BAD: A LOT

## 2023-07-25 ASSESSMENT — PAIN DESCRIPTION - PAIN TYPE
TYPE: ACUTE PAIN;CHRONIC PAIN
TYPE: ACUTE PAIN

## 2023-07-25 ASSESSMENT — GAIT ASSESSMENTS: GAIT LEVEL OF ASSIST: UNABLE TO PARTICIPATE

## 2023-07-25 ASSESSMENT — ENCOUNTER SYMPTOMS
PALPITATIONS: 0
DIZZINESS: 0
COUGH: 0

## 2023-07-25 ASSESSMENT — FIBROSIS 4 INDEX: FIB4 SCORE: 0.78

## 2023-07-25 NOTE — PROGRESS NOTES
Pt education provided in regards to discharge. All medications explained to patient. Patient also provided with extra medication education handouts. All questions answered in regards to patient medications. Pt also educated on heart failure, signs/symptoms to watch for, daily weights, blood pressures, diet, etc. Pt educated on her follow up appointment tomorrow and where to  her medications. All questions answered in regards to her discharge.

## 2023-07-25 NOTE — CARE PLAN
The patient is Stable - Low risk of patient condition declining or worsening    Shift Goals  Clinical Goals: pain control, maintain skin integrity, monitor tele  Patient Goals: Pain control, sleep  Family Goals: KIANA    Progress made toward(s) clinical / shift goals:    Problem: Pain - Standard  Goal: Alleviation of pain or a reduction in pain to the patient’s comfort goal  Outcome: Progressing     Problem: Knowledge Deficit - Standard  Goal: Patient and family/care givers will demonstrate understanding of plan of care, disease process/condition, diagnostic tests and medications  Outcome: Progressing     Problem: Fall Risk  Goal: Patient will remain free from falls  Outcome: Progressing     Problem: Skin Integrity  Goal: Skin integrity is maintained or improved  Outcome: Progressing       Patient is not progressing towards the following goals:

## 2023-07-25 NOTE — DISCHARGE INSTRUCTIONS
HF Patient Discharge Instructions  Monitor your weight daily, and maintain a weight chart, to track your weight changes.   Activity as tolerated, unless your Doctor has ordered otherwise. .  Follow a low fat, low cholesterol, low salt diet unless instructed otherwise by your Doctor. Read the labels on the back of food products and track your intake of fat, cholesterol and salt.   Fluid Restriction No. If a Fluid Restriction has been ordered by your Doctor, measure fluids with a measuring cup to ensure that you are not exceeding the restriction.   No smoking.  Oxygen Yes. If your Doctor has ordered that you wear Oxygen at home, it is important to wear it as ordered.  Did you receive an explanation from staff on the importance of taking each of your medications and why it is necessary to keep taking them unless your doctor says to stop? Yes  Were all of your questions answered about how to manage your heart failure and what to do if you have increased signs and symptoms after you go home? Yes  Do you feel like your heart failure care team involved you in the care treatment plan and allowed you to make decisions regarding your care while in the hospital and addressed any discharge needs you might have? Yes    See the educational handout provided at discharge for more information on monitoring your daily weight, activity and diet. This also explains more about Heart Failure, symptoms of a flare-up and some of the tests that you have undergone.     Warning Signs of a Flare-Up include:  Swelling in the ankles or lower legs.  Shortness of breath, while at rest, or while doing normal activities.   Shortness of breath at night when in bed, or coughing in bed.   Requiring more pillows to sleep at night, or needing to sit up at night to sleep.  Feeling weak, dizzy or fatigued.     When to call your Doctor:  Call [facility] seven days a week from 8:00 a.m. to 8:00 p.m. for medical questions [phone].  Call  your Primary Care Physician or Cardiologist if:   You experience any pain radiating to your jaw or neck.  You have any difficulty breathing.  You experience weight gain of 3 lbs in a day or 5 lbs in a week.   You feel any palpitations or irregular heartbeats.  You become dizzy or lose consciousness.   If you have had an angiogram or had a pacemaker or AICD placed, and experience:  Bleeding, drainage or swelling at the surgical / puncture site.  Fever greater than 100.0 F  Shock from internal defibrillator.  Cool and / or numb extremities.     Please access the [facility]HA My HF Guide/Heart Failure Interactive Workbook:   [email or web address]

## 2023-07-25 NOTE — PROGRESS NOTES
Patient refused turning to allow for skin assessment to back, bottom, and sacrum.  Education provided.  The importance of regular turns to prevent pressure injuries emphasized.  The importance of keeping skin clean and dry also emphasized.  New wound consult placed for worsening skin damage to inner thigh and abdominal folds.

## 2023-07-25 NOTE — PROGRESS NOTES
Bedside report received from SARAH Carranza. Patient is alert and oriented x  4, 2 L O2 via NC in use, SR on the monitor. Fall precautions are in place. Call light is in reach.

## 2023-07-25 NOTE — PROGRESS NOTES
Bedside report given by Katarzyna SMITH. Updated on POC. Pt sleeping at this time. On 2LNC. Bed in low and locked position, call light within reach. Will continue to monitor.

## 2023-07-25 NOTE — PROGRESS NOTES
12 hour chart check:    Rhythm: Afib/SR  Rate:   Ectopy: (R) PVC, (R) Coup, (R) Trip,  Measurements: .14/.07/.41

## 2023-07-25 NOTE — THERAPY
Physical Therapy   Initial Evaluation     Patient Name: Savita Duggan  Age:  57 y.o., Sex:  female  Medical Record #: [id number]  Today's Date: 7/25/2023     Precautions  Precautions: Fall Risk  Comments: limited by pain    Assessment  Patient is 57 y.o. female admitted from outside facility with A fib with RVR now s/p cardioversion, inflammatory arthritis, pulmonary edema, and hypertensive emergency.  PMH includes COPD on 2L O2 at night, obesity, HTN, A fib on anticoagulation, renal cell carcinoma s/p nephrectomy, and GERD.  Patient was seen for PT evaluation, presented with increased pain, weakness, balance deficits, and decreased activity tolerance.  Patient required min A to get out of bed and to transfer to chair, unable to tolerate further mobility due to wrist and knee pain with WB and use of FWW.  Educated patient on role off PT, PT POC and goals, and recommendations as well as strategies for improving mobility including sitting up in chair for all meals and mobilizing to BSC as needed.  Patient is below her functional baseline, recommend post acute placement.     Plan    Physical Therapy Initial Treatment Plan   Treatment Plan : Bed Mobility, Equipment, Gait Training, Neuro Re-Education / Balance, Self Care / Home Evaluation, Stair Training, Therapeutic Activities, Therapeutic Exercise  Treatment Frequency: 4 Times per Week  Duration: Until Therapy Goals Met    DC Equipment Recommendations: (bariatric FWW)  Discharge Recommendations: Recommend post-acute placement for additional physical therapy services prior to discharge home     Objective     07/25/23 1025   Precautions   Precautions Fall Risk   Comments limited by pain   Pain 0 - 10 Group   Therapist Pain Assessment Post Activity Pain Same as Prior to Activity;Nurse Notified (Not quantified)   Prior Living Situation   Prior Services Home-Independent   Housing / Facility 1 Story House   Steps Into Home 4   Rail (yes)   Equipment Owned Single Point Cane   Lives  with - Patient's Self Care Capacity Child Less than 18 Years of Age;Adult Children   Comments Pt's grand-nephews live with her, age 9, 14, 16, & 23.  Her sister lives across the street and has two of the other siblings.  They share cooking & other household duties.   Prior Level of Functional Mobility   Bed Mobility Independent   Transfer Status Independent   Ambulation Independent   Ambulation Distance community ambulator   Assistive Devices Used Single Point Cane   Stairs Independent   Cognition    Cognition / Consciousness WDL   Level of Consciousness Alert   Comments Pleasant & cooperative, limited by pain   Active ROM Lower Body    Active ROM Lower Body  WDL   Strength Lower Body   Lower Body Strength  WDL   Comments R knee ext limited by pain   Sensation Lower Body   Lower Extremity Sensation   X   Comments Denied N/T, c/o R knee pain   Balance Assessment   Sitting Balance (Static) Good   Sitting Balance (Dynamic) Good   Standing Balance (Static) Fair -   Standing Balance (Dynamic) Poor +   Weight Shift Sitting Fair   Weight Shift Standing Poor   Bed Mobility    Supine to Sit Minimal Assist   Sit to Supine (NT, left up in chair)   Scooting Standby Assist   Comments HOB elevated   Gait Analysis   Gait Level Of Assist Unable to Participate   Functional Mobility   Sit to Stand Contact Guard Assist   Bed, Chair, Wheelchair Transfer Minimal Assist   Transfer Method Stand Pivot   Mobility supine > EOB > recliner   Comments Pt limited by pain in wrists with WB on FWW   Activity Tolerance   Sitting in Chair Post session   Sitting Edge of Bed 10 min   Standing 1-2 min   Short Term Goals    Short Term Goal # 1 Pt will perform supine <> sit without bed features with SPV in 6 visits to progress bed mobility   Short Term Goal # 2 Pt will perform STS/functional transfers with FWW and SPV in 6 visits to progress OOB mobility   Short Term Goal # 3 Pt will ambulate 50 ft with FWW and SPV in 6 visits to progress functional  independence   Short Term Goal # 4 Pt will negotiate 4 steps with SPV in 6 visits to access home

## 2023-07-25 NOTE — PROGRESS NOTES
Cardiology Progress Note    Name:   Savita Duggan     YOB: 1965  Age:   57 y.o.  female   MRN:   [id number]    Attending Cardiologist: Dr. Groves  Outpatient Cardiologist: N/A    CC: Abdominal Pain (Pt went to outside facility for abdominal pain and dyspnea, diagnosed with Afib -170) and Shortness of Breath       History of Present Illness  58 yo female presents with ongoing palpitations, shortness of breath, swelling, abdominal pain. Transferred from outside hospital for AF with RVR and HFpEF exacerbation.    Medical problems including atrial fibrillation (paroxysmal?) on apixaban though only taking once a day, LONNIE intolerant to the facemask, using nocturnal O2 via nasal cannula, polyarthritis on long term prednisone, kidney cancer s/p R nephrectomy .     Interim Events   : successfully cardioverted.     : remains in NSR. Up and walking with therapy today. Still requiring oxygen during day. Breathing is better, no further palpitations.  She has seen a cardiologist in [place] and understands she needs to go back to follow-up this hospitalization.        Review of Systems   Constitutional:  Positive for malaise/fatigue.   Respiratory:  Negative for cough.    Cardiovascular:  Negative for chest pain, palpitations and leg swelling.   Musculoskeletal:  Positive for joint pain.        Hand swelling   Neurological:  Negative for dizziness.       Medical History  History reviewed. No pertinent surgical history.    History reviewed. No pertinent family history.    Social History     Tobacco Use   Smoking Status Former    Packs/day: 1.00    Years: 38.00    Pack years: 38.00    Types: Cigarettes    Quit date: 2016    Years since quittin.7   Smokeless Tobacco Never       Allergies   Allergen Reactions    Coconut Oil     Codeine Itching    Penicillins Itching and Swelling     Reports tolerating amoxicillin         Medications   Scheduled Medications   Medication Dose Frequency     "furosemide  40 mg BID DIURETIC    potassium chloride SA  20 mEq BID    atorvastatin  20 mg QHS    Pneumococcal 20-Uma Conj Vacc  0.5 mL Once    spironolactone  25 mg Q DAY    metoprolol SR  25 mg BID    nystatin   BID    sotalol  120 mg TWICE DAILY    predniSONE  10 mg DAILY    senna-docusate  2 Tablet BID    apixaban  5 mg BID    lisinopril  20 mg Q EVENING    omeprazole  40 mg Q EVENING    Pharmacy Consult Request  1 Each PHARMACY TO DOSE           Physical Exam  /57   Pulse (!) 59   Temp 36.5 °C (97.7 °F) (Temporal)   Resp 17   Ht 1.753 m (5' 9\")   Wt (!) 169 kg (371 lb 11.1 oz)   SpO2 95%     Physical Exam  Vitals reviewed.   Constitutional:       Appearance: Normal appearance. She is obese.      Comments: Weak, unable to sit up in bed   HENT:      Head: Normocephalic and atraumatic.   Cardiovascular:      Rate and Rhythm: Tachycardia present. Rhythm irregular.      Heart sounds: No murmur heard.  Pulmonary:      Effort: Pulmonary effort is normal.      Comments: Unable to auscultate due to body position, habitus. No rales noted on brief exam  Abdominal:      Comments: Large abdominal circumference   Musculoskeletal:      Right lower leg: No edema.      Left lower leg: No edema.      Comments: Bilateral MCP joints are swollen   Skin:     General: Skin is warm and dry.   Neurological:      Mental Status: She is alert.   Psychiatric:         Judgment: Judgment normal.         Labs (personally reviewed):     Lab Results   Component Value Date/Time    SODIUM 137 07/25/2023 12:25 AM    POTASSIUM 4.0 07/25/2023 12:25 AM    CHLORIDE 97 07/25/2023 12:25 AM    CO2 31 07/25/2023 12:25 AM    GLUCOSE 87 07/25/2023 12:25 AM    BUN 23 (H) 07/25/2023 12:25 AM    CREATININE 1.26 07/25/2023 12:25 AM     Lab Results   Component Value Date/Time    CHOLSTRLTOT 154 07/22/2023 07:33 AM     (H) 07/22/2023 07:33 AM    HDL 29 (A) 07/22/2023 07:33 AM    TRIGLYCERIDE 122 07/22/2023 07:33 AM     No results found for: " BNPBTYPENAT      Cardiac Imaging and Procedures Review      Telemetry Review  Sinus rhythm,  59-65    Assessment and Medical Decision Making:    Atrial Fibrillation, suspected persistent  - Started on sotalol 120mg bid by our EP service 7/22  - s/p successful PETER guided DCCV 7/24  - continue apixaban 5mg bid  - cont sotalol 120mg bid. QTc 429ms   - cont Metop SR     Acute HFpEF Stage C NYHA III on admission  With concomitant obesity and AF  -Changed to oral diuresis, Lasix 40 mg twice daily, may reduce to daily for symptomatic dehydration    - spironolactone 25mg  - start SGLT2i   - Monitor Mag daily    LONNIE, untreated  - patient refusing to retry sleep masks, has tried 3 different ones    HTN, controlled  - cont lisinopril    Atherosclerosis of coronaries on nongated CT  borderline DM  -  Cont statin therapy    In summary:   Expect discharge today/tomorrow depending on placement  She will need to follow up with her cardiologist (last seen 1+ years ago) in [place] per her insurance. I asked her to see a provider within 7 days and see a cardiologist within 4 weeks given her sotalol dose and need for monitoring  No further cardiology recommendations.    Please see Dr Groves's attestation for additions and further recommendations.    Laurie Carrillo PA-C  [facility]  I personally spent a total of 25 minutes which includes face-to-face time and non-face-to-face time spent on preparing to see the patient, reviewing hospital notes and tests, obtaining history from the patient, performing a medically appropriate exam, counseling and educating the patient, ordering medications/tests/procedures/referrals as clinically indicated, and documenting information in the electronic medical record.

## 2023-07-25 NOTE — DISCHARGE PLANNING
Case Management Discharge Planning    Admission Date: 7/21/2023  GMLOS: 3.9  ALOS: 4    6-Clicks ADL Score: 16  6-Clicks Mobility Score: 12  PT and/or OT Eval ordered: Yes  Post-acute Referrals Ordered: No  Post-acute Choice Obtained: Yes  [facility]  [facility] [place]-only if no other option.     Has referral(s) been sent to post-acute provider:  No    Anticipated Discharge Dispo: Discharge Disposition: Discharged to home/self care (01)  Discharge Address: [address] [id number]    DME Needed: No    Action(s) Taken: DC Assessment Complete (See below) and Choice obtained    Escalations Completed: None    Medically Clear: No    Next Steps: Physical therapy recommended SNF. Pt refuses SNF related to home responsibilities. We discussed   Safety at home and physical therapy recommendation. Patient States she will be up ambulating and is moving her legs to get stronger. She would like outpatient . Physical therapy because she has to go back to work to support her family.  She will discuss with physical therapy when they return this afternoon. If necessary she will go to [facility]- SNF but is reluctant to.     Barriers to Discharge: Medical clearance and Transportation    Is the patient up for discharge tomorrow: No

## 2023-07-25 NOTE — CARE PLAN
The patient is Stable - Low risk of patient condition declining or worsening    Shift Goals  Clinical Goals: Discharge  Patient Goals: Discharge  Family Goals: KIANA    Progress made toward(s) clinical / shift goals:      Problem: Pain - Standard  Goal: Alleviation of pain or a reduction in pain to the patient’s comfort goal  Description: Target End Date:  Prior to discharge or change in level of care    Document on Vitals flowsheet    1.  Document pain using the appropriate pain scale per order or unit policy  2.  Educate and implement non-pharmacologic comfort measures (i.e. relaxation, distraction, massage, cold/heat therapy, etc.)  3.  Pain management medications as ordered  4.  Reassess pain after pain med administration per policy  5.  If opiods administered assess patient's response to pain medication is appropriate per POSS sedation scale  6.  Follow pain management plan developed in collaboration with patient and interdisciplinary team (including palliative care or pain specialists if applicable)  Outcome: Met     Problem: Knowledge Deficit - Standard  Goal: Patient and family/care givers will demonstrate understanding of plan of care, disease process/condition, diagnostic tests and medications  Description: Target End Date:  1-3 days or as soon as patient condition allows    Document in Patient Education    1.  Patient and family/caregiver oriented to unit, equipment, visitation policy and means for communicating concern  2.  Complete/review Learning Assessment  3.  Assess knowledge level of disease process/condition, treatment plan, diagnostic tests and medications  4.  Explain disease process/condition, treatment plan, diagnostic tests and medications  Outcome: Met     Problem: Fall Risk  Goal: Patient will remain free from falls  Description: Target End Date:  Prior to discharge or change in level of care    Document interventions on the Johanna Castano Fall Risk Assessment    1.  Assess for fall risk  factors  2.  Implement fall precautions  Outcome: Met     Problem: Care Map:  Admission Optimal Outcome for the Heart Failure Patient  Goal: Admission:  Optimal Care of the heart failure patient  Description: Target End Date:  end of day 1  Outcome: Met     Problem: Care Map:  Day 1 Optimal Outcome for the Heart Failure Patient  Goal: Day 1:  Optimal Care of the heart failure patient  Description: Target End Date:  end of day 1  Outcome: Met     Problem: Care Map:  Day 2 Optimal Outcome for the Heart Failure Patient  Goal: Day 2:  Optimal Care of the heart failure patient  Description: Target End Date:  end of day 2  Outcome: Met     Problem: Care Map:  Day 3 Optimal Outcome for the Heart Failure Patient  Goal: Day 3:  Optimal Care of the heart failure patient  Description: Target End Date:  end of day 3  Outcome: Met     Problem: Care Map:  Day Before Discharge Optimal Outcome for the Heart Failure Patient  Goal: Day Before Discharge:  Optimal Care of the heart failure patient  Description: Target End Date:  Prior to discharge or change in level of care  Outcome: Met     Problem: Care Map:  Day of Discharge Optimal Outcome for the Heart Failure Patient  Goal: Day of Discharge:  Optimal Care of the heart failure patient  Description: Target End Date:  Prior to discharge or change in level of care  Outcome: Met     Problem: Skin Integrity  Goal: Skin integrity is maintained or improved  Description: Target End Date:  Prior to discharge or change in level of care    Document interventions on Skin Risk/Noah flowsheet groups and corresponding LDA    1.  Assess and monitor skin integrity, appearance and/or temperature  2.  Assess risk factors for impaired skin integrity and/or pressures ulcers  3.  Implement precautions to protect skin integrity in collaboration with interdisciplinary team  4.  Implement pressure ulcer prevention protocol if at risk for skin breakdown  5.  Confirm wound care consult if at risk for skin  breakdown  6.  Ensure patient use of pressure relieving devices  (Low air loss bed, waffle overlay, heel protectors, ROHO cushion, etc)  Outcome: Met     Problem: Urinary Elimination  Goal: Establish and maintain regular urinary output  Description: Target End Date:  Prior to discharge or change in level of care    Document on I/O and Assessment flowsheets    1.  Evaluate need to continue indwelling catheter every shift  2.  Assess signs and symptoms of urinary retention  3.  Assess post-void residual volumes  4.  Implement bladder training program  5.  Encourage scheduled voidings  6.  Assist patient to sit on bedside commode or toilet for voiding  7.  Educate patient and family/caregiver on use and purpose of urine collection devices (document in Patient Education)  Outcome: Met

## 2023-07-26 LAB
ANA INTERPRETIVE COMMENT Q5143: NORMAL
ANTINUCLEAR ANTIBODY (ANA), HEP-2, IGG Q5142: NORMAL
CYTOPLASMIC PATTERN TITER Q5148: ABNORMAL

## 2023-07-26 NOTE — PROGRESS NOTES
Patient wheeled off unit with family. Discharge education provided, meds sent home with patient. IV removed. No other questions.

## 2023-07-26 NOTE — WOUND TEAM
[facility]  Inpatient Services  Initial Wound and Skin Care Evaluation    Admission Date: 2023     Last order of IP CONSULT TO WOUND CARE was found on 2023 from Hospital Encounter on 2023     HPI, PMH, SH: Reviewed    History reviewed. No pertinent surgical history.  Social History     Tobacco Use    Smoking status: Former     Packs/day: 1.00     Years: 38.00     Pack years: 38.00     Types: Cigarettes     Quit date: 2016     Years since quittin.7    Smokeless tobacco: Never   Substance Use Topics    Alcohol use: Never     Chief Complaint   Patient presents with    Abdominal Pain     Pt went to outside facility for abdominal pain and dyspnea, diagnosed with Afib -170    Shortness of Breath     Diagnosis: Atrial fibrillation with rapid ventricular response (HCC) [I48.91]    Unit where seen by Wound Team: [id number]/     WOUND CONSULT RELATED TO:  Thigh and upper back    WOUND HISTORY:   Per patient she realizes that her skin rash is most likely related to her weight, but it is very painful and it itches/burns.         WOUND ASSESSMENT/LDA  Moisture Associated Skin Damage 23 Groin;Other (comment);Panus;Breast (Active)   First Observed Date/First Observed Time: 23 1737   Present on Original Admission: Yes  Wound Location : Groin;Other (comment);Panus;Breast      Assessments 2023  5:00 PM   Wound Image        NEXT Weekly Photo (Inpatient Only) 23   Drainage Amount None   Periwound Assessment Rash;Red   IAD Cleansing Normal Saline Irrigation   Periwound Protectant Antifungal Therapy   IAD Containment Device Purewick   WOUND NURSE ONLY - Time Spent with Patient (mins) 60        Vascular:    JUSTIN:   No results found.    Lab Values:    Lab Results   Component Value Date/Time    WBC 7.7 2023 12:25 AM    RBC 5.07 2023 12:25 AM    HEMOGLOBIN 11.9 (L) 2023 12:25 AM    HEMATOCRIT 40.2 2023 12:25 AM    CREACTPROT 6.04 (H) 2023 10:39 AM     GARYFRANCISNEPTALI 12 07/22/2023 10:39 AM    HBA1C 6.4 (H) 07/25/2023 12:25 AM         Culture Results show:  No results found for this or any previous visit (from the past 720 hour(s)).    Pain Level/Medicated:  None, Tolerated without pain medication       INTERVENTIONS BY WOUND TEAM:  Chart and images reviewed. Discussed with bedside RN. All areas of concern (based on picture review, LDA review and discussion with bedside RN) have been thoroughly assessed. Documentation of areas based on significant findings. This RN in to assess patient. Performed standard wound care which includes appropriate positioning, dressing removal and non-selective debridement. Pictures and measurements obtained weekly if/when required.    Wound:  Skin folds & groin  Cleansed/Non-selectively Debrided with:  No rinse foam soap and Gauze  Marychuy wound: Cleansed with No rinse foam soap, Prepped with Nystatin Powder  Primary Dressing:  Viscopaste applied directly to skin    Advanced Wound Care Discharge Planning  Number of Clinicians necessary to complete wound care: 1  Is patient requiring IV pain medications for dressing changes:  No   Length of time for dressing change 20 min. (This does not include chart review, pre-medication time, set up, clean up or time spent charting.)    Interdisciplinary consultation: Patient, Bedside RN , .  .    EVALUATION / RATIONALE FOR TREATMENT:     Date:  07/25/23  Wound Status:  Initial evaluation    Patient has rash like appearance in between skin folds and groin, antifungal powder applied to skin folds and then viscopaste over it, Viscopatch zinc impregnated gauze to encourage re-epithelialization of superficial 100% viable wound bed, and to provide a non-stick wound contact layer and prevent friction.           Goals: Steady decrease in wound area and depth weekly.    WOUND TEAM PLAN OF CARE - Frequency of Follow-up:   Nursing to follow dressing orders written for wound care. Contact wound team if area fails  to progress, deteriorates or with any questions/concerns if something comes up before next scheduled follow up (See below as to whether wound is following and frequency of wound follow up)   Not following, consult as needed      NURSING PLAN OF CARE ORDERS:  Dressing changes: See Dressing Care orders  Skin care: See Skin Care orders  RN Prevention Protocol    NUTRITION RECOMMENDATIONS   Wound Team Recommendations:  N/A    DIET ORDERS (From admission to next 24h)       Start     Ordered    07/24/23 8504  Diet Order Diet: Cardiac  ALL MEALS        Question:  Diet:  Answer:  Cardiac    07/24/23 0800                    PREVENTATIVE INTERVENTIONS:    Q shift Noah - performed per nursing policy  Q shift pressure point assessments - performed per nursing policy    Surface/Positioning  Standard/trauma mattress - Currently in Place    Offloading/Redistribution  Heel offloading dressing (Silicone dressing) - Ordered      Respiratory  Silicone O2 tubing - Ordered  Gray Foam Ear protectors - Ordered    Containment/Moisture Prevention    Purwick/Condom Cath - Currently in Place    Mobilization      Up to chair     Anticipated discharge plans:  TBD        Vac Discharge Needs:  Vac Discharge plan is purely a recommendation from wound team and not a requirement for discharge unless otherwise stated by physician.  Not Applicable Pt not on a wound vac

## 2023-07-26 NOTE — DISCHARGE SUMMARY
Discharge Summary    CHIEF COMPLAINT ON ADMISSION  Chief Complaint   Patient presents with    Abdominal Pain     Pt went to outside facility for abdominal pain and dyspnea, diagnosed with Afib -170    Shortness of Breath       Reason for Admission  EMS     Admission Date  7/21/2023    CODE STATUS  Full Code    HPI & HOSPITAL COURSE      Please see original H&P and consult notes for [facility] information  Savita Duggan is a 57 y.o. female with a past medical history of morbid obesity, hypertension, atrial fibrillation on anticoagulation, history of renal cell carcinoma status post nephrectomy, GERD, history of COPD(on 2 L home nocturnal oxygen), suspected inflammatory arthritis on chronic corticosteroids who presented 7/21/2023 with abdominal pain, increasing shortness of breath and palpitations.  Patient was seen outside hospital was found to be in atrial fibrillation with RVR with heart rates in the 170s not responsive to metoprolol pushes.  She was started on a diltiazem drip and transferred to [facility] for further evaluation.    Patient with A-fib with RVR, patient was started on sotalol, she was monitoring telemetry, she completed protocol for sotalol initiation, patient went for PETER cardioversion on 7/24/2023 patient is back to sinus rhythm, patient is on Eliquis, patient had generalized abdominal pain, CT abdomen was negative for acute pathology, patient was started on PPI, patient's symptoms have resolved, patient with pulmonary edema and she required IV Lasix, patient has history of COPD and chronic respiratory failure on 2 L of oxygen at home, patient with hypertensive emergency which is improved now with adjusting medications, patient with history of inflammatory arthritis she needs to follow-up as outpatient with primary care doctor and probably rheumatology, patient is on long-term steroids, she needs to have close monitoring with primary care doctor and rheumatology try to wean her off as much as  possible dose was decreased from 15-10 on this admission, patient was evaluated by cardiology, patient has been cleared for discharge by cardiology standpoint with close follow-up, unfortunately [facility] cannot follow-up with patient due to insurance but patient was recommended to follow-up with her cardiology in [place] in 1 to 4 weeks, her Lasix was switched from IV to p.o. patient was evaluated by PT OT and recommended a skilled nursing, patient at this time she refused skilled nursing facility she is stated that she has good support at home and she would like to go back home, I discussed with  and unfortunately there is no home health care available where patient lives, again patient feels comfortable going home she has good support at home with her family, patient at this time is being discharged in a stable condition with family, patient has expressed understanding of her plan of care and agree with it her questions been answered.              Therefore, she is discharged in good and stable condition to home with close outpatient follow-up.    The patient met 2-midnight criteria for an inpatient stay at the time of discharge.    Discharge Date  7/25/2023    FOLLOW UP ITEMS POST DISCHARGE  Primary care physician  Cardiology    DISCHARGE DIAGNOSES  Principal Problem (Resolved):    Atrial fibrillation with rapid ventricular response (HCC) (POA: Yes)  Active Problems:    Chronic hypoxemic respiratory failure (HCC) (POA: Unknown)    H/O renal cell carcinoma s/p nephrectomy (POA: Yes)    COPD (chronic obstructive pulmonary disease) (HCC) (POA: Yes)    Inflammatory arthritis (POA: Yes)    Obstructive sleep apnea (POA: Unknown)  Resolved Problems:    Hypertensive emergency (POA: Yes)    Pulmonary edema (POA: Unknown)    Generalized abdominal pain (POA: Yes)    Hypomagnesemia (POA: Unknown)      FOLLOW UP  No future appointments.  Radhika Johnson, F.N.P.  [address] [place]  [address]  [phone]    Go on 7/26/2023  Please go to your appointment with SAMUEL Larios on July 26 at 3:45pm.      MEDICATIONS ON DISCHARGE     Medication List        START taking these medications        Instructions   atorvastatin 20 MG Tabs  Commonly known as: Lipitor   Take 1 Tablet by mouth at bedtime.  Dose: 20 mg     dapagliflozin propanediol 10 MG Tabs  Start taking on: July 26, 2023  Commonly known as: Farxiga   Take 1 Tablet by mouth every day.  Dose: 10 mg     furosemide 40 MG Tabs  Commonly known as: Lasix   Take 1 Tablet by mouth 2 times a day for 30 days.  Dose: 40 mg     Magnesium Oxide -Mg Supplement 200 MG Tabs  Start taking on: July 26, 2023   Take 1 Tablet by mouth every day.  Dose: 200 mg     metoprolol SR 25 MG Tb24  Commonly known as: Toprol XL   Take 1 Tablet by mouth 2 times a day for 30 days.  Dose: 25 mg     nystatin powder  Commonly known as: Mycostatin   Apply 1 g topically 2 times a day for 3 days.  Dose: 1 Application     oxyCODONE immediate-release 5 MG Tabs  Commonly known as: Roxicodone   Take 1 Tablet by mouth every 8 hours as needed for Severe Pain for up to 3 days.  Dose: 5 mg     potassium chloride SA 20 MEQ Tbcr  Commonly known as: Kdur   Take 1 Tablet by mouth every day for 30 days.  Dose: 20 mEq     sotalol 120 MG tablet  Commonly known as: Betapace   Take 1 Tablet by mouth 2 times a day.  Dose: 120 mg     spironolactone 25 MG Tabs  Start taking on: July 26, 2023  Commonly known as: Aldactone   Take 1 Tablet by mouth every day.  Dose: 25 mg            CHANGE how you take these medications        Instructions   Eliquis 5mg Tabs  What changed: when to take this  Generic drug: apixaban   Take 1 Tablet by mouth 2 times a day.  Dose: 5 mg     predniSONE 10 MG Tabs  What changed:   medication strength  how much to take  Commonly known as: Deltasone   Take 1 Tablet by mouth every day.  Dose: 10 mg            CONTINUE taking these medications        Instructions    lisinopril 20 MG Tabs  Commonly known as: Prinivil   Take 1 Tablet by mouth every day.  Dose: 20 mg     omeprazole 40 MG delayed-release capsule  Commonly known as: PriLOSEC   Take 40 mg by mouth every day.  Dose: 40 mg     ondansetron 4 MG Tbdp  Commonly known as: Zofran ODT   Take 4 mg by mouth every 6 hours as needed for Nausea/Vomiting.  Dose: 4 mg            STOP taking these medications      metoprolol tartrate 100 MG Tabs  Commonly known as: Lopressor              Allergies  Allergies   Allergen Reactions    Coconut Oil     Codeine Itching    Penicillins Itching and Swelling     Reports tolerating amoxicillin       DIET  Orders Placed This Encounter   Procedures    Diet Order Diet: Cardiac     Standing Status:   Standing     Number of Occurrences:   1     Order Specific Question:   Diet:     Answer:   Cardiac [6]       ACTIVITY  As tolerated.  Weight bearing as tolerated    CONSULTATIONS  Cardiology    PROCEDURES  PETER cardioversion    LABORATORY  Lab Results   Component Value Date    SODIUM 137 07/25/2023    POTASSIUM 4.0 07/25/2023    CHLORIDE 97 07/25/2023    CO2 31 07/25/2023    GLUCOSE 87 07/25/2023    BUN 23 (H) 07/25/2023    CREATININE 1.26 07/25/2023        Lab Results   Component Value Date    WBC 7.7 07/25/2023    HEMOGLOBIN 11.9 (L) 07/25/2023    HEMATOCRIT 40.2 07/25/2023    PLATELETCT 352 07/25/2023        Total time of the discharge process exceeds 45 minutes.

## 2024-04-18 ENCOUNTER — HOSPITAL ENCOUNTER (INPATIENT)
Facility: MEDICAL CENTER | Age: 59
LOS: 11 days | End: 2024-04-29
Attending: INTERNAL MEDICINE | Admitting: STUDENT IN AN ORGANIZED HEALTH CARE EDUCATION/TRAINING PROGRAM
Payer: COMMERCIAL

## 2024-04-18 DIAGNOSIS — I50.21 ACUTE SYSTOLIC HEART FAILURE (HCC): ICD-10-CM

## 2024-04-18 DIAGNOSIS — I48.91 ATRIAL FIBRILLATION WITH RAPID VENTRICULAR RESPONSE (HCC): ICD-10-CM

## 2024-04-18 DIAGNOSIS — L03.116 CELLULITIS OF LEFT LOWER EXTREMITY: ICD-10-CM

## 2024-04-18 PROBLEM — R78.81 BACTEREMIA: Status: ACTIVE | Noted: 2024-04-18

## 2024-04-18 PROCEDURE — 770020 HCHG ROOM/CARE - TELE (206)

## 2024-04-18 PROCEDURE — 99291 CRITICAL CARE FIRST HOUR: CPT | Mod: GC | Performed by: STUDENT IN AN ORGANIZED HEALTH CARE EDUCATION/TRAINING PROGRAM

## 2024-04-18 ASSESSMENT — FIBROSIS 4 INDEX: FIB4 SCORE: 0.79

## 2024-04-19 ENCOUNTER — APPOINTMENT (OUTPATIENT)
Dept: RADIOLOGY | Facility: MEDICAL CENTER | Age: 59
End: 2024-04-19
Payer: COMMERCIAL

## 2024-04-19 ENCOUNTER — ANESTHESIA EVENT (OUTPATIENT)
Dept: SURGERY | Facility: MEDICAL CENTER | Age: 59
End: 2024-04-19
Payer: COMMERCIAL

## 2024-04-19 ENCOUNTER — ANESTHESIA (OUTPATIENT)
Dept: SURGERY | Facility: MEDICAL CENTER | Age: 59
End: 2024-04-19
Payer: COMMERCIAL

## 2024-04-19 ENCOUNTER — APPOINTMENT (OUTPATIENT)
Dept: CARDIOLOGY | Facility: MEDICAL CENTER | Age: 59
End: 2024-04-19
Payer: COMMERCIAL

## 2024-04-19 PROBLEM — E87.20 LACTIC ACIDOSIS: Status: ACTIVE | Noted: 2024-04-19

## 2024-04-19 PROBLEM — E66.9 OBESITY: Status: ACTIVE | Noted: 2024-04-19

## 2024-04-19 PROBLEM — I48.91 A-FIB (HCC): Status: ACTIVE | Noted: 2024-04-19

## 2024-04-19 PROBLEM — F39 MOOD DISORDER (HCC): Status: ACTIVE | Noted: 2024-04-19

## 2024-04-19 PROBLEM — L03.116 CELLULITIS OF LEFT LOWER EXTREMITY: Status: ACTIVE | Noted: 2024-04-19

## 2024-04-19 LAB
ALBUMIN SERPL BCP-MCNC: 2.7 G/DL (ref 3.2–4.9)
ALBUMIN/GLOB SERPL: 0.8 G/DL
ALP SERPL-CCNC: 67 U/L (ref 30–99)
ALT SERPL-CCNC: 22 U/L (ref 2–50)
ANION GAP SERPL CALC-SCNC: 13 MMOL/L (ref 7–16)
APPEARANCE FLD: NORMAL
APPEARANCE UR: ABNORMAL
AST SERPL-CCNC: 25 U/L (ref 12–45)
B-HCG SERPL-ACNC: <1 MIU/ML (ref 0–5)
BACTERIA #/AREA URNS HPF: ABNORMAL /HPF
BASE EXCESS BLDA CALC-SCNC: -6 MMOL/L (ref -4–3)
BASOPHILS # BLD AUTO: 0.4 % (ref 0–1.8)
BASOPHILS # BLD: 0.07 K/UL (ref 0–0.12)
BILIRUB SERPL-MCNC: 0.6 MG/DL (ref 0.1–1.5)
BILIRUB UR QL STRIP.AUTO: NEGATIVE
BODY FLD TYPE: NORMAL
BODY TEMPERATURE: 37.1 CENTIGRADE
BUN SERPL-MCNC: 11 MG/DL (ref 8–22)
CALCIUM ALBUM COR SERPL-MCNC: 9.6 MG/DL (ref 8.5–10.5)
CALCIUM SERPL-MCNC: 8.6 MG/DL (ref 8.5–10.5)
CHLORIDE SERPL-SCNC: 99 MMOL/L (ref 96–112)
CO2 SERPL-SCNC: 20 MMOL/L (ref 20–33)
COLOR FLD: YELLOW
COLOR UR: YELLOW
CORTIS SERPL-MCNC: 14.7 UG/DL (ref 0–23)
CREAT SERPL-MCNC: 0.91 MG/DL (ref 0.5–1.4)
CRP SERPL HS-MCNC: 27.55 MG/DL (ref 0–0.75)
CRP SERPL HS-MCNC: 27.79 MG/DL (ref 0–0.75)
CSF COMMENTS 1658: NORMAL
EKG IMPRESSION: NORMAL
EOSINOPHIL # BLD AUTO: 0.13 K/UL (ref 0–0.51)
EOSINOPHIL NFR BLD: 0.7 % (ref 0–6.9)
EPI CELLS #/AREA URNS HPF: NEGATIVE /HPF
ERYTHROCYTE [DISTWIDTH] IN BLOOD BY AUTOMATED COUNT: 42 FL (ref 35.9–50)
GFR SERPLBLD CREATININE-BSD FMLA CKD-EPI: 72 ML/MIN/1.73 M 2
GLOBULIN SER CALC-MCNC: 3.6 G/DL (ref 1.9–3.5)
GLUCOSE BLD STRIP.AUTO-MCNC: 71 MG/DL (ref 65–99)
GLUCOSE BLD STRIP.AUTO-MCNC: 88 MG/DL (ref 65–99)
GLUCOSE SERPL-MCNC: 65 MG/DL (ref 65–99)
GLUCOSE UR STRIP.AUTO-MCNC: NEGATIVE MG/DL
GRAM STN SPEC: NORMAL
GRAM STN SPEC: NORMAL
GRAN CASTS #/AREA URNS LPF: ABNORMAL /LPF
HCO3 BLDA-SCNC: 19 MMOL/L (ref 17–25)
HCT VFR BLD AUTO: 37.8 % (ref 37–47)
HGB BLD-MCNC: 11.4 G/DL (ref 12–16)
HYALINE CASTS #/AREA URNS LPF: ABNORMAL /LPF
IMM GRANULOCYTES # BLD AUTO: 0.17 K/UL (ref 0–0.11)
IMM GRANULOCYTES NFR BLD AUTO: 1 % (ref 0–0.9)
INHALED O2 FLOW RATE: ABNORMAL L/MIN
INR PPP: 1.34 (ref 0.87–1.13)
INR PPP: 1.38 (ref 0.87–1.13)
KETONES UR STRIP.AUTO-MCNC: 15 MG/DL
LACTATE SERPL-SCNC: 1.3 MMOL/L (ref 0.5–2)
LACTATE SERPL-SCNC: 1.3 MMOL/L (ref 0.5–2)
LACTATE SERPL-SCNC: 1.5 MMOL/L (ref 0.5–2)
LACTATE SERPL-SCNC: 3.1 MMOL/L (ref 0.5–2)
LEUKOCYTE ESTERASE UR QL STRIP.AUTO: NEGATIVE
LV EJECT FRACT  99904: 65
LYMPHOCYTES # BLD AUTO: 1.14 K/UL (ref 1–4.8)
LYMPHOCYTES NFR BLD: 6.5 % (ref 22–41)
MCH RBC QN AUTO: 24.5 PG (ref 27–33)
MCHC RBC AUTO-ENTMCNC: 30.2 G/DL (ref 32.2–35.5)
MCV RBC AUTO: 81.3 FL (ref 81.4–97.8)
MICRO URNS: ABNORMAL
MONOCYTES # BLD AUTO: 0.9 K/UL (ref 0–0.85)
MONOCYTES NFR BLD AUTO: 5.1 % (ref 0–13.4)
NEUTROPHILS # BLD AUTO: 15.1 K/UL (ref 1.82–7.42)
NEUTROPHILS NFR BLD: 86.3 % (ref 44–72)
NEUTROPHILS NFR FLD: 100 %
NITRITE UR QL STRIP.AUTO: NEGATIVE
NRBC # BLD AUTO: 0 K/UL
NRBC BLD-RTO: 0 /100 WBC (ref 0–0.2)
NUC CELL # FLD: NORMAL CELLS/UL
PCO2 BLDA: 34.4 MMHG (ref 26–37)
PCO2 TEMP ADJ BLDA: 34.6 MMHG (ref 26–37)
PH BLDA: 7.36 [PH] (ref 7.4–7.5)
PH TEMP ADJ BLDA: 7.36 [PH] (ref 7.4–7.5)
PH UR STRIP.AUTO: 6 [PH] (ref 5–8)
PLATELET # BLD AUTO: 241 K/UL (ref 164–446)
PMV BLD AUTO: 10.5 FL (ref 9–12.9)
PO2 BLDA: 93.8 MMHG (ref 64–87)
PO2 TEMP ADJ BLDA: 94.4 MMHG (ref 64–87)
POTASSIUM SERPL-SCNC: 4.9 MMOL/L (ref 3.6–5.5)
PROT SERPL-MCNC: 6.3 G/DL (ref 6–8.2)
PROT UR QL STRIP: 30 MG/DL
PROTHROMBIN TIME: 16.7 SEC (ref 12–14.6)
PROTHROMBIN TIME: 17.1 SEC (ref 12–14.6)
RBC # BLD AUTO: 4.65 M/UL (ref 4.2–5.4)
RBC # FLD: 8000 CELLS/UL
RBC # URNS HPF: ABNORMAL /HPF
RBC UR QL AUTO: ABNORMAL
SAO2 % BLDA: 96.2 % (ref 93–99)
SIGNIFICANT IND 70042: NORMAL
SIGNIFICANT IND 70042: NORMAL
SITE SITE: NORMAL
SITE SITE: NORMAL
SODIUM SERPL-SCNC: 132 MMOL/L (ref 135–145)
SOURCE SOURCE: NORMAL
SOURCE SOURCE: NORMAL
SP GR UR STRIP.AUTO: 1.02
URATE CRY #/AREA URNS HPF: POSITIVE /HPF
UROBILINOGEN UR STRIP.AUTO-MCNC: 0.2 MG/DL
WBC # BLD AUTO: 17.5 K/UL (ref 4.8–10.8)
WBC #/AREA URNS HPF: ABNORMAL /HPF

## 2024-04-19 PROCEDURE — 80053 COMPREHEN METABOLIC PANEL: CPT

## 2024-04-19 PROCEDURE — 160036 HCHG PACU - EA ADDL 30 MINS PHASE I: Performed by: ORTHOPAEDIC SURGERY

## 2024-04-19 PROCEDURE — 700102 HCHG RX REV CODE 250 W/ 637 OVERRIDE(OP): Performed by: STUDENT IN AN ORGANIZED HEALTH CARE EDUCATION/TRAINING PROGRAM

## 2024-04-19 PROCEDURE — 89051 BODY FLUID CELL COUNT: CPT

## 2024-04-19 PROCEDURE — 160035 HCHG PACU - 1ST 60 MINS PHASE I: Performed by: ORTHOPAEDIC SURGERY

## 2024-04-19 PROCEDURE — 87205 SMEAR GRAM STAIN: CPT

## 2024-04-19 PROCEDURE — 87075 CULTR BACTERIA EXCEPT BLOOD: CPT | Mod: 91

## 2024-04-19 PROCEDURE — A9270 NON-COVERED ITEM OR SERVICE: HCPCS | Performed by: INTERNAL MEDICINE

## 2024-04-19 PROCEDURE — 84702 CHORIONIC GONADOTROPIN TEST: CPT

## 2024-04-19 PROCEDURE — 700101 HCHG RX REV CODE 250: Performed by: STUDENT IN AN ORGANIZED HEALTH CARE EDUCATION/TRAINING PROGRAM

## 2024-04-19 PROCEDURE — 160038 HCHG SURGERY MINUTES - EA ADDL 1 MIN LEVEL 2: Performed by: ORTHOPAEDIC SURGERY

## 2024-04-19 PROCEDURE — 700105 HCHG RX REV CODE 258

## 2024-04-19 PROCEDURE — 700111 HCHG RX REV CODE 636 W/ 250 OVERRIDE (IP): Mod: JZ

## 2024-04-19 PROCEDURE — 0JJW0ZZ INSPECTION OF LOWER EXTREMITY SUBCUTANEOUS TISSUE AND FASCIA, OPEN APPROACH: ICD-10-PCS | Performed by: ORTHOPAEDIC SURGERY

## 2024-04-19 PROCEDURE — 160002 HCHG RECOVERY MINUTES (STAT): Performed by: ORTHOPAEDIC SURGERY

## 2024-04-19 PROCEDURE — 93005 ELECTROCARDIOGRAM TRACING: CPT | Performed by: ORTHOPAEDIC SURGERY

## 2024-04-19 PROCEDURE — 700105 HCHG RX REV CODE 258: Performed by: STUDENT IN AN ORGANIZED HEALTH CARE EDUCATION/TRAINING PROGRAM

## 2024-04-19 PROCEDURE — 700111 HCHG RX REV CODE 636 W/ 250 OVERRIDE (IP): Performed by: STUDENT IN AN ORGANIZED HEALTH CARE EDUCATION/TRAINING PROGRAM

## 2024-04-19 PROCEDURE — 83605 ASSAY OF LACTIC ACID: CPT | Mod: 91

## 2024-04-19 PROCEDURE — 85025 COMPLETE CBC W/AUTO DIFF WBC: CPT

## 2024-04-19 PROCEDURE — 160027 HCHG SURGERY MINUTES - 1ST 30 MINS LEVEL 2: Performed by: ORTHOPAEDIC SURGERY

## 2024-04-19 PROCEDURE — 85610 PROTHROMBIN TIME: CPT | Mod: 91

## 2024-04-19 PROCEDURE — 700102 HCHG RX REV CODE 250 W/ 637 OVERRIDE(OP)

## 2024-04-19 PROCEDURE — 160048 HCHG OR STATISTICAL LEVEL 1-5: Performed by: ORTHOPAEDIC SURGERY

## 2024-04-19 PROCEDURE — 73700 CT LOWER EXTREMITY W/O DYE: CPT | Mod: LT

## 2024-04-19 PROCEDURE — 93306 TTE W/DOPPLER COMPLETE: CPT

## 2024-04-19 PROCEDURE — 700117 HCHG RX CONTRAST REV CODE 255

## 2024-04-19 PROCEDURE — 99233 SBSQ HOSP IP/OBS HIGH 50: CPT | Mod: GC | Performed by: INTERNAL MEDICINE

## 2024-04-19 PROCEDURE — A9270 NON-COVERED ITEM OR SERVICE: HCPCS | Performed by: STUDENT IN AN ORGANIZED HEALTH CARE EDUCATION/TRAINING PROGRAM

## 2024-04-19 PROCEDURE — 82533 TOTAL CORTISOL: CPT

## 2024-04-19 PROCEDURE — 87070 CULTURE OTHR SPECIMN AEROBIC: CPT

## 2024-04-19 PROCEDURE — 93010 ELECTROCARDIOGRAM REPORT: CPT | Performed by: INTERNAL MEDICINE

## 2024-04-19 PROCEDURE — 82803 BLOOD GASES ANY COMBINATION: CPT

## 2024-04-19 PROCEDURE — 93306 TTE W/DOPPLER COMPLETE: CPT | Mod: 26 | Performed by: INTERNAL MEDICINE

## 2024-04-19 PROCEDURE — 700102 HCHG RX REV CODE 250 W/ 637 OVERRIDE(OP): Performed by: INTERNAL MEDICINE

## 2024-04-19 PROCEDURE — 81001 URINALYSIS AUTO W/SCOPE: CPT

## 2024-04-19 PROCEDURE — A9270 NON-COVERED ITEM OR SERVICE: HCPCS

## 2024-04-19 PROCEDURE — 700111 HCHG RX REV CODE 636 W/ 250 OVERRIDE (IP)

## 2024-04-19 PROCEDURE — 0M9P3ZZ DRAINAGE OF LEFT KNEE BURSA AND LIGAMENT, PERCUTANEOUS APPROACH: ICD-10-PCS | Performed by: ORTHOPAEDIC SURGERY

## 2024-04-19 PROCEDURE — 86140 C-REACTIVE PROTEIN: CPT

## 2024-04-19 PROCEDURE — 87040 BLOOD CULTURE FOR BACTERIA: CPT

## 2024-04-19 PROCEDURE — 99253 IP/OBS CNSLTJ NEW/EST LOW 45: CPT | Performed by: INTERNAL MEDICINE

## 2024-04-19 PROCEDURE — 36415 COLL VENOUS BLD VENIPUNCTURE: CPT

## 2024-04-19 PROCEDURE — 770020 HCHG ROOM/CARE - TELE (206)

## 2024-04-19 PROCEDURE — 700111 HCHG RX REV CODE 636 W/ 250 OVERRIDE (IP): Mod: JZ | Performed by: STUDENT IN AN ORGANIZED HEALTH CARE EDUCATION/TRAINING PROGRAM

## 2024-04-19 PROCEDURE — 160009 HCHG ANES TIME/MIN: Performed by: ORTHOPAEDIC SURGERY

## 2024-04-19 PROCEDURE — 82962 GLUCOSE BLOOD TEST: CPT | Mod: 91

## 2024-04-19 RX ORDER — DIPHENHYDRAMINE HYDROCHLORIDE 50 MG/ML
12.5 INJECTION INTRAMUSCULAR; INTRAVENOUS
Status: DISCONTINUED | OUTPATIENT
Start: 2024-04-19 | End: 2024-04-19 | Stop reason: HOSPADM

## 2024-04-19 RX ORDER — METOPROLOL SUCCINATE 25 MG/1
25 TABLET, EXTENDED RELEASE ORAL DAILY
Status: ON HOLD | COMMUNITY
End: 2024-04-25

## 2024-04-19 RX ORDER — OXYCODONE HYDROCHLORIDE 5 MG/1
5 TABLET ORAL EVERY 4 HOURS PRN
Status: DISCONTINUED | OUTPATIENT
Start: 2024-04-19 | End: 2024-04-29 | Stop reason: HOSPADM

## 2024-04-19 RX ORDER — HYDROMORPHONE HYDROCHLORIDE 1 MG/ML
0.2 INJECTION, SOLUTION INTRAMUSCULAR; INTRAVENOUS; SUBCUTANEOUS
Status: DISCONTINUED | OUTPATIENT
Start: 2024-04-19 | End: 2024-04-19 | Stop reason: HOSPADM

## 2024-04-19 RX ORDER — SODIUM CHLORIDE, SODIUM LACTATE, POTASSIUM CHLORIDE, CALCIUM CHLORIDE 600; 310; 30; 20 MG/100ML; MG/100ML; MG/100ML; MG/100ML
INJECTION, SOLUTION INTRAVENOUS CONTINUOUS
Status: DISCONTINUED | OUTPATIENT
Start: 2024-04-19 | End: 2024-04-20

## 2024-04-19 RX ORDER — CEFAZOLIN SODIUM 1 G/3ML
INJECTION, POWDER, FOR SOLUTION INTRAMUSCULAR; INTRAVENOUS PRN
Status: DISCONTINUED | OUTPATIENT
Start: 2024-04-19 | End: 2024-04-19 | Stop reason: SURG

## 2024-04-19 RX ORDER — SODIUM CHLORIDE, SODIUM LACTATE, POTASSIUM CHLORIDE, AND CALCIUM CHLORIDE .6; .31; .03; .02 G/100ML; G/100ML; G/100ML; G/100ML
1000 INJECTION, SOLUTION INTRAVENOUS ONCE
Status: DISCONTINUED | OUTPATIENT
Start: 2024-04-19 | End: 2024-04-19

## 2024-04-19 RX ORDER — HYDROMORPHONE HYDROCHLORIDE 1 MG/ML
0.4 INJECTION, SOLUTION INTRAMUSCULAR; INTRAVENOUS; SUBCUTANEOUS
Status: DISCONTINUED | OUTPATIENT
Start: 2024-04-19 | End: 2024-04-19 | Stop reason: HOSPADM

## 2024-04-19 RX ORDER — LIDOCAINE HYDROCHLORIDE 20 MG/ML
INJECTION, SOLUTION EPIDURAL; INFILTRATION; INTRACAUDAL; PERINEURAL PRN
Status: DISCONTINUED | OUTPATIENT
Start: 2024-04-19 | End: 2024-04-19 | Stop reason: SURG

## 2024-04-19 RX ORDER — DIPHENHYDRAMINE HYDROCHLORIDE 50 MG/ML
25 INJECTION INTRAMUSCULAR; INTRAVENOUS EVERY 6 HOURS PRN
Status: DISCONTINUED | OUTPATIENT
Start: 2024-04-19 | End: 2024-04-29 | Stop reason: HOSPADM

## 2024-04-19 RX ORDER — LACTULOSE 10 G/15ML
10 SOLUTION ORAL DAILY
Status: ON HOLD | COMMUNITY
End: 2024-04-25

## 2024-04-19 RX ORDER — OXYCODONE HCL 5 MG/5 ML
5 SOLUTION, ORAL ORAL
Status: COMPLETED | OUTPATIENT
Start: 2024-04-19 | End: 2024-04-19

## 2024-04-19 RX ORDER — DEXAMETHASONE SODIUM PHOSPHATE 4 MG/ML
INJECTION, SOLUTION INTRA-ARTICULAR; INTRALESIONAL; INTRAMUSCULAR; INTRAVENOUS; SOFT TISSUE PRN
Status: DISCONTINUED | OUTPATIENT
Start: 2024-04-19 | End: 2024-04-19 | Stop reason: SURG

## 2024-04-19 RX ORDER — SOTALOL HYDROCHLORIDE 120 MG/1
120 TABLET ORAL 2 TIMES DAILY
Status: DISCONTINUED | OUTPATIENT
Start: 2024-04-19 | End: 2024-04-29 | Stop reason: HOSPADM

## 2024-04-19 RX ORDER — SODIUM CHLORIDE, SODIUM LACTATE, POTASSIUM CHLORIDE, CALCIUM CHLORIDE 600; 310; 30; 20 MG/100ML; MG/100ML; MG/100ML; MG/100ML
INJECTION, SOLUTION INTRAVENOUS CONTINUOUS
Status: DISCONTINUED | OUTPATIENT
Start: 2024-04-19 | End: 2024-04-19 | Stop reason: HOSPADM

## 2024-04-19 RX ORDER — LABETALOL HYDROCHLORIDE 5 MG/ML
5 INJECTION, SOLUTION INTRAVENOUS
Status: DISCONTINUED | OUTPATIENT
Start: 2024-04-19 | End: 2024-04-19 | Stop reason: HOSPADM

## 2024-04-19 RX ORDER — IPRATROPIUM BROMIDE AND ALBUTEROL SULFATE 2.5; .5 MG/3ML; MG/3ML
3 SOLUTION RESPIRATORY (INHALATION)
Status: DISCONTINUED | OUTPATIENT
Start: 2024-04-19 | End: 2024-04-19 | Stop reason: HOSPADM

## 2024-04-19 RX ORDER — ONDANSETRON 2 MG/ML
4 INJECTION INTRAMUSCULAR; INTRAVENOUS
Status: DISCONTINUED | OUTPATIENT
Start: 2024-04-19 | End: 2024-04-19 | Stop reason: HOSPADM

## 2024-04-19 RX ORDER — ESCITALOPRAM OXALATE 10 MG/1
10 TABLET ORAL DAILY
Status: ON HOLD | COMMUNITY
End: 2024-04-25

## 2024-04-19 RX ORDER — LINEZOLID 2 MG/ML
600 INJECTION, SOLUTION INTRAVENOUS EVERY 12 HOURS
Qty: 6000 ML | Refills: 0 | Status: COMPLETED | OUTPATIENT
Start: 2024-04-19 | End: 2024-04-21

## 2024-04-19 RX ORDER — HYDROMORPHONE HYDROCHLORIDE 1 MG/ML
0.1 INJECTION, SOLUTION INTRAMUSCULAR; INTRAVENOUS; SUBCUTANEOUS
Status: DISCONTINUED | OUTPATIENT
Start: 2024-04-19 | End: 2024-04-19 | Stop reason: HOSPADM

## 2024-04-19 RX ORDER — ROCURONIUM BROMIDE 10 MG/ML
INJECTION, SOLUTION INTRAVENOUS PRN
Status: DISCONTINUED | OUTPATIENT
Start: 2024-04-19 | End: 2024-04-19 | Stop reason: SURG

## 2024-04-19 RX ORDER — LABETALOL HYDROCHLORIDE 5 MG/ML
INJECTION, SOLUTION INTRAVENOUS PRN
Status: DISCONTINUED | OUTPATIENT
Start: 2024-04-19 | End: 2024-04-19 | Stop reason: SURG

## 2024-04-19 RX ORDER — POLYETHYLENE GLYCOL 3350 17 G/17G
1 POWDER, FOR SOLUTION ORAL
Status: DISCONTINUED | OUTPATIENT
Start: 2024-04-19 | End: 2024-04-21

## 2024-04-19 RX ORDER — HYDRALAZINE HYDROCHLORIDE 20 MG/ML
5 INJECTION INTRAMUSCULAR; INTRAVENOUS
Status: DISCONTINUED | OUTPATIENT
Start: 2024-04-19 | End: 2024-04-19 | Stop reason: HOSPADM

## 2024-04-19 RX ORDER — SODIUM CHLORIDE, SODIUM LACTATE, POTASSIUM CHLORIDE, CALCIUM CHLORIDE 600; 310; 30; 20 MG/100ML; MG/100ML; MG/100ML; MG/100ML
INJECTION, SOLUTION INTRAVENOUS
Status: DISCONTINUED | OUTPATIENT
Start: 2024-04-19 | End: 2024-04-19 | Stop reason: SURG

## 2024-04-19 RX ORDER — GABAPENTIN 100 MG/1
100 CAPSULE ORAL 4 TIMES DAILY
Status: ON HOLD | COMMUNITY
End: 2024-04-25

## 2024-04-19 RX ORDER — BUDESONIDE AND FORMOTEROL FUMARATE DIHYDRATE 160; 4.5 UG/1; UG/1
2 AEROSOL RESPIRATORY (INHALATION) 2 TIMES DAILY
Status: ON HOLD | COMMUNITY
End: 2024-04-25

## 2024-04-19 RX ORDER — SODIUM CHLORIDE 9 MG/ML
1000 INJECTION, SOLUTION INTRAVENOUS ONCE
Status: COMPLETED | OUTPATIENT
Start: 2024-04-19 | End: 2024-04-19

## 2024-04-19 RX ORDER — METOPROLOL TARTRATE 1 MG/ML
INJECTION, SOLUTION INTRAVENOUS PRN
Status: DISCONTINUED | OUTPATIENT
Start: 2024-04-19 | End: 2024-04-19 | Stop reason: SURG

## 2024-04-19 RX ORDER — EPHEDRINE SULFATE 50 MG/ML
5 INJECTION, SOLUTION INTRAVENOUS
Status: DISCONTINUED | OUTPATIENT
Start: 2024-04-19 | End: 2024-04-19 | Stop reason: HOSPADM

## 2024-04-19 RX ORDER — OXYCODONE HYDROCHLORIDE 10 MG/1
10 TABLET ORAL EVERY 4 HOURS PRN
Status: DISCONTINUED | OUTPATIENT
Start: 2024-04-19 | End: 2024-04-29 | Stop reason: HOSPADM

## 2024-04-19 RX ORDER — OXYCODONE HCL 5 MG/5 ML
10 SOLUTION, ORAL ORAL
Status: COMPLETED | OUTPATIENT
Start: 2024-04-19 | End: 2024-04-19

## 2024-04-19 RX ORDER — HYDROXYZINE HYDROCHLORIDE 25 MG/1
50 TABLET, FILM COATED ORAL 3 TIMES DAILY PRN
Status: DISCONTINUED | OUTPATIENT
Start: 2024-04-19 | End: 2024-04-29 | Stop reason: HOSPADM

## 2024-04-19 RX ORDER — AMOXICILLIN 250 MG
2 CAPSULE ORAL EVERY EVENING
Status: DISCONTINUED | OUTPATIENT
Start: 2024-04-19 | End: 2024-04-21

## 2024-04-19 RX ORDER — ACETAMINOPHEN 500 MG
1000 TABLET ORAL EVERY 8 HOURS
Status: DISCONTINUED | OUTPATIENT
Start: 2024-04-19 | End: 2024-04-29 | Stop reason: HOSPADM

## 2024-04-19 RX ORDER — ONDANSETRON 2 MG/ML
INJECTION INTRAMUSCULAR; INTRAVENOUS PRN
Status: DISCONTINUED | OUTPATIENT
Start: 2024-04-19 | End: 2024-04-19 | Stop reason: SURG

## 2024-04-19 RX ORDER — HALOPERIDOL 5 MG/ML
1 INJECTION INTRAMUSCULAR
Status: DISCONTINUED | OUTPATIENT
Start: 2024-04-19 | End: 2024-04-19 | Stop reason: HOSPADM

## 2024-04-19 RX ADMIN — ONDANSETRON 4 MG: 2 INJECTION INTRAMUSCULAR; INTRAVENOUS at 13:38

## 2024-04-19 RX ADMIN — FENTANYL CITRATE 25 MCG: 50 INJECTION, SOLUTION INTRAMUSCULAR; INTRAVENOUS at 14:11

## 2024-04-19 RX ADMIN — PROPOFOL 200 MG: 10 INJECTION, EMULSION INTRAVENOUS at 12:37

## 2024-04-19 RX ADMIN — PIPERACILLIN AND TAZOBACTAM 4.5 G: 4; .5 INJECTION, POWDER, FOR SOLUTION INTRAVENOUS at 01:50

## 2024-04-19 RX ADMIN — SODIUM CHLORIDE, POTASSIUM CHLORIDE, SODIUM LACTATE AND CALCIUM CHLORIDE: 600; 310; 30; 20 INJECTION, SOLUTION INTRAVENOUS at 12:26

## 2024-04-19 RX ADMIN — SOTALOL HYDROCHLORIDE 120 MG: 120 TABLET ORAL at 17:01

## 2024-04-19 RX ADMIN — HYDROMORPHONE HYDROCHLORIDE 0.4 MG: 1 INJECTION, SOLUTION INTRAMUSCULAR; INTRAVENOUS; SUBCUTANEOUS at 14:22

## 2024-04-19 RX ADMIN — METOPROLOL TARTRATE 2.5 MG: 5 INJECTION INTRAVENOUS at 13:05

## 2024-04-19 RX ADMIN — OXYCODONE HYDROCHLORIDE 5 MG: 5 SOLUTION ORAL at 14:08

## 2024-04-19 RX ADMIN — HUMAN ALBUMIN MICROSPHERES AND PERFLUTREN 3 ML: 10; .22 INJECTION, SOLUTION INTRAVENOUS at 12:22

## 2024-04-19 RX ADMIN — FENTANYL CITRATE 100 MCG: 50 INJECTION, SOLUTION INTRAMUSCULAR; INTRAVENOUS at 12:37

## 2024-04-19 RX ADMIN — FENTANYL CITRATE 50 MCG: 50 INJECTION, SOLUTION INTRAMUSCULAR; INTRAVENOUS at 13:39

## 2024-04-19 RX ADMIN — HYDROXYZINE HYDROCHLORIDE 50 MG: 25 TABLET, FILM COATED ORAL at 08:18

## 2024-04-19 RX ADMIN — FENTANYL CITRATE 50 MCG: 50 INJECTION, SOLUTION INTRAMUSCULAR; INTRAVENOUS at 14:16

## 2024-04-19 RX ADMIN — PIPERACILLIN AND TAZOBACTAM 4.5 G: 4; .5 INJECTION, POWDER, FOR SOLUTION INTRAVENOUS at 05:01

## 2024-04-19 RX ADMIN — SODIUM CHLORIDE 24 MILLION UNITS: 9 INJECTION, SOLUTION INTRAVENOUS at 17:11

## 2024-04-19 RX ADMIN — LIDOCAINE HYDROCHLORIDE 100 MG: 20 INJECTION, SOLUTION EPIDURAL; INFILTRATION; INTRACAUDAL at 12:37

## 2024-04-19 RX ADMIN — FENTANYL CITRATE 50 MCG: 50 INJECTION, SOLUTION INTRAMUSCULAR; INTRAVENOUS at 13:11

## 2024-04-19 RX ADMIN — ROCURONIUM BROMIDE 60 MG: 50 INJECTION, SOLUTION INTRAVENOUS at 12:37

## 2024-04-19 RX ADMIN — ACETAMINOPHEN 1000 MG: 500 TABLET, FILM COATED ORAL at 18:35

## 2024-04-19 RX ADMIN — HYDROMORPHONE HYDROCHLORIDE 0.2 MG: 1 INJECTION, SOLUTION INTRAMUSCULAR; INTRAVENOUS; SUBCUTANEOUS at 14:46

## 2024-04-19 RX ADMIN — CEFAZOLIN 3 G: 1 INJECTION, POWDER, FOR SOLUTION INTRAMUSCULAR; INTRAVENOUS at 12:40

## 2024-04-19 RX ADMIN — LINEZOLID 600 MG: 600 INJECTION INTRAVENOUS at 17:08

## 2024-04-19 RX ADMIN — LINEZOLID 600 MG: 600 INJECTION INTRAVENOUS at 04:47

## 2024-04-19 RX ADMIN — FENTANYL CITRATE 25 MCG: 50 INJECTION, SOLUTION INTRAMUSCULAR; INTRAVENOUS at 14:08

## 2024-04-19 RX ADMIN — DEXAMETHASONE SODIUM PHOSPHATE 4 MG: 4 INJECTION INTRA-ARTICULAR; INTRALESIONAL; INTRAMUSCULAR; INTRAVENOUS; SOFT TISSUE at 12:40

## 2024-04-19 RX ADMIN — METOPROLOL TARTRATE 2.5 MG: 5 INJECTION INTRAVENOUS at 12:57

## 2024-04-19 RX ADMIN — FENTANYL CITRATE 50 MCG: 50 INJECTION, SOLUTION INTRAMUSCULAR; INTRAVENOUS at 13:14

## 2024-04-19 RX ADMIN — LABETALOL HYDROCHLORIDE 5 MG: 5 INJECTION, SOLUTION INTRAVENOUS at 13:15

## 2024-04-19 RX ADMIN — HYDROMORPHONE HYDROCHLORIDE 0.4 MG: 1 INJECTION, SOLUTION INTRAMUSCULAR; INTRAVENOUS; SUBCUTANEOUS at 14:34

## 2024-04-19 RX ADMIN — SODIUM CHLORIDE 1000 ML: 9 INJECTION, SOLUTION INTRAVENOUS at 02:43

## 2024-04-19 RX ADMIN — SUGAMMADEX 200 MG: 100 INJECTION, SOLUTION INTRAVENOUS at 13:38

## 2024-04-19 RX ADMIN — SODIUM CHLORIDE, POTASSIUM CHLORIDE, SODIUM LACTATE AND CALCIUM CHLORIDE: 600; 310; 30; 20 INJECTION, SOLUTION INTRAVENOUS at 08:23

## 2024-04-19 ASSESSMENT — ENCOUNTER SYMPTOMS
HEADACHES: 0
SORE THROAT: 0
FEVER: 0
RESPIRATORY NEGATIVE: 1
CONSTITUTIONAL NEGATIVE: 1
NERVOUS/ANXIOUS: 1
DOUBLE VISION: 0
COUGH: 0
GASTROINTESTINAL NEGATIVE: 1
PALPITATIONS: 0
CHILLS: 0
DIARRHEA: 0
ABDOMINAL PAIN: 0
BACK PAIN: 0
CARDIOVASCULAR NEGATIVE: 1
NAUSEA: 0
NEUROLOGICAL NEGATIVE: 1
BLURRED VISION: 0
WEAKNESS: 0
EYES NEGATIVE: 1
VOMITING: 0
SHORTNESS OF BREATH: 0
PSYCHIATRIC NEGATIVE: 1

## 2024-04-19 ASSESSMENT — PATIENT HEALTH QUESTIONNAIRE - PHQ9
6. FEELING BAD ABOUT YOURSELF - OR THAT YOU ARE A FAILURE OR HAVE LET YOURSELF OR YOUR FAMILY DOWN: NEARLY EVERY DAY
7. TROUBLE CONCENTRATING ON THINGS, SUCH AS READING THE NEWSPAPER OR WATCHING TELEVISION: NEARLY EVERY DAY
4. FEELING TIRED OR HAVING LITTLE ENERGY: NEARLY EVERY DAY
8. MOVING OR SPEAKING SO SLOWLY THAT OTHER PEOPLE COULD HAVE NOTICED. OR THE OPPOSITE, BEING SO FIGETY OR RESTLESS THAT YOU HAVE BEEN MOVING AROUND A LOT MORE THAN USUAL: MORE THAN HALF THE DAYS
SUM OF ALL RESPONSES TO PHQ9 QUESTIONS 1 AND 2: 3
2. FEELING DOWN, DEPRESSED, IRRITABLE, OR HOPELESS: NEARLY EVERY DAY
5. POOR APPETITE OR OVEREATING: NEARLY EVERY DAY
9. THOUGHTS THAT YOU WOULD BE BETTER OFF DEAD, OR OF HURTING YOURSELF: NOT AT ALL
3. TROUBLE FALLING OR STAYING ASLEEP OR SLEEPING TOO MUCH: NEARLY EVERY DAY

## 2024-04-19 ASSESSMENT — PAIN DESCRIPTION - PAIN TYPE
TYPE: SURGICAL PAIN
TYPE: ACUTE PAIN
TYPE: SURGICAL PAIN
TYPE: ACUTE PAIN
TYPE: ACUTE PAIN;SURGICAL PAIN

## 2024-04-19 ASSESSMENT — COGNITIVE AND FUNCTIONAL STATUS - GENERAL
EATING MEALS: TOTAL
DAILY ACTIVITIY SCORE: 8
MOVING FROM LYING ON BACK TO SITTING ON SIDE OF FLAT BED: TOTAL
MOVING FROM LYING ON BACK TO SITTING ON SIDE OF FLAT BED: TOTAL
EATING MEALS: A LITTLE
MOVING TO AND FROM BED TO CHAIR: TOTAL
MOBILITY SCORE: 6
CLIMB 3 TO 5 STEPS WITH RAILING: TOTAL
HELP NEEDED FOR BATHING: TOTAL
SUGGESTED CMS G CODE MODIFIER MOBILITY: CN
TOILETING: TOTAL
TURNING FROM BACK TO SIDE WHILE IN FLAT BAD: TOTAL
MOVING TO AND FROM BED TO CHAIR: TOTAL
WALKING IN HOSPITAL ROOM: TOTAL
DRESSING REGULAR LOWER BODY CLOTHING: TOTAL
DRESSING REGULAR UPPER BODY CLOTHING: TOTAL
SUGGESTED CMS G CODE MODIFIER DAILY ACTIVITY: CN
STANDING UP FROM CHAIR USING ARMS: TOTAL
TOILETING: TOTAL
SUGGESTED CMS G CODE MODIFIER MOBILITY: CN
HELP NEEDED FOR BATHING: TOTAL
TURNING FROM BACK TO SIDE WHILE IN FLAT BAD: TOTAL
MOBILITY SCORE: 6
WALKING IN HOSPITAL ROOM: TOTAL
DAILY ACTIVITIY SCORE: 6
DRESSING REGULAR UPPER BODY CLOTHING: TOTAL
PERSONAL GROOMING: TOTAL
SUGGESTED CMS G CODE MODIFIER DAILY ACTIVITY: CM
PERSONAL GROOMING: TOTAL
STANDING UP FROM CHAIR USING ARMS: TOTAL
DRESSING REGULAR LOWER BODY CLOTHING: TOTAL
CLIMB 3 TO 5 STEPS WITH RAILING: TOTAL

## 2024-04-19 ASSESSMENT — LIFESTYLE VARIABLES
CONSUMPTION TOTAL: NEGATIVE
ON A TYPICAL DAY WHEN YOU DRINK ALCOHOL HOW MANY DRINKS DO YOU HAVE: 0
EVER FELT BAD OR GUILTY ABOUT YOUR DRINKING: NO
HAVE YOU EVER FELT YOU SHOULD CUT DOWN ON YOUR DRINKING: NO
TOTAL SCORE: 0
ALCOHOL_USE: NO
HOW MANY TIMES IN THE PAST YEAR HAVE YOU HAD 5 OR MORE DRINKS IN A DAY: 0
TOTAL SCORE: 0
TOTAL SCORE: 0
EVER HAD A DRINK FIRST THING IN THE MORNING TO STEADY YOUR NERVES TO GET RID OF A HANGOVER: NO
HAVE PEOPLE ANNOYED YOU BY CRITICIZING YOUR DRINKING: NO
AVERAGE NUMBER OF DAYS PER WEEK YOU HAVE A DRINK CONTAINING ALCOHOL: 0

## 2024-04-19 ASSESSMENT — PAIN SCALES - GENERAL: PAIN_LEVEL: 2

## 2024-04-19 ASSESSMENT — FIBROSIS 4 INDEX
FIB4 SCORE: 1.3
FIB4 SCORE: 1.3

## 2024-04-19 NOTE — ASSESSMENT & PLAN NOTE
Grew group C strep and 4 out of 4 vials at referring hospital. No growth on repeat blood cultures 4/19. Transferred here for 3/6 right upper sternal border murmur to rule out infectious endocarditis. PETER performed 4/22, negative.    - Finish IV PCN for 2 weeks (4/19-5/3/2024) from first negative blood cultures per ID  - Completed Linezolid for 72 hours, last dose 4/21  - Penicillin allergy removed from list since she is tolerating without any itching or rash

## 2024-04-19 NOTE — ANESTHESIA PROCEDURE NOTES
Airway    Date/Time: 4/19/2024 12:39 PM    Performed by: Trent Ceja M.D.  Authorized by: Trent Ceja M.D.    Location:  OR  Urgency:  Elective  Indications for Airway Management:  Anesthesia      Spontaneous Ventilation: absent    Sedation Level:  Deep  Preoxygenated: Yes    Patient Position:  Sniffing  Mask Difficulty Assessment:  3 - difficult mask (inadequate, unstable or two providers) +/- NMBA  Final Airway Type:  Endotracheal airway  Final Endotracheal Airway:  ETT  Cuffed: Yes    Technique Used for Successful ETT Placement:  Video laryngoscopy    Insertion Site:  Oral  Blade Type:  Glide  Laryngoscope Blade/Videolaryngoscope Blade Size:  3  ETT Size (mm):  7.0  Measured from:  Teeth  ETT to Teeth (cm):  22  Placement Verified by: auscultation and capnometry    Cormack-Lehane Classification:  Grade I - full view of glottis  Number of Attempts at Approach:  1

## 2024-04-19 NOTE — H&P
History & Physical Note    Date of Admission: 4/18/2024  Admission Status: Inpatient  [facility] Team: ANIRUDH  Attending: Dr. Catherine  Senior Resident: Dr. Chauhan  Contact Number: [phone]    Chief Complaint: my leg hurts     History of Present Illness (HPI):   Savita Duggan is a 58 y.o. female who presented 4/18/2024 from outside hospital due to worsening infection of left lower extremity.    Patient reports that symptom onset was approximately 3 weeks ago when she started to notice redness around her mid shin that slowly started to worsen.  Patient denies any trauma or injury to site of infection.  Due to increasing pain, erythema and swelling the patient elected to present to her nearest emergency department which is at [place].  Reportedly the patient was admitted on 4/14 and diagnosed with left lower extremity cellulitis and initiated on vancomycin and Levaquin without significant improvement and patient's blood cultures grew group C strep and antibiotics were escalated to cefepime but the patient still continues to have limited improvement.  Also reportedly the patient may have had a murmur subsequently plan was created to transfer the patient to [facility] for further evaluation and management.    When I examined the patient, the patient was writhing in pain and after examining her left lower extremity, the pain appears out of proportion.  Patient was reportedly anxious and worried about her overall condition.  Patient is unsure exactly what caused her cellulitis and denied that this is ever happened before.    Initial vitals of temperature of 98.8 Fahrenheit, respiratory rate of 20, pulse of 98, blood pressure 137/80, 96% pulse oximetry.    Review of Systems:   Review of Systems   Constitutional:  Positive for malaise/fatigue. Negative for chills and fever.   HENT:  Negative for hearing loss and sore throat.    Eyes:  Negative for blurred vision and double vision.   Respiratory:  Negative for cough and shortness of breath.     Cardiovascular:  Negative for chest pain, palpitations and leg swelling.   Gastrointestinal:  Negative for abdominal pain, diarrhea, nausea and vomiting.   Genitourinary:  Negative for dysuria and hematuria.   Musculoskeletal:  Negative for back pain.   Skin:         Increasing redness of left lower extremity   Neurological:  Negative for weakness and headaches.   Psychiatric/Behavioral:  The patient is nervous/anxious.        Past Medical History:   Past Medical History was reviewed with patient.   has a past medical history of Anxiety, Arrhythmia, Cancer ([facility]), COPD (chronic obstructive pulmonary disease) ([facility]), GERD (gastroesophageal reflux disease), Heart attack ([facility]), Hyperlipidemia, and Hypertension.    Past Surgical History: Past Surgical History was reviewed with patient.   has no past surgical history on file.    Medications: Medications have been reviewed with patient.  Patient is unsure which medications she takes.  Awaiting medication reconciliation    Allergies: Allergies have been reviewed with patient.  Allergies   Allergen Reactions    Coconut Oil     Codeine Itching    Penicillins Itching and Swelling     Reports tolerating amoxicillin  Tolerated zosyn 2019       Family History:   family history is not on file.     Social History:   Tobacco: Denied   Alcohol: Denied    Recreational drugs (illegal and prescription):  Denied    Primary Care Provider: not reviewed SAMUEL Larios    Physical Exam:   Vitals:  Temp:  [37.1 °C (98.8 °F)-37.2 °C (99 °F)] 37.2 °C (99 °F)  Pulse:  [98-99] 99  Resp:  [20-22] 22  BP: (137-139)/(80-84) 139/84  SpO2:  [96 %-97 %] 97 %    Physical Exam  Vitals and nursing note reviewed.   Constitutional:       Appearance: Normal appearance. She is obese. She is ill-appearing. She is not toxic-appearing or diaphoretic.   HENT:      Head: Normocephalic and atraumatic.      Right Ear: External ear normal.      Left Ear: External ear normal.      Nose: No rhinorrhea.       Mouth/Throat:      Mouth: Mucous membranes are moist.   Eyes:      Extraocular Movements: Extraocular movements intact.      Conjunctiva/sclera: Conjunctivae normal.   Cardiovascular:      Rate and Rhythm: Normal rate and regular rhythm.      Heart sounds: No murmur heard.     No gallop.      Comments: Unable to auscultate any murmurs however the patient has a large habitus  Pulmonary:      Breath sounds: Normal breath sounds. No wheezing or rales.   Abdominal:      General: Bowel sounds are normal. There is no distension.      Tenderness: There is no abdominal tenderness.   Musculoskeletal:         General: Swelling and tenderness present.      Cervical back: Neck supple.      Right lower leg: Edema present.      Left lower leg: Edema present.   Skin:     Capillary Refill: Capillary refill takes 2 to 3 seconds.      Findings: No rash.      Comments: Significant erythema of the left lower extremity from lower thigh to ankle furthermore significant bilateral lower extremity edema    Crepitus and possible skin bullae is felt of the left lower extremity   Neurological:      Mental Status: She is oriented to person, place, and time.       Labs:   Recent Labs     04/19/24  0051   SODIUM 132*   POTASSIUM 4.9   CHLORIDE 99   CO2 20   GLUCOSE 65   BUN 11     Recent Labs     04/19/24  0051   ALBUMIN 2.7*   TBILIRUBIN 0.6   ALKPHOSPHAT 67   TOTPROTEIN 6.3   ALTSGPT 22   ASTSGOT 25   CREATININE 0.91     Recent Labs     04/19/24  0051   WBC 17.5*   RBC 4.65   HEMOGLOBIN 11.4*   HEMATOCRIT 37.8   MCV 81.3*   MCH 24.5*   RDW 42.0   PLATELETCT 241   MPV 10.5   NEUTSPOLYS 86.30*   LYMPHOCYTES 6.50*   MONOCYTES 5.10   EOSINOPHILS 0.70   BASOPHILS 0.40         EKG: N/A    Imaging:   CT-EXTREMITY, LOWER W/O LEFT   Final Result         1.  Diffuse subcutaneous fat stranding from the mid thigh to the ankle, appearance compatible with cellulitis and/or edema.   2.  No soft tissue gas identified to indicate necrotizing fasciitis.   3.   Prepatellar fluid collection, could represent hematoma, seroma, or possibly evolving abscess.   4.  Small knee joint effusion      EC-ECHOCARDIOGRAM COMPLETE W/O CONT    (Results Pending)         Previous Data Review: reviewed    Problem Representation:     * Cellulitis of left lower extremity  Assessment & Plan  Concern for possible necrotizing fasciitis  Therefore pursuing stat CT lower extremity without contrast in setting of known history of nephrectomy and unclear creatinine.  Discussed with radiologist on-call, CT lower extremity without contrast should identify gas formation but would miss possible abscess.  If clinical concern remains for possible abscess then will will consider CT lower extremity with contrast at a later time  Blood cultures x 2 ordered  LRINEC score of 8  Telemetry monitoring  Orthopedics consulted (Dr. Ortiz), appreciate help  Discussed with intensivist on-call (Dr. Barry), currently appropriate for floor however to contact intensivist on-call with any acute changes, appreciate help  Initiate broad-spectrum antibiotics of Zosyn and linezolid    Bacteremia- (present on admission)  Assessment & Plan  Reportedly grew group C strep and 4 out of 4 vials  Repeat blood cultures ordered  Patient reportedly had a murmur at outside facility that was concerning for possible endocarditis however unable to auscultate any murmur at this time.  Since the patient was sent here for endocarditis eval, will order TTE  Broad-spectrum antibiotics Zosyn and linezolid    A-fib (HCC)  Assessment & Plan  Per previous medical records, patient may be on sotalol and apixaban  Holding apixaban for possible surgical intervention  Will need medication reconciliation on sotalol prior to restarting    Lactic acidosis  Assessment & Plan  Likely due to underlying infection  Will order fluid boluses  Continue trending until resolution    Obstructive sleep apnea- (present on admission)  Assessment & Plan  Not on  CPAP/BiPAP  Wears nocturnal oxygen, patient does not know how much O2  For now continue supplemental oxygen as needed but long-term wise patient may benefit with CPAP/BiPAP    Mood disorder (HCC)  Assessment & Plan  Per outside documentation, patient may be on escitalopram however awaiting med rec prior to restarting    Obesity  Assessment & Plan  BMI of 59.45  Possible risk factor for underlying left lower extremity infection  Provide weight loss counseling when appropriate    Anemia- (present on admission)  Assessment & Plan  Mild anemia  No overt blood loss noted  Continue to trend hemoglobin and monitor for any acute bleeding    H/O renal cell carcinoma s/p nephrectomy- (present on admission)  Assessment & Plan  History of  Limb nephrotoxins        Code: Full  Diet: NPO  DVT Px: SCDs  Dispo: Direct admit from [place]

## 2024-04-19 NOTE — PROGRESS NOTES
Received pt and all belongings to [facility] via EMS. Pt oriented x4. Pt resting in bed with call light within reach.

## 2024-04-19 NOTE — DISCHARGE PLANNING
LSW attempted to meet with pt at bedside to complete assessment. Pt resting and requested this LSW return later when she is more alert.

## 2024-04-19 NOTE — ANESTHESIA TIME REPORT
Anesthesia Start and Stop Event Times       Date Time Event    4/19/2024 1210 Ready for Procedure     1226 Anesthesia Start     1355 Anesthesia Stop          Responsible Staff  04/19/24      Name Role Begin End    Trent eCja M.D. Anesth 1226 1355          Overtime Reason:  no overtime (within assigned shift)    Comments:

## 2024-04-19 NOTE — PROGRESS NOTES
[facility] Internal Medicine Daily Progress Note    Date of Service  4/19/2024    [facility] Team: R IM Blue Team   Attending: Jeimy Bush M.d.  Senior Resident: Dr. Pinzon  Intern:  Dr. Pinto  Contact Number: [phone]    Chief Complaint  Savita Duggan is a 58 y.o. female admitted as direct admit from [place] for worsening left lower extremity cellulitis and possible infective endocarditis.    Hospital Course  Savita Duggan is a 58 y.o. female who presented 4/18/2024 from outside hospital due to worsening infection of left lower extremity. Patient reports that symptom onset was approximately 3 weeks ago when she started to notice redness around her mid shin that slowly started to worsen.  Patient denies any trauma or injury to site of infection.  Due to increasing pain, erythema and swelling the patient elected to present to her nearest emergency department which is at [place].  Reportedly the patient was admitted on 4/14 and diagnosed with left lower extremity cellulitis and initiated on vancomycin and Levaquin without significant improvement and patient's blood cultures grew group C strep and antibiotics were escalated to cefepime but the patient still continues to have limited improvement.  Also reportedly the patient may have had a murmur subsequently plan was created to transfer the patient to [facility] for further evaluation and management.     Interval Problem Update  Patient admitted overnight.  Endorses discomfort of left lower extremity although states pain medications currently are suitable.  Endorse some significant anxiety prior to the procedure, given some Atarax.  Patient underwent left lower extremity incision and drainage by orthopedic surgery today.  Infectious disease will see patient, recommended changing antibiotics to IV penicillin.  She does have listed history of history to penicillins although minor reaction and previously tolerated amoxicillin which have similar side chains.    I have discussed this  patient's plan of care and discharge plan at IDT rounds today with Case Management, Nursing, Nursing leadership, and other members of the IDT team.    Consultants/Specialty  infectious disease and orthopedics    Code Status  Full Code    Disposition  The patient is not medically cleared for discharge to home or a post-acute facility.      I have placed the appropriate orders for post-discharge needs.    Review of Systems  Review of Systems   Constitutional: Negative.    HENT: Negative.     Eyes: Negative.    Respiratory: Negative.     Cardiovascular: Negative.    Gastrointestinal: Negative.    Genitourinary: Negative.    Musculoskeletal:  Positive for joint pain.   Skin:  Positive for rash.   Neurological: Negative.    Endo/Heme/Allergies: Negative.    Psychiatric/Behavioral: Negative.          Physical Exam  Temp:  [36.7 °C (98.1 °F)-37.7 °C (99.9 °F)] 37.7 °C (99.9 °F)  Pulse:  [] 92  Resp:  [14-26] 22  BP: (127-179)/() 137/75  SpO2:  [94 %-100 %] 95 %    Physical Exam  Constitutional:       General: She is in acute distress.      Appearance: She is obese.   Eyes:      General: No scleral icterus.  Cardiovascular:      Rate and Rhythm: Normal rate and regular rhythm.      Heart sounds: Murmur (systolic murmur III/VI) heard.   Pulmonary:      Effort: Pulmonary effort is normal. No respiratory distress.      Breath sounds: Normal breath sounds. No wheezing.   Abdominal:      General: Abdomen is flat. Bowel sounds are normal.      Palpations: Abdomen is soft.      Tenderness: There is no abdominal tenderness.   Musculoskeletal:         General: Swelling (Significant edema to left knee along with large bullae) and tenderness present.   Neurological:      General: No focal deficit present.      Mental Status: She is alert and oriented to person, place, and time. Mental status is at baseline.      Cranial Nerves: No cranial nerve deficit.      Motor: No weakness.   Psychiatric:         Mood and Affect: Mood  normal.         Behavior: Behavior normal.         Fluids    Intake/Output Summary (Last 24 hours) at 4/19/2024 1506  Last data filed at 4/19/2024 1355  Gross per 24 hour   Intake 600 ml   Output 600 ml   Net 0 ml       Laboratory  Recent Labs     04/19/24  0051   WBC 17.5*   RBC 4.65   HEMOGLOBIN 11.4*   HEMATOCRIT 37.8   MCV 81.3*   MCH 24.5*   MCHC 30.2*   RDW 42.0   PLATELETCT 241   MPV 10.5     Recent Labs     04/19/24  0051   SODIUM 132*   POTASSIUM 4.9   CHLORIDE 99   CO2 20   GLUCOSE 65   BUN 11   CREATININE 0.91   CALCIUM 8.6     Recent Labs     04/19/24  0051 04/19/24  0446   INR 1.34* 1.38*               Imaging  EC-ECHOCARDIOGRAM COMPLETE W/ CONT   Final Result      CT-EXTREMITY, LOWER W/O LEFT   Final Result         1.  Diffuse subcutaneous fat stranding from the mid thigh to the ankle, appearance compatible with cellulitis and/or edema.   2.  No soft tissue gas identified to indicate necrotizing fasciitis.   3.  Prepatellar fluid collection, could represent hematoma, seroma, or possibly evolving abscess.   4.  Small knee joint effusion           Assessment/Plan  Problem Representation:    * Cellulitis of left lower extremity  Assessment & Plan  Possible necrotizing fasciitis with LRINEC score 8 although did appear more like severe cellulitis during incision and drainage by orthopedics.  CRP elevated 27.    Blood cultures x 2 ordered continue to trend no growth to date  Telemetry monitoring  Orthopedics consulted (Dr. Ortiz), perform incision and drainage on 4/19  Consult infectious disease, Dr. Warren will evaluate and recommended transitioning to IV penicillin and continue linezolid as she does have documented allergy to penicillin although discussed with infectious disease and previous tolerated amoxicillin and given similar R-sided changes should be able to tolerate penicillin  Ordered IV Benadryl as needed in case develops allergic reaction  Per infectious disease, will order IV continuous  penicillin and linezolid  Wound team consulted by orthopedics, recommend wet-to-dry with possible initiation of wound VAC    Mood disorder (HCC)  Assessment & Plan  Appears patient on escitalopram, awaiting medical reconciliation by pharmacy to initiate      A-fib (HCC)  Assessment & Plan  Per previous medical records, patient on sotalol and apixaban  Holding apixaban for surgical intervention  Pharmacy perform medical reconciliation, starting sotalol 120 mg twice daily    Obesity  Assessment & Plan  BMI of 59.45  Possible risk factor for underlying left lower extremity infection  Provide weight loss counseling when appropriate    Lactic acidosis  Assessment & Plan  Likely due to underlying infection  Fluid boluses  Lactic acid downtrending after fluid boluses    Bacteremia- (present on admission)  Assessment & Plan  Reportedly grew group C strep and 4 out of 4 vials  Repeat blood cultures ordered  Murmur identified best appreciable at right upper sternal border 3 out of 6 holosystolic.  Does have moderate AS on TTE although unable to obtain great visualization of the valves.  Recommend PETER for further evaluation  -TTE unable to visualize valves recommend PETER  - Pursue PETER, n.p.o. at midnight and sent message on [facility] to cardiology team  Broad-spectrum antibiotics linezolid and change IV Zosyn to IV penicillin after discussing with infectious disease.  Does have documented history of allergic reaction although minor reaction and tolerated amoxicillin in the past which have similar side chains.    Obstructive sleep apnea- (present on admission)  Assessment & Plan  Not on CPAP/BiPAP  Wears nocturnal oxygen, patient does not know how much O2  For now continue supplemental oxygen as needed but long-term wise patient may benefit with CPAP/BiPAP    Anemia- (present on admission)  Assessment & Plan  Mild anemia  No overt blood loss noted  Continue to trend hemoglobin and monitor for any acute bleeding    H/O renal cell  carcinoma s/p nephrectomy- (present on admission)  Assessment & Plan  History of  Limit nephrotoxins         VTE prophylaxis: SCDs/TEDs pharmacologic prophylaxis held due to procedure today    I have performed a physical exam and reviewed and updated ROS and Plan today (4/19/2024). In review of yesterday's note (4/18/2024), there are no changes except as documented above.

## 2024-04-19 NOTE — OR SURGEON
Immediate Post OP Note    PreOp Diagnosis: Left leg cellulitis with concern for necrotizing soft tissue infection      PostOp Diagnosis: same      Procedure(s):  IRRIGATION AND DEBRIDEMENT, WOUND - LEG - Wound Class: Dirty or Infected    Surgeon(s):  Taco Ortiz M.D.    Anesthesiologist/Type of Anesthesia:  Anesthesiologist: Trent Ceja M.D./General    Surgical Staff:  Circulator: Mirella Dallas R.N.  Scrub Person: Escobar Coates    Specimens removed if any:  ID Type Source Tests Collected by Time Destination   1 : LEFT LEG WOUND Body Fluid Leg AEROBIC/ANAEROBIC CULTURE (SURGERY) Taco Ortiz M.D. 4/19/2024  1:22 PM    2 : LEFT KNEE BURSA Bone Knee FLUID CELL COUNT, AEROBIC/ANAEROBIC CULTURE (SURGERY) Taco Ortiz M.D. 4/19/2024  1:24 PM        Estimated Blood Loss: 200cc    Findings: see dictation    Complications: none known    PLAN:  --readmit postop  --wound care for packing changes daily  --continue empiric abx, fu intra-op cultures  --continue to monitor clinical progress        4/19/2024 1:47 PM Taco Ortiz M.D.

## 2024-04-19 NOTE — PROGRESS NOTES
4 Eyes Skin Assessment Completed by SARAH Malcolm and SARAH Ren.    Head WDL redness on cheeks  Ears WDL  Nose WDL  Mouth WDL  Neck Redness and Blanching  Breast/Chest WDL  Shoulder Blades rednesss non blanching  Spine redness non pnanching  (R) Arm/Elbow/Hand Bruising, Swelling, and Discoloration abrasion with scab on forearm  (L) Arm/Elbow/Hand Bruising, Swelling, and Discoloration  Abdomen Redness and Blanching moisture rash with excoriation in folds and panus  Groin Redness, Blanching, and Excoriation  Scrotum/Coccyx/Buttocks Redness and Non-Blanching  (R) Leg Redness and Non-Blanching  (L) Leg Redness and Non-Blanching dicoloration, boggy, purple, yellow pitting  (R) Heel/Foot/Toe Non-Blanching, Discoloration, and Boggy  (L) Heel/Foot/Toe Non-Blanching, Discoloration, and Boggy          Devices In Places Tele Box, Pulse Ox, and Krueger      Interventions In Place NC W/Ear Foams, InterDry, Heel Mepilex, and Low Air Loss Mattress    Possible Skin Injury Yes    Pictures Uploaded Into Epic Yes  Wound Consult Placed Yes  RN Wound Prevention Protocol Ordered Yes

## 2024-04-19 NOTE — DIETARY
"Nutrition services: Day 1 of admit.  Savita Duggan is a 58 y.o. female with admitting DX of Bacteremia     Consult received for Malnutrition Screening tool: 3 (unsure weight loss with UBW of 181 kg/ 400 lbs, as well as poor appetite reported)    RD attempted to visit pt st bedside, though currently in preOp for irrigation and debridement of left leg    Assessment:  Height: 175.3 cm (5' 9\")  Weight: (!) 183 kg (402 lb 9 oz)  Body mass index is 59.45 kg/m²., BMI classification: Obesity Class III  Diet/Intake: NPO     Evaluation:   Pertinent medical hx and presentation includes: cellulitis of left lower extremity, obesity, bacteremia, h/o renal cell carcinoma s/p nephrectomy, anemia   Pt intake PTA unknown. Pt currently NPO.   Per chart review, weight hx with unknown measurement sources  Wt Readings from Last 4 Encounters:   04/19/24 (!) 183 kg (402 lb 9 oz)   07/25/23 (!) 150 kg (330 lb 11 oz)   11/11/19 (!) 167 kg (369 lb 0.8 oz)   Per pt's UBW of 400 lbs/ 181 kg, wt appears stable  Unable to assess fully via NFPE as unable to visit in person.   Skin: wound to pretibial left, pelvis anterior/ ventral bilateral moisture associated excoriation, moisture associated skin damage to panus. Noted with weeping 4+ LLE edema, generalized edema to RUE/RLE/LUE  Pertinent labs: Na 132, albumin 2.7  Pertinent meds: currently held as in preOp: linezolid, senokot, continuous lactated ringers   Last BM PTA    Malnutrition Risk: RD does not suspect criteria met for malnutrition at this time, will continue to monitor and reassess as new information obtained.     Problem: Nutritional:  Goal: Achieve adequate nutritional intake    Recommendations/Plan:  Patient will start oral diet as medically feasible and pt will consume >50%  Once started on oral diet, document intake of all meals and/or supplements as % taken in ADL's to provide interdisciplinary communication across all shifts.   Monitor weight.    RD following     "

## 2024-04-19 NOTE — THERAPY
04/19/24 0718   Interdisciplinary Plan of Care Collaboration   Collaboration Comments OT consult received. Pt pending CT scan and subsequent further POC including possible sx. Will hold at this time and follow for appropriate timing of eval.

## 2024-04-19 NOTE — PROGRESS NOTES
Plan for transesophageal echocardiogram to evaluate for possible endocarditis on Monday, 4/23/2024 at 9:30 AM in echo lab with Dr. Castaneda and anesthesia.

## 2024-04-19 NOTE — ANESTHESIA POSTPROCEDURE EVALUATION
Patient: Savita Duggan    Procedure Summary       Date: 04/19/24 Room / Location: [place] [address] / SURGERY [facility]    Anesthesia Start: 1226 Anesthesia Stop: 1355    Procedure: IRRIGATION AND DEBRIDEMENT, WOUND - LEG (Left: Leg Lower) Diagnosis: (LEFT LEG CELLULITIS,)    Surgeons: Taco Ortiz M.D. Responsible Provider: Trent Ceja M.D.    Anesthesia Type: general ASA Status: 4 - Emergent            Final Anesthesia Type: general  Last vitals  BP   Blood Pressure: (!) 172/72    Temp   37.7 °C (99.8 °F)    Pulse   88   Resp   14    SpO2   98 %      Anesthesia Post Evaluation    Patient location during evaluation: PACU  Patient participation: complete - patient participated  Level of consciousness: awake  Pain score: 2    Airway patency: patent  Anesthetic complications: no  Cardiovascular status: hemodynamically stable  Respiratory status: acceptable  Hydration status: acceptable    PONV: none          No notable events documented.     Nurse Pain Score: 3 (NPRS)

## 2024-04-19 NOTE — OP REPORT
DATE OF SERVICE:  04/19/2024     PREOPERATIVE DIAGNOSIS:  Left leg cellulitis with concern for necrotizing soft   tissue infection.     POSTOPERATIVE DIAGNOSIS:  Left leg cellulitis without signs of necrotizing   fasciitis or obvious deep abscess or fluid collection.     PROCEDURE PERFORMED:  Left leg fasciotomy of anterior and superficial posterior   compartments.     SURGEON:  Taco Ortiz MD     ANESTHESIOLOGIST:  Trent Ceja MD     ANESTHESIA:  General.     ESTIMATED BLOOD LOSS:  200 mL.     SPECIMENS:  Swab of the fasciotomy sites and sent to lab for aerobic and   anaerobic cultures.     INDICATIONS FOR PROCEDURE:  The patient is 58 years old.  She presented to   [facility] with significant cellulitis and development of blisters to the left   lower extremity and discoloration concerning for potential development of   necrotizing fasciitis.  She had CT imaging preoperatively, which did not show   signs of obvious deep fluid collection or gas or soft tissue gas present.    There was a prepatellar fluid collection concerning for possible evolving   abscess.  Given the severity and the appearance of her skin, I felt that it   would be in her best interest to go to the operating room for incision and   drainage of these areas to rule out necrotizing soft tissue infection.  She   signed informed consent preoperatively and wished to proceed with surgery as   outlined above.     DESCRIPTION OF PROCEDURE:  The patient was met in the preoperative holding   area.  Her surgical site was signed.  Her consent was confirmed to be   accurate.  She was taken back to the operating room and general anesthesia was   induced.  Left lower extremity was prepped and draped in the usual sterile   fashion.  A formal timeout was performed to confirm patient's correct name,   correct surgical site, correct procedure and correct laterality.  The area of   focal induration of blistering at the anteromedial leg was incised    longitudinally about 12 cm consistent with a fasciotomy type incision.  There was no   evidence of obvious purulent fluid or any dishwater type of appearance in   either of a subcutaneous fluid or edema.  Her fascia appeared relatively   normal, but to confirm there was no muscle necrosis.  I incised the fascia of the  superficial posterior compartment, which was viable and contractile and  healthy appearance of superficial posterior compartment.  I did not release   the deep posterior compartment.  I thoroughly lavaged the wound with Pulsavac   using normal saline.  I then performed a similar incision to the lateral   aspect of the leg and dissected sharply down through subcutaneous tissue.    Again, there was no evidence of obvious purulent drainage or tunneling of any   deep fluid collection.  There was no evidence of dishwater type fluid, which   would otherwise be concerning for necrotizing fasciitis.  I identified the   anterior compartment and I incised the fascia longitudinally and the muscle   was contractile and viable.  I did perform fasciotomy of the lateral compartment to   the healthy appearance of the anterior compartment muscle and lack of obvious   definitive deep infection.  This appeared to be consistent with just an   aggressive form of cellulitis.  There was some concern for the viability of   the anterior skin, but I did not feel that it would be in her best interest to   excise this area of cellulitic and inflamed skin, which was nearly   circumferentially around the leg.  I thoroughly lavaged the lateral wound with   Pulsavac using normal saline and packed away and applied a wet-to-dry   dressings with a saline soaked Kerlix, 4x4's, AVDs, Xeroform over the   blistered skin and a well-padded compressive dressing.  There was an area of   fluid collection anterior to the knee joint seen on preoperative CT imaging   and I performed an aspiration of the prepatellar bursal area and using about    10 mL of bright yellow fluid and was sent to lab for cell count and cultures,   but it did not have the gross appearance concerning for infection, likely,   this is consistent with prepatellar bursitis.  She was awoken from anesthesia   and transferred on the gurney and taken to postanesthesia care unit in stable   condition.     PLAN:  1.  The patient will be readmitted to medical service postop.  2.  Recommend continuing aggressive empiric antibiotic therapy and continue to   monitor her clinical progress.  3.  She can weightbear as tolerated to left lower extremity.  4.  I have consulted the inpatient wound team to perform daily wet-to-dry   dressing changes and can convert to a wound VAC therapy if the integrity of   the skin is otherwise amenable to that and adjacent to the wounds.        ______________________________  MD ROSALIA Ruiz/MAXIM    DD:  04/19/2024 14:18  DT:  04/19/2024 14:49    Job#:  [id number]

## 2024-04-19 NOTE — ASSESSMENT & PLAN NOTE
Severe LLE cellulitis, s/p fasciotomy with ortho Dr. Ortiz on 4/19 and wound vac placement on 4/22. Improved with antibiotics and judicious use of diuretics.    - Appreciate wound care help, dressings changes 3x weekly  - Ortho follow 2-3 weeks post-op  - Plastics/wound care follow up in few weeks for skin graft consult as not a candidate for secondary closure

## 2024-04-19 NOTE — CARE PLAN
The patient is Stable - Low risk of patient condition declining or worsening    Shift Goals  Clinical Goals: Monitor wound, IV ABX, CT scan  Patient Goals: Get her leg better  Family Goals: nirmala    Progress made toward(s) clinical / shift goals:    Problem: Knowledge Deficit - Standard  Goal: Patient and family/care givers will demonstrate understanding of plan of care, disease process/condition, diagnostic tests and medications  Outcome: Progressing  Note: Oriented to unit and plan of care     Problem: Pain - Standard  Goal: Alleviation of pain or a reduction in pain to the patient’s comfort goal  Outcome: Progressing     Problem: Skin Integrity  Goal: Skin integrity is maintained or improved  Outcome: Progressing  Note: Implemented bariatric low airloss mattress, sacral and heel protector and  interdry     Problem: Fall Risk  Goal: Patient will remain free from falls  Outcome: Progressing  Note: 1. Assess for fall risk factors 2. Implement fall precautions        Patient is not progressing towards the following goals:

## 2024-04-19 NOTE — ANESTHESIA PREPROCEDURE EVALUATION
Case: [id number] Date/Time: 04/19/24 1432    Procedures:       IRRIGATION AND DEBRIDEMENT, WOUND - LEG (Left)      APPLICATION OR REPLACEMENT, WOUND VAC    Anesthesia type: General    Location: [place] [address] / SURGERY [facility]    Surgeons: Taco Ortiz M.D.          59 yo F w/ BMI 60, LONNIE, COPD on nocturnal O2, afib, admitted with bacteremia s/t LLE infection    TTE 7/22/23  Technically very difficult study.  Grossly normal LV systolic function, afib, cannot assess diastolic function, valves are not well visualized but no abnormality is seen  Relevant Problems   ANESTHESIA   (positive) Obstructive sleep apnea      PULMONARY   (positive) COPD (chronic obstructive pulmonary disease) (HCC)      CARDIAC   (positive) A-fib (HCC)      GI   (positive) GERD (gastroesophageal reflux disease)      Other   (positive) Inflammatory arthritis       Physical Exam    Airway   Mallampati: IV  TM distance: >3 FB  Neck ROM: full       Cardiovascular - normal exam  Rhythm: regular  Rate: normal  (-) murmur     Dental - normal exam           Pulmonary - normal exam  Comments: Distant breath sounds   Abdominal   (+) obese     Neurological     Comments: Sleepy               Anesthesia Plan    ASA 4 (BMI 60)- EMERGENT   ASA physical status 4 criteria: sepsisASA physical status emergent criteria: acute deteriorating condition due to infection    Plan - general       Airway plan will be ETT          Induction: intravenous    Postoperative Plan: Postoperative administration of opioids is intended.    Pertinent diagnostic labs and testing reviewed    Informed Consent:    Anesthetic plan and risks discussed with patient.    Use of blood products discussed with: patient whom consented to blood products.

## 2024-04-19 NOTE — ASSESSMENT & PLAN NOTE
BMI of 59.45  Possible risk factor for underlying left lower extremity infection  Provide weight loss counseling when appropriate

## 2024-04-19 NOTE — CONSULTS
DATE OF SERVICE:  04/19/2024     ORTHOPEDIC CONSULTATION     REQUESTING PHYSICIAN:  CRISTY [facility] Internal medicine service.     REASON FOR CONSULTATION:  Left leg infection.     CHIEF COMPLAINT:  Left leg pain.     HISTORY OF PRESENT ILLNESS:  The patient is 58 years old.  She was transferred   from an outside facility.  She was reportedly transferred from a previous   facility to [facility], was found to be bacteremic, been treated   with IV antibiotic therapy, specifically cefepime.  Apparently it has worsened   over the 24 last hours.  She started developing some blisters.  Reportedly,   her white blood cell count has been trending down at the outside facility.    She reportedly developed hemoptysis overnight and she had been on Eliquis,   which was held.  I was asked to consult to provide treatment recommendations   due to concern for left lower extremity infection, potentially worsening and   some potential underlying concern for necrotizing fasciitis.     PAST MEDICAL HISTORY:  ALLERGIES:  CODEINE, PENICILLINS.  PAST MEDICAL DIAGNOSES:  Morbid obesity; atrial fibrillation, on Eliquis;   diastolic congestive heart failure; rheumatoid arthritis; COPD, on oxygen at   night; history of a right sided nephrectomy; anxiety; gastroesophageal reflux   disease; history of heart attack; hypertension; hyperlipidemia.     PAST SURGICAL HISTORY:  Cholecystectomy, nephrectomy.     OUTPATIENT MEDICATIONS:  Zofran, Prilosec, sotalol, Lipitor, prednisone,   lisinopril, Eliquis, spironolactone, Farxiga, magnesium oxide.      PERTINENT ADDITIONAL INPATIENT MEDICATIONS: Zosyn, linezolid.     SOCIAL HISTORY:  The patient quit smoking in 2016.  Denies alcohol and illicit   drug use.  She lives in [place], [place].     PHYSICAL EXAMINATION:   VITAL SIGNS:  Temperature is 99, heart rate 99, respiratory rate 22, blood   pressure 139/84, pulse oximetry 100% on 3 liters nasal cannula.  GENERAL APPEARANCE:  The patient is  alert, pleasant, cooperative, in mild   amount of distress due to pain in left lower extremity.  MUSCULOSKELETAL:  Examination of left lower extremity, she has some blistering   and discoloration at the anteromedial aspect of the leg and diffuse   induration and erythema around the leg.  There is some more mild erythema   proximally at the level of the distal thigh, nearly circumferentially.  She is   able to dorsi and plantarflex the foot and flex and extend the toes.  She has   brisk capillary refill in the toes.  It is difficult to readily palpate her   posterior tibial and dorsalis pedis pulses.  There is no evidence of obvious   significant open wound to the right lower or bilateral upper extremities.    There are no open wound to the left lower extremity.  There is a superficial   ulceration of the posteromedial aspect of the distal leg.  She is exquisitely   tender to palpation over the area of maximum induration in the left leg.     LABORATORY DATA:  White blood cell count of 17.5.  Sodium 132.  INR 1.34.  CRP   is 27.79.     ASSESSMENT:  A 58-year-old female with morbid obesity and left lower extremity   infection consistent with very least a significant cellulitis.  I cannot   completely rule out potential for developing necrotizing fasciitis or an   underlying abscess.     RECOMMENDATIONS:   1.  I discussed findings with the patient and she currently has a CT of the   left lower extremity pending.  2.  Recommend following up results of that, but I do feel given the appearance   of the wound that she will require surgical intervention to rule out   necrotizing fasciitis and deep abscess.  3.  We did discuss the potential need for excision of devitalized skin and   soft tissue and likely wound VAC application in hopes of continuing limb   salvage.  We did discuss that if they were unable to control the infection   significantly that she may require amputation, but she would like to avoid   this if at all  possible obviously.  4.  She should continue to be n.p.o.  We will follow up the results of CT   imaging and coordinate further timing for surgical management, but she is   currently hemodynamically stable and is being treated with empiric antibiotic   therapy.        ______________________________  MD ROSALIA Ruiz/ASHA/ARLEY    DD:  04/19/2024 02:48  DT:  04/19/2024 05:11    Job#:  [id number]

## 2024-04-19 NOTE — HOSPITAL COURSE
Savita Duggan is a 58 y.o. female who presented 4/18/2024 from outside hospital due to worsening infection of left lower extremity. Patient reports that symptom onset was approximately 3 weeks ago when she started to notice redness around her mid shin that slowly started to worsen.  Patient denies any trauma or injury to site of infection.  Due to increasing pain, erythema and swelling the patient elected to present to her nearest emergency department which is at [place].  Reportedly the patient was admitted on 4/14 and diagnosed with left lower extremity cellulitis and initiated on vancomycin and Levaquin without significant improvement and patient's blood cultures grew group C strep and antibiotics were escalated to cefepime but the patient still continues to have limited improvement.  Also reportedly the patient may have had a murmur subsequently plan was created to transfer the patient to [facility] for further evaluation and management.  Orthopedics was consulted on admission, pharmacies and drainage of left lower extremity on 4/19 due to concern for necrotizing fasciitis.  Consulted infectious disease regarding possible infective endocarditis, and transitioning antibiotics to IV penicillin.  TTE performed although unable to visualize valves, and PETER ordered which showed no evidence of IE, therefore continuing course of IV penicillin x 2 weeks following negative blood cultures.  She previously had reported history of alert allergy to penicillins although tolerated IV penicillin without any adverse allergic reactions and removed from allergy list. With continued treatment with IV penicillin and judicious diuretic use, her left lower extremity cellulitis and pain is improving.

## 2024-04-19 NOTE — PROGRESS NOTES
Pt arrived back to unit post surgery at approximately 1530. Pt is drowsy but oriented x4, arouses to voice. Pt arrives on 3L oxymask, post-op vitals continued, VSS at this time. Pt denies pain at this time. Tele monitor placed back on pt, monitor tech notified. CMS of LLE intact.

## 2024-04-19 NOTE — PROGRESS NOTES
Pharmacy Vancomycin Kinetics Note for 4/19/2024     58 y.o. female on Vancomycin day # 5     Vancomycin Indication (Trough based Dosing): Skin/skin structure infection (goal trough 10-15)    Provider specified end date: 04/26/24    Active Antibiotics (From admission, onward)      Ordered     Ordering Provider       Fri Apr 19, 2024  1:45 AM    04/19/24 0145  vancomycin (Vancocin) 2 g in  mL IVPB  (vancomycin (VANCOCIN) IV (LD + Maintenance))  EVERY 18 HOURS        Note to Pharmacy: Per P&T vanco per pharmacy Protocol    Mark Chauhan M.D.       Fri Apr 19, 2024 12:49 AM    04/19/24 0049  MD Alert...Vancomycin per Pharmacy  PHARMACY TO DOSE        Question:  Indication(s) for vancomycin?  Answer:  Skin and soft tissue infection    Mark Chauhan M.D.    04/19/24 0049  piperacillin-tazobactam (Zosyn) 4.5 g in  mL IVPB  (piperacillin-tazobactam (ZOSYN) IV (Extended-infusion) PANEL )  ONCE        See Hyperspace for full Linked Orders Report.    Mark Chauhan M.D.    04/19/24 0049  piperacillin-tazobactam (Zosyn) 4.5 g in  mL IVPB  (piperacillin-tazobactam (ZOSYN) IV (Extended-infusion) PANEL )  EVERY 8 HOURS        See Hyperspace for full Linked Orders Report.    Mark Chauhan M.D.            Dosing Weight: 183 kg (403 lb 7.1 oz)      Admission History: Admitted on 4/18/2024 for Bacteremia [R78.81]  Pertinent history: Patient with multiple co-morbidities including A-Fib, right nephrectomy, and CHF transferring from OSH with worsening infection and concern for endocarditis. 4 of 4 culture bottles growing Group C Strep at OSH. Patient has been on vanco and levaquin, switched to cefepime today due to new necrotic bullae of the left lower extremity. Patient also noted to have systolic murmur concerning for endocarditis. [facility] records show pt has tolerated zosyn in the past, cefepime switched to zosyn. Patient has been on vanco 2g Q18h since 4/15 with a trough level of 18.7 on 4/17. Last vanco dose 4/18 @  "1030.    Allergies:     Coconut oil, Codeine, and Penicillins     Pertinent cultures to date:     Results       Procedure Component Value Units Date/Time    BLOOD CULTURE [[id number]]     Order Status: Sent Specimen: Blood from Peripheral     BLOOD CULTURE [[id number]]     Order Status: Sent Specimen: Blood from Peripheral             Labs:     Estimated Creatinine Clearance: 118.7 mL/min (by C-G formula based on SCr of 0.91 mg/dL).  Recent Labs     24  0051   WBC 17.5*   NEUTSPOLYS 86.30*     Recent Labs     24  0051   BUN 11   CREATININE 0.91   ALBUMIN 2.7*     No intake or output data in the 24 hours ending 24 0146   /84   Pulse 99   Temp 37.2 °C (99 °F) (Temporal)   Resp (!) 22   Ht 1.753 m (5' 9\")   Wt (!) 183 kg (402 lb 9 oz)   SpO2 97%  Temp (24hrs), Av.2 °C (98.9 °F), Min:37.1 °C (98.8 °F), Max:37.2 °C (99 °F)      List concerns for Vancomycin clearance:     Obesity;Nephrotoxic drugs;Malnutrition/Low albumin;CHF    Trough kinetics:    Trough 18.7 (per OSH)    A/P:     -  Vancomycin dose: Continue 2g Q18h    -  Next vancomycin level(s):    - None ordered at this time.     -  Comments: Concerns for accumulation listed above. Patient pending further endocarditis work up, given this will continue with same dose at slightly elevated trough level. Pharmacy will continue to follow and adjust therapy as clinically appropriate.    Jose Dorado, PharmD    "

## 2024-04-19 NOTE — PROGRESS NOTES
[facility] DIRECT ADMISSION REPORT  Transferring facility: [facility]  Transferring physician: Keren    Chief complaint: Left leg infection, fever  Pertinent history & patient course: 58-year-old female with history of morbid obesity, A-fib on Eliquis, diastolic CHF, rheumatoid arthritis, COPD on nocturnal oxygen, right-sided nephrectomy, was admitted at [facility] on 4/14 as a transfer from [place], with left lower extremity cellulitis.  Patient was treated with IV antibiotics vancomycin, Levaquin due to allergy to penicillin, without improvement.  Subsequently blood culture at [facility] grew strep group C in 4 bottles.  Antibiotics changed to cefepime.  Patient developed necrotic bulla of the left lower extremity in the last 24 hours.  Vital stable, WBC trended down from 15-11 since admission.  She had some hemoptysis overnight, Eliquis held.  Chest x-ray consistent with possible volume overload.  Lactic acid initially 2.5, resolved with IV hydration.  On 3 L nasal cannula, SpO2 92.  Intermittent mild fever.  Patient noted to have systolic murmur on exam.  No prior history of heart murmur or valvular disease.  Concern for possible endocarditis.  Outside hospital do not have any specialist or echo.  Pertinent imaging & lab results:   Consultants called prior to transfer and pertinent input from consultants:   Code Status: Full per transferring provider, I personally verified with the transferring provider patient's code status and the transferring provider has confirmed this with the patient.  Reason for Transfer: Transthoracic echo, ID consult, orthopedic surgery consult  Further work up or recommendations requested prior to transfer:     Patient accepted for transfer: Yes  Accepting [facility] Facility: [facility] to notify the Triage Coordinator in the RTOC via Voalte or Phone ext. [phone] when patient arrives to the unit. The Triage Coordinator will assign the  admitting provider.    Consultants to be called upon arrival:   Admission status: Inpatient.   Floor requested: Telemetry  If ICU transfer, name of intensivist case discussed with and pertinent input from critical care: N/A    The admitting provider is the point of contact for questions or concerns regarding patient's care.            Bed in lowest position, wheels locked, appropriate side rails in place/Call bell, personal items and telephone in reach/Instruct patient to call for assistance before getting out of bed or chair/Non-slip footwear when patient is out of bed/[place] to call system/Physically safe environment - no spills, clutter or unnecessary equipment/Purposeful Proactive Rounding/Room/bathroom lighting operational, light cord in reach

## 2024-04-19 NOTE — THERAPY
Physical Therapy Contact Note    Patient Name: Savita Duggan  Age:  58 y.o., Sex:  female  Medical Record #: [id number]  Today's Date: 4/19/2024    PT consult received. Pt currently pending LLE CT and potential surgical intervention depending on results. Will hold PT eval at this time and follow up once POC is established.    Malick Garcia, PT, DPT

## 2024-04-19 NOTE — ASSESSMENT & PLAN NOTE
Remains overall rate controlled  - Sotalol 120 mg twice daily  - Reinitiated apixaban 5 mg twice daily 4/21

## 2024-04-20 LAB
ALBUMIN SERPL BCP-MCNC: 2.2 G/DL (ref 3.2–4.9)
ALBUMIN/GLOB SERPL: 0.7 G/DL
ALP SERPL-CCNC: 67 U/L (ref 30–99)
ALT SERPL-CCNC: 20 U/L (ref 2–50)
ANION GAP SERPL CALC-SCNC: 9 MMOL/L (ref 7–16)
ANISOCYTOSIS BLD QL SMEAR: ABNORMAL
AST SERPL-CCNC: 24 U/L (ref 12–45)
BACTERIA SPEC ANAEROBE CULT: NORMAL
BASOPHILS # BLD AUTO: 0 % (ref 0–1.8)
BASOPHILS # BLD: 0 K/UL (ref 0–0.12)
BILIRUB SERPL-MCNC: 0.3 MG/DL (ref 0.1–1.5)
BUN SERPL-MCNC: 13 MG/DL (ref 8–22)
BURR CELLS BLD QL SMEAR: NORMAL
CALCIUM ALBUM COR SERPL-MCNC: 9.4 MG/DL (ref 8.5–10.5)
CALCIUM SERPL-MCNC: 8 MG/DL (ref 8.5–10.5)
CHLORIDE SERPL-SCNC: 103 MMOL/L (ref 96–112)
CO2 SERPL-SCNC: 22 MMOL/L (ref 20–33)
CREAT SERPL-MCNC: 0.91 MG/DL (ref 0.5–1.4)
DOHLE BOD BLD QL SMEAR: NORMAL
EOSINOPHIL # BLD AUTO: 0 K/UL (ref 0–0.51)
EOSINOPHIL NFR BLD: 0 % (ref 0–6.9)
ERYTHROCYTE [DISTWIDTH] IN BLOOD BY AUTOMATED COUNT: 42.1 FL (ref 35.9–50)
GFR SERPLBLD CREATININE-BSD FMLA CKD-EPI: 72 ML/MIN/1.73 M 2
GLOBULIN SER CALC-MCNC: 3.3 G/DL (ref 1.9–3.5)
GLUCOSE SERPL-MCNC: 110 MG/DL (ref 65–99)
HCT VFR BLD AUTO: 27.9 % (ref 37–47)
HGB BLD-MCNC: 8.6 G/DL (ref 12–16)
LYMPHOCYTES # BLD AUTO: 0.7 K/UL (ref 1–4.8)
LYMPHOCYTES NFR BLD: 3.4 % (ref 22–41)
MAGNESIUM SERPL-MCNC: 2.2 MG/DL (ref 1.5–2.5)
MANUAL DIFF BLD: NORMAL
MCH RBC QN AUTO: 24.9 PG (ref 27–33)
MCHC RBC AUTO-ENTMCNC: 30.8 G/DL (ref 32.2–35.5)
MCV RBC AUTO: 80.9 FL (ref 81.4–97.8)
METAMYELOCYTES NFR BLD MANUAL: 0.9 %
MICROCYTES BLD QL SMEAR: ABNORMAL
MONOCYTES # BLD AUTO: 1.41 K/UL (ref 0–0.85)
MONOCYTES NFR BLD AUTO: 6.8 % (ref 0–13.4)
MORPHOLOGY BLD-IMP: NORMAL
NEUTROPHILS # BLD AUTO: 18.4 K/UL (ref 1.82–7.42)
NEUTROPHILS NFR BLD: 88 % (ref 44–72)
NEUTS BAND NFR BLD MANUAL: 0.9 % (ref 0–10)
NRBC # BLD AUTO: 0 K/UL
NRBC BLD-RTO: 0 /100 WBC (ref 0–0.2)
PHOSPHATE SERPL-MCNC: 4.2 MG/DL (ref 2.5–4.5)
PLATELET # BLD AUTO: 262 K/UL (ref 164–446)
PLATELET BLD QL SMEAR: NORMAL
PMV BLD AUTO: 11.1 FL (ref 9–12.9)
POIKILOCYTOSIS BLD QL SMEAR: NORMAL
POTASSIUM SERPL-SCNC: 5.4 MMOL/L (ref 3.6–5.5)
PROT SERPL-MCNC: 5.5 G/DL (ref 6–8.2)
RBC # BLD AUTO: 3.45 M/UL (ref 4.2–5.4)
RBC BLD AUTO: PRESENT
SIGNIFICANT IND 70042: NORMAL
SITE SITE: NORMAL
SODIUM SERPL-SCNC: 134 MMOL/L (ref 135–145)
SOURCE SOURCE: NORMAL
WBC # BLD AUTO: 20.7 K/UL (ref 4.8–10.8)
WBC TOXIC VACUOLES BLD QL SMEAR: NORMAL

## 2024-04-20 PROCEDURE — 700111 HCHG RX REV CODE 636 W/ 250 OVERRIDE (IP): Mod: JZ | Performed by: INTERNAL MEDICINE

## 2024-04-20 PROCEDURE — 85027 COMPLETE CBC AUTOMATED: CPT

## 2024-04-20 PROCEDURE — 700101 HCHG RX REV CODE 250

## 2024-04-20 PROCEDURE — A9270 NON-COVERED ITEM OR SERVICE: HCPCS

## 2024-04-20 PROCEDURE — 36415 COLL VENOUS BLD VENIPUNCTURE: CPT

## 2024-04-20 PROCEDURE — 99233 SBSQ HOSP IP/OBS HIGH 50: CPT | Mod: GC | Performed by: INTERNAL MEDICINE

## 2024-04-20 PROCEDURE — 700105 HCHG RX REV CODE 258

## 2024-04-20 PROCEDURE — 700102 HCHG RX REV CODE 250 W/ 637 OVERRIDE(OP)

## 2024-04-20 PROCEDURE — 83735 ASSAY OF MAGNESIUM: CPT

## 2024-04-20 PROCEDURE — 700111 HCHG RX REV CODE 636 W/ 250 OVERRIDE (IP)

## 2024-04-20 PROCEDURE — 99231 SBSQ HOSP IP/OBS SF/LOW 25: CPT | Performed by: INTERNAL MEDICINE

## 2024-04-20 PROCEDURE — 80053 COMPREHEN METABOLIC PANEL: CPT

## 2024-04-20 PROCEDURE — 770020 HCHG ROOM/CARE - TELE (206)

## 2024-04-20 PROCEDURE — 97162 PT EVAL MOD COMPLEX 30 MIN: CPT

## 2024-04-20 PROCEDURE — 84100 ASSAY OF PHOSPHORUS: CPT

## 2024-04-20 PROCEDURE — 85007 BL SMEAR W/DIFF WBC COUNT: CPT

## 2024-04-20 RX ORDER — LIDOCAINE HYDROCHLORIDE 40 MG/ML
SOLUTION TOPICAL
Status: DISCONTINUED | OUTPATIENT
Start: 2024-04-20 | End: 2024-04-29 | Stop reason: HOSPADM

## 2024-04-20 RX ORDER — SODIUM HYPOCHLORITE 1.25 MG/ML
SOLUTION TOPICAL 2 TIMES DAILY
Status: DISCONTINUED | OUTPATIENT
Start: 2024-04-20 | End: 2024-04-22 | Stop reason: ALTCHOICE

## 2024-04-20 RX ORDER — ESCITALOPRAM OXALATE 10 MG/1
10 TABLET ORAL DAILY
Status: DISCONTINUED | OUTPATIENT
Start: 2024-04-20 | End: 2024-04-29 | Stop reason: HOSPADM

## 2024-04-20 RX ORDER — GABAPENTIN 100 MG/1
100 CAPSULE ORAL 4 TIMES DAILY
Status: DISCONTINUED | OUTPATIENT
Start: 2024-04-20 | End: 2024-04-29 | Stop reason: HOSPADM

## 2024-04-20 RX ORDER — ATORVASTATIN CALCIUM 20 MG/1
20 TABLET, FILM COATED ORAL
Status: DISCONTINUED | OUTPATIENT
Start: 2024-04-20 | End: 2024-04-29 | Stop reason: HOSPADM

## 2024-04-20 RX ORDER — LIDOCAINE HYDROCHLORIDE 20 MG/ML
20 INJECTION, SOLUTION INFILTRATION; PERINEURAL
Status: DISCONTINUED | OUTPATIENT
Start: 2024-04-20 | End: 2024-04-29 | Stop reason: HOSPADM

## 2024-04-20 RX ORDER — BUDESONIDE AND FORMOTEROL FUMARATE DIHYDRATE 160; 4.5 UG/1; UG/1
2 AEROSOL RESPIRATORY (INHALATION) 2 TIMES DAILY
Status: DISCONTINUED | OUTPATIENT
Start: 2024-04-20 | End: 2024-04-20

## 2024-04-20 RX ADMIN — SODIUM CHLORIDE 24 MILLION UNITS: 9 INJECTION, SOLUTION INTRAVENOUS at 20:14

## 2024-04-20 RX ADMIN — SOTALOL HYDROCHLORIDE 120 MG: 120 TABLET ORAL at 05:10

## 2024-04-20 RX ADMIN — ACETAMINOPHEN 1000 MG: 500 TABLET, FILM COATED ORAL at 01:42

## 2024-04-20 RX ADMIN — OXYCODONE 5 MG: 5 TABLET ORAL at 16:29

## 2024-04-20 RX ADMIN — SODIUM HYPOCHLORITE 50 ML: 1.25 SOLUTION TOPICAL at 12:00

## 2024-04-20 RX ADMIN — OXYCODONE 5 MG: 5 TABLET ORAL at 00:33

## 2024-04-20 RX ADMIN — OXYCODONE 5 MG: 5 TABLET ORAL at 05:15

## 2024-04-20 RX ADMIN — GABAPENTIN 100 MG: 100 CAPSULE ORAL at 12:42

## 2024-04-20 RX ADMIN — ACETAMINOPHEN 1000 MG: 500 TABLET, FILM COATED ORAL at 16:25

## 2024-04-20 RX ADMIN — SOTALOL HYDROCHLORIDE 120 MG: 120 TABLET ORAL at 16:26

## 2024-04-20 RX ADMIN — OXYCODONE 5 MG: 5 TABLET ORAL at 09:12

## 2024-04-20 RX ADMIN — MOMETASONE FUROATE AND FORMOTEROL FUMARATE DIHYDRATE 2 PUFF: 200; 5 AEROSOL RESPIRATORY (INHALATION) at 09:00

## 2024-04-20 RX ADMIN — SENNOSIDES AND DOCUSATE SODIUM 2 TABLET: 50; 8.6 TABLET ORAL at 16:25

## 2024-04-20 RX ADMIN — ATORVASTATIN CALCIUM 20 MG: 20 TABLET, FILM COATED ORAL at 20:28

## 2024-04-20 RX ADMIN — LINEZOLID 600 MG: 600 INJECTION INTRAVENOUS at 17:04

## 2024-04-20 RX ADMIN — OXYCODONE 5 MG: 5 TABLET ORAL at 19:46

## 2024-04-20 RX ADMIN — ESCITALOPRAM OXALATE 10 MG: 10 TABLET ORAL at 12:42

## 2024-04-20 RX ADMIN — MOMETASONE FUROATE AND FORMOTEROL FUMARATE DIHYDRATE 2 PUFF: 200; 5 AEROSOL RESPIRATORY (INHALATION) at 16:25

## 2024-04-20 RX ADMIN — ACETAMINOPHEN 1000 MG: 500 TABLET, FILM COATED ORAL at 09:12

## 2024-04-20 RX ADMIN — LINEZOLID 600 MG: 600 INJECTION INTRAVENOUS at 05:12

## 2024-04-20 RX ADMIN — GABAPENTIN 100 MG: 100 CAPSULE ORAL at 18:38

## 2024-04-20 ASSESSMENT — PATIENT HEALTH QUESTIONNAIRE - PHQ9
7. TROUBLE CONCENTRATING ON THINGS, SUCH AS READING THE NEWSPAPER OR WATCHING TELEVISION: SEVERAL DAYS
5. POOR APPETITE OR OVEREATING: NEARLY EVERY DAY
1. LITTLE INTEREST OR PLEASURE IN DOING THINGS: NOT AT ALL
4. FEELING TIRED OR HAVING LITTLE ENERGY: NOT AT ALL
9. THOUGHTS THAT YOU WOULD BE BETTER OFF DEAD, OR OF HURTING YOURSELF: NOT AT ALL
6. FEELING BAD ABOUT YOURSELF - OR THAT YOU ARE A FAILURE OR HAVE LET YOURSELF OR YOUR FAMILY DOWN: SEVERAL DAYS
3. TROUBLE FALLING OR STAYING ASLEEP OR SLEEPING TOO MUCH: MORE THAN HALF THE DAYS
SUM OF ALL RESPONSES TO PHQ QUESTIONS 1-9: 8
7. TROUBLE CONCENTRATING ON THINGS, SUCH AS READING THE NEWSPAPER OR WATCHING TELEVISION: SEVERAL DAYS
8. MOVING OR SPEAKING SO SLOWLY THAT OTHER PEOPLE COULD HAVE NOTICED. OR THE OPPOSITE, BEING SO FIGETY OR RESTLESS THAT YOU HAVE BEEN MOVING AROUND A LOT MORE THAN USUAL: SEVERAL DAYS
4. FEELING TIRED OR HAVING LITTLE ENERGY: NOT AT ALL
5. POOR APPETITE OR OVEREATING: NEARLY EVERY DAY
9. THOUGHTS THAT YOU WOULD BE BETTER OFF DEAD, OR OF HURTING YOURSELF: NOT AT ALL
3. TROUBLE FALLING OR STAYING ASLEEP OR SLEEPING TOO MUCH: MORE THAN HALF THE DAYS
8. MOVING OR SPEAKING SO SLOWLY THAT OTHER PEOPLE COULD HAVE NOTICED. OR THE OPPOSITE, BEING SO FIGETY OR RESTLESS THAT YOU HAVE BEEN MOVING AROUND A LOT MORE THAN USUAL: SEVERAL DAYS
SUM OF ALL RESPONSES TO PHQ9 QUESTIONS 1 AND 2: 0
2. FEELING DOWN, DEPRESSED, IRRITABLE, OR HOPELESS: NOT AT ALL
SUM OF ALL RESPONSES TO PHQ9 QUESTIONS 1 AND 2: 0
2. FEELING DOWN, DEPRESSED, IRRITABLE, OR HOPELESS: NOT AT ALL
6. FEELING BAD ABOUT YOURSELF - OR THAT YOU ARE A FAILURE OR HAVE LET YOURSELF OR YOUR FAMILY DOWN: SEVERAL DAYS
1. LITTLE INTEREST OR PLEASURE IN DOING THINGS: NOT AT ALL
SUM OF ALL RESPONSES TO PHQ QUESTIONS 1-9: 8

## 2024-04-20 ASSESSMENT — PAIN DESCRIPTION - PAIN TYPE
TYPE: ACUTE PAIN

## 2024-04-20 ASSESSMENT — ENCOUNTER SYMPTOMS
GASTROINTESTINAL NEGATIVE: 1
PSYCHIATRIC NEGATIVE: 1
EYES NEGATIVE: 1
CARDIOVASCULAR NEGATIVE: 1
NEUROLOGICAL NEGATIVE: 1
CONSTITUTIONAL NEGATIVE: 1
RESPIRATORY NEGATIVE: 1

## 2024-04-20 ASSESSMENT — FIBROSIS 4 INDEX: FIB4 SCORE: 1.21

## 2024-04-20 NOTE — PROGRESS NOTES
This note is intended for the purposes of medical student education and feedback only.   Please refer to the documentation by this patient's assigned medical practitioner for details of care and plans.    Reason for admission:   Savita Duggan is a 57 y/o female who was directly admitted from [place] for worsening left lower extremity cellulitis and possible infective endocarditis.    SUBJECTIVE:  Patient had a left fasciotomy of the lateral and superficial posterior compartments done by orthopedic surgeon, Dr. Ortiz. She was found to have left leg cellulitis without signs of necrotizing fasciitis or abscess. Patient was also seen by infectious disease, Dr. Warren, who recommended IV Penicillin and Linezolid due to group C strep cultures. Additionally, per patient's records, she previously tolerated Amoxicillin. IV Benadryl is ordered as PRN if patient begins having allergic symptoms.  Patient is feeling better today, but is still a bit anxious. She states that she would like to go home because her kids, ages 15, 17, and 23, are at home and she is worried for them. She reports that her 23 year old is currently taking care of the family. She states that she is not in much pain aside from her left leg pain after her procedure. She reports that her left leg feels a bit itchy from the ACE wrap, otherwise she denies itchiness anywhere else. She denies abdominal pain, swelling of her throat, wheezing, cough, or rash.     OBJECTIVE:  Vital Signs:  O2 Sat: 98% on 4 liters of O2 oxymask    Vitals:    04/19/24 2010 04/19/24 2226 04/20/24 0019 04/20/24 0441   BP: 125/65  120/64 130/60   Pulse: 74  67 63   Resp: 16  16 16   Temp: 36.7 °C (98.1 °F)  36.2 °C (97.2 °F) 36.4 °C (97.5 °F)   TempSrc: Temporal  Temporal Temporal   SpO2: 97%  97% 98%   Weight:  (!) 183 kg (402 lb 9 oz)  (!) 181 kg (399 lb 0.5 oz)   Height:         Physical Exam:  Physical Exam  Constitutional:       General: She is not in acute distress.      Appearance: She is obese.   HENT:      Head: Normocephalic and atraumatic.   Eyes:      Extraocular Movements: Extraocular movements intact.      Conjunctiva/sclera: Conjunctivae normal.   Cardiovascular:      Rate and Rhythm: Normal rate.      Heart sounds:      No friction rub. No gallop.      Comments: 2/6 systolic ejection murmur heard best at the right upper sternal border  Pulmonary:      Effort: No respiratory distress.      Breath sounds: Normal breath sounds. No stridor. No wheezing.   Abdominal:      Tenderness: There is no abdominal tenderness. There is no guarding or rebound.      Comments: Obese adbomen   Genitourinary:     Comments: Krueger catheter in place. Krueger bag with felix yellow urine ~100cc  Musculoskeletal:         General: Swelling and tenderness present.      Comments: RLE normal. LLE is wrapped in an ACE wrap. Left knee is erythematous and warm compared to right knee. There are no gaping wounds appreciated.    Neurological:      General: No focal deficit present.      Mental Status: She is alert and oriented to person, place, and time.   Psychiatric:      Comments: Anxious     Ins/Outs:  Intake/Output Summary (Last 24 hours) at 4/20/2024 0710  Last data filed at 4/20/2024 0515  Gross per 24 hour   Intake 850 ml   Output 1200 ml   Net -350 ml     Lab Results:  Recent Labs     04/19/24 0051 04/20/24 0039   WBC 17.5* 20.7*   RBC 4.65 3.45*   HEMOGLOBIN 11.4* 8.6*   HEMATOCRIT 37.8 27.9*   MCV 81.3* 80.9*   MCH 24.5* 24.9*   MCHC 30.2* 30.8*   RDW 42.0 42.1   PLATELETCT 241 262   MPV 10.5 11.1     Recent Labs     04/19/24 0051 04/20/24  0039   SODIUM 132* 134*   POTASSIUM 4.9 5.4   CHLORIDE 99 103   CO2 20 22   GLUCOSE 65 110*   BUN 11 13   CREATININE 0.91 0.91   CALCIUM 8.6 8.0*     Recent Labs     04/19/24 0051 04/19/24 0446   INR 1.34* 1.38*     Recent Labs     04/19/24 0051 04/19/24 0446 04/20/24  0039   ASTSGOT 25  --  24   ALTSGPT 22  --  20   TBILIRUBIN 0.6  --  0.3   ALKPHOSPHAT  67  --  67   GLOBULIN 3.6*  --  3.3   INR 1.34* 1.38*  --      Imaging Results:  EC-ECHOCARDIOGRAM COMPLETE W/ CONT   Final Result      CT-EXTREMITY, LOWER W/O LEFT   Final Result         1.  Diffuse subcutaneous fat stranding from the mid thigh to the ankle, appearance compatible with cellulitis and/or edema.   2.  No soft tissue gas identified to indicate necrotizing fasciitis.   3.  Prepatellar fluid collection, could represent hematoma, seroma, or possibly evolving abscess.   4.  Small knee joint effusion      EC-PETER W/O CONT    (Results Pending)     ASSESSMENT/PLAN:  Ms. Duggan is a 59 y/o female with a history of Class III obesity, A-fib on Eliquis, renal cell carcinoma s/p R nephrectomy, LONNIE not on CPAP/BiPAP, anemia, and anxiety who was a direct admit from [place] for worsening LLE cellulitis and concerns for necrotizing fasciitis and endocarditis.    Left lower extremity cellulitis   Patient with LLE edema, erythema, developing bullae, and pain out of proportion found to have cellulitis that was concerning for necrotizing fasciitis. Minimal improvement with multiple IV antibiotics. I&D performed by Dr. Ortiz, orthopedic surgeon on 4/19/2024, which ruled out necrotizing fasciitis and abscess. Dr. Warren, infectious disease, recommended IV Penicillin and Linezolid to target group C strep infection with strict anaphylactic precautions. Per patient's records, patient has tolerated Amoxicillin in the past. Patient currently tolerating IV Penicillin and Linezolid well. Will continue to treat and monitor for progress.   - Continue IV Penicllin (4/19-4/28)  - Continue Linezolid (4/19-4/21)  - IV Bendadryl PRN for allergic reaction, eg. new rash, swelling, itching, wheezing  - If allergic reaction erupts, stop IV Penicillin and switch to IV Ceftriaxone  - Weight bearing as tolerated  - Daily wet-to-dry dressing changes    Bacteremia, group C strep  Patient with 4/4 positive blood cultures growing group C strep (Strep  dysagalactiae). Followed by Dr. Warren, infectious disease, who recommended IV Penicillin and Linezolid. Per patient's records, she has tolerated Amoxicillin in the past and stated that she developed a rash to Penicillin as a child. Repeat blood cultures show no growth to date. Patient also has a 2/6 systolic ejection murmur best heard at the right upper sternal border. PETER did not have great visualization of the heart valves. Pending TTE on Monday, 4/22/24. Will continue antibiotics, monitor for allergic reactions, and treat accordingly.   - Continue IV Penicillin (4/19-4/28)  - Continue Linezolid (4/19-4/21)  - IV Bendadryl PRN for allergic reaction, eg. new rash, swelling, itching, wheezing  - If allergic reaction erupts, stop IV Penicillin and switch to IV Ceftriaxone  - Pending TTE on Monday, 4/22/24    Lactic acidosis  Patient had a lactic acid level of 3.1 on admission which has been improving since. Elevated lactic acid likely due to underlying infection. Today lactic acid is 1.3.  - Fluid boluses as needed    Anemia  Patient's Hgb decreased from 11.4 to 8.6 today. This is likely from being in a post-op setting. Iron studies not warranted at this time. Will continue to trend CBC and monitor anemia. Transfusion not indicated at this time.   - Trend CBC daily     Atrial fibrillation  Patient with a history of atrial fibrillation on Eliquis. Eliquis was recently held for an episode of hemoptysis and for surgery. Patient's Hgb has dropped from 11.4 to 8.6 today. Will continue to hold Eliquis until CBC is stable.   - Hold Eliquis  - Trend CBC daily    Obstructive sleep apnea  Patient with a history of LONNIE not on CPAP or BiPAP at home. May benefit from CPAP or BiPAP at home.  - May order CPAP if desired  - Sleep medicine referral     History of renal cell carcinoma s/p right nephrectomy  Patient with history of RCC s/p R nephrectomy. Baseline creatinine is unknown, however, Cr is 0.91 today. Stable.  - Avoid  nephrotoxin  - Renally dose medications    PROPHYLAXIS  DVT: SCDs/TEDs     Overseeing Licensed Provider: Dr. Jeimy Bush MD    Student:  [facility]  [employer]

## 2024-04-20 NOTE — PROGRESS NOTES
"      Orthopaedic Progress Note    Interval changes:  Patient doing well post op  RLE dressings are CDI-to be changed by wound team  Cleared for DC to SNF by ortho pending medicine clearance    ROS - Patient denies any new issues.  Pain well controlled.    /53   Pulse 61   Temp 36.3 °C (97.3 °F) (Temporal)   Resp 16   Ht 1.753 m (5' 9\")   Wt (!) 181 kg (399 lb 0.5 oz)   SpO2 98%     Patient seen and examined  No acute distress  Breathing non labored  RRR  RLE dressings CDI, DVNI, moves all toes, cap refill, <2 sec.    Recent Labs     04/19/24  0051 04/20/24  0039   WBC 17.5* 20.7*   RBC 4.65 3.45*   HEMOGLOBIN 11.4* 8.6*   HEMATOCRIT 37.8 27.9*   MCV 81.3* 80.9*   MCH 24.5* 24.9*   MCHC 30.2* 30.8*   RDW 42.0 42.1   PLATELETCT 241 262   MPV 10.5 11.1       Active Hospital Problems    Diagnosis     H/O renal cell carcinoma s/p nephrectomy [Z85.528]      Priority: Medium    Anemia [D64.9]      Priority: Low    Cellulitis of left lower extremity [L03.116]     Lactic acidosis [E87.20]     Obesity [E66.9]     A-fib ([facility]) [I48.91]     Mood disorder ([facility]) [F39]     Bacteremia [R78.81]     Obstructive sleep apnea [G47.33]        Assessment/Plan:  Patient doing well post op  RLE dressings are CDI-to be changed by wound team  Cleared for DC to SNF by ortho pending medicine clearance  POD#1 S/P  Left leg fasciotomy of lateral and superficial posterior compartments.  Wt bearing status - WBAT LLE  Wound care/Drains - Dressings to be changed by wound team  Future Procedures - none planned   Sutures/Staples out- 14-21 days post operatively. Removal will completed by ortho mid levels only.  PT/OT-initiated  Antibiotics: pen G  DVT Prophylaxis- TEDS/SCDs/Foot pumps  Krueger-not needed per ortho  Case Coordination for Discharge Planning - Disposition per therapy recs.    "

## 2024-04-20 NOTE — WOUND TEAM
[facility]  Inpatient Services  Initial Wound and Skin Care Evaluation    Admission Date: 2024     Last order of IP CONSULT TO WOUND CARE was found on 2024 from Hospital Encounter on 2024     HPI, PMH, SH: Reviewed    Past Surgical History:   Procedure Laterality Date    IRRIGATION & DEBRIDEMENT GENERAL Left 2024    Procedure: IRRIGATION AND DEBRIDEMENT, WOUND - LEG;  Surgeon: Taco Ortiz M.D.;  Location: SURGERY [facility];  Service: Orthopedics     Social History     Tobacco Use    Smoking status: Former     Current packs/day: 0.00     Average packs/day: 1 pack/day for 38.0 years (38.0 ttl pk-yrs)     Types: Cigarettes     Start date: 1978     Quit date: 2016     Years since quittin.4    Smokeless tobacco: Never   Substance Use Topics    Alcohol use: Never     No chief complaint on file.    Diagnosis: Bacteremia [R78.81]    Unit where seen by Wound Team: S140/00     WOUND CONSULT RELATED TO:  RACHEL CAMPOS    WOUND TEAM PLAN OF CARE - Frequency of Follow-up:   Nursing to follow dressing orders written for wound care. Contact wound team if area fails to progress, deteriorates or with any questions/concerns if something comes up before next scheduled follow up (See below as to whether wound is following and frequency of wound follow up)   Bi-weekly -      WOUND HISTORY:   Pt is a 57 yo female who is a direct admit from [facility] for L leg cellulitis with necrotic bulla.  Hx includes A-fib on Eliquis.  Admitted for cellulitis, bacteremia.  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  Pt states that her leg started becoming red and swollen around the beginning of 24 I&D fasciotomy L leg Dr Ortiz - recommending NPWT when periwound amenable  24 ID following - Dr Warren           WOUND ASSESSMENT/LDA             Wound 24 Leg Left medial and lateral (Active)   Wound Image    24 1200   Site Assessment  24    Periwound Assessment Maceration;Edema;Blanchable erythema 04/20/24 1200   Drainage Amount Small 04/20/24 1200   Drainage Description Serosanguineous 04/20/24 1200   Measurements Medial 15x3.8x2.7  Lateral 14x2.6x4    Treatments  04/19/24 2000   Periwound Protectant Adaptic 04/20/24 1200   Dressing Status  04/20/24 1200   Dressing Changed Changed 04/20/24 1200   Dressing Cleansing/Solutions 1/4 Strength Dakin's Solution 04/20/24 1200   Dressing Options Moist Roll Gauze;Absorbent Abdominal Pad;Dry Roll Gauze;Ace Wrap;Offloading Dressing - Heel 04/20/24 1200   Dressing Change/Treatment Frequency Every Shift, and As Needed 04/20/24 1200   NEXT Dressing Change/Treatment Date 04/20/24 04/20/24 1200   NEXT Weekly Photo (Inpatient Only) 04/27/24 04/20/24 1200   Wound Team Following Other (comment) 04/20/24 1200   Wound Bed Granulation (%) 20 % granular, 20% adipose, 60% muscle 04/20/24 1200   Wound Odor None 04/20/24 1200   WOUND NURSE ONLY - Time Spent with Patient (mins) 60 04/20/24 1200         Vascular:    JUSTIN:   No results found.    CT scan 4/19/24 L leg  1.  Diffuse subcutaneous fat stranding from the mid thigh to the ankle, appearance compatible with cellulitis and/or edema.  2.  No soft tissue gas identified to indicate necrotizing fasciitis.  3.  Prepatellar fluid collection, could represent hematoma, seroma, or possibly evolving abscess.  4.  Small knee joint effusion    Lab Values:    Lab Results   Component Value Date/Time    WBC 20.7 (H) 04/20/2024 12:39 AM    RBC 3.45 (L) 04/20/2024 12:39 AM    HEMOGLOBIN 8.6 (L) 04/20/2024 12:39 AM    HEMATOCRIT 27.9 (L) 04/20/2024 12:39 AM    CREACTPROT 27.55 (H) 04/19/2024 08:19 AM    SEDRATEWES 12 07/22/2023 10:39 AM    HBA1C 6.4 (H) 07/25/2023 12:25 AM         Culture Results show: 4/19/24 intra operatively - awaiting final  No results found for this or any previous visit (from the past 720 hour(s)).    Pain Level/Medicated:  PO pain medications administered by bedside  RN 60 prior   requesting additional pain meds for next dressing change, dressing soaked with lidocaine    INTERVENTIONS BY WOUND TEAM:  CIrish Performed standard wound care which includes appropriate positioning, dressing removal and non-selective debridement. Pictures and measurements obtained weekly if/when required.    Hg 8.6, INR 1.38    Wound:  L leg medial and lateral  Preparation for Dressing removal: Dressing soaked with Lidocaine  Cleansed/Non-selectively Debrided with:  Normal Saline and Gauze  Leonela wound: Cleansed with Normal Saline and Gauze, Prepped with N/A  Primary Dressing:  adaptic to leonela wound fragile skin, dakins soaked gauze to wounds  Secondary (Outer) Dressing: ABD, rolled gauze, heel mepilex to protect heel and anterior ankle, ACE    Advanced Wound Care Discharge Planning  Number of Clinicians necessary to complete wound care: 1  Is patient requiring IV pain medications for dressing changes:  No   Length of time for dressing change 60 min. (This does not include chart review, pre-medication time, set up, clean up or time spent charting.)    Interdisciplinary consultation: Patient, Bedside RN (), .      EVALUATION / RATIONALE FOR TREATMENT:     Date:  04/20/24  Wound Status:  Initial evaluation    Clean wounds - 100% viable tissue. Leonela wound is fragile with lifting epithelial and weeping drainage - will re assess for NPWT when leonela improves and a seal can be achieved.  Erythema is receeding from sharpie lines, pt agrees the color is much improved.           Goals: Granular wound beds    NURSING PLAN OF CARE ORDERS:  Dressing changes: See Dressing Care orders    NUTRITION RECOMMENDATIONS   Wound Team Recommendations:  Dietary consult    DIET ORDERS (From admission to next 24h)       Start     Ordered    04/19/24 1757  Diet Order Diet: Regular  ALL MEALS        Question:  Diet:  Answer:  Regular    04/19/24 1756                    PREVENTATIVE INTERVENTIONS:    Q shift Noah - performed per  nursing policy  Q shift pressure point assessments - performed per nursing policy    Surface/Positioning  Bariatric JOLENE - Currently in Place    Offloading/Redistribution  Heel offloading dressing (Silicone dressing) - Currently in Place    Anticipated discharge plans:  TBD    pt will need ongoing wound care for L leg following discharge    Vac Discharge Needs:  Vac Discharge plan is purely a recommendation from wound team and not a requirement for discharge unless otherwise stated by physician.  Will initiate VAC in the next few days

## 2024-04-20 NOTE — PROGRESS NOTES
Med rec completed utilizing medication list from [facility]. Transfer paper work also included med rec done there.

## 2024-04-20 NOTE — CONSULTS
DATE OF SERVICE:  04/19/2024     INFECTIOUS DISEASES CONSULTATION     REQUESTING PHYSICIAN:  Jeimy Bush MD     IDENTIFICATION:  A 58-year-old patient seen for evaluation of group C strep   bacteremia.     HISTORY OF PRESENT ILLNESS:  I reviewed the records from [facility].  She   is a 58-year-old female who presented with increasing left leg pain associated   with leukocytosis and fever.  Blood cultures were obtained there and multiple   bottles are growing group C strep.She was treated with varied   antibiotics including daptomycin, levofloxacin, vancomycin and the patient   continued to deteriorate and so she was referred to [facility] for further   evaluation.  Because of the possibility of this being necrotizing fasciitis,   Dr. Ortiz operated on her today and he performed a left leg fasciotomy of the   lateral and superficial posterior compartments.  He did not find any   compromised fascia.  He also aspirated prepatellar fluid collection and sent   that for Gram stain and culture and she was returned to her room.  The patient   has a documented history of PENICILLIN ALLERGY, but she also has previously   been on amoxicillin without any problem.  She has been on daptomycin and   linezolid here.  She is presently sleepy postoperative and is unable to wake   up enough to answer questions.  The rest of this is from chart review.  She   has presently been ordered for penicillin 24 million units IV continuous   infusion and linezolid.     Scheduled Medications   Medication Dose Frequency    senna-docusate  2 Tablet Q EVENING    linezolid (ZYVOX) IV  600 mg Q12HRS    penicillin G potassium 24 Million Units in  mL infusion  24 Million Units Continuous Abx    sotalol  120 mg BID    acetaminophen  1,000 mg Q8HRS      PAST MEDICAL HISTORY:  History of atrial fibrillation, arrhythmias, COPD and   hypertension.     PAST SURGICAL HISTORY:  Unremarkable.     ALLERGIES:  As noted above.  The patient  reports tolerating amoxicillin as   well as Zosyn previously.  THE PENICILLIN ALLERGY IS POSSIBLY ITCHING AND   SWELLING.     SOCIAL HISTORY:  The patient has 3 children at home and lives in the [place]   area.     Vitals:    04/19/24 1910   BP: 114/58   Pulse: 78   Resp: 14   Temp: 36.5 °C (97.7 °F)   SpO2: 95%      PHYSICAL EXAMINATION:  VITAL SIGNS:  She is presently afebrile.  HEART:  Her physical exam is remarkable for a systolic murmur heard at the   upper sternal border.  ABDOMEN:  Unremarkable.  EXTREMITIES:  Her left leg is in postop dressing, but the exposed area below the left   knee is indurated, erythematous and quite warm compared to the other side.    There is significant swelling of her legs.     LABORATORY DATA AND IMAGING:  As noted, she had group C strep in her blood.    Her white count was markedly elevated in [place] and on admission here was 17.5.    Her chemistry panel shows a creatinine 0.91, albumin of 2.7.  Transaminases   normal.   and glucose of 65.  Imaging of her left leg showed diffuse   subcutaneous fat stranding from the mid thigh to the ankle, appearance   compatible with cellulitis and/or edema.  No soft tissue gas identified to   indicate necrotizing fasciitis and a prepatellar fluid collection, possibly   consistent with hematoma, seroma or abscess and a small knee joint effusion.    Her aspirate of her prepatellar fluid collection showed 16,300 nucleated   cells, 8000 red cells and 100% polys.  The Gram stain shows many white cells   and no bacteria were noted.  Blood cultures are pending here.     ASSESSMENT:  This is a 58-year-old female who has developed a Group C   (Strep dysgalactiae) infection, which can be very invasive and severe.  This strain   of Streptococcus often times has the same pathogenic mechanisms seen with Group A Strep including  the M1 protein, antistreptolysin toxin, etc. It can definitely present  as a necrotizing infection.  She now has bacteremia,  severe cellulitis,a prepatellar infection and possibly also endocarditis.  Given the invasive, likely toxin mediated infection, treatment is using linezolid  to block toxin production combined with penicillin, which is the drug of choice for strep infections because it is uniformly   highly susceptible to penicillin..     RECOMMENDATIONS:  1.  Start the IV penicillin 24 million units IV q. 24 hours.  Her renal   function is normal and we can keep a close eye if she does get an allergic   reaction with Benadryl.  If she is not tolerant of this, we can switch her to   ceftriaxone 2 grams IV daily.  2.  Continue the linezolid for 72 hours for its antitoxin activity.  3.  Agree with transesophageal echo even though right now she is already on   treatment for strep endocarditis with high-dose penicillin, however, that diagnosis will   extend her treatment probably for at least 4 weeks.  4.  Continue to monitor the culture results as well as her physical exam to   make sure she does not progress to a more necrotizing cellulitis.     Thank you very much and I will continue to follow this challenging patient   with you.        ______________________________  MD MERYL VALLADARES/CHRIS/ALEXIS    DD:  04/19/2024 17:25  DT:  04/19/2024 18:51    Job#:  [id number]

## 2024-04-20 NOTE — PROGRESS NOTES
58 year old female seen in f/u for severe Group C strep  bacteremia and invasive infection. This morning she continues on iv PCN and linezolid. . Feels better, very alert . Pain under control.   She relates that she was told by her mother that she had a reaction to PCN as a child and has avoided it since, however has taken Amoxicillin without problem in the past. Denies any rash or itching this morning. She is on oxygen at home.     Scheduled Medications   Medication Dose Frequency    atorvastatin  20 mg QHS    mometasone-formoterol  2 Puff BID    dakins 0.125% (1/4 strength)   BID    senna-docusate  2 Tablet Q EVENING    linezolid (ZYVOX) IV  600 mg Q12HRS    penicillin G potassium 24 Million Units in  mL infusion  24 Million Units Continuous Abx    sotalol  120 mg BID    acetaminophen  1,000 mg Q8HRS      Vitals:    04/20/24 0809   BP: 124/53   Pulse: 61   Resp: 16   Temp: 36.3 °C (97.3 °F)   SpO2: 98%      Comfortable appearing alert and interactive     Card- systolic murmur right upper sternal border   Skin- no rash   Ext- leftleg- erythema and swelliing below left knee is less rred and less indurated     LABS:        Component 1 d ago   Significant Indicator NEG P   Source SYNO P   Site Left Knee P   Culture Result Culture in progress. P   Resulting Agency M                   Component 1 d ago   Significant Indicator NEG   Source WND   Site Left leg wound   Culture Result -   Gram Stain Result Rare WBCs.  No organisms seen.   Resulting Agency M                   Asses: see asses from yesterday's ID consult   She is doing well today,  more stable and no fever. The elevated WBC in this morning's labs  likely reflects the infection and yesterday's surgery.On exam, she does have a  systolic murmur over the aortic area  which was not noted in a cardiology consult last year.   She is tolerating PCN well without rash or itching  and the allergy should be removed from the EMR.     REC: complete 3 days of  linezolid            Continue iv PCN at least 2 weeks and possibly longer if there is  evidence for endocarditis on upcoming PETER.         Discussed with patient and Med Team

## 2024-04-20 NOTE — PROGRESS NOTES
[facility] Internal Medicine Daily Progress Note    Date of Service  4/20/2024    [employer] Team: R IM Blue Team   Attending: Jeimy Bush M.d.  Senior Resident: Dr. Pinzon  Intern:  Dr. Pinto  Contact Number: [phone]    Chief Complaint  Savita Duggan is a 58 y.o. female admitted as direct admit from [place] for worsening left lower extremity cellulitis and possible infective endocarditis.    Hospital Course  Savita Duggan is a 58 y.o. female who presented 4/18/2024 from outside hospital due to worsening infection of left lower extremity. Patient reports that symptom onset was approximately 3 weeks ago when she started to notice redness around her mid shin that slowly started to worsen.  Patient denies any trauma or injury to site of infection.  Due to increasing pain, erythema and swelling the patient elected to present to her nearest emergency department which is at [place].  Reportedly the patient was admitted on 4/14 and diagnosed with left lower extremity cellulitis and initiated on vancomycin and Levaquin without significant improvement and patient's blood cultures grew group C strep and antibiotics were escalated to cefepime but the patient still continues to have limited improvement.  Also reportedly the patient may have had a murmur subsequently plan was created to transfer the patient to [facility] for further evaluation and management.  Orthopedics was consulted on admission, pharmacies and drainage of left lower extremity on 4/19 due to concern for necrotizing fasciitis.  Consulted infectious disease regarding possible infective endocarditis, and transitioning antibiotics to IV penicillin.  TTE performed although unable to visualize valves, will order PETER.    Interval Problem Update  No acute events overnight. She had left leg I&D yesterday which she tolerated well, appears more likely severe cellulitis rather than necrotizing fasciitis. Pain overall well controlled on current regimen. Med rec completed and  re-initiating home medications. She is scheduled for a PETER on Monday and updated with that plan and she is agreeable to assess heart valves for possible endocarditis. Left leg in ACE wrap, wound team consulted.    I have discussed this patient's plan of care and discharge plan at IDT rounds today with Case Management, Nursing, Nursing leadership, and other members of the IDT team.    Consultants/Specialty  infectious disease and orthopedics    Code Status  Full Code    Disposition  The patient is not medically cleared for discharge to home or a post-acute facility.      I have placed the appropriate orders for post-discharge needs.    Review of Systems  Review of Systems   Constitutional: Negative.    HENT: Negative.     Eyes: Negative.    Respiratory: Negative.     Cardiovascular: Negative.    Gastrointestinal: Negative.    Genitourinary: Negative.    Musculoskeletal:  Positive for joint pain.   Skin:  Positive for rash.   Neurological: Negative.    Endo/Heme/Allergies: Negative.    Psychiatric/Behavioral: Negative.          Physical Exam  Temp:  [36.2 °C (97.2 °F)-37.7 °C (99.9 °F)] 36.3 °C (97.3 °F)  Pulse:  [61-94] 62  Resp:  [12-26] 18  BP: (101-172)/() 120/59  SpO2:  [93 %-99 %] 97 %    Physical Exam  Constitutional:       General: She is not in acute distress.     Appearance: She is obese.   Eyes:      General: No scleral icterus.  Cardiovascular:      Rate and Rhythm: Normal rate and regular rhythm.      Heart sounds: Murmur (systolic murmur III/VI) heard.   Pulmonary:      Effort: Pulmonary effort is normal. No respiratory distress.      Breath sounds: Normal breath sounds. No wheezing.   Abdominal:      General: Abdomen is flat. Bowel sounds are normal.      Palpations: Abdomen is soft.      Tenderness: There is no abdominal tenderness.   Musculoskeletal:         General: Tenderness and deformity (left lower extremity wrapped in ace bandage) present.   Neurological:      General: No focal deficit  present.      Mental Status: She is alert and oriented to person, place, and time. Mental status is at baseline.      Cranial Nerves: No cranial nerve deficit.      Motor: No weakness.   Psychiatric:         Mood and Affect: Mood normal.         Behavior: Behavior normal.         Fluids    Intake/Output Summary (Last 24 hours) at 4/20/2024 1207  Last data filed at 4/20/2024 1114  Gross per 24 hour   Intake 1050 ml   Output 1100 ml   Net -50 ml       Laboratory  Recent Labs     04/19/24  0051 04/20/24  0039   WBC 17.5* 20.7*   RBC 4.65 3.45*   HEMOGLOBIN 11.4* 8.6*   HEMATOCRIT 37.8 27.9*   MCV 81.3* 80.9*   MCH 24.5* 24.9*   MCHC 30.2* 30.8*   RDW 42.0 42.1   PLATELETCT 241 262   MPV 10.5 11.1     Recent Labs     04/19/24  0051 04/20/24  0039   SODIUM 132* 134*   POTASSIUM 4.9 5.4   CHLORIDE 99 103   CO2 20 22   GLUCOSE 65 110*   BUN 11 13   CREATININE 0.91 0.91   CALCIUM 8.6 8.0*     Recent Labs     04/19/24  0051 04/19/24  0446   INR 1.34* 1.38*               Imaging  EC-ECHOCARDIOGRAM COMPLETE W/ CONT   Final Result      CT-EXTREMITY, LOWER W/O LEFT   Final Result         1.  Diffuse subcutaneous fat stranding from the mid thigh to the ankle, appearance compatible with cellulitis and/or edema.   2.  No soft tissue gas identified to indicate necrotizing fasciitis.   3.  Prepatellar fluid collection, could represent hematoma, seroma, or possibly evolving abscess.   4.  Small knee joint effusion      EC-PETER W/O CONT    (Results Pending)        Assessment/Plan  Problem Representation:    * Cellulitis of left lower extremity  Assessment & Plan  LRINEC score 8 on admission, although did appear more like severe cellulitis during incision and drainage by orthopedics.  No abscess, fluid collections, or necrotic tissue.  CRP elevated 27.    Blood cultures x 2 ordered continue to trend no growth to date  Telemetry monitoring  Orthopedics consulted (Dr. Ortiz), perform incision and drainage on 4/19  Consult infectious  disease, Dr. Warren   Recommended transitioning to IV penicillin and continue linezolid as she does have documented allergy to penicillin although discussed with infectious disease and previous tolerated amoxicillin and given similar R-sided changes should be able to tolerate penicillin  Ordered IV Benadryl as needed in case develops allergic reaction  Linezolid for 72 hours to reduce antitoxin  Wound team consulted, ortho recommends wet-to-dry with possible initiation of wound VAC  Wound and synovial cultures pending    Mood disorder ([facility])  Assessment & Plan  Continue home escitalopram 10 mg daily    A-fib ([facility])  Assessment & Plan  Remains overall rate controlled  -Sotalol 120 mg twice daily  - Holding apixaban in the setting of surgery and hemoglobin drop restart as tolerated    Obesity  Assessment & Plan  BMI of 59.45  Possible risk factor for underlying left lower extremity infection  Provide weight loss counseling when appropriate    Lactic acidosis  Assessment & Plan  Likely due to underlying infection improved after fluid bolus  Status post fluid boluses    Bacteremia- (present on admission)  Assessment & Plan  Reportedly grew group C strep and 4 out of 4 vials  Repeat blood cultures ordered 4/18, NGTD  Murmur identified best appreciable at right upper sternal border 3 out of 6 holosystolic.  Does have moderate AS on TTE although unable to obtain great visualization of the valves.  Recommend PETER for further evaluation  -TTE unable to visualize valves recommend PETER  -PETER ordered and scheduled for 4/22  - IV PCN  - Linezolid for 72 hours    Obstructive sleep apnea- (present on admission)  Assessment & Plan  Not on CPAP/BiPAP  Wears nocturnal oxygen around 2L NC  For now continue supplemental oxygen as needed but long-term wise patient may benefit with CPAP/BiPAP    Anemia- (present on admission)  Assessment & Plan  Hgb dropped 8.6 from 11 following surgery, will continue to monitor. No active blood loss  noted;  - Continue to hold apixaban  Continue to trend hemoglobin and monitor for any acute bleeding    H/O renal cell carcinoma s/p nephrectomy- (present on admission)  Assessment & Plan  History of  Limit nephrotoxins         VTE prophylaxis: SCDs/TEDs pharmacologic prophylaxis held due to procedure today    I have performed a physical exam and reviewed and updated ROS and Plan today (4/20/2024). In review of yesterday's note (4/19/2024), there are no changes except as documented above.

## 2024-04-20 NOTE — CARE PLAN
The patient is Watcher - Medium risk of patient condition declining or worsening    Shift Goals  Clinical Goals: ABX, monitor pain and VS, wound care  Patient Goals: rest and feel better  Family Goals: nirmala    Progress made toward(s) clinical / shift goals:        Problem: Knowledge Deficit - Standard  Goal: Patient and family/care givers will demonstrate understanding of plan of care, disease process/condition, diagnostic tests and medications  Note: Assess knowledge level of disease process and answer all questions, explain tests, go over plan of care     Problem: Pain - Standard  Goal: Alleviation of pain or a reduction in pain to the patient’s comfort goal  Note: Give pain management medications as ordered, assess pain often, reposition often, hourly rounding     Problem: Fall Risk  Goal: Patient will remain free from falls  Note: Assess for fall risk factors and implement fall precautions such as bed alarms and signs, safety protocols in place and teach pt, hourly rounding       Patient is not progressing towards the following goals:

## 2024-04-20 NOTE — CARE PLAN
The patient is Stable - Low risk of patient condition declining or worsening    Shift Goals  Clinical Goals: ABX, monitor pain and VS, wound care  Patient Goals: rest and feel better  Family Goals: nirmala    Progress made toward(s) clinical / shift goals:  wound care     Patient is not progressing towards the following goals:      Problem: Knowledge Deficit - Standard  Goal: Patient and family/care givers will demonstrate understanding of plan of care, disease process/condition, diagnostic tests and medications  Outcome: Progressing  Note: Plan of care reviewed with patient at bedside. Wound care preformed this morning by wound care rn. Dressing clean dry and intact, bariatric bed in place with turn pump q2 hrs. Patient endorses a great deal of pain when mobilizing left leg & during wound care but otherwise comfortable with no complaints at rest. Vitals stable, sats >90% on 3L nc. SB-SR on telemetry. Incentive spirometer at bedside. Krueger discontinued this afternoon. pure wick in place.

## 2024-04-21 ENCOUNTER — APPOINTMENT (OUTPATIENT)
Dept: RADIOLOGY | Facility: MEDICAL CENTER | Age: 59
End: 2024-04-21
Payer: COMMERCIAL

## 2024-04-21 PROBLEM — M06.9 RHEUMATOID ARTHRITIS INVOLVING MULTIPLE SITES (HCC): Status: ACTIVE | Noted: 2024-04-21

## 2024-04-21 LAB
ALBUMIN SERPL BCP-MCNC: 2.4 G/DL (ref 3.2–4.9)
ALBUMIN/GLOB SERPL: 0.8 G/DL
ALP SERPL-CCNC: 68 U/L (ref 30–99)
ALT SERPL-CCNC: 17 U/L (ref 2–50)
ANION GAP SERPL CALC-SCNC: 10 MMOL/L (ref 7–16)
AST SERPL-CCNC: 25 U/L (ref 12–45)
BASOPHILS # BLD AUTO: 0.1 % (ref 0–1.8)
BASOPHILS # BLD: 0.02 K/UL (ref 0–0.12)
BILIRUB SERPL-MCNC: 0.2 MG/DL (ref 0.1–1.5)
BUN SERPL-MCNC: 17 MG/DL (ref 8–22)
CALCIUM ALBUM COR SERPL-MCNC: 9.5 MG/DL (ref 8.5–10.5)
CALCIUM SERPL-MCNC: 8.2 MG/DL (ref 8.5–10.5)
CHLORIDE SERPL-SCNC: 101 MMOL/L (ref 96–112)
CO2 SERPL-SCNC: 23 MMOL/L (ref 20–33)
CREAT SERPL-MCNC: 0.95 MG/DL (ref 0.5–1.4)
EOSINOPHIL # BLD AUTO: 0.18 K/UL (ref 0–0.51)
EOSINOPHIL NFR BLD: 1.3 % (ref 0–6.9)
ERYTHROCYTE [DISTWIDTH] IN BLOOD BY AUTOMATED COUNT: 43.2 FL (ref 35.9–50)
GFR SERPLBLD CREATININE-BSD FMLA CKD-EPI: 69 ML/MIN/1.73 M 2
GLOBULIN SER CALC-MCNC: 3.1 G/DL (ref 1.9–3.5)
GLUCOSE SERPL-MCNC: 93 MG/DL (ref 65–99)
HCT VFR BLD AUTO: 26.2 % (ref 37–47)
HGB BLD-MCNC: 8 G/DL (ref 12–16)
IMM GRANULOCYTES # BLD AUTO: 0.58 K/UL (ref 0–0.11)
IMM GRANULOCYTES NFR BLD AUTO: 4.1 % (ref 0–0.9)
LYMPHOCYTES # BLD AUTO: 1.56 K/UL (ref 1–4.8)
LYMPHOCYTES NFR BLD: 11.1 % (ref 22–41)
MAGNESIUM SERPL-MCNC: 2.2 MG/DL (ref 1.5–2.5)
MCH RBC QN AUTO: 24.9 PG (ref 27–33)
MCHC RBC AUTO-ENTMCNC: 30.5 G/DL (ref 32.2–35.5)
MCV RBC AUTO: 81.6 FL (ref 81.4–97.8)
MONOCYTES # BLD AUTO: 0.98 K/UL (ref 0–0.85)
MONOCYTES NFR BLD AUTO: 7 % (ref 0–13.4)
NEUTROPHILS # BLD AUTO: 10.77 K/UL (ref 1.82–7.42)
NEUTROPHILS NFR BLD: 76.4 % (ref 44–72)
NRBC # BLD AUTO: 0.02 K/UL
NRBC BLD-RTO: 0.1 /100 WBC (ref 0–0.2)
PHOSPHATE SERPL-MCNC: 3.1 MG/DL (ref 2.5–4.5)
PLATELET # BLD AUTO: 290 K/UL (ref 164–446)
PMV BLD AUTO: 10.5 FL (ref 9–12.9)
POTASSIUM SERPL-SCNC: 5 MMOL/L (ref 3.6–5.5)
PROT SERPL-MCNC: 5.5 G/DL (ref 6–8.2)
RBC # BLD AUTO: 3.21 M/UL (ref 4.2–5.4)
SODIUM SERPL-SCNC: 134 MMOL/L (ref 135–145)
WBC # BLD AUTO: 14.1 K/UL (ref 4.8–10.8)

## 2024-04-21 PROCEDURE — A9270 NON-COVERED ITEM OR SERVICE: HCPCS

## 2024-04-21 PROCEDURE — 700102 HCHG RX REV CODE 250 W/ 637 OVERRIDE(OP)

## 2024-04-21 PROCEDURE — 36415 COLL VENOUS BLD VENIPUNCTURE: CPT

## 2024-04-21 PROCEDURE — 770020 HCHG ROOM/CARE - TELE (206)

## 2024-04-21 PROCEDURE — 85025 COMPLETE CBC W/AUTO DIFF WBC: CPT

## 2024-04-21 PROCEDURE — 99232 SBSQ HOSP IP/OBS MODERATE 35: CPT | Mod: GC | Performed by: INTERNAL MEDICINE

## 2024-04-21 PROCEDURE — 80053 COMPREHEN METABOLIC PANEL: CPT

## 2024-04-21 PROCEDURE — 94664 DEMO&/EVAL PT USE INHALER: CPT

## 2024-04-21 PROCEDURE — 83735 ASSAY OF MAGNESIUM: CPT

## 2024-04-21 PROCEDURE — 93970 EXTREMITY STUDY: CPT

## 2024-04-21 PROCEDURE — 700105 HCHG RX REV CODE 258

## 2024-04-21 PROCEDURE — 700111 HCHG RX REV CODE 636 W/ 250 OVERRIDE (IP): Mod: JZ | Performed by: INTERNAL MEDICINE

## 2024-04-21 PROCEDURE — 84100 ASSAY OF PHOSPHORUS: CPT

## 2024-04-21 PROCEDURE — 700111 HCHG RX REV CODE 636 W/ 250 OVERRIDE (IP)

## 2024-04-21 PROCEDURE — 99231 SBSQ HOSP IP/OBS SF/LOW 25: CPT | Performed by: INTERNAL MEDICINE

## 2024-04-21 RX ORDER — POLYETHYLENE GLYCOL 3350 17 G/17G
1 POWDER, FOR SOLUTION ORAL 2 TIMES DAILY
Status: DISCONTINUED | OUTPATIENT
Start: 2024-04-21 | End: 2024-04-29 | Stop reason: HOSPADM

## 2024-04-21 RX ORDER — AMOXICILLIN 250 MG
2 CAPSULE ORAL EVERY EVENING
Status: DISCONTINUED | OUTPATIENT
Start: 2024-04-21 | End: 2024-04-29 | Stop reason: HOSPADM

## 2024-04-21 RX ORDER — ONDANSETRON 4 MG/1
4 TABLET, ORALLY DISINTEGRATING ORAL EVERY 4 HOURS PRN
Status: DISCONTINUED | OUTPATIENT
Start: 2024-04-21 | End: 2024-04-29 | Stop reason: HOSPADM

## 2024-04-21 RX ADMIN — ATORVASTATIN CALCIUM 20 MG: 20 TABLET, FILM COATED ORAL at 20:15

## 2024-04-21 RX ADMIN — ACETAMINOPHEN 1000 MG: 500 TABLET, FILM COATED ORAL at 17:25

## 2024-04-21 RX ADMIN — SOTALOL HYDROCHLORIDE 120 MG: 120 TABLET ORAL at 05:31

## 2024-04-21 RX ADMIN — SODIUM HYPOCHLORITE 50 ML: 1.25 SOLUTION TOPICAL at 09:36

## 2024-04-21 RX ADMIN — OXYCODONE HYDROCHLORIDE 10 MG: 10 TABLET ORAL at 17:25

## 2024-04-21 RX ADMIN — MOMETASONE FUROATE AND FORMOTEROL FUMARATE DIHYDRATE 2 PUFF: 200; 5 AEROSOL RESPIRATORY (INHALATION) at 17:25

## 2024-04-21 RX ADMIN — ESCITALOPRAM OXALATE 10 MG: 10 TABLET ORAL at 05:31

## 2024-04-21 RX ADMIN — SODIUM CHLORIDE 24 MILLION UNITS: 9 INJECTION, SOLUTION INTRAVENOUS at 19:41

## 2024-04-21 RX ADMIN — GABAPENTIN 100 MG: 100 CAPSULE ORAL at 18:38

## 2024-04-21 RX ADMIN — LINEZOLID 600 MG: 600 INJECTION INTRAVENOUS at 05:35

## 2024-04-21 RX ADMIN — LINEZOLID 600 MG: 600 INJECTION INTRAVENOUS at 17:27

## 2024-04-21 RX ADMIN — OXYCODONE HYDROCHLORIDE 10 MG: 10 TABLET ORAL at 09:36

## 2024-04-21 RX ADMIN — APIXABAN 5 MG: 5 TABLET, FILM COATED ORAL at 09:33

## 2024-04-21 RX ADMIN — GABAPENTIN 100 MG: 100 CAPSULE ORAL at 13:20

## 2024-04-21 RX ADMIN — APIXABAN 5 MG: 5 TABLET, FILM COATED ORAL at 19:47

## 2024-04-21 RX ADMIN — GABAPENTIN 100 MG: 100 CAPSULE ORAL at 00:29

## 2024-04-21 RX ADMIN — GABAPENTIN 100 MG: 100 CAPSULE ORAL at 08:19

## 2024-04-21 RX ADMIN — OXYCODONE 5 MG: 5 TABLET ORAL at 05:30

## 2024-04-21 RX ADMIN — OXYCODONE 5 MG: 5 TABLET ORAL at 00:29

## 2024-04-21 RX ADMIN — SENNOSIDES AND DOCUSATE SODIUM 2 TABLET: 50; 8.6 TABLET ORAL at 17:25

## 2024-04-21 RX ADMIN — SOTALOL HYDROCHLORIDE 120 MG: 120 TABLET ORAL at 17:26

## 2024-04-21 RX ADMIN — POLYETHYLENE GLYCOL 3350 1 PACKET: 17 POWDER, FOR SOLUTION ORAL at 09:34

## 2024-04-21 RX ADMIN — ACETAMINOPHEN 1000 MG: 500 TABLET, FILM COATED ORAL at 09:33

## 2024-04-21 RX ADMIN — MOMETASONE FUROATE AND FORMOTEROL FUMARATE DIHYDRATE 2 PUFF: 200; 5 AEROSOL RESPIRATORY (INHALATION) at 05:32

## 2024-04-21 ASSESSMENT — ENCOUNTER SYMPTOMS
RESPIRATORY NEGATIVE: 1
CONSTITUTIONAL NEGATIVE: 1
EYES NEGATIVE: 1
PSYCHIATRIC NEGATIVE: 1
CARDIOVASCULAR NEGATIVE: 1
GASTROINTESTINAL NEGATIVE: 1
NEUROLOGICAL NEGATIVE: 1

## 2024-04-21 ASSESSMENT — CHA2DS2 SCORE
AGE 75 OR GREATER: NO
SEX: FEMALE
HYPERTENSION: NO
DIABETES: NO
CHA2DS2 VASC SCORE: 2
AGE 65 TO 74: NO
PRIOR STROKE OR TIA OR THROMBOEMBOLISM: NO
CHF OR LEFT VENTRICULAR DYSFUNCTION: YES
VASCULAR DISEASE: NO

## 2024-04-21 ASSESSMENT — PAIN DESCRIPTION - PAIN TYPE: TYPE: ACUTE PAIN

## 2024-04-21 ASSESSMENT — FIBROSIS 4 INDEX: FIB4 SCORE: 1.23

## 2024-04-21 NOTE — DIETARY
Nutrition Update:    Day 3 of admit.  Savita Dgugan is a 58 y.o. female with admitting DX of Bacteremia [R78.81].  Patient being followed to optimize nutrition.    RD consulted for PRESSURE ULCER STAGE 2-4 OR NON-HEALING WOUND. Wound team following. RD to order Riley BID and Ensure Max x1/d to support wound healing. Currently pt eating >50% majority of meals. Continue to encourage intake of all meals and supplements.     Problem: Nutritional:  Goal: Achieve adequate nutritional intake  Description: Patient will consume >50% of meals  Outcome: Met    RD to monitor per department policy.

## 2024-04-21 NOTE — PROGRESS NOTES
[facility] Internal Medicine Daily Progress Note    Date of Service  4/21/2024    [employer] Team: R IM Blue Team   Attending: Jeimy Bush M.d.  Senior Resident: Dr. Pinzon  Intern:  Dr. Pinto  Contact Number: [phone]    Chief Complaint  Savita Duggan is a 58 y.o. female admitted as direct admit from [place] for worsening left lower extremity cellulitis and possible infective endocarditis.    Hospital Course  Savita Duggan is a 58 y.o. female who presented 4/18/2024 from outside hospital due to worsening infection of left lower extremity. Patient reports that symptom onset was approximately 3 weeks ago when she started to notice redness around her mid shin that slowly started to worsen.  Patient denies any trauma or injury to site of infection.  Due to increasing pain, erythema and swelling the patient elected to present to her nearest emergency department which is at [place].  Reportedly the patient was admitted on 4/14 and diagnosed with left lower extremity cellulitis and initiated on vancomycin and Levaquin without significant improvement and patient's blood cultures grew group C strep and antibiotics were escalated to cefepime but the patient still continues to have limited improvement.  Also reportedly the patient may have had a murmur subsequently plan was created to transfer the patient to [facility] for further evaluation and management.  Orthopedics was consulted on admission, pharmacies and drainage of left lower extremity on 4/19 due to concern for necrotizing fasciitis.  Consulted infectious disease regarding possible infective endocarditis, and transitioning antibiotics to IV penicillin.  TTE performed although unable to visualize valves, and PETER ordered.  She previously had reported history of alert allergy to penicillins although tolerated IV penicillin without any adverse allergic reactions and removed from allergy list.    Interval Problem Update  No acute events overnight. Continues to tolerate IV pencillin  without itching or tejas. Removed allergy from documented allergy list. She is overall doing better, pain is under control although states wound care was very painful. Was previously on prednisone for RA but states she does not wish to start this again.  States she has not had a bowel movement since transfer to this facility, will schedule MiraLAX twice daily.  Hemoglobin overall stable will initiate apixaban for A-fib.  DVT ultrasound bilateral lower extremity performed without evidence of DVT.  She is scheduled for PETER tomorrow make n.p.o. at midnight.    I have discussed this patient's plan of care and discharge plan at IDT rounds today with Case Management, Nursing, Nursing leadership, and other members of the IDT team.    Consultants/Specialty  infectious disease and orthopedics    Code Status  Full Code    Disposition  The patient is not medically cleared for discharge to home or a post-acute facility.      I have placed the appropriate orders for post-discharge needs.    Review of Systems  Review of Systems   Constitutional: Negative.    HENT: Negative.     Eyes: Negative.    Respiratory: Negative.     Cardiovascular: Negative.    Gastrointestinal: Negative.    Genitourinary: Negative.    Musculoskeletal:  Positive for joint pain.   Skin:  Positive for rash.   Neurological: Negative.    Endo/Heme/Allergies: Negative.    Psychiatric/Behavioral: Negative.          Physical Exam  Temp:  [36.3 °C (97.3 °F)-36.6 °C (97.9 °F)] 36.5 °C (97.7 °F)  Pulse:  [56-66] 56  Resp:  [16-18] 18  BP: (120-154)/(46-66) 123/46  SpO2:  [92 %-97 %] 94 %    Physical Exam  Constitutional:       General: She is not in acute distress.     Appearance: She is obese.   Eyes:      General: No scleral icterus.  Cardiovascular:      Rate and Rhythm: Normal rate and regular rhythm.      Heart sounds: Murmur (systolic murmur III/VI best appreciable RUSB) heard.   Pulmonary:      Effort: Pulmonary effort is normal. No respiratory distress.       Breath sounds: Normal breath sounds. No wheezing.   Abdominal:      General: Abdomen is flat. Bowel sounds are normal.      Palpations: Abdomen is soft.      Tenderness: There is no abdominal tenderness.   Musculoskeletal:         General: Tenderness and deformity (left lower extremity wrapped in ace bandage) present.      Right lower leg: Edema present.      Left lower leg: Edema present.   Skin:     Findings: Lesion (bilateral upper extremities) and rash (left knee) present.   Neurological:      General: No focal deficit present.      Mental Status: She is alert and oriented to person, place, and time. Mental status is at baseline.      Cranial Nerves: No cranial nerve deficit.      Motor: No weakness.   Psychiatric:         Mood and Affect: Mood normal.         Behavior: Behavior normal.         Fluids    Intake/Output Summary (Last 24 hours) at 4/21/2024 1039  Last data filed at 4/21/2024 1017  Gross per 24 hour   Intake 1080 ml   Output 1250 ml   Net -170 ml       Laboratory  Recent Labs     04/19/24  0051 04/20/24  0039 04/21/24  0020   WBC 17.5* 20.7* 14.1*   RBC 4.65 3.45* 3.21*   HEMOGLOBIN 11.4* 8.6* 8.0*   HEMATOCRIT 37.8 27.9* 26.2*   MCV 81.3* 80.9* 81.6   MCH 24.5* 24.9* 24.9*   MCHC 30.2* 30.8* 30.5*   RDW 42.0 42.1 43.2   PLATELETCT 241 262 290   MPV 10.5 11.1 10.5     Recent Labs     04/19/24  0051 04/20/24  0039 04/21/24  0020   SODIUM 132* 134* 134*   POTASSIUM 4.9 5.4 5.0   CHLORIDE 99 103 101   CO2 20 22 23   GLUCOSE 65 110* 93   BUN 11 13 17   CREATININE 0.91 0.91 0.95   CALCIUM 8.6 8.0* 8.2*     Recent Labs     04/19/24  0051 04/19/24  0446   INR 1.34* 1.38*               Imaging  US-EXTREMITY VENOUS LOWER BILAT   Final Result      EC-ECHOCARDIOGRAM COMPLETE W/ CONT   Final Result      CT-EXTREMITY, LOWER W/O LEFT   Final Result         1.  Diffuse subcutaneous fat stranding from the mid thigh to the ankle, appearance compatible with cellulitis and/or edema.   2.  No soft tissue gas identified  to indicate necrotizing fasciitis.   3.  Prepatellar fluid collection, could represent hematoma, seroma, or possibly evolving abscess.   4.  Small knee joint effusion      EC-PETER W/O CONT    (Results Pending)        Assessment/Plan  Problem Representation:    * Cellulitis of left lower extremity  Assessment & Plan  LRINEC score 8 on admission, although did appear more like severe cellulitis during incision and drainage by orthopedics.  No abscess, fluid collections, or necrotic tissue.  CRP elevated 27.    Blood cultures x 2 ordered continue to trend no growth to date  Telemetry monitoring  Orthopedics consulted (Dr. Ortiz), perform incision and drainage on 4/19  Consult infectious disease, Dr. Warren   Recommended transitioning to IV penicillin and continue linezolid as she does have documented allergy to penicillin although discussed with infectious disease and previous tolerated amoxicillin and given similar R-sided changes should be able to tolerate penicillin  Ordered IV Benadryl as needed in case develops allergic reaction  Linezolid for 72 hours to reduce antitoxin  Wound team consulted, ortho recommends wet-to-dry with possible initiation of wound VAC  Wound and synovial cultures pending    Rheumatoid arthritis involving multiple sites ([facility])  Assessment & Plan  History rheumatoid arthritis with joint pain  - Follows with rheumatology in [place]  - Prescribed prednisone 15 mg morning and 5 mg at night, currently holding per patient preference    Mood disorder ([facility])  Assessment & Plan  Continue home escitalopram 10 mg daily    A-fib ([facility])  Assessment & Plan  Remains overall rate controlled  -Sotalol 120 mg twice daily  - Reinitiated apixaban 5 mg twice daily 4/21    Obesity  Assessment & Plan  BMI of 59.45  Possible risk factor for underlying left lower extremity infection  Provide weight loss counseling when appropriate    Lactic acidosis  Assessment & Plan  Likely due to underlying infection improved after  fluid bolus  Status post fluid boluses    Bacteremia- (present on admission)  Assessment & Plan  Reportedly grew group C strep and 4 out of 4 vials  Repeat blood cultures ordered 4/18, NGTD  Murmur identified best appreciable at right upper sternal border 3 out of 6 holosystolic.  Does have moderate AS on TTE although unable to obtain great visualization of the valves.  Recommend PETER for further evaluation  -PETER ordered and scheduled for 4/22  - IV PCN, likely least 2 weeks therapy per infectious disease if evidence of endocarditis will require longer therapy  -Removed penicillin from allergy list that she is tolerating without any itching or rash  - Linezolid for 72 hours, last dose 4/21    Obstructive sleep apnea- (present on admission)  Assessment & Plan  Not on CPAP/BiPAP  Wears nocturnal oxygen around 2L NC  For now continue supplemental oxygen as needed but long-term wise patient may benefit with CPAP/BiPAP    Constipation- (present on admission)  Assessment & Plan  Constipation no bowel movements since transfer hospital  - MiraLAX twice daily    Anemia- (present on admission)  Assessment & Plan  Hgb dropped 8.6 from 11 following surgery, will continue to monitor. No active blood loss noted;  -Restart home apixaban  Continue to trend hemoglobin and monitor for any acute bleeding    H/O renal cell carcinoma s/p nephrectomy- (present on admission)  Assessment & Plan  History of  Limit nephrotoxins         VTE prophylaxis: SCDs/TEDs pharmacologic prophylaxis held due to procedure today    I have performed a physical exam and reviewed and updated ROS and Plan today (4/21/2024). In review of yesterday's note (4/20/2024), there are no changes except as documented above.

## 2024-04-21 NOTE — PROGRESS NOTES
58 year old female seen in f/u for severe Group C strep  bacteremia and invasive infection. This morning she continues on iv PCN and linezolid. . Feels better, very alert . Pain under control.   She relates that she was told by her mother that she had a reaction to PCN as a child and has avoided it since, however has taken Amoxicillin without problem in the past. Denies any rash or itching this morning. She is on oxygen at home.     Update 4/21/24 - had painful dressing change yesterday, otherwise feeling better. Remains afebrile, eating ok. No respiratory complaints. No rash or itching. Meds reviewed     Scheduled Medications   Medication Dose Frequency    atorvastatin  20 mg QHS    mometasone-formoterol  2 Puff BID    dakins 0.125% (1/4 strength)   BID    escitalopram  10 mg DAILY    gabapentin  100 mg 4XDAY    senna-docusate  2 Tablet Q EVENING    linezolid (ZYVOX) IV  600 mg Q12HRS    penicillin G potassium 24 Million Units in  mL infusion  24 Million Units Continuous Abx    sotalol  120 mg BID    acetaminophen  1,000 mg Q8HRS      Vitals:    04/21/24 0538   BP: (!) 154/66   Pulse: 64   Resp: 16   Temp: 36.6 °C (97.9 °F)   SpO2: 95%      Comfortable appearing alert and interactive     Card- systolic murmur left upper sternal border   Skin- no rash   Ext- left leg- erythema and swelliing below left knee is less rred and less indurated , there is a large blister developing  Pictures of wound change reviewed in Media section     LABS:  0 Result Notes      Component 2 d ago   Significant Indicator NEG P   Source SYNO P   Site Left Knee P   Culture Result No growth at 24 hours. P   Gram Stain Result Many WBCs.  No organisms seen.        Component 2 d ago   Significant Indicator NEG P   Source BLD P   Site PERIPHERAL P   Cul   Latest Reference Range & Units 04/19/24 00:51 04/20/24 00:39 04/21/24 00:20   WBC 4.8 - 10.8 K/uL 17.5 (H) 20.7 (H) 14.1 (H)   (H): Data is abnormally highture Result     No Growth                         Asses: She is doing well today,  more stable and no fever. The WBC count is dropping as she recovers On exam, she does have a  systolic murmur over the aortic area  which was not noted in a cardiology consult last year.  Has a blister developing over the left upper shin . She is tolerating PCN well without rash or itching  and the allergy should be removed from the EMR.     REC: complete 3 days of linezolid today             Continue iv PCN at least 2 weeks and possibly longer if there is  evidence for endocarditis on upcoming PETER.           Consider Mid-line as iv access is difficult with her          Discussed with patient

## 2024-04-21 NOTE — CARE PLAN
The patient is Stable - Low risk of patient condition declining or worsening    Shift Goals  Clinical Goals: ABX, wound care, pain control  Patient Goals: heal my leg soon  Family Goals: nirmala    Progress made toward(s) clinical / shift goals:        Problem: Knowledge Deficit - Standard  Goal: Patient and family/care givers will demonstrate understanding of plan of care, disease process/condition, diagnostic tests and medications  Note: Assess knowledge level of disease process ans answer all questions, explain tests, go over plan of care     Problem: Pain - Standard  Goal: Alleviation of pain or a reduction in pain to the patient’s comfort goal  Note: Give pain management medications as ordered, assess pain often, hourly rounding     Problem: Fall Risk  Goal: Patient will remain free from falls  Note: Assess for fall risk factors and implement fall precautions such as bed alarms, teach pt safety protocols       Patient is not progressing towards the following goals:

## 2024-04-21 NOTE — ASSESSMENT & PLAN NOTE
History rheumatoid arthritis with joint pain  - Follows with rheumatology in [place]  - Prescribed prednisone 15 mg morning and 5 mg at night, currently holding per patient preference

## 2024-04-21 NOTE — PROGRESS NOTES
OFFICE VISIT      Patient: Shahab Chinchilla Date of Service: 2020   : 1951 MRN: [id number]     SUBJECTIVE:     Chief Complaint   Patient presents with   • Convey Results     lab   • Follow-up       HISTORY OF PRESENT ILLNESS:  Shahab Chinchilla is a 68 year old male who presents today for complaints listed above  Feeling well  Psoriasis under control  Small skin nodules for long time. Mild itch   no bleeding  Not watching diet as good past 3 months  Denied FHx of CAD or CVA.    Review of Systems   Constitutional: Negative.         Gain 5 llbs   HENT: Negative.    Eyes: Negative.    Respiratory: Negative.    Cardiovascular: Negative.    Gastrointestinal: Negative.  Negative for abdominal pain and constipation.   Genitourinary: Negative.  Negative for dysuria.        Nocturia 1-2 x/nite   Musculoskeletal: Negative.    Skin:        As HPI   Neurological: Negative.    Psychiatric/Behavioral: Negative.        PAST MEDICAL HISTORY:  Past Medical History:   Diagnosis Date   • Hx of colonoscopy 2017    done by [facility]. normal. repeat in 10 yrs.       ACTIVE PROBLEM LIST:  Patient Active Problem List   Diagnosis   • Benign prostatic hyperplasia without lower urinary tract symptoms   • Psoriasis   • RBBB (right bundle branch block)   • Encounter for Medicare annual wellness exam   • Hyperkeratosis       MEDICATIONS:  Current Outpatient Medications   Medication Sig   • clobetasol (TEMOVATE) 0.05 % topical solution Apply topically 2 times daily.   • clobetasol (TEMOVATE) 0.05 % ointment Apply 1 application topically 2 times daily.     No current facility-administered medications for this visit.        ALLERGIES:  ALLERGIES:  Not on File    PAST SURGICAL HISTORY:  Past Surgical History:   Procedure Laterality Date   • Knee surgery         FAMILY HISTORY:  Family History   Problem Relation Age of Onset   • Diabetes Mother    • Hypertension Mother    • Diabetes Maternal Grandfather        SOCIAL HISTORY:  Social History  Tele summary     SR/SB  PVC   HR 52-80  .18/.07/.41                 Tobacco Use   • Smoking status: Former Smoker     Packs/day: 1.00     Years: 35.00     Pack years: 35.00     Start date:      Last attempt to quit: 2004     Years since quittin.0   • Smokeless tobacco: Never Used   Substance Use Topics   • Alcohol use: Yes     Alcohol/week: 2.0 standard drinks     Types: 2 Cans of beer per week     Frequency: 2-4 times a month     Drinks per session: 1 or 2     Binge frequency: Never   • Drug use: Never       OBJECTIVE:     Visit Vitals  /89 (BP Location: LUE - Left upper extremity, Patient Position: Sitting, Cuff Size: Regular)   Pulse 69   Temp 98 °F (36.7 °C) (Temporal)   Ht 6' 5\" (1.956 m)   Wt 78.3 kg (172 lb 8.2 oz)   SpO2 96%   BMI 20.46 kg/m²       Physical Exam  Constitutional: Well-developed and well-nourished.  HENT: Normocephalic and atraumatic. External ears normal. Nose normal. Oropharynx is clear and moist.  Eyes: Normal conjunctivae. PERRL. EOMI.myopia needing glasses.  Neck: Normal range of motion. Neck supple. No thyromegaly present. No cervical adenopathy. Right carotid bruit negative. Left carotid bruit negative.  Cardiovascular: Regular rate and rhythm. Normal heart sounds. No murmur heard. No leg edema.  Pulmonary/Chest: Normal respiratory effort. Breath sounds normal.  Abdominal: Soft. Non-tender. Non-distended. Bowel sounds are normal. No guarding. No masses. No hepatomegaly. No splenomegaly.  Musculoskeletal: Normal range of motion of knees and hips. SLRT negative. NELIA negative.  Neurological: Alert and oriented to person, place, and time. No cranial nerve deficit.  Skin: Skin is warm and dry. No rash noted. 0.55 x 0.35 cm well margined light brown colored skin nodule over the left supraclavicular area. 0.4 x 0.2cm skin nodule with even color and well defined margin noted on the mid upper frontal area.   Psychiatric: Normal mood and affect. Normal behavior. Judgment and thought content normal.      DIAGNOSTIC STUDIES:   LAB  RESULTS:    Lab Results Reviewed and   Lab Services on 12/17/2019   Component Date Value Ref Range Status   • Fasting Status 12/17/2019 FASTING  hrs Final   • Sodium 12/17/2019 142  135 - 145 mmol/L Final   • Potassium 12/17/2019 4.6  3.4 - 5.1 mmol/L Final   • Chloride 12/17/2019 108* 98 - 107 mmol/L Final   • Carbon Dioxide 12/17/2019 28  21 - 32 mmol/L Final   • Anion Gap 12/17/2019 11  10 - 20 mmol/L Final   • Glucose 12/17/2019 88  65 - 99 mg/dL Final   • BUN 12/17/2019 15  6 - 20 mg/dL Final   • Creatinine 12/17/2019 0.83  0.67 - 1.17 mg/dL Final   • GFR Estimate,  12/17/2019 >90   Final   • GFR Estimate, Non  12/17/2019 90   Final   • BUN/Creatinine Ratio 12/17/2019 18  7 - 25 Final   • CALCIUM 12/17/2019 8.9  8.4 - 10.2 mg/dL Final   • TOTAL BILIRUBIN 12/17/2019 1.0  0.2 - 1.0 mg/dL Final   • AST/SGOT 12/17/2019 31  <38 Units/L Final   • ALT/SGPT 12/17/2019 30  <64 Units/L Final   • ALK PHOSPHATASE 12/17/2019 52  45 - 117 Units/L Final   • TOTAL PROTEIN 12/17/2019 7.3  6.4 - 8.2 g/dL Final   • Albumin 12/17/2019 3.9  3.6 - 5.1 g/dL Final   • GLOBULIN 12/17/2019 3.4  2.0 - 4.0 g/dL Final   • A/G Ratio, Serum 12/17/2019 1.1  1.0 - 2.4 Final   • FASTING STATUS 12/17/2019 FASTING  hrs Final   • CHOLESTEROL 12/17/2019 263* <200 mg/dL Final   • CALCULATED LDL 12/17/2019 168* <130 mg/dL Final   • HDL 12/17/2019 62  >39 mg/dL Final   • TRIGLYCERIDE 12/17/2019 166* <150 mg/dL Final   • CALCULATED NON HDL 12/17/2019 201  mg/dL Final   • CHOL/HDL 12/17/2019 4.2  <4.5 Final   • PSA, Total 12/17/2019 0.34  <4.01 ng/mL Final        Assessment AND PLAN:   This is a 68 year old year-old male who presents with following diagnoses.    Assessment   Problem List Items Addressed This Visit        Urinary    Benign prostatic hyperplasia without lower urinary tract symptoms     PSA is normal. There is not s/s of UTI. To observe.            Integumentary    Psoriasis     This are under control with  current topical rx. To contiue.            Endocrine    Mixed hyperlipidemia - Primary     Lipid abnormalities are unchanged and still high in LDL..  Nutritional counseling was provided. discussed with pt, he declined to be started on medication.  Lipids will be reassessed in 3 months.            Oncologic    Benign mole     The pictures of the moles are totally benign looking. Discussed with pt, if he felt needed or the lesions become symptomatic, then will send for dermatology referral. He agrees with this approach.             RTC 3 months with LP. If not better, will start rx with statin. Use oatmeals, Omeg 3 fish oil and follow  Low chol diet as ordered.  No follow-ups on file.    Instructions provided as documented in the AVS.    The patient indicated understanding of the diagnosis and agreed with the plan of care.

## 2024-04-21 NOTE — PROGRESS NOTES
"      Orthopaedic Progress Note    Interval changes:  Patient doing well    RLE dressings are CDI   Cleared for DC to SNF by ortho pending medicine clearance    ROS - Patient denies any new issues.  Pain well controlled.    /64   Pulse 60   Temp 36.6 °C (97.9 °F) (Temporal)   Resp 18   Ht 1.753 m (5' 9\")   Wt (!) 182 kg (402 lb 5.4 oz)   SpO2 95%     Patient seen and examined  No acute distress  Breathing non labored  RRR  RLE dressings CDI, DVNI, moves all toes, cap refill, <2 sec.    Recent Labs     04/19/24  0051 04/20/24  0039 04/21/24  0020   WBC 17.5* 20.7* 14.1*   RBC 4.65 3.45* 3.21*   HEMOGLOBIN 11.4* 8.6* 8.0*   HEMATOCRIT 37.8 27.9* 26.2*   MCV 81.3* 80.9* 81.6   MCH 24.5* 24.9* 24.9*   MCHC 30.2* 30.8* 30.5*   RDW 42.0 42.1 43.2   PLATELETCT 241 262 290   MPV 10.5 11.1 10.5       Active Hospital Problems    Diagnosis     H/O renal cell carcinoma s/p nephrectomy [Z85.528]      Priority: Medium    Anemia [D64.9]      Priority: Low    Rheumatoid arthritis involving multiple sites ([facility]) [M06.9]     Cellulitis of left lower extremity [L03.116]     Lactic acidosis [E87.20]     Obesity [E66.9]     A-fib ([facility]) [I48.91]     Mood disorder ([facility]) [F39]     Bacteremia [R78.81]     Obstructive sleep apnea [G47.33]     Constipation [K59.00]        Assessment/Plan:  Patient doing well    RLE dressings are CDI   Cleared for DC to SNF by ortho pending medicine clearance  POD#2 S/P  Left leg fasciotomy of lateral and superficial posterior compartments.  Wt bearing status - WBAT LLE  Wound care/Drains - Dressings to be changed by wound team  Future Procedures - none planned   Sutures/Staples out- 14-21 days post operatively. Removal will completed by ortho mid levels only.  PT/OT-initiated  Antibiotics: pen G  DVT Prophylaxis- TEDS/SCDs/Foot pumps  [place]-not needed per ortho  Case Coordination for Discharge Planning - Disposition per therapy recs.    "

## 2024-04-22 ENCOUNTER — ANESTHESIA (OUTPATIENT)
Dept: CARDIOLOGY | Facility: MEDICAL CENTER | Age: 59
End: 2024-04-22
Payer: COMMERCIAL

## 2024-04-22 ENCOUNTER — APPOINTMENT (OUTPATIENT)
Dept: CARDIOLOGY | Facility: MEDICAL CENTER | Age: 59
End: 2024-04-22
Attending: NURSE PRACTITIONER
Payer: COMMERCIAL

## 2024-04-22 ENCOUNTER — ANESTHESIA EVENT (OUTPATIENT)
Dept: CARDIOLOGY | Facility: MEDICAL CENTER | Age: 59
End: 2024-04-22
Payer: COMMERCIAL

## 2024-04-22 LAB
ALBUMIN SERPL BCP-MCNC: 2.5 G/DL (ref 3.2–4.9)
ALBUMIN/GLOB SERPL: 0.8 G/DL
ALP SERPL-CCNC: 60 U/L (ref 30–99)
ALT SERPL-CCNC: 13 U/L (ref 2–50)
ANION GAP SERPL CALC-SCNC: 10 MMOL/L (ref 7–16)
AST SERPL-CCNC: 22 U/L (ref 12–45)
BACTERIA FLD AEROBE CULT: NORMAL
BACTERIA WND AEROBE CULT: NORMAL
BASOPHILS # BLD AUTO: 0.3 % (ref 0–1.8)
BASOPHILS # BLD: 0.03 K/UL (ref 0–0.12)
BILIRUB SERPL-MCNC: 0.2 MG/DL (ref 0.1–1.5)
BUN SERPL-MCNC: 19 MG/DL (ref 8–22)
CALCIUM ALBUM COR SERPL-MCNC: 9.2 MG/DL (ref 8.5–10.5)
CALCIUM SERPL-MCNC: 8 MG/DL (ref 8.5–10.5)
CHLORIDE SERPL-SCNC: 100 MMOL/L (ref 96–112)
CO2 SERPL-SCNC: 24 MMOL/L (ref 20–33)
CREAT SERPL-MCNC: 0.86 MG/DL (ref 0.5–1.4)
EOSINOPHIL # BLD AUTO: 0.24 K/UL (ref 0–0.51)
EOSINOPHIL NFR BLD: 2.6 % (ref 0–6.9)
ERYTHROCYTE [DISTWIDTH] IN BLOOD BY AUTOMATED COUNT: 43.8 FL (ref 35.9–50)
GFR SERPLBLD CREATININE-BSD FMLA CKD-EPI: 77 ML/MIN/1.73 M 2
GLOBULIN SER CALC-MCNC: 3 G/DL (ref 1.9–3.5)
GLUCOSE SERPL-MCNC: 96 MG/DL (ref 65–99)
GRAM STN SPEC: NORMAL
GRAM STN SPEC: NORMAL
HCT VFR BLD AUTO: 27.2 % (ref 37–47)
HGB BLD-MCNC: 8.2 G/DL (ref 12–16)
IMM GRANULOCYTES # BLD AUTO: 0.18 K/UL (ref 0–0.11)
IMM GRANULOCYTES NFR BLD AUTO: 2 % (ref 0–0.9)
LYMPHOCYTES # BLD AUTO: 1.83 K/UL (ref 1–4.8)
LYMPHOCYTES NFR BLD: 20.1 % (ref 22–41)
MAGNESIUM SERPL-MCNC: 2.1 MG/DL (ref 1.5–2.5)
MCH RBC QN AUTO: 24.6 PG (ref 27–33)
MCHC RBC AUTO-ENTMCNC: 30.1 G/DL (ref 32.2–35.5)
MCV RBC AUTO: 81.7 FL (ref 81.4–97.8)
MONOCYTES # BLD AUTO: 0.54 K/UL (ref 0–0.85)
MONOCYTES NFR BLD AUTO: 5.9 % (ref 0–13.4)
NEUTROPHILS # BLD AUTO: 6.3 K/UL (ref 1.82–7.42)
NEUTROPHILS NFR BLD: 69.1 % (ref 44–72)
NRBC # BLD AUTO: 0.05 K/UL
NRBC BLD-RTO: 0.5 /100 WBC (ref 0–0.2)
PHOSPHATE SERPL-MCNC: 3.7 MG/DL (ref 2.5–4.5)
PLATELET # BLD AUTO: 382 K/UL (ref 164–446)
PMV BLD AUTO: 10.1 FL (ref 9–12.9)
POTASSIUM SERPL-SCNC: 5.1 MMOL/L (ref 3.6–5.5)
PROT SERPL-MCNC: 5.5 G/DL (ref 6–8.2)
RBC # BLD AUTO: 3.33 M/UL (ref 4.2–5.4)
SIGNIFICANT IND 70042: NORMAL
SIGNIFICANT IND 70042: NORMAL
SITE SITE: NORMAL
SITE SITE: NORMAL
SODIUM SERPL-SCNC: 134 MMOL/L (ref 135–145)
SOURCE SOURCE: NORMAL
SOURCE SOURCE: NORMAL
WBC # BLD AUTO: 9.1 K/UL (ref 4.8–10.8)

## 2024-04-22 PROCEDURE — A9270 NON-COVERED ITEM OR SERVICE: HCPCS

## 2024-04-22 PROCEDURE — 83735 ASSAY OF MAGNESIUM: CPT

## 2024-04-22 PROCEDURE — 97535 SELF CARE MNGMENT TRAINING: CPT

## 2024-04-22 PROCEDURE — 93325 DOPPLER ECHO COLOR FLOW MAPG: CPT

## 2024-04-22 PROCEDURE — 700111 HCHG RX REV CODE 636 W/ 250 OVERRIDE (IP)

## 2024-04-22 PROCEDURE — 85025 COMPLETE CBC W/AUTO DIFF WBC: CPT

## 2024-04-22 PROCEDURE — 84100 ASSAY OF PHOSPHORUS: CPT

## 2024-04-22 PROCEDURE — 160035 HCHG PACU - 1ST 60 MINS PHASE I

## 2024-04-22 PROCEDURE — 700101 HCHG RX REV CODE 250: Performed by: ANESTHESIOLOGY

## 2024-04-22 PROCEDURE — 80053 COMPREHEN METABOLIC PANEL: CPT

## 2024-04-22 PROCEDURE — 700105 HCHG RX REV CODE 258

## 2024-04-22 PROCEDURE — 770020 HCHG ROOM/CARE - TELE (206)

## 2024-04-22 PROCEDURE — 700102 HCHG RX REV CODE 250 W/ 637 OVERRIDE(OP)

## 2024-04-22 PROCEDURE — 700111 HCHG RX REV CODE 636 W/ 250 OVERRIDE (IP): Performed by: ANESTHESIOLOGY

## 2024-04-22 PROCEDURE — 93312 ECHO TRANSESOPHAGEAL: CPT | Mod: 26 | Performed by: INTERNAL MEDICINE

## 2024-04-22 PROCEDURE — 99232 SBSQ HOSP IP/OBS MODERATE 35: CPT | Mod: GC | Performed by: INTERNAL MEDICINE

## 2024-04-22 PROCEDURE — 160002 HCHG RECOVERY MINUTES (STAT)

## 2024-04-22 PROCEDURE — 302098 PASTE RING (FLAT): Performed by: ORTHOPAEDIC SURGERY

## 2024-04-22 PROCEDURE — 36415 COLL VENOUS BLD VENIPUNCTURE: CPT

## 2024-04-22 PROCEDURE — 700105 HCHG RX REV CODE 258: Performed by: ANESTHESIOLOGY

## 2024-04-22 PROCEDURE — 97608 NEG PRS WND THER NDME>50SQCM: CPT

## 2024-04-22 RX ORDER — HYDROMORPHONE HYDROCHLORIDE 1 MG/ML
1 INJECTION, SOLUTION INTRAMUSCULAR; INTRAVENOUS; SUBCUTANEOUS ONCE
Status: COMPLETED | OUTPATIENT
Start: 2024-04-22 | End: 2024-04-22

## 2024-04-22 RX ORDER — MIDAZOLAM HYDROCHLORIDE 1 MG/ML
INJECTION INTRAMUSCULAR; INTRAVENOUS PRN
Status: DISCONTINUED | OUTPATIENT
Start: 2024-04-22 | End: 2024-04-22 | Stop reason: SURG

## 2024-04-22 RX ORDER — SODIUM CHLORIDE, SODIUM LACTATE, POTASSIUM CHLORIDE, CALCIUM CHLORIDE 600; 310; 30; 20 MG/100ML; MG/100ML; MG/100ML; MG/100ML
INJECTION, SOLUTION INTRAVENOUS
Status: DISCONTINUED | OUTPATIENT
Start: 2024-04-22 | End: 2024-04-22 | Stop reason: SURG

## 2024-04-22 RX ORDER — LIDOCAINE HYDROCHLORIDE 20 MG/ML
INJECTION, SOLUTION EPIDURAL; INFILTRATION; INTRACAUDAL; PERINEURAL PRN
Status: DISCONTINUED | OUTPATIENT
Start: 2024-04-22 | End: 2024-04-22 | Stop reason: SURG

## 2024-04-22 RX ORDER — HYDROMORPHONE HYDROCHLORIDE 1 MG/ML
0.2 INJECTION, SOLUTION INTRAMUSCULAR; INTRAVENOUS; SUBCUTANEOUS
Status: DISCONTINUED | OUTPATIENT
Start: 2024-04-22 | End: 2024-04-22 | Stop reason: HOSPADM

## 2024-04-22 RX ORDER — HYDRALAZINE HYDROCHLORIDE 20 MG/ML
5 INJECTION INTRAMUSCULAR; INTRAVENOUS
Status: DISCONTINUED | OUTPATIENT
Start: 2024-04-22 | End: 2024-04-22 | Stop reason: HOSPADM

## 2024-04-22 RX ORDER — SODIUM CHLORIDE, SODIUM LACTATE, POTASSIUM CHLORIDE, CALCIUM CHLORIDE 600; 310; 30; 20 MG/100ML; MG/100ML; MG/100ML; MG/100ML
INJECTION, SOLUTION INTRAVENOUS CONTINUOUS
Status: DISCONTINUED | OUTPATIENT
Start: 2024-04-22 | End: 2024-04-22 | Stop reason: HOSPADM

## 2024-04-22 RX ORDER — OXYCODONE HCL 5 MG/5 ML
10 SOLUTION, ORAL ORAL
Status: DISCONTINUED | OUTPATIENT
Start: 2024-04-22 | End: 2024-04-22 | Stop reason: HOSPADM

## 2024-04-22 RX ORDER — OXYCODONE HCL 5 MG/5 ML
5 SOLUTION, ORAL ORAL
Status: DISCONTINUED | OUTPATIENT
Start: 2024-04-22 | End: 2024-04-22 | Stop reason: HOSPADM

## 2024-04-22 RX ORDER — HYDROMORPHONE HYDROCHLORIDE 1 MG/ML
0.4 INJECTION, SOLUTION INTRAMUSCULAR; INTRAVENOUS; SUBCUTANEOUS
Status: DISCONTINUED | OUTPATIENT
Start: 2024-04-22 | End: 2024-04-22 | Stop reason: HOSPADM

## 2024-04-22 RX ORDER — DIPHENHYDRAMINE HYDROCHLORIDE 50 MG/ML
12.5 INJECTION INTRAMUSCULAR; INTRAVENOUS
Status: DISCONTINUED | OUTPATIENT
Start: 2024-04-22 | End: 2024-04-22 | Stop reason: HOSPADM

## 2024-04-22 RX ORDER — MIDAZOLAM HYDROCHLORIDE 1 MG/ML
1 INJECTION INTRAMUSCULAR; INTRAVENOUS
Status: DISCONTINUED | OUTPATIENT
Start: 2024-04-22 | End: 2024-04-22 | Stop reason: HOSPADM

## 2024-04-22 RX ORDER — LABETALOL HYDROCHLORIDE 5 MG/ML
5 INJECTION, SOLUTION INTRAVENOUS
Status: DISCONTINUED | OUTPATIENT
Start: 2024-04-22 | End: 2024-04-22 | Stop reason: HOSPADM

## 2024-04-22 RX ORDER — EPHEDRINE SULFATE 50 MG/ML
5 INJECTION, SOLUTION INTRAVENOUS
Status: DISCONTINUED | OUTPATIENT
Start: 2024-04-22 | End: 2024-04-22 | Stop reason: HOSPADM

## 2024-04-22 RX ORDER — HALOPERIDOL 5 MG/ML
1 INJECTION INTRAMUSCULAR
Status: DISCONTINUED | OUTPATIENT
Start: 2024-04-22 | End: 2024-04-22 | Stop reason: HOSPADM

## 2024-04-22 RX ORDER — MEPERIDINE HYDROCHLORIDE 25 MG/ML
12.5 INJECTION INTRAMUSCULAR; INTRAVENOUS; SUBCUTANEOUS
Status: DISCONTINUED | OUTPATIENT
Start: 2024-04-22 | End: 2024-04-22 | Stop reason: HOSPADM

## 2024-04-22 RX ORDER — HYDROMORPHONE HYDROCHLORIDE 1 MG/ML
0.5 INJECTION, SOLUTION INTRAMUSCULAR; INTRAVENOUS; SUBCUTANEOUS
Status: DISCONTINUED | OUTPATIENT
Start: 2024-04-22 | End: 2024-04-22 | Stop reason: HOSPADM

## 2024-04-22 RX ORDER — ONDANSETRON 2 MG/ML
4 INJECTION INTRAMUSCULAR; INTRAVENOUS
Status: DISCONTINUED | OUTPATIENT
Start: 2024-04-22 | End: 2024-04-22 | Stop reason: HOSPADM

## 2024-04-22 RX ADMIN — OXYCODONE 5 MG: 5 TABLET ORAL at 20:01

## 2024-04-22 RX ADMIN — GABAPENTIN 100 MG: 100 CAPSULE ORAL at 07:35

## 2024-04-22 RX ADMIN — ESCITALOPRAM OXALATE 10 MG: 10 TABLET ORAL at 04:55

## 2024-04-22 RX ADMIN — SODIUM CHLORIDE 24 MILLION UNITS: 9 INJECTION, SOLUTION INTRAVENOUS at 17:16

## 2024-04-22 RX ADMIN — LIDOCAINE HYDROCHLORIDE 100 MG: 20 INJECTION, SOLUTION EPIDURAL; INFILTRATION; INTRACAUDAL at 09:05

## 2024-04-22 RX ADMIN — HYDROMORPHONE HYDROCHLORIDE 1 MG: 1 INJECTION, SOLUTION INTRAMUSCULAR; INTRAVENOUS; SUBCUTANEOUS at 13:40

## 2024-04-22 RX ADMIN — APIXABAN 5 MG: 5 TABLET, FILM COATED ORAL at 17:15

## 2024-04-22 RX ADMIN — ATORVASTATIN CALCIUM 20 MG: 20 TABLET, FILM COATED ORAL at 20:01

## 2024-04-22 RX ADMIN — GABAPENTIN 100 MG: 100 CAPSULE ORAL at 13:40

## 2024-04-22 RX ADMIN — MOMETASONE FUROATE AND FORMOTEROL FUMARATE DIHYDRATE 2 PUFF: 200; 5 AEROSOL RESPIRATORY (INHALATION) at 04:56

## 2024-04-22 RX ADMIN — OXYCODONE HYDROCHLORIDE 10 MG: 10 TABLET ORAL at 04:55

## 2024-04-22 RX ADMIN — PROPOFOL 20 MG: 10 INJECTION, EMULSION INTRAVENOUS at 09:07

## 2024-04-22 RX ADMIN — GABAPENTIN 100 MG: 100 CAPSULE ORAL at 20:01

## 2024-04-22 RX ADMIN — PROPOFOL 20 MG: 10 INJECTION, EMULSION INTRAVENOUS at 09:05

## 2024-04-22 RX ADMIN — MIDAZOLAM HYDROCHLORIDE 1 MG: 1 INJECTION, SOLUTION INTRAMUSCULAR; INTRAVENOUS at 09:05

## 2024-04-22 RX ADMIN — SOTALOL HYDROCHLORIDE 120 MG: 120 TABLET ORAL at 17:15

## 2024-04-22 RX ADMIN — SOTALOL HYDROCHLORIDE 120 MG: 120 TABLET ORAL at 04:55

## 2024-04-22 RX ADMIN — SODIUM CHLORIDE, POTASSIUM CHLORIDE, SODIUM LACTATE AND CALCIUM CHLORIDE: 600; 310; 30; 20 INJECTION, SOLUTION INTRAVENOUS at 08:55

## 2024-04-22 RX ADMIN — APIXABAN 5 MG: 5 TABLET, FILM COATED ORAL at 04:55

## 2024-04-22 RX ADMIN — OXYCODONE HYDROCHLORIDE 10 MG: 10 TABLET ORAL at 10:19

## 2024-04-22 RX ADMIN — GABAPENTIN 100 MG: 100 CAPSULE ORAL at 00:50

## 2024-04-22 RX ADMIN — PROPOFOL 20 MG: 10 INJECTION, EMULSION INTRAVENOUS at 09:09

## 2024-04-22 RX ADMIN — OXYCODONE HYDROCHLORIDE 10 MG: 10 TABLET ORAL at 00:49

## 2024-04-22 RX ADMIN — ACETAMINOPHEN 1000 MG: 500 TABLET, FILM COATED ORAL at 17:15

## 2024-04-22 ASSESSMENT — PAIN DESCRIPTION - PAIN TYPE
TYPE: ACUTE PAIN

## 2024-04-22 ASSESSMENT — FIBROSIS 4 INDEX: FIB4 SCORE: 0.94

## 2024-04-22 ASSESSMENT — ENCOUNTER SYMPTOMS
NEUROLOGICAL NEGATIVE: 1
GASTROINTESTINAL NEGATIVE: 1
PSYCHIATRIC NEGATIVE: 1
RESPIRATORY NEGATIVE: 1
EYES NEGATIVE: 1
CONSTITUTIONAL NEGATIVE: 1
CARDIOVASCULAR NEGATIVE: 1

## 2024-04-22 NOTE — PROGRESS NOTES
[facility] Internal Medicine Daily Progress Note    Date of Service  4/22/2024    [facility] Team: R IM Blue Team   Attending: Jeimy Bush M.d.  Senior Resident: Dr. Chauhan  Intern:  Dr. Crowder  Contact Number: [phone]    Chief Complaint  Savita Duggan is a 58 y.o. female admitted as direct admit from [place] for worsening left lower extremity cellulitis and possible infective endocarditis.    Hospital Course  Savita Duggan is a 58 y.o. female who presented 4/18/2024 from outside hospital due to worsening infection of left lower extremity. Patient reports that symptom onset was approximately 3 weeks ago when she started to notice redness around her mid shin that slowly started to worsen.  Patient denies any trauma or injury to site of infection.  Due to increasing pain, erythema and swelling the patient elected to present to her nearest emergency department which is at [place].  Reportedly the patient was admitted on 4/14 and diagnosed with left lower extremity cellulitis and initiated on vancomycin and Levaquin without significant improvement and patient's blood cultures grew group C strep and antibiotics were escalated to cefepime but the patient still continues to have limited improvement.  Also reportedly the patient may have had a murmur subsequently plan was created to transfer the patient to [facility] for further evaluation and management.  Orthopedics was consulted on admission, pharmacies and drainage of left lower extremity on 4/19 due to concern for necrotizing fasciitis.  Consulted infectious disease regarding possible infective endocarditis, and transitioning antibiotics to IV penicillin.  TTE performed although unable to visualize valves, and PETER ordered which showed no evidence of IE.  She previously had reported history of alert allergy to penicillins although tolerated IV penicillin without any adverse allergic reactions and removed from allergy list.    Interval Problem Update  No acute events overnight. Pain overall  well controlled on current regimen. She underwent PETER this morning, which showed no evidence of IE. Left leg remains in ACE wrap, wound team planning on placing wound vac this afternoon.    I have discussed this patient's plan of care and discharge plan at IDT rounds today with Case Management, Nursing, Nursing leadership, and other members of the IDT team.    Consultants/Specialty  infectious disease and orthopedics    Code Status  Full Code    Disposition  Medically Cleared  I have placed the appropriate orders for post-discharge needs.    Review of Systems  Review of Systems   Constitutional: Negative.    HENT: Negative.     Eyes: Negative.    Respiratory: Negative.     Cardiovascular: Negative.    Gastrointestinal: Negative.    Genitourinary: Negative.    Musculoskeletal:  Positive for joint pain.   Skin:  Positive for rash.   Neurological: Negative.    Endo/Heme/Allergies: Negative.    Psychiatric/Behavioral: Negative.          Physical Exam  Temp:  [36.2 °C (97.2 °F)-37.1 °C (98.7 °F)] 36.5 °C (97.7 °F)  Pulse:  [58-67] 60  Resp:  [12-18] 18  BP: (120-165)/(57-75) 156/67  SpO2:  [93 %-100 %] 99 %    Physical Exam  Constitutional:       General: She is not in acute distress.     Appearance: She is obese.   Eyes:      General: No scleral icterus.  Cardiovascular:      Rate and Rhythm: Normal rate and regular rhythm.      Heart sounds: Murmur (systolic murmur III/VI) heard.   Pulmonary:      Effort: Pulmonary effort is normal. No respiratory distress.      Breath sounds: Normal breath sounds. No wheezing.   Abdominal:      General: Abdomen is flat. Bowel sounds are normal.      Palpations: Abdomen is soft.      Tenderness: There is no abdominal tenderness.   Musculoskeletal:         General: Tenderness and deformity (left lower extremity wrapped in ace bandage) present.   Neurological:      General: No focal deficit present.      Mental Status: She is alert and oriented to person, place, and time. Mental status  is at baseline.      Cranial Nerves: No cranial nerve deficit.      Motor: No weakness.   Psychiatric:         Mood and Affect: Mood normal.         Behavior: Behavior normal.         Fluids    Intake/Output Summary (Last 24 hours) at 4/22/2024 1520  Last data filed at 4/22/2024 0913  Gross per 24 hour   Intake 450 ml   Output 1700 ml   Net -1250 ml       Laboratory  Recent Labs     04/20/24  0039 04/21/24  0020 04/22/24  0035   WBC 20.7* 14.1* 9.1   RBC 3.45* 3.21* 3.33*   HEMOGLOBIN 8.6* 8.0* 8.2*   HEMATOCRIT 27.9* 26.2* 27.2*   MCV 80.9* 81.6 81.7   MCH 24.9* 24.9* 24.6*   MCHC 30.8* 30.5* 30.1*   RDW 42.1 43.2 43.8   PLATELETCT 262 290 382   MPV 11.1 10.5 10.1     Recent Labs     04/20/24  0039 04/21/24  0020 04/22/24  0035   SODIUM 134* 134* 134*   POTASSIUM 5.4 5.0 5.1   CHLORIDE 103 101 100   CO2 22 23 24   GLUCOSE 110* 93 96   BUN 13 17 19   CREATININE 0.91 0.95 0.86   CALCIUM 8.0* 8.2* 8.0*                     Imaging  EC-PETER W/O CONT   Final Result      US-EXTREMITY VENOUS LOWER BILAT   Final Result      EC-ECHOCARDIOGRAM COMPLETE W/ CONT   Final Result      CT-EXTREMITY, LOWER W/O LEFT   Final Result         1.  Diffuse subcutaneous fat stranding from the mid thigh to the ankle, appearance compatible with cellulitis and/or edema.   2.  No soft tissue gas identified to indicate necrotizing fasciitis.   3.  Prepatellar fluid collection, could represent hematoma, seroma, or possibly evolving abscess.   4.  Small knee joint effusion           Assessment/Plan  Problem Representation:    * Cellulitis of left lower extremity  Assessment & Plan  LRINEC score 8 on admission, although did appear more like severe cellulitis during incision and drainage by orthopedics.  No abscess, fluid collections, or necrotic tissue.  CRP elevated 27.    Blood cultures x 2 ordered continue to trend no growth to date  Telemetry monitoring  Orthopedics consulted (Dr. Ortiz), perform incision and drainage on 4/19  Consult  infectious disease, Dr. Warren   Recommended transitioning to IV penicillin and continue linezolid as she does have documented allergy to penicillin although discussed with infectious disease and previous tolerated amoxicillin and given similar R-sided changes should be able to tolerate penicillin  Ordered IV Benadryl as needed in case develops allergic reaction  Linezolid for 72 hours to reduce antitoxin  Wound team consulted, ortho recommends wet-to-dry with wound VAC placement planned 4/22   Wound and synovial cultures pending    Rheumatoid arthritis involving multiple sites ([facility])  Assessment & Plan  History rheumatoid arthritis with joint pain  - Follows with rheumatology in [place]  - Prescribed prednisone 15 mg morning and 5 mg at night, currently holding per patient preference    Mood disorder ([facility])  Assessment & Plan  Continue home escitalopram 10 mg daily    A-fib ([facility])  Assessment & Plan  Remains overall rate controlled  - Sotalol 120 mg twice daily  - Reinitiated apixaban 5 mg twice daily 4/21    Obesity  Assessment & Plan  BMI of 59.45  Possible risk factor for underlying left lower extremity infection  Provide weight loss counseling when appropriate    Lactic acidosis  Assessment & Plan  Likely due to underlying infection improved after fluid bolus  Status post fluid boluses    Bacteremia- (present on admission)  Assessment & Plan  Reportedly grew group C strep and 4 out of 4 vials  Repeat blood cultures ordered 4/18, NGTD  Murmur identified best appreciable at right upper sternal border 3 out of 6 holosystolic.  Does have moderate AS on TTE although unable to obtain great visualization of the valves.    - Underwent PETER 4/22, no evidence of IE  - IV PCN, likely least 2 weeks therapy per ID if evidence of endocarditis will require longer therapy  - Removed penicillin from allergy list that she is tolerating without any itching or rash  - Completed Linezolid for 72 hours, last dose 4/21    Obstructive  sleep apnea- (present on admission)  Assessment & Plan  Not on CPAP/BiPAP  Wears nocturnal oxygen around 2L NC  For now continue supplemental oxygen as needed but long-term wise patient may benefit with CPAP/BiPAP    Constipation- (present on admission)  Assessment & Plan  Constipation no bowel movements since transfer hospital  - MiraLAX twice daily    Anemia- (present on admission)  Assessment & Plan  Hgb dropped 8.6 from 11 following surgery, will continue to monitor. No active blood loss noted;  - Restart home apixaban  - Continue to trend hemoglobin and monitor for any acute bleeding    H/O renal cell carcinoma s/p nephrectomy- (present on admission)  Assessment & Plan  History of  Limit nephrotoxins       VTE prophylaxis: SCDs/TEDs    I have performed a physical exam and reviewed and updated ROS and Plan today (4/22/2024). In review of yesterday's note (4/21/2024), there are no changes except as documented above.

## 2024-04-22 NOTE — OR NURSING
Pt A&Ox4. VSS on 3 L O2 via NC. Pt denies nausea, reports LLE pain however declines medication.   Report called to SARAH Timmons and pt transported back to [facility] via [facility].

## 2024-04-22 NOTE — ANESTHESIA TIME REPORT
Anesthesia Start and Stop Event Times       Date Time Event    4/22/2024 0853 Ready for Procedure     0855 Anesthesia Start     0924 Anesthesia Stop          Responsible Staff  04/22/24      Name Role Begin End    Yelena Coffey M.D. Anesth 0855 0924          Overtime Reason:  no overtime (within assigned shift)    Comments:

## 2024-04-22 NOTE — ANESTHESIA POSTPROCEDURE EVALUATION
Patient: Savita Duggan    Procedure Summary       Date: 04/22/24 Room / Location: [facility] - Echocardiology [facility]    Anesthesia Start: 0855 Anesthesia Stop: 0924    Procedure: EC-PETER W/O CONT Diagnosis:             Bacteremia            Bacteremia    Scheduled Providers: Jarrett Castaneda M.D.; Yelena Coffey M.D.; Sawyer Quintanilla M.D. Responsible Provider: Yelena Coffey M.D.    Anesthesia Type: MAC ASA Status: 3            Final Anesthesia Type: MAC  Last vitals  BP   Blood Pressure: 129/63    Temp   37.1 °C (98.7 °F)    Pulse   61   Resp   17    SpO2   100 %      Anesthesia Post Evaluation    Patient location during evaluation: PACU  Patient participation: complete - patient participated  Level of consciousness: awake and alert    Airway patency: patent  Anesthetic complications: no  Cardiovascular status: hemodynamically stable  Respiratory status: acceptable  Hydration status: euvolemic    PONV: none          No notable events documented.     Nurse Pain Score: 5 (NPRS)

## 2024-04-22 NOTE — WOUND TEAM
[facility]  Inpatient Services  Wound and Skin Care Follow-up    Admission Date: 2024     Last order of IP CONSULT TO WOUND CARE was found on 2024 from Hospital Encounter on 2024     HPI, PMH, SH: Reviewed    Past Surgical History:   Procedure Laterality Date    IRRIGATION & DEBRIDEMENT GENERAL Left 2024    Procedure: IRRIGATION AND DEBRIDEMENT, WOUND - LEG;  Surgeon: Taco Ortiz M.D.;  Location: SURGERY [facility];  Service: Orthopedics     Social History     Tobacco Use    Smoking status: Former     Current packs/day: 0.00     Average packs/day: 1 pack/day for 38.0 years (38.0 total pack years)     Types: Cigarettes     Start date: 1978     Quit date: 2016     Years since quittin.4    Smokeless tobacco: Never   Substance Use Topics    Alcohol use: Never     No chief complaint on file.    Diagnosis: Bacteremia [R78.81]    Unit where seen by Wound Team: S14/     WOUND FOLLOW UP RELATED TO:  L. Lower leg       WOUND TEAM PLAN OF CARE - Frequency of Follow-up:   Nursing to follow dressing orders written for wound care. Contact wound team if area fails to progress, deteriorates or with any questions/concerns if something comes up before next scheduled follow up (See below as to whether wound is following and frequency of wound follow up)  Dressing changes by wound team:                   NPWT change 3 times weekly - L. Medial and Lateral lower leg    WOUND HISTORY:       Pt is a 57 yo female who is a direct admit from [facility] for L leg cellulitis with necrotic bulla.  Hx includes A-fib on Eliquis.  Admitted for cellulitis, bacteremia.  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  Pt states that her leg started becoming red and swollen around the beginning of 24 I&D fasciotomy L leg Dr Ortiz - recommending NPWT when periwound amenable  24 ID following - Dr Warren           WOUND ASSESSMENT/LDA  Wound 24 Full  Thickness Wound Leg Medial;Lower Left (Active)   Date First Assessed/Time First Assessed: 04/20/24 1200   Present on Original Admission: No  Hand Hygiene Completed: Yes  Primary Wound Type: Full Thickness Wound  Location: Leg  Wound Orientation: Medial;Lower  Laterality: Left      Assessments 4/22/2024  3:00 PM   Wound Image     Site Assessment Red;Yellow   Periwound Assessment Denuded;Blistered;Hyperpigmented   Margins Defined edges;Unattached edges   Closure Secondary intention   Drainage Amount Small   Drainage Description Serosanguineous   Treatments Cleansed;Nonselective debridement;Site care;Compression   Wound Cleansing Normal Saline Irrigation   Periwound Protectant No-sting Skin Prep;Paste Ring;Hydrofiber   Dressing Status Clean;Dry;Intact   Dressing Changed New   Dressing Cleansing/Solutions Normal Saline   Dressing Options Wound Vac;Tubigrip   Dressing Change/Treatment Frequency Monday, Wednesday, Friday, and As Needed   NEXT Dressing Change/Treatment Date 04/24/24   NEXT Weekly Photo (Inpatient Only) 04/27/24   Wound Team Following 3x Weekly   Non-staged Wound Description Full thickness   Shape oval   WOUND NURSE ONLY - Time Spent with Patient (mins) 90       Wound 04/20/24 Full Thickness Wound Leg Lower;Lateral Left (Active)   Date First Assessed/Time First Assessed: 04/20/24 1338   Present on Original Admission: No  Hand Hygiene Completed: Yes  Primary Wound Type: Full Thickness Wound  Location: Leg  Wound Orientation: Lower;Lateral  Laterality: Left      Assessments 4/22/2024  3:00 PM   Wound Image     Site Assessment Red;Yellow;Pink   Periwound Assessment Denuded;Red;Blistered;Hyperpigmented   Margins Defined edges;Unattached edges   Closure Secondary intention   Drainage Amount Small   Drainage Description Serosanguineous   Treatments Cleansed;Nonselective debridement;Site care;Compression   Wound Cleansing Normal Saline Irrigation   Periwound Protectant Hydrofiber;Paste Ring   Dressing Status  Clean;Dry;Intact   Dressing Changed New   Dressing Cleansing/Solutions Normal Saline   Dressing Options Wound Vac;Tubigrip   Dressing Change/Treatment Frequency Monday, Wednesday, Friday, and As Needed   NEXT Dressing Change/Treatment Date 04/24/24   NEXT Weekly Photo (Inpatient Only) 04/27/24   Wound Team Following 3x Weekly   Non-staged Wound Description Full thickness   Shape oval   Exposed Structures Tendon       Negative Pressure Wound Therapy 04/22/24 Pretibial Medial;Lower Left (Active)   Placement Date/Time: 04/22/24 1556   Inserted by: Wound RN  Location: Pretibial  Wound Orientation: Medial;Lower  Laterality: Left      Assessments 4/22/2024  3:00 PM   Wound Image      NPWT Pump Mode / Pressure Setting 125 mmHg   Dressing Type Medium;Black Foam (Veraflo)   Number of Foam Pieces Used 2   Canister Changed Yes   NEXT Dressing Change/Treatment Date 04/24/24   VAC VeraFlo Irrigant Normal Saline   VAC VeraFlo Soak Time (mins) 2   VAC VeraFlo Instill Volume (ml) 28   VAC VeraFlo - Therapy Time (hrs) 2   VAC VeraFlo Pressure (mm/Hg) 125 mmHg       Negative Pressure Wound Therapy 04/22/24 Leg Lower;Lateral Left (Active)   Placement Date/Time: 04/22/24 1557   Present on Original Admission: No  Inserted by: Wound RN  Location: Leg  Wound Orientation: Lower;Lateral  Laterality: Left      Assessments 4/22/2024  3:00 PM   Wound Image     NPWT Pump Mode / Pressure Setting 125 mmHg   Dressing Type Medium;Black Foam (Veraflo)   Number of Foam Pieces Used 2   Canister Changed Yes   NEXT Dressing Change/Treatment Date 04/24/24   VAC VeraFlo Irrigant Normal Saline   VAC VeraFlo Soak Time (mins) 2   VAC VeraFlo Instill Volume (ml) 24   VAC VeraFlo - Therapy Time (hrs) 2   VAC VeraFlo Pressure (mm/Hg) 125 mmHg        Vascular:    JUSTIN:   No results found.    Lab Values:    Lab Results   Component Value Date/Time    WBC 9.1 04/22/2024 12:35 AM    RBC 3.33 (L) 04/22/2024 12:35 AM    HEMOGLOBIN 8.2 (L) 04/22/2024 12:35 AM     HEMATOCRIT 27.2 (L) 04/22/2024 12:35 AM    CREACTPROT 27.55 (H) 04/19/2024 08:19 AM    SEDRATEWES 12 07/22/2023 10:39 AM    HBA1C 6.4 (H) 07/25/2023 12:25 AM         Culture Results show:  Recent Results (from the past 720 hour(s))   CULTURE WOUND W/ GRAM STAIN    Collection Time: 04/19/24  1:22 PM    Specimen: Wound   Result Value Ref Range    Significant Indicator NEG     Source WND     Site Left leg wound     Culture Result No growth at 72 hours.     Gram Stain Result Rare WBCs.  No organisms seen.          Pain Level/Medicated:  PO pain medications administered by bedside RN 1 hr prior and IV pain medications administered by bedside RN 10 minutes prior (Pt educated that IV medication will not be available on outpatient basis)       INTERVENTIONS BY WOUND TEAM:  Chart and images reviewed. Discussed with bedside RN. All areas of concern (based on picture review, LDA review and discussion with bedside RN) have been thoroughly assessed. Documentation of areas based on significant findings. This RN in to assess patient. Performed standard wound care which includes appropriate positioning, dressing removal and non-selective debridement. Pictures and measurements obtained weekly if/when required.    Wound:  L. Medial lower leg  Cleansed/Non-selectively Debrided with:  Normal Saline and Gauze  Marychuy wound: Cleansed with Normal Saline and Gauze, Prepped with No Sting, Paste Rings, and Hydrofiber Ag  Primary Dressing:  Black VF foam tucked into wound bed sealed with dermatec  Secondary (Outer) Dressing: Tubi  F.     Wound:  L. Lateral lower leg  Cleansed/Non-selectively Debrided with:  Normal Saline and Gauze  Marychuy wound: Cleansed with Normal Saline and Gauze, Prepped with No Sting, Paste Rings, and Hydrofiber Ag  Primary Dressing:  Black VF foam tucked into wound bed secured with Dermatec  Secondary (Outer) Dressing: Tubi  F    Advanced Wound Care Discharge Planning  Number of Clinicians necessary to complete  wound care: 1  Is patient requiring IV pain medications for dressing changes:  Yes  Length of time for dressing change 60 min. (This does not include chart review, pre-medication time, set up, clean up or time spent charting.)    Interdisciplinary consultation: Patient, Bedside RN (Shanelle), Katie ([facility] Rep).  Pressure injury and staging reviewed with N/A.    EVALUATION / RATIONALE FOR TREATMENT:     Date:  04/22/24  Wound Status:  Initial evaluation    Patient's dressing had not been changed since previous assessment, on 4/20.  Wound bed x2 appears a little necrotic, but marychuy-wound dry enough to attempt wound vac.  Hydrofiber silver applied over edges that were weeping, and dermatec used to prevent tissue damage. And 2 machines to prevent bridging over open skin.    Tubi  applied for a little initial compression.    Date:  04/20/24  Wound Status:  Initial evaluation    Clean wounds - 100% viable tissue. Marychuy wound is fragile with lifting epithelial and weeping drainage - will re assess for NPWT when marychuy improves and a seal can be achieved.  Erythema is receeding from sharpie lines, pt agrees the color is much improved.           Goals: Steady decrease in wound area and depth weekly.    NURSING PLAN OF CARE ORDERS:  Dressing changes: See Dressing Care orders  Skin care: See Skin Care orders  RN Prevention Protocol  Rectal tube care: See Rectal Tube Care orders    NUTRITION RECOMMENDATIONS   Wound Team Recommendations:  N/A     DIET ORDERS (From admission to next 24h)       Start     Ordered    04/22/24 1142  Diet Order Diet: Regular  ALL MEALS        Question:  Diet:  Answer:  Regular    04/22/24 1141    04/21/24 1220  Supplements  ALL MEALS        Comments: Riley BID and Ensure Max x1/d to support wound healing.   Question:  Which Supplement  Answer:  Other (Specify in Comments)    04/21/24 1219                    PREVENTATIVE INTERVENTIONS:   Q shift Noah - performed per nursing policy  Q shift pressure point  assessments - performed per nursing policy    Surface/Positioning  Bariatric JOLENE - Currently in Place  Reposition q 2 hours - Currently in Place  Move and Transfer System (MATs) - Currently in Place  TAPs Turning system - Currently in Place    Offloading/Redistribution  Heel offloading dressing (Silicone dressing) - Currently in Place      Respiratory  Silicone O2 tubing - Currently in Place  Gray Foam Ear protectors - Currently in Place    Containment/Moisture Prevention    Purwick/Condom Cath - Currently in Place    Anticipated discharge plans:  TBD        Vac Discharge Needs:  Vac Discharge plan is purely a recommendation from wound team and not a requirement for discharge unless otherwise stated by physician.  Veraflo Vac while inpatient, will need to remain on Veraflo Vac upon discharge

## 2024-04-22 NOTE — CARE PLAN
The patient is Watcher - Medium risk of patient condition declining or worsening    Shift Goals  Clinical Goals: pain control, ABX, wound care  Patient Goals: keep my pain low and get home to my kids  Family Goals: nirmala    Progress made toward(s) clinical / shift goals:        Problem: Knowledge Deficit - Standard  Goal: Patient and family/care givers will demonstrate understanding of plan of care, disease process/condition, diagnostic tests and medications  Note: Assess knowledge level of disease process and answer all questions, explain tests, go over plan of care     Problem: Pain - Standard  Goal: Alleviation of pain or a reduction in pain to the patient’s comfort goal  Note: Give pain management medications as ordered, assess pain often, hourly rounding     Problem: Fall Risk  Goal: Patient will remain free from falls  Note: Assess for fall risk factors and implement fall precautions such as bed alarms and signs, teach pt safety protocols, hourly rounding       Patient is not progressing towards the following goals:

## 2024-04-22 NOTE — PROGRESS NOTES
"      Orthopaedic Progress Note    Interval changes:  Patient doing well    RLE dressings are CDI   Cleared for DC to SNF by ortho pending medicine clearance    ROS - Patient denies any new issues.  Pain well controlled.    BP (!) 156/67   Pulse 60   Temp 36.5 °C (97.7 °F) (Temporal)   Resp 18   Ht 1.753 m (5' 9\")   Wt (!) 183 kg (403 lb 7.1 oz)   SpO2 99%     Patient seen and examined  No acute distress  Breathing non labored  RRR  RLE dressings CDI, DVNI, moves all toes, cap refill, <2 sec.    Recent Labs     04/20/24  0039 04/21/24  0020 04/22/24  0035   WBC 20.7* 14.1* 9.1   RBC 3.45* 3.21* 3.33*   HEMOGLOBIN 8.6* 8.0* 8.2*   HEMATOCRIT 27.9* 26.2* 27.2*   MCV 80.9* 81.6 81.7   MCH 24.9* 24.9* 24.6*   MCHC 30.8* 30.5* 30.1*   RDW 42.1 43.2 43.8   PLATELETCT 262 290 382   MPV 11.1 10.5 10.1       Active Hospital Problems    Diagnosis     H/O renal cell carcinoma s/p nephrectomy [Z85.528]      Priority: Medium    Anemia [D64.9]      Priority: Low    Rheumatoid arthritis involving multiple sites ([facility]) [M06.9]     Cellulitis of left lower extremity [L03.116]     Lactic acidosis [E87.20]     Obesity [E66.9]     A-fib ([facility]) [I48.91]     Mood disorder ([facility]) [F39]     Bacteremia [R78.81]     Obstructive sleep apnea [G47.33]     Constipation [K59.00]        Assessment/Plan:  Patient doing well    RLE dressings are CDI   Cleared for DC to SNF by ortho pending medicine clearance  POD#3 S/P  Left leg fasciotomy of lateral and superficial posterior compartments.  Wt bearing status - WBAT LLE  Wound care/Drains - Dressings to be changed by wound team  Future Procedures - none planned   Sutures/Staples out- 14-21 days post operatively. Removal will completed by ortho mid levels only.  PT/OT-initiated  Antibiotics: pen G  DVT Prophylaxis- TEDS/SCDs/Foot pumps  [place]-not needed per ortho  Case Coordination for Discharge Planning - Disposition per therapy recs.    "

## 2024-04-22 NOTE — PROGRESS NOTES
11/16/2020      Taryn Rodriguez  [id number]  [place] [address]      Dear Taryn:    Recently an Advance Directive, Honoring Choices was mailed to you.  This important document serves as a guide for your Primary Care Physician and your family if a situation were to arise when you could not speak for yourself.      Your Primary Care Physician encourages you to review and discuss your wishes with your family.  The best time to complete your Advance Directive is while you are in good health and you can think clearly about your choices.      Information as how to approach and manage decisions was included.  When completing this document please make sure that you and 2 witnesses sign page 9.  Remember, witnesses CANNOT be related to you by blood, marriage or adoption.  They cannot be a health care worker.  Witnesses CAN be your friends, neighbors, [employer] members, bank tellers, etc.  The entire form needs to be returned, even the pages that did not require you to fill anything out.    Once completed, you may drop off your Advance Directive at your next appointment or at any of our [facility] locations.    I thank you in advance for providing your Primary Care Physician and your family this very important information.    If you have an Advance Directive already, and would like it added to your chart, please mail or drop off a copy to any [facility].    Sincerely,        Nitesh Brock MD  [facility]  [id number]  [facility] [address]  [phone]   NO HARD CHART:       Patient to procedure Room for PETER with no hard chart, only loose papers. Per Floor RN, no hard chart available. Procedure RN completed Pre-Procedure Consent paperwork packet, including: WHO Yellow Sheet signed by RN and MD, Informed consent for PETER procedure signed by RN, patient, and Cardiology, and Informed consent for Anesthesia signed by Anesthesia MD and patient. Procedure Packet secured to loose papers and hand delivered to receiving PACU RN who acknowledged receipt and no hard chart.

## 2024-04-22 NOTE — RESPIRATORY CARE
"  COPD EDUCATION by COPD CLINICAL EDUCATOR  4/21/2024  at  1610 PM by Lizz Payne, RRT     Patient interviewed by education team.  Patient declined or is unable to participate in the full program.  Therefore, a short intervention has been conducted including information about COPD, types of treatments to manage their disease and safe home Oxygen usage was provided and reviewed with patient at the bedside.      COPD Screen  COPD Risk Screening  Do you have a history of COPD?: Yes  Do you have a Pulmonologist?: No    COPD Assessment  COPD Clinical Specialists ONLY  COPD Education Initiated: Yes--Short Intervention (Pt with HX fo COPD -per chart. Pt lives in [facility] and is tearful during assessment- C/O \"leg pain\". Had brief discussion with Pt regarding her pulmonary HX - states she is in too much pain / declined handouts/ class info - Will CB if she needs)  DME Company: Unknown  DME Equipment Type: Home 02 @ 2LNC  Physician Name: SAMUEL Larios  Pulmonologist Name: none at this time - per PT  Referrals Initiated: Yes  Pulmonary Rehab: N/A  Smoking Cessation:  (Pt states she quit 2016)  Hospice: N/A  Home Health Care:  (TBD - Pt lives in [place], [place])  Mobile Urgent Care Services: N/A  Geriatric Specialty Group: N/A  Private In-Home Care Agency: N/A  $ Demo/Eval of SVN's, MDI's and Aerosols: Yes (Reviewed respiratory medications and proper use)  (OP) Pulmonary Function Testing:  (Unable to locate in chart)  Interdisciplinary Rounds: Attendance at Rounds (30 Min)    PFT Results    No results found for: \"PFT\"    Meds to Beds  [facility] for all eligible patients at discharge.  Would you like to opt out of this program for any reason?: No - Stay Opted In     MY COPD ACTION PLAN     It is recommended that patients and physicians /healthcare providers complete this action plan together. This plan should be discussed at each physician visit and updated as needed.    The green, " "yellow and red zones show groups of symptoms of COPD. This list of symptoms is not comprehensive, and you may experience other symptoms. In the \"Actions\" column, your healthcare provider has recommended actions for you to take based on your symptoms.    Patient Name: Savita Duggan   YOB: 1965   Last Updated on: 4/21/2024  2:58 PM   Green Zone:  I am doing well today Actions     Usual activitiy and exercise level   Take daily medications     Usual amounts of cough and phlegm/mucus   Use oxygen as prescribed     Sleep well at night   Continue regular exercise/diet plan     Appetite is good   At all times avoid cigarette smoke, inhaled irritants     Daily Medications (these medications are taken every day):   Budesonide-Formoterol Fumarate (Symbicort) 2 Puffs Twice daily     Additional Information:  Rinse mouth and spit after using Symbicort    Yellow Zone:  I am having a bad day or a COPD flare Actions     More breathless than usual   Continue daily medications     I have less energy for my daily activities   Use quick relief inhaler as ordered     Increased or thicker phlegm/mucus   Use oxygen as prescribed     Using quick relief inhaler/nebulizer more often   Get plenty of rest     Swelling of ankles more than usual   Use pursed lip breathing     More coughing than usual   At all times avoid cigarette smoke, inhaled irritants     I feel like I have a \"chest cold\"     Poor sleep and my symptoms woke me up     My appetite is not good     My medicine is not helping      Call provider immediately if symptoms don’t improve     Continue daily medications, add rescue medications:   Albuterol 2 Puffs         Medications to be used during a flare up, (as Discussed with Provider):           Additional Information:  Use spacer with this inhaler and as prescribed by your doctor    Red Zone:  I need urgent medical care Actions     Severe shortness of breath even at rest   Call 911 or seek medical care immediately     " Not able to do any activity because of breathing      Fever or shaking chills      Feeling confused or very drowsy       Chest pains      Coughing up blood

## 2024-04-22 NOTE — THERAPY
Physical Therapy Contact Note    Patient Name: Savita Duggan  Age:  58 y.o., Sex:  female  Medical Record #: [id number]  Today's Date: 4/22/2024    PT consult received. Attempted PT eval, however pt declined due to LLE pain following wound care. PLOF and living hx detailed below. Will re attempt eval as appropriate/able.       Prior Living Situation   Prior Services None;Home-Independent   Housing / Facility 1 Story House   Steps Into Home 5   Steps In Home 0   Equipment Owned Front-Wheel Walker;Quad Cane   Lives with - Patient's Self Care Capacity Adult Children;Child Less than 18 Years of Age   Comments reports living with her kids age 15, 17, and 23   Prior Level of Functional Mobility   Bed Mobility Independent   Transfer Status Independent   Ambulation Independent   Ambulation Distance community distances   Assistive Devices Used Quad Cane   Stairs Independent

## 2024-04-22 NOTE — RESPIRATORY CARE
"  COPD EDUCATION by COPD CLINICAL EDUCATOR  4/21/2024  at  1510PM by Lizz Payne RRT     Patient interviewed by education team.  Patient declined or is unable to participate in the full program.  Therefore, a short intervention has been conducted including information about COPD, types of treatments to manage their disease and safe home Oxygen usage was provided and reviewed with patient at the bedside.      COPD Screen  COPD Risk Screening  Do you have a history of COPD?: Yes  Do you have a Pulmonologist?: No    COPD Assessment  COPD Clinical Specialists ONLY  COPD Education Initiated: Yes--Short Intervention (Pt with)  DME Equipment Type: Home 02 @ 2LNC  Pulmonologist Name: none at this time - per PT  Referrals Initiated: Yes  Pulmonary Rehab: N/A  Smoking Cessation:  (Pt states she quit 2016)  Hospice: N/A  Home Health Care:  (TBD - Pt lives in [place], [place])  Mobile Urgent Care Services: N/A  [facility] Group: N/A  Private In-Home Care Agency: N/A  $ Demo/Eval of SVN's, MDI's and Aerosols: Yes (Reviewed respiratory medications and proper use)  (OP) Pulmonary Function Testing:  (Unable to locate in chart)  Interdisciplinary Rounds: Attendance at Rounds (30 Min)    PFT Results    No results found for: \"PFT\"    Meds to Beds  [facility] provides bedside medication delivery for all eligible patients at discharge.  Would you like to opt out of this program for any reason?: No - Stay Opted In     MY COPD ACTION PLAN     It is recommended that patients and physicians /healthcare providers complete this action plan together. This plan should be discussed at each physician visit and updated as needed.    The green, yellow and red zones show groups of symptoms of COPD. This list of symptoms is not comprehensive, and you may experience other symptoms. In the \"Actions\" column, your healthcare provider has recommended actions for you to take based on your symptoms.    Patient Name: Savita Duggan   YOB: 1965   " "Last Updated on: 4/21/2024  2:58 PM   Green Zone:  I am doing well today Actions     Usual activitiy and exercise level   Take daily medications     Usual amounts of cough and phlegm/mucus   Use oxygen as prescribed     Sleep well at night   Continue regular exercise/diet plan     Appetite is good   At all times avoid cigarette smoke, inhaled irritants     Daily Medications (these medications are taken every day):   Budesonide-Formoterol Fumarate (Symbicort) 2 Puffs Twice daily     Additional Information:  Rinse mouth and spit after using Symbicort    Yellow Zone:  I am having a bad day or a COPD flare Actions     More breathless than usual   Continue daily medications     I have less energy for my daily activities   Use quick relief inhaler as ordered     Increased or thicker phlegm/mucus   Use oxygen as prescribed     Using quick relief inhaler/nebulizer more often   Get plenty of rest     Swelling of ankles more than usual   Use pursed lip breathing     More coughing than usual   At all times avoid cigarette smoke, inhaled irritants     I feel like I have a \"chest cold\"     Poor sleep and my symptoms woke me up     My appetite is not good     My medicine is not helping      Call provider immediately if symptoms don’t improve     Continue daily medications, add rescue medications:   Albuterol 2 Puffs         Medications to be used during a flare up, (as Discussed with Provider):           Additional Information:  Use spacer with this inhaler and as prescribed by your doctor    Red Zone:  I need urgent medical care Actions     Severe shortness of breath even at rest   Call 911 or seek medical care immediately     Not able to do any activity because of breathing      Fever or shaking chills      Feeling confused or very drowsy       Chest pains      Coughing up blood                  "

## 2024-04-22 NOTE — THERAPY
04/22/24 1001   Interdisciplinary Plan of Care Collaboration   Collaboration Comments OT eval attempted. Pt off unit for PETER. Will follow up after procedure and complete eval as appropriate/able.

## 2024-04-22 NOTE — ANESTHESIA PREPROCEDURE EVALUATION
Date/Time: 04/22/24 0930    Scheduled providers: Jarrett Castaneda M.D.; Yelena Coffey M.D.    Procedure: EC-PETER W/O CONT    Diagnosis:             Bacteremia [R78.81]            Bacteremia [R78.81]    Location: [facility] - Echocardiology [facility]            Relevant Problems   ANESTHESIA   (positive) Obstructive sleep apnea      PULMONARY   (positive) COPD (chronic obstructive pulmonary disease) (HCC)      CARDIAC   (positive) A-fib (HCC)      GI   (positive) GERD (gastroesophageal reflux disease)      Other   (positive) Inflammatory arthritis   (positive) Rheumatoid arthritis involving multiple sites (HCC)     BMI 60      Echo 4/24:  Technically difficult study with poor image quality limits accurate interpretation. Recommend PETER if endocarditis is in question. Valves not well visualized.  Normal biventricular systolic functions, LVEF 65%  Mild MS  Moderate AS (Vmax 3.9m/s, very close to severe criteria), however limited assessment given quality  No pericardial effusion  Normal IVC size    Physical Exam    Airway   Mallampati: II  TM distance: >3 FB  Neck ROM: full       Cardiovascular - normal exam  Rhythm: regular  Rate: normal  (-) murmur     Dental - normal exam           Pulmonary - normal exam  Breath sounds clear to auscultation     Abdominal   (+) obese     Neurological - normal exam                   Anesthesia Plan    ASA 3   ASA physical status 3 criteria: morbid obesity - BMI greater than or equal to 40 and other (comment)    Plan - MAC               Induction: intravenous      Pertinent diagnostic labs and testing reviewed    Informed Consent:    Anesthetic plan and risks discussed with patient.    Use of blood products discussed with: patient whom consented to blood products.

## 2024-04-23 LAB
ANION GAP SERPL CALC-SCNC: 10 MMOL/L (ref 7–16)
BUN SERPL-MCNC: 13 MG/DL (ref 8–22)
CALCIUM SERPL-MCNC: 8.2 MG/DL (ref 8.5–10.5)
CHLORIDE SERPL-SCNC: 99 MMOL/L (ref 96–112)
CO2 SERPL-SCNC: 26 MMOL/L (ref 20–33)
CREAT SERPL-MCNC: 0.6 MG/DL (ref 0.5–1.4)
ERYTHROCYTE [DISTWIDTH] IN BLOOD BY AUTOMATED COUNT: 42.9 FL (ref 35.9–50)
GFR SERPLBLD CREATININE-BSD FMLA CKD-EPI: 103 ML/MIN/1.73 M 2
GLUCOSE SERPL-MCNC: 84 MG/DL (ref 65–99)
HCT VFR BLD AUTO: 27.3 % (ref 37–47)
HGB BLD-MCNC: 8.3 G/DL (ref 12–16)
MCH RBC QN AUTO: 24.6 PG (ref 27–33)
MCHC RBC AUTO-ENTMCNC: 30.4 G/DL (ref 32.2–35.5)
MCV RBC AUTO: 81 FL (ref 81.4–97.8)
PLATELET # BLD AUTO: 403 K/UL (ref 164–446)
PMV BLD AUTO: 9.4 FL (ref 9–12.9)
POTASSIUM SERPL-SCNC: 4.8 MMOL/L (ref 3.6–5.5)
RBC # BLD AUTO: 3.37 M/UL (ref 4.2–5.4)
SODIUM SERPL-SCNC: 135 MMOL/L (ref 135–145)
WBC # BLD AUTO: 10.3 K/UL (ref 4.8–10.8)

## 2024-04-23 PROCEDURE — 85027 COMPLETE CBC AUTOMATED: CPT

## 2024-04-23 PROCEDURE — 97530 THERAPEUTIC ACTIVITIES: CPT

## 2024-04-23 PROCEDURE — 97163 PT EVAL HIGH COMPLEX 45 MIN: CPT

## 2024-04-23 PROCEDURE — 700111 HCHG RX REV CODE 636 W/ 250 OVERRIDE (IP): Mod: JZ

## 2024-04-23 PROCEDURE — 770020 HCHG ROOM/CARE - TELE (206)

## 2024-04-23 PROCEDURE — 80048 BASIC METABOLIC PNL TOTAL CA: CPT

## 2024-04-23 PROCEDURE — 36415 COLL VENOUS BLD VENIPUNCTURE: CPT

## 2024-04-23 PROCEDURE — 700111 HCHG RX REV CODE 636 W/ 250 OVERRIDE (IP): Performed by: HOSPITALIST

## 2024-04-23 PROCEDURE — 97535 SELF CARE MNGMENT TRAINING: CPT

## 2024-04-23 PROCEDURE — 99233 SBSQ HOSP IP/OBS HIGH 50: CPT | Mod: GC | Performed by: HOSPITALIST

## 2024-04-23 PROCEDURE — 700105 HCHG RX REV CODE 258

## 2024-04-23 PROCEDURE — 700111 HCHG RX REV CODE 636 W/ 250 OVERRIDE (IP)

## 2024-04-23 PROCEDURE — A9270 NON-COVERED ITEM OR SERVICE: HCPCS

## 2024-04-23 PROCEDURE — 700102 HCHG RX REV CODE 250 W/ 637 OVERRIDE(OP)

## 2024-04-23 RX ORDER — FUROSEMIDE 10 MG/ML
40 INJECTION INTRAMUSCULAR; INTRAVENOUS ONCE
Status: COMPLETED | OUTPATIENT
Start: 2024-04-23 | End: 2024-04-23

## 2024-04-23 RX ADMIN — SOTALOL HYDROCHLORIDE 120 MG: 120 TABLET ORAL at 17:35

## 2024-04-23 RX ADMIN — ESCITALOPRAM OXALATE 10 MG: 10 TABLET ORAL at 04:32

## 2024-04-23 RX ADMIN — APIXABAN 5 MG: 5 TABLET, FILM COATED ORAL at 04:31

## 2024-04-23 RX ADMIN — OXYCODONE HYDROCHLORIDE 10 MG: 10 TABLET ORAL at 09:02

## 2024-04-23 RX ADMIN — GABAPENTIN 100 MG: 100 CAPSULE ORAL at 09:02

## 2024-04-23 RX ADMIN — ATORVASTATIN CALCIUM 20 MG: 20 TABLET, FILM COATED ORAL at 20:49

## 2024-04-23 RX ADMIN — ACETAMINOPHEN 1000 MG: 500 TABLET, FILM COATED ORAL at 09:01

## 2024-04-23 RX ADMIN — GABAPENTIN 100 MG: 100 CAPSULE ORAL at 13:46

## 2024-04-23 RX ADMIN — MOMETASONE FUROATE AND FORMOTEROL FUMARATE DIHYDRATE 2 PUFF: 200; 5 AEROSOL RESPIRATORY (INHALATION) at 17:35

## 2024-04-23 RX ADMIN — OXYCODONE HYDROCHLORIDE 10 MG: 10 TABLET ORAL at 17:39

## 2024-04-23 RX ADMIN — GABAPENTIN 100 MG: 100 CAPSULE ORAL at 19:20

## 2024-04-23 RX ADMIN — GABAPENTIN 100 MG: 100 CAPSULE ORAL at 01:22

## 2024-04-23 RX ADMIN — OXYCODONE 5 MG: 5 TABLET ORAL at 22:48

## 2024-04-23 RX ADMIN — ACETAMINOPHEN 1000 MG: 500 TABLET, FILM COATED ORAL at 17:34

## 2024-04-23 RX ADMIN — OXYCODONE HYDROCHLORIDE 10 MG: 10 TABLET ORAL at 13:49

## 2024-04-23 RX ADMIN — ACETAMINOPHEN 1000 MG: 500 TABLET, FILM COATED ORAL at 01:22

## 2024-04-23 RX ADMIN — ONDANSETRON 4 MG: 4 TABLET, ORALLY DISINTEGRATING ORAL at 22:38

## 2024-04-23 RX ADMIN — FUROSEMIDE 40 MG: 10 INJECTION INTRAMUSCULAR; INTRAVENOUS at 12:02

## 2024-04-23 RX ADMIN — APIXABAN 5 MG: 5 TABLET, FILM COATED ORAL at 17:35

## 2024-04-23 RX ADMIN — SOTALOL HYDROCHLORIDE 120 MG: 120 TABLET ORAL at 04:31

## 2024-04-23 RX ADMIN — OXYCODONE 5 MG: 5 TABLET ORAL at 01:22

## 2024-04-23 RX ADMIN — SODIUM CHLORIDE 24 MILLION UNITS: 9 INJECTION, SOLUTION INTRAVENOUS at 14:57

## 2024-04-23 ASSESSMENT — COGNITIVE AND FUNCTIONAL STATUS - GENERAL
MOVING TO AND FROM BED TO CHAIR: TOTAL
MOVING TO AND FROM BED TO CHAIR: TOTAL
STANDING UP FROM CHAIR USING ARMS: TOTAL
SUGGESTED CMS G CODE MODIFIER MOBILITY: CM
CLIMB 3 TO 5 STEPS WITH RAILING: TOTAL
MOBILITY SCORE: 7
CLIMB 3 TO 5 STEPS WITH RAILING: TOTAL
MOBILITY SCORE: 7
TURNING FROM BACK TO SIDE WHILE IN FLAT BAD: A LOT
SUGGESTED CMS G CODE MODIFIER MOBILITY: CM
WALKING IN HOSPITAL ROOM: TOTAL
TURNING FROM BACK TO SIDE WHILE IN FLAT BAD: A LOT
MOVING FROM LYING ON BACK TO SITTING ON SIDE OF FLAT BED: TOTAL
STANDING UP FROM CHAIR USING ARMS: TOTAL
WALKING IN HOSPITAL ROOM: TOTAL
MOVING FROM LYING ON BACK TO SITTING ON SIDE OF FLAT BED: TOTAL

## 2024-04-23 ASSESSMENT — ENCOUNTER SYMPTOMS
PSYCHIATRIC NEGATIVE: 1
CONSTITUTIONAL NEGATIVE: 1
CARDIOVASCULAR NEGATIVE: 1
RESPIRATORY NEGATIVE: 1
NEUROLOGICAL NEGATIVE: 1
GASTROINTESTINAL NEGATIVE: 1
EYES NEGATIVE: 1

## 2024-04-23 ASSESSMENT — GAIT ASSESSMENTS: GAIT LEVEL OF ASSIST: UNABLE TO PARTICIPATE

## 2024-04-23 ASSESSMENT — PAIN SCALES - WONG BAKER
WONGBAKER_NUMERICALRESPONSE: HURTS JUST A LITTLE BIT
WONGBAKER_NUMERICALRESPONSE: HURTS JUST A LITTLE BIT

## 2024-04-23 ASSESSMENT — ACTIVITIES OF DAILY LIVING (ADL): TOILETING: INDEPENDENT

## 2024-04-23 ASSESSMENT — PAIN DESCRIPTION - PAIN TYPE
TYPE: ACUTE PAIN

## 2024-04-23 ASSESSMENT — FIBROSIS 4 INDEX: FIB4 SCORE: 0.89

## 2024-04-23 NOTE — PROGRESS NOTES
Report received from Allen RN at 1900. PT seen at bedside and pt care assumed. Pt is A&Ox4, on 2.5L NC, denies pain. PIV flushed and intact. PT is SR on telemetry monitor. PT is max assist x 2 - unable to ambulate.     Plan of care reviewed with pt's family, call light, phone, and personal belongings within reach. Bed alarm on, and bed in low locked position. All pt's needs met at this time.     Bedside report received from off going RN: SARAH Timmons, assumed care of patient.      Fall Risk Score: HIGH RISK  Fall risk interventions in place: Place yellow fall risk ID band on patient, Provide patient/family education based on risk assessment, Educate patient/family to call staff for assistance when getting out of bed, Place fall precaution signage outside patient door, Place patient in room close to nursing station, Utilize bed/chair fall alarm, Notify charge of high risk for huddle, and Bed alarm connected correctly  Bed type: Bariatric Low air loss (Noah Score less than 17 interventions in place)  Patient on cardiac monitor: Yes  IVF/IV medications: Not Applicable   Oxygen: 2.5L NC  Bedside sitter: Not Applicable   Isolation: Not applicable

## 2024-04-23 NOTE — CARE PLAN
The patient is Stable - Low risk of patient condition declining or worsening    Shift Goals  Clinical Goals: IV atb, pain management, wound care, PT/OT  Patient Goals: pain control  Family Goals: KIANA    Progress made toward(s) clinical / shift goals:    Problem: Pain - Standard  Goal: Alleviation of pain or a reduction in pain to the patient’s comfort goal  Outcome: Progressing  Note: Pain will be at a tolerable level as evidence by, adequate sleep, pt states pain level is tolerable, and VS within parameters. PRN oxycodone given for pain.      Problem: Skin Integrity  Goal: Skin integrity is maintained or improved  Outcome: Progressing  Note: Pt on q2turn, on bariatric JOLENE mattress. Wound care onboard. Pt educated about the importance of frequent repositioning to prevent skin breakdown. Pt verbalized understanding. Appropriate dressings in place. Extra pillows in place for comfort, between knees, and to float heels. Barrier cream applied as appropriate. Purewick in place. Pt cleaned and linens changed frequently as appropriate.          Problem: Fall Risk  Goal: Patient will remain free from falls  Outcome: Progressing  Note: Patient remained free from falls. All fall precautions in place. Patient educated the need to call when needed assistance, patient verbalized understanding. Call light and personal belongings within reach.

## 2024-04-23 NOTE — THERAPY
"Physical Therapy   Initial Evaluation     Patient Name: Savita Duggan  Age:  58 y.o., Sex:  female  Medical Record #: [id number]  Today's Date: 4/23/2024     Precautions  Precautions: Fall Risk;Weight Bearing As Tolerated Left Lower Extremity  Comments: wound vac x2    Assessment  Patient is a 58-y.o. female who presents to acute as a transfer from [facility] with increasing left leg pain due to worsening infection of LLE. Pt is s/p L leg fasciotomy of anterior and superficial posterior compartments on 4/19. Pt has PMHx of anxiety, cancer, COPD, GERD, MI, HLD, and HTN.    Pt presents to acute physical therapy with pain, decreased activity tolerance, functional strength deficits, balance deficits, and self limiting behavior (fear and anxiety regarding movement) which are limiting her ability to safely perform functional mobility. Pt mobilized as detailed below. Pt required multiple cues for encouragement to participate in therapy session and to attempt tasks before saying \"I can't\". Patient verbalized wanting to DC home but is not yet performing adequate mobility for DC home. Recommend patient mobilize to EOB (at least once/day, 3x/day at meals would be better) with RN staff to progress strength and tolerance. Provided education regarding pain management strategies, acute healing process, and importance of mobility / pathology of bed rest. Recommend post-acute placement at this time. Will follow.    Plan    Physical Therapy Initial Treatment Plan   Treatment Plan : Bed Mobility, Equipment, Gait Training, Neuro Re-Education / Balance, Self Care / Home Evaluation, Stair Training, Therapeutic Exercise, Therapeutic Activities  Treatment Frequency: 3 Times per Week  Duration: Until Therapy Goals Met    DC Equipment Recommendations: Unable to determine at this time  Discharge Recommendations: Recommend post-acute placement for additional physical therapy services prior to discharge home       Subjective    RN cleared " "patient for therapy, received in bed, agreeable but fearful regarding movement     Objective       04/23/24 1201   Cosignature   Documentation Review Approved with modifications made by preceptor in flowsheet   Charge Group   PT Evaluation PT Evaluation High   Total Time Spent   PT Total Time Yes   PT Evaluation Time Spent (Mins) 20   PT Total Time Spent (Calculated) 20    Services   Is patient using  services for this encounter? No   Initial Contact Note    Initial Contact Note Order Received and Verified, Physical Therapy Evaluation in Progress with Full Report to Follow.   Precautions   Precautions Fall Risk;Weight Bearing As Tolerated Left Lower Extremity   Comments wound vac x2   Vitals   O2 (LPM) 2   O2 Delivery Device Silicone Nasal Cannula   Vitals Comments Pt did not report symptoms of dizziness or lightheadedness.   Pain 0 - 10 Group   Location Leg   Location Orientation Left   Description Aching;Burning   Therapist Pain Assessment Nurse Notified;Post Activity Pain Same as Prior to Activity;During Activity   Prior Living Situation   Prior Services None;Home-Independent   Housing / Facility 1 Story House   Steps Into Home 5   Steps In Home 0   Equipment Owned Front-Wheel Walker;Quad Cane   Lives with - Patient's Self Care Capacity Adult Children;Child Less than 18 Years of Age   Comments reports living with her \"kids\" age 15, 17, and 23; clarified later in session they are \"adopted\" nieces and nephews. Reported they can assist some with IADLs   Prior Level of Functional Mobility   Bed Mobility Independent   Transfer Status Independent   Ambulation Independent   Ambulation Distance community   Assistive Devices Used Quad Cane   Stairs Independent   Cognition    Cognition / Consciousness X   Level of Consciousness Alert   Comments internally distracted by pain and focused on L leg pain during movement. Pt repeatedly said \"I can't\" before attempting tasks   Active ROM Upper Body "   Comments patient moved BUE functionally during session. Some limitations due to body habitus   Active ROM Lower Body    Active ROM Lower Body  WDL   Strength Lower Body   Lower Body Strength  X   Comments RLE grossly 3+ to 4/5; LLE grossly 2 to 3/5 and limited by pain   Coordination Lower Body    Coordination Lower Body  X   Comments Increased time and effort   Balance Assessment   Sitting Balance (Static) Fair   Sitting Balance (Dynamic) Fair   Weight Shift Sitting Poor   Comments No AD used in sitting. Pt unable to stand due to anxiety  with movement and fear of falls.   Bed Mobility    Supine to Sit Maximal Assist   Sit to Supine Maximal Assist   Scooting Maximal Assist   Rolling Maximal Assist to Rt.;Maximum Assist to Lt.   Comments Max Ax2. HOB slightly elevated.   Gait Analysis   Gait Level Of Assist Unable to Participate   Weight Bearing Status LLE WBAT   Vision Deficits Impacting Mobility no restrictions   Comments deferred given patient's fear and report of intractable pain   Functional Mobility   Sit to Stand Unable to Participate  (appears capable)   Comments Pt limited by anxiety, pain, and fear of falls.   6 Clicks Assessment - How much HELP from from another person do you currently need... (If the patient hasn't done an activity recently, how much help from another person do you think he/she would need if he/she tried?)   Turning from your back to your side while in a flat bed without using bedrails? 2   Moving from lying on your back to sitting on the side of a flat bed without using bedrails? 1   Moving to and from a bed to a chair (including a wheelchair)? 1   Standing up from a chair using your arms (e.g., wheelchair, or bedside chair)? 1   Walking in hospital room? 1   Climbing 3-5 steps with a railing? 1   6 clicks Mobility Score 7   Patient / Family Goals    Patient / Family Goal #1 To return home   Short Term Goals    Short Term Goal # 1 Pt will demonstrate supine <>sit with HOB flat and  bedrails with Min A within 6 visits to progress functional independence with bed mobility.   Short Term Goal # 2 Pt will demonstrate sit <> stand with LRAD with supervision within 6 visits to progress functional independence in preparation for transfers.   Short Term Goal # 3 Pt will ambulate 50 ft with LRAD with supervision within 6 visits to progress return to community ambulator.   Short Term Goal # 4 Pt will ascend/descend 5 steps with left handrail with SBA within 6 visits to progress return to prior level of living.   Education Group   Education Provided Role of Physical Therapist   Role of Physical Therapist Patient Response Patient;Acceptance;Explanation;Verbal Demonstration   Physical Therapy Initial Treatment Plan    Treatment Plan  Bed Mobility;Equipment;Gait Training;Neuro Re-Education / Balance;Self Care / Home Evaluation;Stair Training;Therapeutic Exercise;Therapeutic Activities   Treatment Frequency 3 Times per Week   Duration Until Therapy Goals Met   Problem List    Problems Pain;Impaired Bed Mobility;Impaired Transfers;Impaired Ambulation;Functional Strength Deficit;Impaired Balance;Decreased Activity Tolerance;Safety Awareness Deficits / Cognition   Anticipated Discharge Equipment and Recommendations   DC Equipment Recommendations Unable to determine at this time   Discharge Recommendations Recommend post-acute placement for additional physical therapy services prior to discharge home   Interdisciplinary Plan of Care Collaboration   IDT Collaboration with  Nursing   Patient Position at End of Therapy In Bed;Call Light within Reach;Tray Table within Reach;Phone within Reach   Collaboration Comments RN updated.   Session Information   Date / Session Number  4/23 - 1(1/3, 4/29)

## 2024-04-23 NOTE — PROGRESS NOTES
Telemetry Report:    Rhythm:     SR  Heart Rate: 64 - 68  Ectopy:      PVC's      OK:  0.16           QRS:       0.06  QT:   0.37      Per Telestrip from Monitor Room

## 2024-04-23 NOTE — THERAPY
Physical Therapy   Daily Treatment     Patient Name: Savita Duggan  Age:  58 y.o., Sex:  female  Medical Record #: [id number]  Today's Date: 4/23/2024     Precautions  Precautions: Fall Risk;Weight Bearing As Tolerated Left Lower Extremity  Comments: wound vac x2    Assessment    Coordinated bed mobility with RN to discuss safe strategies for staff and patient and to reduce burden on staff for RN tasks when mobility is incorporated. Patient is able to perform rolling with use of bed rail and mod A x1 to facilitate hip/knee flexion/RLE foot on bed and cues and facilitation of rolling to reach bed rail; patient is able to pull on rail to complete turn. Provided further education regarding pain and importance of attempting mobility despite pain especially if patient hopes to return home following medical DC. Will continue to follow.     Plan    Treatment Plan Status: Continue Current Treatment Plan  Type of Treatment: Bed Mobility, Equipment, Gait Training, Neuro Re-Education / Balance, Self Care / Home Evaluation, Stair Training, Therapeutic Exercise, Therapeutic Activities  Treatment Frequency: 3 Times per Week  Treatment Duration: Until Therapy Goals Met    DC Equipment Recommendations: Unable to determine at this time  Discharge Recommendations: Recommend post-acute placement for additional physical therapy services prior to discharge home     Objective       04/23/24 1355   Charge Group   Charges  Yes   PT Therapeutic Activities (Units) 1   Total Time Spent   PT Total Time Yes   PT Therapeutic Activities Time Spent (Mins) 10   PT Total Time Spent (Calculated) 10   Precautions   Precautions Fall Risk;Weight Bearing As Tolerated Left Lower Extremity   Comments wound vac x2   Vitals   O2 (LPM) 2   O2 Delivery Device Silicone Nasal Cannula   Pain 0 - 10 Group   Location Leg   Location Orientation Left   Therapist Pain Assessment During Activity;Nurse Notified   Cognition    Cognition / Consciousness X   Level of  Consciousness Alert   Comments distracted by pain; pleasant and cooperative, improved participation from prior   Bed Mobility    Rolling Moderate Assist to Lt.   Skilled Intervention Compensatory Strategies;Facilitation;Sequencing;Tactile Cuing;Verbal Cuing   6 Clicks Assessment - How much HELP from from another person do you currently need... (If the patient hasn't done an activity recently, how much help from another person do you think he/she would need if he/she tried?)   Turning from your back to your side while in a flat bed without using bedrails? 2   Moving from lying on your back to sitting on the side of a flat bed without using bedrails? 1   Moving to and from a bed to a chair (including a wheelchair)? 1   Standing up from a chair using your arms (e.g., wheelchair, or bedside chair)? 1   Walking in hospital room? 1   Climbing 3-5 steps with a railing? 1   6 clicks Mobility Score 7   Patient / Family Goals    Patient / Family Goal #1 To return home   Goal #1 Outcome Goal not met   Short Term Goals    Short Term Goal # 1 Pt will demonstrate supine <>sit with HOB flat and bedrails with Min A within 6 visits to progress functional independence with bed mobility.   Goal Outcome # 1 Progressing as expected   Short Term Goal # 2 Pt will demonstrate sit <> stand with LRAD with supervision within 6 visits to progress functional independence in preparation for transfers.   Goal Outcome # 2 Goal not met   Short Term Goal # 3 Pt will ambulate 50 ft with LRAD with supervision within 6 visits to progress return to community ambulator.   Goal Outcome # 3 Goal not met   Short Term Goal # 4 Pt will ascend/descend 5 steps with left handrail with SBA within 6 visits to progress return to prior level of living.   Goal Outcome # 4 Goal not met   Education Group   Additional Comments education regarding safety of staff and patient during mobility; pain management and patient moving LLE volitionally instead of RN staff  assisting; use of bed features to reduce burden on staff; importance of progressing mobility despite pain and managing pain with RN   Physical Therapy Treatment Plan   Physical Therapy Treatment Plan Continue Current Treatment Plan   Anticipated Discharge Equipment and Recommendations   DC Equipment Recommendations Unable to determine at this time   Discharge Recommendations Recommend post-acute placement for additional physical therapy services prior to discharge home   Interdisciplinary Plan of Care Collaboration   IDT Collaboration with  Nursing   Patient Position at End of Therapy In Bed;Call Light within Reach;Tray Table within Reach;Phone within Reach   Collaboration Comments RN present to observe PT facilitating bed mobility to reduce burden on staff   Session Information   Date / Session Number  4/23-1 (1/3, 4/29)

## 2024-04-23 NOTE — CARE PLAN
The patient is Stable - Low risk of patient condition declining or worsening    Shift Goals  Clinical Goals: pain management, wound care, PETER  Patient Goals: pain control  Family Goals: nirmala    Progress made toward(s) clinical / shift goals:    Problem: Knowledge Deficit - Standard  Goal: Patient and family/care givers will demonstrate understanding of plan of care, disease process/condition, diagnostic tests and medications  Description: Target End Date:  1-3 days or as soon as patient condition allows    Document in Patient Education    1.  Patient and family/caregiver oriented to unit, equipment, visitation policy and means for communicating concern  2.  Complete/review Learning Assessment  3.  Assess knowledge level of disease process/condition, treatment plan, diagnostic tests and medications  4.  Explain disease process/condition, treatment plan, diagnostic tests and medications  Outcome: Progressing     Problem: Pain - Standard  Goal: Alleviation of pain or a reduction in pain to the patient’s comfort goal  Description: Target End Date:  Prior to discharge or change in level of care    Document on Vitals flowsheet    1.  Document pain using the appropriate pain scale per order or unit policy  2.  Educate and implement non-pharmacologic comfort measures (i.e. relaxation, distraction, massage, cold/heat therapy, etc.)  3.  Pain management medications as ordered  4.  Reassess pain after pain med administration per policy  5.  If opiods administered assess patient's response to pain medication is appropriate per POSS sedation scale  6.  Follow pain management plan developed in collaboration with patient and interdisciplinary team (including palliative care or pain specialists if applicable)  Outcome: Progressing     Problem: Skin Integrity  Goal: Skin integrity is maintained or improved  Description: Target End Date:  Prior to discharge or change in level of care    Document interventions on Skin Risk/Noah  flowsheet groups and corresponding LDA    1.  Assess and monitor skin integrity, appearance and/or temperature  2.  Assess risk factors for impaired skin integrity and/or pressures ulcers  3.  Implement precautions to protect skin integrity in collaboration with interdisciplinary team  4.  Implement pressure ulcer prevention protocol if at risk for skin breakdown  5.  Confirm wound care consult if at risk for skin breakdown  6.  Ensure patient use of pressure relieving devices  (Low air loss bed, waffle overlay, heel protectors, ROHO cushion, etc)  Outcome: Progressing

## 2024-04-23 NOTE — DISCHARGE PLANNING
Case Management Discharge Planning    Admission Date: 4/18/2024  GMLOS: 3.2  ALOS: 5    6-Clicks ADL Score: 8  6-Clicks Mobility Score: 6  PT and/or OT Eval ordered: Yes  Post-acute Referrals Ordered: No  Post-acute Choice Obtained: No  Has referral(s) been sent to post-acute provider:  Yes      Anticipated Discharge Dispo: Discharge Disposition: Discharged to home/self care (01)  Discharge Address: [address] [place]    DME Needed: No    Action(s) Taken: Chart review completed. Pt currently pending PT/OT evaluations, however six clicks are 8/6. Pt also currently has two veraflo wound vacs on left leg. Pt's medi-fito is through [facility]. Requested DPA send referrals to SNF in [facility].    Addendum @0146  Spoke with pt about DC disposition. Pt aware that services are limited in [place], there is no C and unsure whether they offer outpatient wound care. Pt adamant she cannot stay at a SNF as she has already missed too much work. Pt is not concerned about losing her job, but rather being able to make money to support herself and her family. Discussed DC needs likely including PT/OT as well as wound care 3x/week. Pt voiced understanding and feels she is able to do this outpatient as long as it can be done in [place].    Per review,  Physical Therapy ([phone]) provides wound care. Spoke with wound RN, Olegario, who confirmed they would be able to do wound care for two wound vacs. Per Olegario, referral for wound care would need to be initiated through their main number and request to speak to intake ([phone]).    LSW spoke with Vida at  ([phone]) to verify what is needed to get home wound vacs through [employer] insurance. In addition to labs, pt will need doppler study and nursing care plan to include frequency of dressing changes and at what pressure settings.    Addendum @7968  Per IDT rounds, ID recommendations are for 2 weeks IV abx as well. LSW verified [facility]  [place] also offers outpatient infusion services (contact SARAH Argueta [phone]).     Wound vac order form signed by MD and faxed to [employer].    Escalations Completed: None    Medically Clear: No    Next Steps: Care coordination will f/u with wound vac referrals    Barriers to Discharge: Medical clearance, Pending PT Evaluation, DME, and Outpatient referrals pending    Is the patient up for discharge tomorrow: No    Care Transition Team Assessment    LSW met with pt at bedside to complete assessment. Pt A&Ox4 and able to verify the information on the face sheet. Pt lives with her three grand-nephews (ages 23, 17, and 15) in a single story house that has five steps to enter. Prior to this hospitalization pt was independent with all ADLs and IADLs. Pt reported the only DME she uses is 2L O2 at night supplied by [employer]. Pt drives independently and reports she is employed full-time. Pt's 15 and 18 yo nephews go to school, however the 24 yo is home all day. Pt's sister lives next door with additional grand nieces/nephews that they work together to raise. Pt denies any SA or MH concerns. Pt does not have an advance directive, however her NOK is her sister, Edith.    Information Source  Orientation Level: Oriented X4  Information Given By: Patient  Informant's Name: Savita Duggan  Who is responsible for making decisions for patient? : Patient    Readmission Evaluation  Is this a readmission?: No    Elopement Risk  Legal Hold: No  Ambulatory or Self Mobile in Wheelchair: No-Not an Elopement Risk  Elopement Risk: Not at Risk for Elopement    Interdisciplinary Discharge Planning  Lives with - Patient's Self Care Capacity: Adult Children, Child Less than 18 Years of Age  Patient or legal guardian wants to designate a caregiver: No  Support Systems: Family Member(s)  Housing / Facility: [address]  Prior Services: None, Home-Independent  Durable Medical Equipment: Home Oxygen, Walker    Discharge Preparedness  What is your plan  after discharge?: Home with help  What are your discharge supports?: Other (comment), Sibling (grandnephews/nieces)  Prior Functional Level: Ambulatory, Drives Self, Independent with Activities of Daily Living, Independent with Medication Management  Difficulity with ADLs: None  Difficulity with IADLs: None    Functional Assesment  Prior Functional Level: Ambulatory, Drives Self, Independent with Activities of Daily Living, Independent with Medication Management    Finances  Financial Barriers to Discharge: No  Prescription Coverage: Yes    Vision / Hearing Impairment  Vision Impairment : Yes  Right Eye Vision: Impaired  Left Eye Vision: Impaired  Hearing Impairment : No    Advance Directive  Advance Directive?: None  Advance Directive offered?: AD Booklet refused    Domestic Abuse  Have you ever been the victim of abuse or violence?: No  Physical Abuse or Sexual Abuse: No  Verbal Abuse or Emotional Abuse: No  Possible Abuse/Neglect Reported to:: Not Applicable    Psychological Assessment  History of Substance Abuse: None  History of Psychiatric Problems: No  Non-compliant with Treatment: No  Newly Diagnosed Illness: No    Discharge Risks or Barriers  Discharge risks or barriers?: No  Patient risk factors: Bariatric    Anticipated Discharge Information  Discharge Disposition: Discharged to home/self care (01)  Discharge Address: [address] [place] [address] [place]

## 2024-04-23 NOTE — PROGRESS NOTES
"    Orthopedic PA Progress Note    Interval changes:  Patient doing ok overall  Veraflo wound vac placed and holding suction  LLE dressings are CDI  WBAT LLE  Continue aggressive abx therapy  OR cx results NGTD  Follow up with Dr. Ortiz 3-4 weeks postop  Cleared for DC from orthopedic standpoint pending placement that accepts veraflo    ROS - Patient denies any new issues.  Denies any numbness or tingling. Pain well controlled.    BP (!) 150/70   Pulse 64   Temp 36.6 °C (97.9 °F) (Temporal)   Resp 12   Ht 1.753 m (5' 9\")   Wt (!) 186 kg (411 lb)   SpO2 93%     Patient seen and examined  No acute distress  Breathing non labored  RRR  LLE: Dressings CDI, vac intact without leak.     Recent Labs     04/21/24  0020 04/22/24  0035 04/23/24  0300   WBC 14.1* 9.1 10.3   RBC 3.21* 3.33* 3.37*   HEMOGLOBIN 8.0* 8.2* 8.3*   HEMATOCRIT 26.2* 27.2* 27.3*   MCV 81.6 81.7 81.0*   MCH 24.9* 24.6* 24.6*   MCHC 30.5* 30.1* 30.4*   RDW 43.2 43.8 42.9   PLATELETCT 290 382 403   MPV 10.5 10.1 9.4       Active Hospital Problems    Diagnosis     H/O renal cell carcinoma s/p nephrectomy [Z85.528]      Priority: Medium    Anemia [D64.9]      Priority: Low    Rheumatoid arthritis involving multiple sites ([facility]) [M06.9]     Cellulitis of left lower extremity [L03.116]     Lactic acidosis [E87.20]     Obesity [E66.9]     A-fib ([facility]) [I48.91]     Mood disorder ([facility]) [F39]     Bacteremia [R78.81]     Obstructive sleep apnea [G47.33]     Constipation [K59.00]        Assessment/Plan:  Patient doing ok overall  Veraflo wound vac placed and holding suction  LLE dressings are CDI  WBAT LLE  Continue aggressive abx therapy  Follow up with Dr. Ortiz 3-4 weeks postop  Cleared for DC from orthopedic standpoint pending placement that accepts veraflo    POD#4 S/p  Left leg fasciotomy of anterior and superficial posterior   compartments.  Wt bearing status - WBAT LLE  Wound care/Drains - Dressings to be changed per protocol by wound team  Future " Procedures - None planned   Sutures/Staples out- 14-21 days post operatively. Removal will completed by ortho MAME's unless transferred.  DVT Prophylaxis outpatient: ASA 81 mg PO BID x4 weeks  PT/OT-initiated  Antibiotics: PCN   DVT Prophylaxis- TEDS/SCDs/Foot pumps/eliquis  [place]-not needed per ortho  Case Coordination for Discharge Planning - Disposition per therapy recs.

## 2024-04-23 NOTE — DISCHARGE PLANNING
[facility]  Nahomy with admissions said that they are full as of right now. No possible d/c anytime soon.     [facility]   Left a message for Vasile(Artem) to follow up on referral. Left my direct line.       1319  Left another message for Vasile with [facility] to follow up on referral. Left my direct line.

## 2024-04-23 NOTE — PROGRESS NOTES
[facility] Internal Medicine Daily Progress Note    Date of Service  4/23/2024    R Team: R IM Blue Team   Attending: REID Elizabeth M.d.  Senior Resident: Dr. Chauhan  Intern:  Dr. Crowder  Contact Number: [phone]    Chief Complaint  Savita Duggan is a 58 y.o. female admitted as direct admit from [facility] for worsening left lower extremity cellulitis and possible infective endocarditis.    Hospital Course  Savita Duggan is a 58 y.o. female who presented 4/18/2024 from outside hospital due to worsening infection of left lower extremity. Patient reports that symptom onset was approximately 3 weeks ago when she started to notice redness around her mid shin that slowly started to worsen.  Patient denies any trauma or injury to site of infection.  Due to increasing pain, erythema and swelling the patient elected to present to her nearest emergency department which is at [facility].  Reportedly the patient was admitted on 4/14 and diagnosed with left lower extremity cellulitis and initiated on vancomycin and Levaquin without significant improvement and patient's blood cultures grew group C strep and antibiotics were escalated to cefepime but the patient still continues to have limited improvement.  Also reportedly the patient may have had a murmur subsequently plan was created to transfer the patient to [facility] for further evaluation and management.  Orthopedics was consulted on admission, pharmacies and drainage of left lower extremity on 4/19 due to concern for necrotizing fasciitis.  Consulted infectious disease regarding possible infective endocarditis, and transitioning antibiotics to IV penicillin.  TTE performed although unable to visualize valves, and PETER ordered which showed no evidence of IE, therefore continuing course of IV penicillin x 2 weeks following negative blood cultures.  She previously had reported history of alert allergy to penicillins although tolerated IV penicillin without any adverse allergic reactions and  removed from allergy list.     Interval Problem Update  No acute events overnight. Wound vac in place, appreciate wound care team. Pain well controlled on current regimen. Overall improving, will give Lasix IV 40 to help with swelling. Somewhat increased oxygen requirements likely due to atelectasis. Given no other symptoms, encouraged use of incentive spirometer. PT/OT to evaluate, though notably, patient has historically declined SNF.    I have discussed this patient's plan of care and discharge plan at IDT rounds today with Case Management, Nursing, Nursing leadership, and other members of the IDT team.    Consultants/Specialty  infectious disease and orthopedics    Code Status  Full Code    Disposition  The patient is not medically cleared for discharge to home or a post-acute facility.      I have placed the appropriate orders for post-discharge needs.    Review of Systems  Review of Systems   Constitutional: Negative.    HENT: Negative.     Eyes: Negative.    Respiratory: Negative.     Cardiovascular: Negative.    Gastrointestinal: Negative.    Genitourinary: Negative.    Musculoskeletal:  Positive for joint pain.   Skin:  Positive for rash.   Neurological: Negative.    Endo/Heme/Allergies: Negative.    Psychiatric/Behavioral: Negative.          Physical Exam  Temp:  [36.5 °C (97.7 °F)-36.9 °C (98.4 °F)] 36.9 °C (98.4 °F)  Pulse:  [64-72] 72  Resp:  [12-18] 12  BP: (121-152)/(58-76) 148/58  SpO2:  [93 %-100 %] 95 %    Physical Exam  Constitutional:       General: She is not in acute distress.     Appearance: She is obese.   Eyes:      General: No scleral icterus.  Cardiovascular:      Rate and Rhythm: Normal rate and regular rhythm.      Heart sounds: Murmur (systolic murmur III/VI) heard.   Pulmonary:      Effort: Pulmonary effort is normal. No respiratory distress.      Breath sounds: Normal breath sounds. No wheezing.   Abdominal:      General: Abdomen is flat. Bowel sounds are normal.      Palpations:  Abdomen is soft.      Tenderness: There is no abdominal tenderness.   Musculoskeletal:         General: Tenderness and deformity (left lower extremity wrapped in ace bandage) present.   Skin:     Comments: Erythema of LLE improved, new bullae noted today, overall improving   Neurological:      General: No focal deficit present.      Mental Status: She is alert and oriented to person, place, and time. Mental status is at baseline.      Cranial Nerves: No cranial nerve deficit.      Motor: No weakness.   Psychiatric:         Mood and Affect: Mood normal.         Behavior: Behavior normal.         Fluids    Intake/Output Summary (Last 24 hours) at 4/23/2024 1324  Last data filed at 4/23/2024 1200  Gross per 24 hour   Intake 800 ml   Output 2400 ml   Net -1600 ml       Laboratory  Recent Labs     04/21/24  0020 04/22/24  0035 04/23/24  0300   WBC 14.1* 9.1 10.3   RBC 3.21* 3.33* 3.37*   HEMOGLOBIN 8.0* 8.2* 8.3*   HEMATOCRIT 26.2* 27.2* 27.3*   MCV 81.6 81.7 81.0*   MCH 24.9* 24.6* 24.6*   MCHC 30.5* 30.1* 30.4*   RDW 43.2 43.8 42.9   PLATELETCT 290 382 403   MPV 10.5 10.1 9.4     Recent Labs     04/21/24  0020 04/22/24  0035 04/23/24  0300   SODIUM 134* 134* 135   POTASSIUM 5.0 5.1 4.8   CHLORIDE 101 100 99   CO2 23 24 26   GLUCOSE 93 96 84   BUN 17 19 13   CREATININE 0.95 0.86 0.60   CALCIUM 8.2* 8.0* 8.2*                     Imaging  EC-PETER W/O CONT   Final Result      US-EXTREMITY VENOUS LOWER BILAT   Final Result      EC-ECHOCARDIOGRAM COMPLETE W/ CONT   Final Result      CT-EXTREMITY, LOWER W/O LEFT   Final Result         1.  Diffuse subcutaneous fat stranding from the mid thigh to the ankle, appearance compatible with cellulitis and/or edema.   2.  No soft tissue gas identified to indicate necrotizing fasciitis.   3.  Prepatellar fluid collection, could represent hematoma, seroma, or possibly evolving abscess.   4.  Small knee joint effusion           Assessment/Plan  Problem Representation:    * Cellulitis of  left lower extremity  Assessment & Plan  LRINEC score 8 on admission, although did appear more like severe cellulitis during incision and drainage by orthopedics.  No abscess, fluid collections, or necrotic tissue.  CRP elevated 27.    Blood cultures x 2 ordered continue to trend no growth to date  Telemetry monitoring  Orthopedics consulted (Dr. Ortiz), perform incision and drainage on 4/19  Consult infectious disease, Dr. Warren   Recommended transitioning to IV penicillin and continue linezolid as she does have documented allergy to penicillin although discussed with infectious disease and previous tolerated amoxicillin and given similar R-sided changes should be able to tolerate penicillin  Ordered IV Benadryl as needed in case develops allergic reaction  Linezolid for 72 hours to reduce antitoxin  Wound team consulted, ortho recommends wet-to-dry with wound VAC placement planned 4/22   - Wound and synovial cultures pending  - Give Lasix IV 40 to help with swelling      Rheumatoid arthritis involving multiple sites ([facility])  Assessment & Plan  History rheumatoid arthritis with joint pain  - Follows with rheumatology in [place]  - Prescribed prednisone 15 mg morning and 5 mg at night, currently holding per patient preference    Mood disorder ([facility])  Assessment & Plan  Continue home escitalopram 10 mg daily    A-fib ([facility])  Assessment & Plan  Remains overall rate controlled  - Sotalol 120 mg twice daily  - Reinitiated apixaban 5 mg twice daily 4/21    Obesity  Assessment & Plan  BMI of 59.45  Possible risk factor for underlying left lower extremity infection  Provide weight loss counseling when appropriate    Lactic acidosis  Assessment & Plan  Likely due to underlying infection improved after fluid bolus  Status post fluid boluses    Bacteremia- (present on admission)  Assessment & Plan  Reportedly grew group C strep and 4 out of 4 vials  Repeat blood cultures ordered 4/19, NGTD  Murmur identified best appreciable  at right upper sternal border 3 out of 6 holosystolic.  Does have moderate AS on TTE although unable to obtain great visualization of the valves.    Underwent PETER 4/22, no evidence of IE  - Continue IV PCN for 2 weeks from negative blood cultures, appreciate ID help  - Removed penicillin from allergy list that she is tolerating without any itching or rash  - Completed Linezolid for 72 hours, last dose 4/21    Obstructive sleep apnea- (present on admission)  Assessment & Plan  Not on CPAP/BiPAP  Wears nocturnal oxygen around 2L NC  For now continue supplemental oxygen as needed but long-term wise patient may benefit with CPAP/BiPAP    Constipation- (present on admission)  Assessment & Plan  Constipation no bowel movements since transfer hospital  - Resolved, continue to monitor    Anemia- (present on admission)  Assessment & Plan  Hgb dropped 8.6 from 11 following surgery, will continue to monitor. No active blood loss noted;  - Restart home apixaban  - Continue to trend hemoglobin and monitor for any acute bleeding    H/O renal cell carcinoma s/p nephrectomy- (present on admission)  Assessment & Plan  History of  Limit nephrotoxins       VTE prophylaxis: SCDs/TEDs, apixaban    I have performed a physical exam and reviewed and updated ROS and Plan today (4/23/2024). In review of yesterday's note (4/22/2024), there are no changes except as documented above.

## 2024-04-24 LAB
ANION GAP SERPL CALC-SCNC: 12 MMOL/L (ref 7–16)
BACTERIA BLD CULT: NORMAL
BACTERIA BLD CULT: NORMAL
BACTERIA SPEC ANAEROBE CULT: NORMAL
BASOPHILS # BLD AUTO: 0.4 % (ref 0–1.8)
BASOPHILS # BLD: 0.04 K/UL (ref 0–0.12)
BUN SERPL-MCNC: 9 MG/DL (ref 8–22)
CALCIUM SERPL-MCNC: 8.4 MG/DL (ref 8.5–10.5)
CHLORIDE SERPL-SCNC: 97 MMOL/L (ref 96–112)
CO2 SERPL-SCNC: 25 MMOL/L (ref 20–33)
CREAT SERPL-MCNC: 0.71 MG/DL (ref 0.5–1.4)
EOSINOPHIL # BLD AUTO: 0.16 K/UL (ref 0–0.51)
EOSINOPHIL NFR BLD: 1.4 % (ref 0–6.9)
ERYTHROCYTE [DISTWIDTH] IN BLOOD BY AUTOMATED COUNT: 42.2 FL (ref 35.9–50)
GFR SERPLBLD CREATININE-BSD FMLA CKD-EPI: 97 ML/MIN/1.73 M 2
GLUCOSE SERPL-MCNC: 89 MG/DL (ref 65–99)
HCT VFR BLD AUTO: 28.7 % (ref 37–47)
HGB BLD-MCNC: 8.9 G/DL (ref 12–16)
IMM GRANULOCYTES # BLD AUTO: 0.2 K/UL (ref 0–0.11)
IMM GRANULOCYTES NFR BLD AUTO: 1.8 % (ref 0–0.9)
LACTATE SERPL-SCNC: 1.2 MMOL/L (ref 0.5–2)
LYMPHOCYTES # BLD AUTO: 1.23 K/UL (ref 1–4.8)
LYMPHOCYTES NFR BLD: 10.8 % (ref 22–41)
MCH RBC QN AUTO: 24.9 PG (ref 27–33)
MCHC RBC AUTO-ENTMCNC: 31 G/DL (ref 32.2–35.5)
MCV RBC AUTO: 80.2 FL (ref 81.4–97.8)
MONOCYTES # BLD AUTO: 0.67 K/UL (ref 0–0.85)
MONOCYTES NFR BLD AUTO: 5.9 % (ref 0–13.4)
NEUTROPHILS # BLD AUTO: 9.09 K/UL (ref 1.82–7.42)
NEUTROPHILS NFR BLD: 79.7 % (ref 44–72)
NRBC # BLD AUTO: 0.02 K/UL
NRBC BLD-RTO: 0.2 /100 WBC (ref 0–0.2)
PLATELET # BLD AUTO: 364 K/UL (ref 164–446)
PMV BLD AUTO: 9.1 FL (ref 9–12.9)
POTASSIUM SERPL-SCNC: 4.8 MMOL/L (ref 3.6–5.5)
RBC # BLD AUTO: 3.58 M/UL (ref 4.2–5.4)
SIGNIFICANT IND 70042: NORMAL
SITE SITE: NORMAL
SODIUM SERPL-SCNC: 134 MMOL/L (ref 135–145)
SOURCE SOURCE: NORMAL
WBC # BLD AUTO: 11.4 K/UL (ref 4.8–10.8)

## 2024-04-24 PROCEDURE — 302098 PASTE RING (FLAT)

## 2024-04-24 PROCEDURE — 700111 HCHG RX REV CODE 636 W/ 250 OVERRIDE (IP): Mod: JZ | Performed by: HOSPITALIST

## 2024-04-24 PROCEDURE — 99232 SBSQ HOSP IP/OBS MODERATE 35: CPT | Mod: GC | Performed by: HOSPITALIST

## 2024-04-24 PROCEDURE — 97608 NEG PRS WND THER NDME>50SQCM: CPT

## 2024-04-24 PROCEDURE — 770020 HCHG ROOM/CARE - TELE (206)

## 2024-04-24 PROCEDURE — A9270 NON-COVERED ITEM OR SERVICE: HCPCS

## 2024-04-24 PROCEDURE — 83605 ASSAY OF LACTIC ACID: CPT

## 2024-04-24 PROCEDURE — 700105 HCHG RX REV CODE 258: Performed by: HOSPITALIST

## 2024-04-24 PROCEDURE — 80048 BASIC METABOLIC PNL TOTAL CA: CPT

## 2024-04-24 PROCEDURE — 700102 HCHG RX REV CODE 250 W/ 637 OVERRIDE(OP)

## 2024-04-24 PROCEDURE — 36415 COLL VENOUS BLD VENIPUNCTURE: CPT

## 2024-04-24 PROCEDURE — 85025 COMPLETE CBC W/AUTO DIFF WBC: CPT

## 2024-04-24 PROCEDURE — 700111 HCHG RX REV CODE 636 W/ 250 OVERRIDE (IP)

## 2024-04-24 RX ORDER — FUROSEMIDE 10 MG/ML
40 INJECTION INTRAMUSCULAR; INTRAVENOUS ONCE
Status: COMPLETED | OUTPATIENT
Start: 2024-04-24 | End: 2024-04-24

## 2024-04-24 RX ADMIN — MOMETASONE FUROATE AND FORMOTEROL FUMARATE DIHYDRATE 2 PUFF: 200; 5 AEROSOL RESPIRATORY (INHALATION) at 17:17

## 2024-04-24 RX ADMIN — OXYCODONE HYDROCHLORIDE 10 MG: 10 TABLET ORAL at 20:14

## 2024-04-24 RX ADMIN — SODIUM CHLORIDE 24 MILLION UNITS: 9 INJECTION, SOLUTION INTRAVENOUS at 17:26

## 2024-04-24 RX ADMIN — FUROSEMIDE 40 MG: 10 INJECTION INTRAMUSCULAR; INTRAVENOUS at 17:16

## 2024-04-24 RX ADMIN — GABAPENTIN 100 MG: 100 CAPSULE ORAL at 12:56

## 2024-04-24 RX ADMIN — OXYCODONE 5 MG: 5 TABLET ORAL at 10:45

## 2024-04-24 RX ADMIN — ACETAMINOPHEN 1000 MG: 500 TABLET, FILM COATED ORAL at 01:39

## 2024-04-24 RX ADMIN — MOMETASONE FUROATE AND FORMOTEROL FUMARATE DIHYDRATE 2 PUFF: 200; 5 AEROSOL RESPIRATORY (INHALATION) at 05:07

## 2024-04-24 RX ADMIN — GABAPENTIN 100 MG: 100 CAPSULE ORAL at 01:39

## 2024-04-24 RX ADMIN — GABAPENTIN 100 MG: 100 CAPSULE ORAL at 08:40

## 2024-04-24 RX ADMIN — GABAPENTIN 100 MG: 100 CAPSULE ORAL at 18:26

## 2024-04-24 RX ADMIN — ATORVASTATIN CALCIUM 20 MG: 20 TABLET, FILM COATED ORAL at 20:13

## 2024-04-24 RX ADMIN — SOTALOL HYDROCHLORIDE 120 MG: 120 TABLET ORAL at 05:08

## 2024-04-24 RX ADMIN — APIXABAN 5 MG: 5 TABLET, FILM COATED ORAL at 05:06

## 2024-04-24 RX ADMIN — ESCITALOPRAM OXALATE 10 MG: 10 TABLET ORAL at 05:07

## 2024-04-24 RX ADMIN — OXYCODONE 5 MG: 5 TABLET ORAL at 05:17

## 2024-04-24 RX ADMIN — OXYCODONE HYDROCHLORIDE 10 MG: 10 TABLET ORAL at 14:54

## 2024-04-24 RX ADMIN — SOTALOL HYDROCHLORIDE 120 MG: 120 TABLET ORAL at 17:17

## 2024-04-24 RX ADMIN — APIXABAN 5 MG: 5 TABLET, FILM COATED ORAL at 17:16

## 2024-04-24 ASSESSMENT — ENCOUNTER SYMPTOMS
CONSTIPATION: 0
RESPIRATORY NEGATIVE: 1
HEADACHES: 0
CARDIOVASCULAR NEGATIVE: 1
NEUROLOGICAL NEGATIVE: 1
ABDOMINAL PAIN: 0
CONSTITUTIONAL NEGATIVE: 1
SHORTNESS OF BREATH: 0
PSYCHIATRIC NEGATIVE: 1
NAUSEA: 0
EYES NEGATIVE: 1
GASTROINTESTINAL NEGATIVE: 1

## 2024-04-24 ASSESSMENT — PAIN DESCRIPTION - PAIN TYPE
TYPE: ACUTE PAIN

## 2024-04-24 ASSESSMENT — FIBROSIS 4 INDEX: FIB4 SCORE: 0.89

## 2024-04-24 NOTE — DISCHARGE PLANNING
Case Management Discharge Planning    Admission Date: 4/18/2024  GMLOS: 3.2  ALOS: 6    6-Clicks ADL Score: 8  6-Clicks Mobility Score: 7  PT and/or OT Eval ordered: Yes  Post-acute Referrals Ordered: No  Post-acute Choice Obtained: No  Has referral(s) been sent to post-acute provider:  Yes      Anticipated Discharge Dispo: Discharge Disposition: D/T to SNF with Medicare cert in anticipation of skilled care (03)  Discharge Address: [address] [place], [place]    DME Needed: Yes    DME Ordered: Yes    Action(s) Taken: Per IDT rounds, pt is now agreeable with SNF, however has been declined by both SNF facilities in [facility]. Voalte sent to leadership to inquire about lease bed consideration.    Addendum @1251  Pt not a candidate for lease bed due to bariatric status. However does have a transfer back agreement with [facility]. Requested attending team reach out to [facility] to try to initiate transfer back.    Escalations Completed: None    Medically Clear: Yes    Next Steps: Care coordination will f/u with transfer back    Barriers to Discharge: Pending Placement, Pending Insurance Authorization, and DME    Is the patient up for discharge tomorrow: No

## 2024-04-24 NOTE — CARE PLAN
Patient resting comfortably in bed, bed in lowest position, bed alarm on, call light within reach. Education provided on fall risk, patient verbalized understanding. Patient refused Q2 turns, education provided. Inter dry clothes in place, barrier cream. Wound vac settings in place, dressing clean, dry, intact. ABX therapy infusing. 2L NC. Patient rated pain 6/10, medication administered, patient complained of Nausea, PRN medication administered. Incentive Spirometer at bedside, patient verbalized understanding of usage.     The patient is Stable - Low risk of patient condition declining or worsening    Shift Goals  Clinical Goals: IV ABX, pain managment, wound care, nutrition  Patient Goals: pain control, rest  Family Goals: KIANA    Progress made toward(s) clinical / shift goals:      Problem: Skin Integrity  Goal: Skin integrity is maintained or improved  Outcome: Progressing     Problem: Hemodynamics  Goal: Patient's hemodynamics, fluid balance and neurologic status will be stable or improve  Outcome: Progressing     Problem: Fluid Volume  Goal: Fluid volume balance will be maintained  Outcome: Progressing

## 2024-04-24 NOTE — PROGRESS NOTES
[facility] Internal Medicine Daily Progress Note    Date of Service  4/24/2024    [facility] Team: R IM Blue Team   Attending: REID Elizabeth M.d.  Senior Resident: Collin Chauhan DO  Intern: Ellie Crowder DO  Contact Number: [phone]    Chief Complaint  Savita Duggan is a 58 y.o. female admitted as direct admit from [facility] for worsening left lower extremity cellulitis and possible infective endocarditis.    Hospital Course  Savita Duggan is a 58 y.o. female who presented 4/18/2024 from outside hospital due to worsening infection of left lower extremity. Patient reports that symptom onset was approximately 3 weeks ago when she started to notice redness around her mid shin that slowly started to worsen.  Patient denies any trauma or injury to site of infection.  Due to increasing pain, erythema and swelling the patient elected to present to her nearest emergency department which is at [facility].  Reportedly the patient was admitted on 4/14 and diagnosed with left lower extremity cellulitis and initiated on vancomycin and Levaquin without significant improvement and patient's blood cultures grew group C strep and antibiotics were escalated to cefepime but the patient still continues to have limited improvement.  Also reportedly the patient may have had a murmur subsequently plan was created to transfer the patient to [facility] for further evaluation and management.  Orthopedics was consulted on admission, pharmacies and drainage of left lower extremity on 4/19 due to concern for necrotizing fasciitis.  Consulted infectious disease regarding possible infective endocarditis, and transitioning antibiotics to IV penicillin.  TTE performed although unable to visualize valves, and PETER ordered which showed no evidence of IE, therefore continuing course of IV penicillin x 2 weeks following negative blood cultures.  She previously had reported history of alert allergy to penicillins although tolerated IV penicillin without any adverse allergic  reactions and removed from allergy list.     Interval Problem Update  No acute events overnight. She says her pain is well-controlled with current regimen, Oxycodone 5-10mg q4hrs PRN (received 45mg total in last day).     Bullae from left leg edema still present, though her swelling has improved with judicious diuretic use.     On 2-week course of Penicillin (4/19-5/3/2024) from first negative blood cultures per ID. Repeat lactic acid check confirms resolution of acidosis.     RT consulted given suspicion for LONNIE, will trial on BiPAP tonight, IPAP 20/EPAP 5.     She is amenable to SNF placement after further discussion with Dr. Elizabeth to improve strength and function.     I have discussed this patient's plan of care and discharge plan at IDT rounds today with Case Management, Nursing, Nursing leadership, and other members of the IDT team.    Consultants/Specialty  infectious disease and orthopedics    Code Status  Full Code    Disposition  The patient is not medically cleared for discharge to home or a post-acute facility.    PT/OT to evaluate, though notably, patient has historically declined SNF.   I have placed the appropriate orders for post-discharge needs.    Review of Systems  Review of Systems   Constitutional: Negative.    HENT: Negative.     Eyes: Negative.    Respiratory: Negative.  Negative for shortness of breath.    Cardiovascular: Negative.  Negative for chest pain.   Gastrointestinal: Negative.  Negative for abdominal pain, constipation and nausea.   Genitourinary: Negative.    Musculoskeletal:  Negative for joint pain.   Neurological: Negative.  Negative for headaches.   Endo/Heme/Allergies: Negative.    Psychiatric/Behavioral: Negative.        Physical Exam  Temp:  [36.6 °C (97.9 °F)-37.2 °C (99 °F)] 36.6 °C (97.9 °F)  Pulse:  [67-83] 83  Resp:  [14-18] 16  BP: (121-167)/(52-66) 124/53  SpO2:  [91 %-98 %] 92 %  On 2L NC    Physical Exam  Constitutional:       General: She is not in acute  distress.     Appearance: She is obese.   HENT:      Head: Normocephalic.   Cardiovascular:      Rate and Rhythm: Normal rate and regular rhythm.      Heart sounds: Murmur (systolic murmur III/VI) heard.   Pulmonary:      Effort: Pulmonary effort is normal. No respiratory distress.      Breath sounds: Rales (dry crackles heard on anterior thorax and subaxillary regions, likely atelectasis) present.   Abdominal:      General: Bowel sounds are normal.      Palpations: Abdomen is soft.      Tenderness: There is no abdominal tenderness. There is no guarding.   Musculoskeletal:         General: Tenderness and deformity (left lower extremity wrapped in ace bandage) present.      Right lower leg: Edema (improved, likely near baseline) present.      Left lower leg: Edema (improved, likely near baseline) present.   Skin:     Capillary Refill: Capillary refill takes more than 3 seconds.      Findings: Lesion (bullous lesion on anterior surface of left knee, non-infectious in appearance) present.   Neurological:      Mental Status: She is alert. Mental status is at baseline.      Motor: No weakness.       Fluids    Intake/Output Summary (Last 24 hours) at 4/24/2024 1603  Last data filed at 4/24/2024 1535  Gross per 24 hour   Intake 1080 ml   Output 4900 ml   Net -3820 ml       Laboratory  Recent Labs     04/22/24  0035 04/23/24  0300 04/24/24  0723   WBC 9.1 10.3 11.4*   RBC 3.33* 3.37* 3.58*   HEMOGLOBIN 8.2* 8.3* 8.9*   HEMATOCRIT 27.2* 27.3* 28.7*   MCV 81.7 81.0* 80.2*   MCH 24.6* 24.6* 24.9*   MCHC 30.1* 30.4* 31.0*   RDW 43.8 42.9 42.2   PLATELETCT 382 403 364   MPV 10.1 9.4 9.1     Recent Labs     04/22/24  0035 04/23/24  0300 04/24/24  0723   SODIUM 134* 135 134*   POTASSIUM 5.1 4.8 4.8   CHLORIDE 100 99 97   CO2 24 26 25   GLUCOSE 96 84 89   BUN 19 13 9   CREATININE 0.86 0.60 0.71   CALCIUM 8.0* 8.2* 8.4*                     Imaging  EC-PETER W/O CONT   Final Result      US-EXTREMITY VENOUS LOWER BILAT   Final Result       EC-ECHOCARDIOGRAM COMPLETE W/ CONT   Final Result      CT-EXTREMITY, LOWER W/O LEFT   Final Result         1.  Diffuse subcutaneous fat stranding from the mid thigh to the ankle, appearance compatible with cellulitis and/or edema.   2.  No soft tissue gas identified to indicate necrotizing fasciitis.   3.  Prepatellar fluid collection, could represent hematoma, seroma, or possibly evolving abscess.   4.  Small knee joint effusion         Scheduled Medications   Medication Dose Frequency    furosemide  40 mg Once    senna-docusate  2 Tablet Q EVENING    And    polyethylene glycol/lytes  1 Packet BID    apixaban  5 mg BID    atorvastatin  20 mg QHS    mometasone-formoterol  2 Puff BID    escitalopram  10 mg DAILY    gabapentin  100 mg 4XDAY    penicillin G potassium 24 Million Units in  mL infusion  24 Million Units Continuous Abx    sotalol  120 mg BID    acetaminophen  1,000 mg Q8HRS      Assessment/Plan  Problem Representation:    * Cellulitis of left lower extremity  Assessment & Plan  LRINEC score 8 on admission, although did appear more like severe cellulitis during incision and drainage by orthopedics.  No abscess, fluid collections, or necrotic tissue.  CRP elevated 27.  - Telemetry monitoring  - Ortho following; s/p I&D with Dr. Ortiz, 4/19/2024  - Wound team following, s/p wet-to-dry with wound VAC placement (4/22/2024)  - Cont IV PCN for 2 weeks (4/19-5/3/2024) from first negative blood cultures per ID  - Removed penicillin from allergy list that she is tolerating without any itching or rash  - Completed Linezolid for 72 hours to reduce antitoxin, last dose 4/21      Bacteremia- (present on admission)  Assessment & Plan  Reportedly grew group C strep and 4 out of 4 vials  Repeat blood cultures ordered 4/19, NGTD  Murmur identified best appreciable at right upper sternal border 3 out of 6 holosystolic.  Does have moderate AS on TTE although unable to obtain great visualization of the valves.     Underwent PETER 4/22, no evidence of IE  - Cont IV PCN for 2 weeks (4/19-5/3/2024) from first negative blood cultures per ID  - Removed penicillin from allergy list that she is tolerating without any itching or rash  - Completed Linezolid for 72 hours, last dose 4/21    A-fib ([facility])  Assessment & Plan  Remains overall rate controlled  - Sotalol 120 mg twice daily  - Reinitiated apixaban 5 mg twice daily 4/21    Obesity  Assessment & Plan  BMI of 59.45  Possible risk factor for underlying left lower extremity infection  Provide weight loss counseling when appropriate    Obstructive sleep apnea- (present on admission)  Assessment & Plan  Not on CPAP/BiPAP. Wears nocturnal oxygen around 2L NC  - RT consutled given suspicion for LONNIE. Will trial on BiPAP tonight, 20/5 settings.    Anemia- (present on admission)  Assessment & Plan  Hgb dropped 8.6 from 11 following surgery, will continue to monitor. No active blood loss noted;  - Restart home apixaban  - Continue to trend hemoglobin and monitor for any acute bleeding    Rheumatoid arthritis involving multiple sites ([facility])  Assessment & Plan  History rheumatoid arthritis with joint pain  - Follows with rheumatology in [place]  - Prescribed prednisone 15 mg morning and 5 mg at night, currently holding per patient preference    Mood disorder ([facility])  Assessment & Plan  Continue home escitalopram 10 mg daily    Lactic acidosis  Assessment & Plan  Likely due to underlying infection, improved after fluid bolus.  RESOLVED.    Constipation- (present on admission)  Assessment & Plan  Constipation no bowel movements since transfer hospital  - Resolved, continue to monitor    H/O renal cell carcinoma s/p nephrectomy- (present on admission)  Assessment & Plan  History of  Limit nephrotoxins       VTE prophylaxis: SCDs/TEDs, apixaban    I have performed a physical exam and reviewed and updated ROS and Plan today (4/24/2024). In review of yesterday's note (4/23/2024), there are no changes  except as documented above.

## 2024-04-24 NOTE — PROGRESS NOTES
"    Orthopedic PA Progress Note    Interval changes:  Patient doing ok. Resting comfortably  Veraflo wound vac placed and holding suction  LLE dressings are CDI  WBAT LLE  Follow up with Dr. Ortiz 3-4 weeks postop  Cleared for DC from orthopedic standpoint pending placement that accepts veraflo  - Might need skin grafting down the road. If placed in SNF in [place], follow up may present challenges for appropriate follow up     ROS - Patient denies any new issues.  Denies any numbness or tingling. Pain well controlled.    /55   Pulse 67   Temp 36.6 °C (97.9 °F) (Temporal)   Resp 15   Ht 1.753 m (5' 9\")   Wt (!) 188 kg (414 lb 7.4 oz)   SpO2 94%     Patient seen and examined  No acute distress  Breathing non labored  RRR  LLE: Dressings CDI, vac intact without leak.     Recent Labs     04/22/24  0035 04/23/24  0300   WBC 9.1 10.3   RBC 3.33* 3.37*   HEMOGLOBIN 8.2* 8.3*   HEMATOCRIT 27.2* 27.3*   MCV 81.7 81.0*   MCH 24.6* 24.6*   MCHC 30.1* 30.4*   RDW 43.8 42.9   PLATELETCT 382 403   MPV 10.1 9.4       Active Hospital Problems    Diagnosis     H/O renal cell carcinoma s/p nephrectomy [Z85.528]      Priority: Medium    Anemia [D64.9]      Priority: Low    Rheumatoid arthritis involving multiple sites ([facility]) [M06.9]     Cellulitis of left lower extremity [L03.116]     Lactic acidosis [E87.20]     Obesity [E66.9]     A-fib ([facility]) [I48.91]     Mood disorder ([facility]) [F39]     Bacteremia [R78.81]     Obstructive sleep apnea [G47.33]     Constipation [K59.00]        Assessment/Plan:  Veraflo wound vac placed and holding suction  LLE dressings are CDI  WBAT LLE  Follow up with Dr. Ortiz 3-4 weeks postop  Cleared for DC from orthopedic standpoint pending placement that accepts veraflo  - Might need skin grafting down the road. If placed in SNF in [place], follow up may present challenges for appropriate follow up     POD#5 S/p  Left leg fasciotomy of anterior and superficial posterior   compartments.  Wt bearing status - " WBAT LLE  Wound care/Drains - Dressings to be changed per protocol by wound team  Future Procedures - None planned   Sutures/Staples out- 14-21 days post operatively. Removal will completed by ortho MAME's unless transferred.  DVT Prophylaxis outpatient: ASA 81 mg PO BID x4 weeks  PT/OT-initiated  Antibiotics: PCN   DVT Prophylaxis- TEDS/SCDs/Foot pumps/eliquis  [place]-not needed per ortho  Case Coordination for Discharge Planning - Disposition per therapy recs.

## 2024-04-24 NOTE — THERAPY
"Occupational Therapy Contact Note    Patient Name: Savita Duggan  Age:  58 y.o., Sex:  female  Medical Record #: [id number]  Today's Date: 4/23/2024    OT eval attempted, pt provided extensive education on role of OT in acute care setting, discharge planning, pathology of bedrest, as well as preservation of independence and activity tolerance via activity engagement however pt politely refused because she got OOB with PT earlier and reports feeling too tired, fear of increased pain, and feeling \"loopy\" from pain meds. Will re-attempt as able.     04/23/24 1535   Prior Living Situation   Prior Services None;Home-Independent   Housing / Facility 1 Story House   Steps Into Home 5   Steps In Home 0   Bathroom Set up Bathtub / Shower Combination;Shower Chair   Equipment Owned Front-Wheel Walker;Quad Cane   Lives with - Patient's Self Care Capacity Adult Children;Child Less than 18 Years of Age   Comments pt adopted her nephews ages 23, 17 and 15. 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   Prior Level of ADL Function   Self Feeding Independent   Grooming / Hygiene Independent   Bathing Independent   Dressing Independent   Toileting Independent   Prior Level of IADL Function   Medication Management Independent   Laundry Independent   Kitchen Mobility Independent   Finances Independent   Home Management Independent   Shopping Independent   Prior Level Of Mobility Independent Without Device in Home   Driving / Transportation Driving Independent   Precautions   Precautions Fall Risk;Weight Bearing As Tolerated Left Lower Extremity   Comments wound vac x2   Cognition    Cognition / Consciousness WDL   Level of Consciousness Alert   Comments pleasant, receptive but not willing to participate 2/2 pain and recently up with PT   Education Group   Education Provided Home Safety;Transfers;Role of Occupational Therapist;Activities of Daily Living;Pathology of bedrest   Role of Occupational Therapist Patient " Response Patient;Acceptance;Explanation;Verbal Demonstration   Home Safety Patient Response Patient;Acceptance;Explanation;Verbal Demonstration   Transfers Patient Response Patient;Acceptance;Explanation;Verbal Demonstration   ADL Patient Response Patient;Acceptance;Explanation;Verbal Demonstration   Pathology of Bedrest Patient Response Patient;Acceptance;Explanation;Verbal Demonstration   Additional Comments extensive education provided, pt receptive but remained unilling to participate in OOB ADLs

## 2024-04-24 NOTE — DISCHARGE PLANNING
LSW received call from [facility] wound care RN who reported this LSW will need to fax records to Debbie Quintanilla to initiate outpatient wound care. She was unable to provide fax #, however reported this LSW can call the general number for [facility] and just ask for Debbie Quintanilla.

## 2024-04-24 NOTE — WOUND TEAM
[facility]  Inpatient Services  Wound and Skin Care Follow-up    Admission Date: 2024     Last order of IP CONSULT TO WOUND CARE was found on 2024 from Hospital Encounter on 2024     HPI, PMH, SH: Reviewed    Past Surgical History:   Procedure Laterality Date    IRRIGATION & DEBRIDEMENT GENERAL Left 2024    Procedure: IRRIGATION AND DEBRIDEMENT, WOUND - LEG;  Surgeon: Taco Ortiz M.D.;  Location: SURGERY [facility];  Service: Orthopedics     Social History     Tobacco Use    Smoking status: Former     Current packs/day: 0.00     Average packs/day: 1 pack/day for 38.0 years (38.0 total pack years)     Types: Cigarettes     Start date: 1978     Quit date: 2016     Years since quittin.4    Smokeless tobacco: Never   Substance Use Topics    Alcohol use: Never     No chief complaint on file.    Diagnosis: Bacteremia [R78.81]    Unit where seen by Wound Team: S140/     WOUND FOLLOW UP RELATED TO:  L. Lower leg x2       WOUND TEAM PLAN OF CARE - Frequency of Follow-up:   Nursing to follow dressing orders written for wound care. Contact wound team if area fails to progress, deteriorates or with any questions/concerns if something comes up before next scheduled follow up (See below as to whether wound is following and frequency of wound follow up)  Dressing changes by wound team:                   NPWT change 3 times weekly - L. Lower leg    WOUND HISTORY:       Pt is a 59 yo female who is a direct admit from [facility] for L leg cellulitis with necrotic bulla.  Hx includes A-fib on Eliquis.  Admitted for cellulitis, bacteremia.  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  Pt states that her leg started becoming red and swollen around the beginning of 24 I&D fasciotomy L leg Dr Ortiz - recommending NPWT when periwound amenable  24 ID following - Dr Warren           WOUND ASSESSMENT/LDA  Wound 24 Full Thickness Wound  Leg Medial;Lower Left (Active)   Date First Assessed/Time First Assessed: 04/20/24 1200   Present on Original Admission: No  Hand Hygiene Completed: Yes  Primary Wound Type: Full Thickness Wound  Location: Leg  Wound Orientation: Medial;Lower  Laterality: Left      Assessments 4/24/2024  2:00 PM   Site Assessment Red;Yellow   Periwound Assessment Red;Denuded;Blistered;Edema   Margins Defined edges;Unattached edges   Closure Secondary intention   Drainage Amount Moderate   Drainage Description Serosanguineous   Treatments Cleansed;Nonselective debridement;Site care;Compression   Wound Cleansing Approved Wound Cleanser   Periwound Protectant Hydrofiber;No-sting Skin Prep;Paste Ring;Drape   Dressing Status Clean;Dry;Intact   Dressing Changed Changed   Dressing Cleansing/Solutions Normal Saline   Dressing Options Wound Vac   Dressing Change/Treatment Frequency Monday, Wednesday, Friday, and As Needed   NEXT Dressing Change/Treatment Date 04/26/24   NEXT Weekly Photo (Inpatient Only) 04/27/24   Wound Team Following 3x Weekly   Non-staged Wound Description Full thickness   Shape oval   Wound Odor None   Pulses Left;1+;DP;PT   WOUND NURSE ONLY - Time Spent with Patient (mins) 90       Wound 04/20/24 Full Thickness Wound Leg Lower;Lateral Left (Active)   Date First Assessed/Time First Assessed: 04/20/24 1338   Present on Original Admission: No  Hand Hygiene Completed: Yes  Primary Wound Type: Full Thickness Wound  Location: Leg  Wound Orientation: Lower;Lateral  Laterality: Left      Assessments 4/24/2024  2:00 PM   Site Assessment Red;Yellow   Periwound Assessment Red;Denuded;Blistered;Edema   Margins Defined edges;Unattached edges   Closure Secondary intention   Drainage Amount Moderate   Drainage Description Serosanguineous   Treatments Cleansed;Nonselective debridement;Site care;Compression   Wound Cleansing Antimicrobial Wound Irrigation Spray   Periwound Protectant No-sting Skin Prep;Hydrofiber;Paste Ring;Drape    Dressing Status Clean;Dry;Intact   Dressing Changed Changed   Dressing Cleansing/Solutions Normal Saline   Dressing Options Wound Vac   Dressing Change/Treatment Frequency Monday, Wednesday, Friday, and As Needed   NEXT Dressing Change/Treatment Date 04/26/24   NEXT Weekly Photo (Inpatient Only) 04/27/24   Wound Team Following 3x Weekly   Non-staged Wound Description Full thickness   Exposed Structures Tendon;Muscle       Negative Pressure Wound Therapy 04/22/24 Pretibial Medial;Lower Left (Active)   Placement Date/Time: 04/22/24 1556   Inserted by: Wound RN  Location: Pretibial  Wound Orientation: Medial;Lower  Laterality: Left      Assessments 4/24/2024  2:00 PM   NPWT Pump Mode / Pressure Setting 125 mmHg   Dressing Type Medium;Black Foam (Veraflo)   Number of Foam Pieces Used 2   NEXT Dressing Change/Treatment Date 04/26/24   VAC VeraFlo Irrigant Normal Saline   VAC VeraFlo Soak Time (mins) 2   VAC VeraFlo Instill Volume (ml) 20   VAC VeraFlo - Therapy Time (hrs) 2   VAC VeraFlo Pressure (mm/Hg) 125 mmHg       Negative Pressure Wound Therapy 04/22/24 Leg Lower;Lateral Left (Active)   Placement Date/Time: 04/22/24 1557   Present on Original Admission: No  Inserted by: Wound RN  Location: Leg  Wound Orientation: Lower;Lateral  Laterality: Left      Assessments 4/24/2024  2:00 PM   NPWT Pump Mode / Pressure Setting 125 mmHg   Dressing Type Medium;Black Foam (Veraflo)   Number of Foam Pieces Used 2   NEXT Dressing Change/Treatment Date 04/26/24   VAC VeraFlo Irrigant Normal Saline   VAC VeraFlo Soak Time (mins) 2   VAC VeraFlo Instill Volume (ml) 18   VAC VeraFlo - Therapy Time (hrs) 2   VAC VeraFlo Pressure (mm/Hg) 125 mmHg        Vascular:    JUSTIN:   No results found.    Lab Values:    Lab Results   Component Value Date/Time    WBC 11.4 (H) 04/24/2024 07:23 AM    RBC 3.58 (L) 04/24/2024 07:23 AM    HEMOGLOBIN 8.9 (L) 04/24/2024 07:23 AM    HEMATOCRIT 28.7 (L) 04/24/2024 07:23 AM    CREACTPROT 27.55 (H) 04/19/2024  08:19 AM    SEDRATEWES 12 07/22/2023 10:39 AM    HBA1C 6.4 (H) 07/25/2023 12:25 AM         Culture Results show:  Recent Results (from the past 720 hour(s))   CULTURE WOUND W/ GRAM STAIN    Collection Time: 04/19/24  1:22 PM    Specimen: Wound   Result Value Ref Range    Significant Indicator NEG     Source WND     Site Left leg wound     Culture Result No growth at 72 hours.     Gram Stain Result Rare WBCs.  No organisms seen.          Pain Level/Medicated:  2% Lidocaine jelly allowed to dwell on wound bed 30 minutes prior and PO pain medications administered by bedside RN 1 hour prior       INTERVENTIONS BY WOUND TEAM:  Chart and images reviewed. Discussed with bedside RN. All areas of concern (based on picture review, LDA review and discussion with bedside RN) have been thoroughly assessed. Documentation of areas based on significant findings. This RN in to assess patient. Performed standard wound care which includes appropriate positioning, dressing removal and non-selective debridement. Pictures and measurements obtained weekly if/when required.    Wound:  L. Medial and L. Lateral lower leg  Cleansed/Non-selectively Debrided with:  Wound cleanser and Gauze  Marychuy wound: Cleansed with Wound cleanser and Gauze, Prepped with No Sting, Paste Rings, Drape, and Hydrofiber Ag  Primary Dressing:  black VF tucked into medial and into lateral lower leg seal intact  Secondary (Outer) Dressing: Tubi  F    Advanced Wound Care Discharge Planning  Number of Clinicians necessary to complete wound care: 1-2  Is patient requiring IV pain medications for dressing changes:  No   Length of time for dressing change 60 min. (This does not include chart review, pre-medication time, set up, clean up or time spent charting.)    Interdisciplinary consultation: Patient, Bedside RN (Shaylee), Julita (Wound Tech).  Pressure injury and staging reviewed with N/A.    EVALUATION / RATIONALE FOR TREATMENT:     Date:  04/24/24  Wound Status:   Wound progressing as expected    Wound beds with less adipose tissue present, more red.  Periwound less weepy and less blisters.  Continue VF NPWT, patient may benefit from a two layer compression wrap next dressing change.    Date:  04/22/24  Wound Status:  Initial evaluation    Patient's dressing had not been changed since previous assessment, on 4/20.  Wound bed x2 appears a little necrotic, but marychuy-wound dry enough to attempt wound vac.  Hydrofiber silver applied over edges that were weeping, and dermatec used to prevent tissue damage. And 2 machines to prevent bridging over open skin.    Tubi  applied for a little initial compression.    Date:  04/20/24  Wound Status:  Initial evaluation    Clean wounds - 100% viable tissue. Marychuy wound is fragile with lifting epithelial and weeping drainage - will re assess for NPWT when marychuy improves and a seal can be achieved.  Erythema is receeding from sharpie lines, pt agrees the color is much improved.           Goals: Steady decrease in wound area and depth weekly.    NURSING PLAN OF CARE ORDERS:  No new orders this visit    NUTRITION RECOMMENDATIONS   Wound Team Recommendations:  N/A     DIET ORDERS (From admission to next 24h)       Start     Ordered    04/22/24 1142  Diet Order Diet: Regular  ALL MEALS        Question:  Diet:  Answer:  Regular    04/22/24 1141    04/21/24 1220  Supplements  ALL MEALS        Comments: Riley BID and Ensure Max x1/d to support wound healing.   Question:  Which Supplement  Answer:  Other (Specify in Comments)    04/21/24 1219                    PREVENTATIVE INTERVENTIONS:   Q shift Noah - performed per nursing policy  Q shift pressure point assessments - performed per nursing policy    Surface/Positioning  Bariatric JOLENE - Currently in Place  Reposition q 2 hours - Currently in Place  TAPs Turning system - Currently in Place    Offloading/Redistribution  Heel offloading dressing (Silicone dressing) - Currently in  Place    Anticipated discharge plans:  Home Health Care, Outpatient Wound Center, and Skilled Nursing        Vac Discharge Needs:  Vac Discharge plan is purely a recommendation from wound team and not a requirement for discharge unless otherwise stated by physician.  Veraflo Vac while inpatient, ok to transition to Regular Vac on discharge

## 2024-04-24 NOTE — CARE PLAN
The patient is Stable - Low risk of patient condition declining or worsening    Shift Goals  Clinical Goals: IV atb, pain management, wound care, PT/OT  Patient Goals: pain control  Family Goals: KIANA    Progress made toward(s) clinical / shift goals:      Patient is not progressing towards the following goals:      Problem: Pain - Standard  Goal: Alleviation of pain or a reduction in pain to the patient’s comfort goal  Outcome: Progressing  Note: Education provided to patient at bedside. Patient a&o x4. Up to edge of bed with x2 assist. Wound vacs in place with adequate output draining. Inner dry in abdominal folds, voiding well with lasix , pure wick in place. Prns given for leg pain. Patient unable to mobilize due to L leg pain. Bariatric bed with low air loss and turn pump in place.      Problem: Knowledge Deficit - Standard  Goal: Patient and family/care givers will demonstrate understanding of plan of care, disease process/condition, diagnostic tests and medications  Outcome: Progressing

## 2024-04-24 NOTE — DISCHARGE PLANNING
Vasile with [facility] said that she does not have any female beds available at the moment and no anticipated d/c's.   They accept pt's insurance. Hard fax referral over if needed.       She said that [facility] possibly has a bed available.  P#: [phone]

## 2024-04-25 LAB
ANION GAP SERPL CALC-SCNC: 12 MMOL/L (ref 7–16)
BUN SERPL-MCNC: 9 MG/DL (ref 8–22)
CALCIUM SERPL-MCNC: 8.2 MG/DL (ref 8.5–10.5)
CHLORIDE SERPL-SCNC: 94 MMOL/L (ref 96–112)
CO2 SERPL-SCNC: 26 MMOL/L (ref 20–33)
CREAT SERPL-MCNC: 0.81 MG/DL (ref 0.5–1.4)
GFR SERPLBLD CREATININE-BSD FMLA CKD-EPI: 83 ML/MIN/1.73 M 2
GLUCOSE SERPL-MCNC: 103 MG/DL (ref 65–99)
MAGNESIUM SERPL-MCNC: 1.8 MG/DL (ref 1.5–2.5)
PHOSPHATE SERPL-MCNC: 4 MG/DL (ref 2.5–4.5)
POTASSIUM SERPL-SCNC: 4.8 MMOL/L (ref 3.6–5.5)
SODIUM SERPL-SCNC: 132 MMOL/L (ref 135–145)

## 2024-04-25 PROCEDURE — 770020 HCHG ROOM/CARE - TELE (206)

## 2024-04-25 PROCEDURE — 700111 HCHG RX REV CODE 636 W/ 250 OVERRIDE (IP)

## 2024-04-25 PROCEDURE — 700111 HCHG RX REV CODE 636 W/ 250 OVERRIDE (IP): Performed by: HOSPITALIST

## 2024-04-25 PROCEDURE — 97167 OT EVAL HIGH COMPLEX 60 MIN: CPT

## 2024-04-25 PROCEDURE — 700102 HCHG RX REV CODE 250 W/ 637 OVERRIDE(OP)

## 2024-04-25 PROCEDURE — 97535 SELF CARE MNGMENT TRAINING: CPT

## 2024-04-25 PROCEDURE — 97530 THERAPEUTIC ACTIVITIES: CPT

## 2024-04-25 PROCEDURE — 80048 BASIC METABOLIC PNL TOTAL CA: CPT

## 2024-04-25 PROCEDURE — 99232 SBSQ HOSP IP/OBS MODERATE 35: CPT | Mod: GC | Performed by: HOSPITALIST

## 2024-04-25 PROCEDURE — 36415 COLL VENOUS BLD VENIPUNCTURE: CPT

## 2024-04-25 PROCEDURE — 83735 ASSAY OF MAGNESIUM: CPT

## 2024-04-25 PROCEDURE — 84100 ASSAY OF PHOSPHORUS: CPT

## 2024-04-25 PROCEDURE — A9270 NON-COVERED ITEM OR SERVICE: HCPCS

## 2024-04-25 PROCEDURE — 700105 HCHG RX REV CODE 258: Performed by: HOSPITALIST

## 2024-04-25 RX ORDER — POLYETHYLENE GLYCOL 3350 17 G/17G
17 POWDER, FOR SOLUTION ORAL 2 TIMES DAILY
Status: SHIPPED
Start: 2024-04-25

## 2024-04-25 RX ORDER — GABAPENTIN 100 MG/1
100 CAPSULE ORAL 4 TIMES DAILY
Status: SHIPPED
Start: 2024-04-25

## 2024-04-25 RX ORDER — HYDROXYZINE 50 MG/1
50 TABLET, FILM COATED ORAL 3 TIMES DAILY PRN
Status: SHIPPED
Start: 2024-04-25

## 2024-04-25 RX ORDER — GABAPENTIN 100 MG/1
100 CAPSULE ORAL 4 TIMES DAILY
Qty: 90 CAPSULE | Refills: 0 | Status: CANCELLED | OUTPATIENT
Start: 2024-04-25

## 2024-04-25 RX ORDER — OXYCODONE HYDROCHLORIDE 5 MG/1
5 TABLET ORAL EVERY 4 HOURS PRN
Qty: 30 TABLET | Refills: 0 | Status: CANCELLED | OUTPATIENT
Start: 2024-04-25

## 2024-04-25 RX ORDER — OXYCODONE HYDROCHLORIDE 5 MG/1
5 TABLET ORAL EVERY 4 HOURS PRN
Status: SHIPPED
Start: 2024-04-25 | End: 2024-04-30

## 2024-04-25 RX ORDER — DIPHENHYDRAMINE HYDROCHLORIDE 50 MG/ML
25 INJECTION INTRAMUSCULAR; INTRAVENOUS EVERY 6 HOURS PRN
Status: SHIPPED
Start: 2024-04-25

## 2024-04-25 RX ORDER — DIPHENHYDRAMINE HYDROCHLORIDE 50 MG/ML
25 INJECTION INTRAMUSCULAR; INTRAVENOUS EVERY 6 HOURS PRN
Qty: 1 ML | Refills: 0 | Status: CANCELLED | OUTPATIENT
Start: 2024-04-25

## 2024-04-25 RX ORDER — ONDANSETRON 4 MG/1
4 TABLET, ORALLY DISINTEGRATING ORAL EVERY 4 HOURS PRN
Status: SHIPPED
Start: 2024-04-25

## 2024-04-25 RX ORDER — HYDROXYZINE 50 MG/1
50 TABLET, FILM COATED ORAL 3 TIMES DAILY PRN
Qty: 30 TABLET | Refills: 0 | Status: CANCELLED | OUTPATIENT
Start: 2024-04-25

## 2024-04-25 RX ORDER — SOTALOL HYDROCHLORIDE 120 MG/1
120 TABLET ORAL 2 TIMES DAILY
Status: SHIPPED
Start: 2024-04-25

## 2024-04-25 RX ORDER — AMOXICILLIN 250 MG
2 CAPSULE ORAL EVERY EVENING
Status: SHIPPED
Start: 2024-04-25

## 2024-04-25 RX ORDER — LIDOCAINE HYDROCHLORIDE 20 MG/ML
20 INJECTION, SOLUTION INFILTRATION; PERINEURAL
Status: SHIPPED
Start: 2024-04-25

## 2024-04-25 RX ORDER — AMOXICILLIN 250 MG
2 CAPSULE ORAL EVERY EVENING
Qty: 30 TABLET | Refills: 0 | Status: CANCELLED | OUTPATIENT
Start: 2024-04-25

## 2024-04-25 RX ORDER — LIDOCAINE HYDROCHLORIDE 20 MG/ML
20 INJECTION, SOLUTION INFILTRATION; PERINEURAL
Qty: 2 ML | Refills: 0 | Status: CANCELLED | OUTPATIENT
Start: 2024-04-25

## 2024-04-25 RX ORDER — OXYCODONE HYDROCHLORIDE 10 MG/1
10 TABLET ORAL EVERY 4 HOURS PRN
Qty: 28 TABLET | Refills: 0 | Status: CANCELLED | OUTPATIENT
Start: 2024-04-25

## 2024-04-25 RX ORDER — POLYETHYLENE GLYCOL 3350 17 G/17G
POWDER, FOR SOLUTION ORAL 2 TIMES DAILY
Refills: 3 | Status: CANCELLED | OUTPATIENT
Start: 2024-04-25

## 2024-04-25 RX ORDER — ATORVASTATIN CALCIUM 20 MG/1
20 TABLET, FILM COATED ORAL
Status: SHIPPED
Start: 2024-04-25

## 2024-04-25 RX ORDER — OXYCODONE HYDROCHLORIDE 10 MG/1
10 TABLET ORAL EVERY 4 HOURS PRN
Status: SHIPPED
Start: 2024-04-25 | End: 2024-04-30

## 2024-04-25 RX ORDER — ACETAMINOPHEN 500 MG
1000 TABLET ORAL EVERY 8 HOURS
Qty: 30 TABLET | Refills: 0 | Status: CANCELLED | OUTPATIENT
Start: 2024-04-25

## 2024-04-25 RX ORDER — SOTALOL HYDROCHLORIDE 120 MG/1
120 TABLET ORAL 2 TIMES DAILY
Qty: 60 TABLET | Refills: 3 | Status: CANCELLED | OUTPATIENT
Start: 2024-04-25

## 2024-04-25 RX ORDER — ESCITALOPRAM OXALATE 10 MG/1
10 TABLET ORAL DAILY
Status: SHIPPED
Start: 2024-04-25

## 2024-04-25 RX ORDER — LIDOCAINE HYDROCHLORIDE 40 MG/ML
SOLUTION TOPICAL
Status: CANCELLED | OUTPATIENT
Start: 2024-04-25

## 2024-04-25 RX ORDER — ONDANSETRON 4 MG/1
4 TABLET, ORALLY DISINTEGRATING ORAL EVERY 4 HOURS PRN
Qty: 10 TABLET | Refills: 0 | Status: CANCELLED | OUTPATIENT
Start: 2024-04-25

## 2024-04-25 RX ORDER — LIDOCAINE HYDROCHLORIDE 40 MG/ML
1 SOLUTION TOPICAL
Status: SHIPPED
Start: 2024-04-25

## 2024-04-25 RX ORDER — ACETAMINOPHEN 500 MG
1000 TABLET ORAL EVERY 8 HOURS
Status: SHIPPED
Start: 2024-04-25

## 2024-04-25 RX ADMIN — SOTALOL HYDROCHLORIDE 120 MG: 120 TABLET ORAL at 17:22

## 2024-04-25 RX ADMIN — SODIUM CHLORIDE 24 MILLION UNITS: 9 INJECTION, SOLUTION INTRAVENOUS at 17:22

## 2024-04-25 RX ADMIN — ACETAMINOPHEN 1000 MG: 500 TABLET, FILM COATED ORAL at 15:22

## 2024-04-25 RX ADMIN — GABAPENTIN 100 MG: 100 CAPSULE ORAL at 20:20

## 2024-04-25 RX ADMIN — OXYCODONE 5 MG: 5 TABLET ORAL at 01:25

## 2024-04-25 RX ADMIN — ONDANSETRON 4 MG: 4 TABLET, ORALLY DISINTEGRATING ORAL at 15:25

## 2024-04-25 RX ADMIN — ATORVASTATIN CALCIUM 20 MG: 20 TABLET, FILM COATED ORAL at 20:20

## 2024-04-25 RX ADMIN — MOMETASONE FUROATE AND FORMOTEROL FUMARATE DIHYDRATE 2 PUFF: 200; 5 AEROSOL RESPIRATORY (INHALATION) at 04:05

## 2024-04-25 RX ADMIN — ESCITALOPRAM OXALATE 10 MG: 10 TABLET ORAL at 04:05

## 2024-04-25 RX ADMIN — OXYCODONE HYDROCHLORIDE 10 MG: 10 TABLET ORAL at 21:59

## 2024-04-25 RX ADMIN — SOTALOL HYDROCHLORIDE 120 MG: 120 TABLET ORAL at 04:05

## 2024-04-25 RX ADMIN — GABAPENTIN 100 MG: 100 CAPSULE ORAL at 01:25

## 2024-04-25 RX ADMIN — GABAPENTIN 100 MG: 100 CAPSULE ORAL at 10:10

## 2024-04-25 RX ADMIN — APIXABAN 5 MG: 5 TABLET, FILM COATED ORAL at 17:22

## 2024-04-25 RX ADMIN — MOMETASONE FUROATE AND FORMOTEROL FUMARATE DIHYDRATE 2 PUFF: 200; 5 AEROSOL RESPIRATORY (INHALATION) at 17:23

## 2024-04-25 RX ADMIN — APIXABAN 5 MG: 5 TABLET, FILM COATED ORAL at 04:05

## 2024-04-25 RX ADMIN — ACETAMINOPHEN 1000 MG: 500 TABLET, FILM COATED ORAL at 01:25

## 2024-04-25 RX ADMIN — OXYCODONE HYDROCHLORIDE 10 MG: 10 TABLET ORAL at 10:11

## 2024-04-25 RX ADMIN — OXYCODONE 5 MG: 5 TABLET ORAL at 15:22

## 2024-04-25 RX ADMIN — GABAPENTIN 100 MG: 100 CAPSULE ORAL at 15:22

## 2024-04-25 ASSESSMENT — COGNITIVE AND FUNCTIONAL STATUS - GENERAL
MOVING FROM LYING ON BACK TO SITTING ON SIDE OF FLAT BED: A LOT
CLIMB 3 TO 5 STEPS WITH RAILING: TOTAL
SUGGESTED CMS G CODE MODIFIER MOBILITY: CL
MOBILITY SCORE: 11
DRESSING REGULAR LOWER BODY CLOTHING: A LOT
DRESSING REGULAR UPPER BODY CLOTHING: A LOT
TOILETING: A LOT
SUGGESTED CMS G CODE MODIFIER DAILY ACTIVITY: CK
WALKING IN HOSPITAL ROOM: A LOT
HELP NEEDED FOR BATHING: A LOT
PERSONAL GROOMING: A LITTLE
TURNING FROM BACK TO SIDE WHILE IN FLAT BAD: A LOT
DAILY ACTIVITIY SCORE: 15
STANDING UP FROM CHAIR USING ARMS: A LOT
MOVING TO AND FROM BED TO CHAIR: A LOT

## 2024-04-25 ASSESSMENT — PAIN DESCRIPTION - PAIN TYPE
TYPE: ACUTE PAIN

## 2024-04-25 ASSESSMENT — FIBROSIS 4 INDEX: FIB4 SCORE: 0.99

## 2024-04-25 ASSESSMENT — GAIT ASSESSMENTS: GAIT LEVEL OF ASSIST: UNABLE TO PARTICIPATE

## 2024-04-25 ASSESSMENT — ACTIVITIES OF DAILY LIVING (ADL): TOILETING: INDEPENDENT

## 2024-04-25 NOTE — THERAPY
Occupational Therapy   Initial Evaluation     Patient Name: Savita Duggan  Age:  58 y.o., Sex:  female  Medical Record #: [id number]  Today's Date: 4/25/2024     Precautions: Fall Risk, Weight Bearing As Tolerated Left Upper Extremity  Comments: wound vac x2    Assessment    Patient is 58 y.o. female admitted with L leg cellulitis with concern for necrotizing soft tissue infection, s/p I & D. Pt presents to OT eval debilitated and below her baseline of activity tolerance, functional mobility and self-care independence. Pt will benefit from post-acute placement for additional therapy once medically cleared for DC. Pt is motivated and was previously independent, primarily limited by pain and body habitus which has expedited her rapid debility. Pt was able to stand and take a side step today but was fearful of increased pain. Pt is receptive to encouragement and reassurance, pt needs to sit EOB and stand daily with staff to increase her functional activity tolerance, she is NOT bedridden. Pt has supportive nephews at home able to assist upon eventual DC home. Consider PM&R consult.     Plan    Occupational Therapy Initial Treatment Plan   Treatment Interventions: Self Care / Activities of Daily Living, Therapeutic Activity, Therapeutic Exercises, Adaptive Equipment  Treatment Frequency: 4 Times per Week  Duration: Until Therapy Goals Met    DC Equipment Recommendations: Unable to determine at this time  Discharge Recommendations: Recommend post-acute placement for additional occupational therapy services prior to discharge home      Objective       04/25/24 1001   Prior Living Situation   Prior Services None;Home-Independent   Housing / Facility 1 Story House   Steps Into Home 5   Steps In Home 0   Bathroom Set up Bathtub / Shower Combination;Shower Chair   Equipment Owned Front-Wheel Walker;Quad Cane   Lives with - Patient's Self Care Capacity Adult Children;Child Less than 18 Years of Age   Comments pt adopted her nephews  ages 23, 17 and 15. 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   Prior Level of ADL Function   Self Feeding Independent   Grooming / Hygiene Independent   Bathing Independent   Dressing Independent   Toileting Independent   Prior Level of IADL Function   Medication Management Independent   Laundry Independent   Kitchen Mobility Independent   Finances Independent   Home Management Independent   Shopping Independent   Prior Level Of Mobility Independent Without Device in Community   Driving / Transportation Driving Independent   Precautions   Precautions Fall Risk;Weight Bearing As Tolerated Left Upper Extremity   Comments wound vac x2   Pain 0 - 10 Group   Therapist Pain Assessment During Activity;Nurse Notified;9   Cognition    Cognition / Consciousness WDL   Level of Consciousness Alert   Comments pleasant, receptive, motivated   Strength Upper Body   Upper Body Strength  X   Gross Strength Generalized Weakness, Equal Bilaterally.    Coordination Upper Body   Coordination WDL   Balance Assessment   Sitting Balance (Static) Fair   Sitting Balance (Dynamic) Fair   Standing Balance (Static) Poor +   Standing Balance (Dynamic) Poor -   Weight Shift Sitting Fair   Weight Shift Standing Poor   Comments could not tolerate standing more than 5 seconds   Bed Mobility    Supine to Sit Maximal Assist   Sit to Supine Maximal Assist   Scooting Maximal Assist   Rolling Moderate Assist to Rt.;Moderate Assist to Lt.   ADL Assessment   Eating Independent   Grooming Supervision;Seated   Upper Body Dressing Moderate Assist   Lower Body Dressing Maximal Assist   Toileting Maximal Assist   How much help from another person does the patient currently need...   Putting on and taking off regular lower body clothing? 2   Bathing (including washing, rinsing, and drying)? 2   Toileting, which includes using a toilet, bedpan, or urinal? 2   Putting on and taking off regular upper body clothing? 2   Taking care  of personal grooming such as brushing teeth? 3   Eating meals? 4   6 Clicks Daily Activity Score 15   Functional Mobility   Sit to Stand Moderate Assist   Bed, Chair, Wheelchair Transfer Unable to Participate   Toilet Transfers Unable to Participate   Mobility sit>stand x3, 1 side step   Comments limited by pain   Activity Tolerance   Sitting in Chair NT   Sitting Edge of Bed 25min   Standing 3 stands, <1min total   Patient / Family Goals   Patient / Family Goal #1 get stronger every day   Short Term Goals   Short Term Goal # 1 pt will complete UB dress with SPV   Short Term Goal # 2 pt will tolerate >1min  prep for functional transfers   Short Term Goal # 3 pt will complete functional transfer to [facility] with [facility]   Education Group   Education Provided Transfers;Activities of Daily Living;Role of Occupational Therapist;Pathology of bedrest   Role of Occupational Therapist Patient Response Patient;Acceptance;Explanation;Verbal Demonstration   Home Safety Patient Response Patient;Acceptance;Explanation;Verbal Demonstration   Transfers Patient Response Patient;Acceptance;Explanation;Verbal Demonstration   ADL Patient Response Patient;Acceptance;Explanation;Verbal Demonstration;Action Demonstration   Pathology of Bedrest Patient Response Patient;Acceptance;Explanation;Verbal Demonstration   Occupational Therapy Initial Treatment Plan    Treatment Interventions Self Care / Activities of Daily Living;Therapeutic Activity;Therapeutic Exercises;Adaptive Equipment   Treatment Frequency 3 Times per Week   Duration Until Therapy Goals Met   Problem List   Problem List Decreased Active Daily Living Skills;Decreased Homemaking Skills;Decreased Upper Extremity Strength Right;Decreased Upper Extremity Strength Left;Decreased Functional Mobility;Decreased Activity Tolerance;Safety Awareness Deficits / Cognition;Impaired Postural Control / Balance;Other (Comments)  (pain)   Anticipated Discharge Equipment and Recommendations    DC Equipment Recommendations Unable to determine at this time   Discharge Recommendations Recommend post-acute placement for additional occupational therapy services prior to discharge home

## 2024-04-25 NOTE — CARE PLAN
The patient is Watcher - Medium risk of patient condition declining or worsening    Shift Goals  Clinical Goals: patient will report adequate pain control and ambulate oob by end of shift  Patient Goals: go back to other hospital  Family Goals: updated on poc    Progress made toward(s) clinical / shift goals:    Problem: Pain - Standard  Goal: Alleviation of pain or a reduction in pain to the patient’s comfort goal  Outcome: Progressing  Note: oxy prn for pain. Tylenol for arthritis.      Problem: Fall Risk  Goal: Patient will remain free from falls  Outcome: Progressing  Note: OOB with therapy. Stood x1. Refusing q2t.      Problem: Hemodynamics  Goal: Patient's hemodynamics, fluid balance and neurologic status will be stable or improve  Outcome: Progressing  Note: VSS.        Wound vac in place. Per ortho - will dc to receiving hospital with vacs clamped.

## 2024-04-25 NOTE — PROGRESS NOTES
Telemetry Report    Rhythm SR  Rate 73  Ectopy R PVC     VA 0.15  QRS 0.08  QT 0.35            Per telemetry room monitor

## 2024-04-25 NOTE — DISCHARGE PLANNING
Case Management Discharge Planning    Admission Date: 4/18/2024  GMLOS: 3.2  ALOS: 7    TRANSFER BACK PATIENT    Referring Facility: [facility]  Reason for Transfer: Consults for transthoracic echo, ID, and orthopedic surgery    Signed Repatriation/Transfer Back Agreement saved in .    Once this patient has received the requested service or the patient no longer requires tertiary level of care from [facility] and is stable to transfer back to referring facility, we can facilitate a transfer back. Please contact the [facility] at [phone] to coordinate this transfer request.

## 2024-04-25 NOTE — PROGRESS NOTES
"    Orthopedic PA Progress Note    Interval changes:  Patient doing ok. Resting comfortably  Veraflo wound vac changed yesterday  LLE dressings are CDI  WBAT LLE  - Might need skin grafting down the road. Recommend follow up in [place] for this per patient request to be closer to home.   - Please leave wound vac in place and clamp for transport  - Receiving facility will need to change vac upon arrival and attach to their own vac  Cleared for DC from orthopedic standpoint pending placement that accepts veraflo    ROS - Patient denies any new issues.  Denies any numbness or tingling. Pain well controlled.    /58   Pulse 62   Temp 36.3 °C (97.3 °F) (Temporal)   Resp 17   Ht 1.753 m (5' 9\")   Wt (!) 187 kg (411 lb 2.5 oz)   SpO2 97%     Patient seen and examined  No acute distress  Breathing non labored  RRR  LLE: Dressings CDI, vac intact without leak.     Recent Labs     04/23/24  0300 04/24/24  0723   WBC 10.3 11.4*   RBC 3.37* 3.58*   HEMOGLOBIN 8.3* 8.9*   HEMATOCRIT 27.3* 28.7*   MCV 81.0* 80.2*   MCH 24.6* 24.9*   MCHC 30.4* 31.0*   RDW 42.9 42.2   PLATELETCT 403 364   MPV 9.4 9.1       Active Hospital Problems    Diagnosis     H/O renal cell carcinoma s/p nephrectomy [Z85.528]      Priority: Medium    Anemia [D64.9]      Priority: Low    Rheumatoid arthritis involving multiple sites ([facility]) [M06.9]     Cellulitis of left lower extremity [L03.116]     Lactic acidosis [E87.20]     Obesity [E66.9]     A-fib ([facility]) [I48.91]     Mood disorder ([facility]) [F39]     Bacteremia [R78.81]     Obstructive sleep apnea [G47.33]     Constipation [K59.00]        Assessment/Plan:  Patient doing ok. Resting comfortably  Veraflo wound vac changed yesterday  LLE dressings are CDI  WBAT LLE  - Might need skin grafting down the road. Recommend follow up in [place] for this per patient request to be closer to home.   - Please leave wound vac in place and clamp for transport  - Receiving facility will need to change vac upon arrival and " attach to their own vac  Cleared for DC from orthopedic standpoint pending placement that accepts veraflo    POD#6 S/p  Left leg fasciotomy of anterior and superficial posterior   compartments.  Wt bearing status - WBAT LLE  Wound care/Drains - Dressings to be changed per protocol by wound team  Future Procedures - None planned   Sutures/Staples out- 14-21 days post operatively. Removal will completed by ortho MAME's unless transferred.  DVT Prophylaxis outpatient: ASA 81 mg PO BID x4 weeks  PT/OT-initiated  Antibiotics: PCN   DVT Prophylaxis- TEDS/SCDs/Foot pumps/eliquis  [place]-not needed per ortho  Case Coordination for Discharge Planning - Disposition per therapy recs.

## 2024-04-25 NOTE — DISCHARGE PLANNING
Case Management Discharge Planning    Admission Date: 4/18/2024  GMLOS: 3.2  ALOS: 7    6-Clicks ADL Score: 15  6-Clicks Mobility Score: 7  PT and/or OT Eval ordered: Yes  Post-acute Referrals Ordered: No  Post-acute Choice Obtained: No  Has referral(s) been sent to post-acute provider:  Yes      Anticipated Discharge Dispo: Discharge Disposition: D/T to short term gen hosp for IP care (02)  Discharge Address: [address] [place], [place]    DME Needed: No    Action(s) Taken: Discussed pt in AM and IDT rounds. Requested attending team reach out to [facility] to initiate transfer back to [facility].    Escalations Completed: None    Medically Clear: Yes    Next Steps: Care coordination will follow to assist with transfer needs    Barriers to Discharge: Pending Placement, medi-fito insurance, bariatric, wound vacs, IV abx    Is the patient up for discharge tomorrow: No

## 2024-04-25 NOTE — THERAPY
Physical Therapy   Daily Treatment     Patient Name: Savita Duggan  Age:  58 y.o., Sex:  female  Medical Record #: [id number]  Today's Date: 4/25/2024     Precautions  Precautions: Fall Risk;Weight Bearing As Tolerated Left Lower Extremity  Comments: wound vac x2    Assessment  Pt seen for PT tx with mobility detailed down below. Pt still needs significant assistance for bed mobility, however she was able to progress and attempt multiple STS today with FWW. Pt is limited throughout mobility by LLE pain, however she is receptive to encouragement/education. Continue to recommend placement at this time, will follow.     Plan    Treatment Plan Status: Continue Current Treatment Plan  Type of Treatment: Bed Mobility, Equipment, Gait Training, Neuro Re-Education / Balance, Self Care / Home Evaluation, Stair Training, Therapeutic Exercise, Therapeutic Activities  Treatment Frequency: 3 Times per Week  Treatment Duration: Until Therapy Goals Met    DC Equipment Recommendations: Unable to determine at this time  Discharge Recommendations: Recommend post-acute placement for additional physical therapy services prior to discharge home        Vitals   O2 (LPM) 2   O2 Delivery Device Silicone Nasal Cannula   Pain 0 - 10 Group   Location Leg   Location Orientation Left   Description Aching   Therapist Pain Assessment During Activity   Cognition    Cognition / Consciousness WDL   Balance   Sitting Balance (Static) Fair   Sitting Balance (Dynamic) Fair   Standing Balance (Static) Poor +   Standing Balance (Dynamic) Poor -   Weight Shift Sitting Fair   Weight Shift Standing Poor   Skilled Intervention Verbal Cuing;Tactile Cuing;Sequencing   Comments FWW + 2p assist for transfers   Bed Mobility    Supine to Sit Maximal Assist   Sit to Supine Maximal Assist   Scooting Maximal Assist   Rolling Moderate Assist to Rt.;Moderate Assist to Lt.   Skilled Intervention Verbal Cuing;Sequencing;Tactile Cuing   Gait Analysis   Gait Level Of Assist  Unable to Participate   Weight Bearing Status WBAT LLE   Comments pt able to take 2-3 sidesteps along EOB with FWW   Functional Mobility   Sit to Stand Moderate Assist   Mobility STS x3 from EOB with FWW and 2 person assist   Skilled Intervention Verbal Cuing;Sequencing   Comments cues for hand positioning and sequencing   6 Clicks Assessment - How much HELP from from another person do you currently need... (If the patient hasn't done an activity recently, how much help from another person do you think he/she would need if he/she tried?)   Turning from your back to your side while in a flat bed without using bedrails? 2   Moving from lying on your back to sitting on the side of a flat bed without using bedrails? 2   Moving to and from a bed to a chair (including a wheelchair)? 2   Standing up from a chair using your arms (e.g., wheelchair, or bedside chair)? 2   Walking in hospital room? 2   Climbing 3-5 steps with a railing? 1   6 clicks Mobility Score 11   Activity Tolerance   Sitting Edge of Bed 20 min   Standing <1 min   Comments limited by LLE pain   Short Term Goals    Short Term Goal # 1 Pt will demonstrate supine <>sit with HOB flat and bedrails with Min A within 6 visits to progress functional independence with bed mobility.   Goal Outcome # 1 Progressing slower than expected   Short Term Goal # 2 Pt will demonstrate sit <> stand with LRAD with supervision within 6 visits to progress functional independence in preparation for transfers.   Goal Outcome # 2 Progressing as expected   Short Term Goal # 3 Pt will ambulate 50 ft with LRAD with supervision within 6 visits to progress return to community ambulator.   Goal Outcome # 3 Goal not met   Short Term Goal # 4 Pt will ascend/descend 5 steps with left handrail with SBA within 6 visits to progress return to prior level of living.   Goal Outcome # 4 Goal not met   Education Group   Additional Comments education during session on progression of mobility while  admitted   Physical Therapy Treatment Plan   Physical Therapy Treatment Plan Continue Current Treatment Plan   Anticipated Discharge Equipment and Recommendations   DC Equipment Recommendations Unable to determine at this time   Discharge Recommendations Recommend post-acute placement for additional physical therapy services prior to discharge home   Interdisciplinary Plan of Care Collaboration   IDT Collaboration with  Nursing;Occupational Therapist;Certified Nursing Assistant   Patient Position at End of Therapy In Bed;Call Light within Reach;Tray Table within Reach;Phone within Reach   Collaboration Comments RN updated   Session Information   Date / Session Number  4/25- 2 (2/3, 4/29)       Patient benefited from team-treatment with Occupational Therapist for the following reason(s):  Patient required 2 person assistance for safety and to provide effective interventions. Each discipline assisted patient with appropriate and separate goals. Due to the medical complexity, the skill of both practitioners is needed to monitor vitals, patient status, and adjust the intervention to fit the patient's needs and goals. Pt also needing skill of both practitioners due  morbid obesity and significant assistance and skill throughout session.

## 2024-04-25 NOTE — CARE PLAN
Patient is resting comfortably in bed, bed in lowest position, call light within reach, bed alarm on, patient refused Q2 turns throughout the night, education provided on skin integrity. Wound vac in place, elevated calf. Patient stated pain 8/10, medication administered see MAR. Monitor respiratory and VS.     The patient is Stable - Low risk of patient condition declining or worsening    Shift Goals  Clinical Goals: IV ABX, pain management, wound care, nutrition  Patient Goals: pain control, rest  Family Goals: KIANA    Progress made toward(s) clinical / shift goals:  Problem: Knowledge Deficit - Standard  Goal: Patient and family/care givers will demonstrate understanding of plan of care, disease process/condition, diagnostic tests and medications  Outcome: Progressing     Problem: Pain - Standard  Goal: Alleviation of pain or a reduction in pain to the patient’s comfort goal  Outcome: Progressing     Problem: Skin Integrity  Goal: Skin integrity is maintained or improved  Outcome: Progressing

## 2024-04-26 LAB
ANION GAP SERPL CALC-SCNC: 9 MMOL/L (ref 7–16)
BUN SERPL-MCNC: 9 MG/DL (ref 8–22)
CALCIUM SERPL-MCNC: 8.4 MG/DL (ref 8.5–10.5)
CHLORIDE SERPL-SCNC: 96 MMOL/L (ref 96–112)
CO2 SERPL-SCNC: 29 MMOL/L (ref 20–33)
CREAT SERPL-MCNC: 0.79 MG/DL (ref 0.5–1.4)
ERYTHROCYTE [DISTWIDTH] IN BLOOD BY AUTOMATED COUNT: 42.8 FL (ref 35.9–50)
GFR SERPLBLD CREATININE-BSD FMLA CKD-EPI: 86 ML/MIN/1.73 M 2
GLUCOSE SERPL-MCNC: 99 MG/DL (ref 65–99)
HCT VFR BLD AUTO: 26.8 % (ref 37–47)
HGB BLD-MCNC: 8.2 G/DL (ref 12–16)
MAGNESIUM SERPL-MCNC: 1.9 MG/DL (ref 1.5–2.5)
MCH RBC QN AUTO: 24.9 PG (ref 27–33)
MCHC RBC AUTO-ENTMCNC: 30.6 G/DL (ref 32.2–35.5)
MCV RBC AUTO: 81.5 FL (ref 81.4–97.8)
PHOSPHATE SERPL-MCNC: 4.3 MG/DL (ref 2.5–4.5)
PLATELET # BLD AUTO: 348 K/UL (ref 164–446)
PMV BLD AUTO: 8.9 FL (ref 9–12.9)
POTASSIUM SERPL-SCNC: 4.5 MMOL/L (ref 3.6–5.5)
RBC # BLD AUTO: 3.29 M/UL (ref 4.2–5.4)
SODIUM SERPL-SCNC: 134 MMOL/L (ref 135–145)
WBC # BLD AUTO: 9.5 K/UL (ref 4.8–10.8)

## 2024-04-26 PROCEDURE — 700105 HCHG RX REV CODE 258: Performed by: HOSPITALIST

## 2024-04-26 PROCEDURE — 84100 ASSAY OF PHOSPHORUS: CPT

## 2024-04-26 PROCEDURE — 36415 COLL VENOUS BLD VENIPUNCTURE: CPT

## 2024-04-26 PROCEDURE — 700102 HCHG RX REV CODE 250 W/ 637 OVERRIDE(OP)

## 2024-04-26 PROCEDURE — 770001 HCHG ROOM/CARE - MED/SURG/GYN PRIV*

## 2024-04-26 PROCEDURE — 700111 HCHG RX REV CODE 636 W/ 250 OVERRIDE (IP): Performed by: HOSPITALIST

## 2024-04-26 PROCEDURE — 302098 PASTE RING (FLAT)

## 2024-04-26 PROCEDURE — A9270 NON-COVERED ITEM OR SERVICE: HCPCS

## 2024-04-26 PROCEDURE — 97605 NEG PRS WND THER DME<=50SQCM: CPT

## 2024-04-26 PROCEDURE — 85027 COMPLETE CBC AUTOMATED: CPT

## 2024-04-26 PROCEDURE — 83735 ASSAY OF MAGNESIUM: CPT

## 2024-04-26 PROCEDURE — 99232 SBSQ HOSP IP/OBS MODERATE 35: CPT | Mod: GC | Performed by: HOSPITALIST

## 2024-04-26 PROCEDURE — 80048 BASIC METABOLIC PNL TOTAL CA: CPT

## 2024-04-26 RX ADMIN — ACETAMINOPHEN 1000 MG: 500 TABLET, FILM COATED ORAL at 01:11

## 2024-04-26 RX ADMIN — APIXABAN 5 MG: 5 TABLET, FILM COATED ORAL at 17:02

## 2024-04-26 RX ADMIN — MOMETASONE FUROATE AND FORMOTEROL FUMARATE DIHYDRATE 2 PUFF: 200; 5 AEROSOL RESPIRATORY (INHALATION) at 04:54

## 2024-04-26 RX ADMIN — APIXABAN 5 MG: 5 TABLET, FILM COATED ORAL at 04:52

## 2024-04-26 RX ADMIN — GABAPENTIN 100 MG: 100 CAPSULE ORAL at 01:11

## 2024-04-26 RX ADMIN — GABAPENTIN 100 MG: 100 CAPSULE ORAL at 13:01

## 2024-04-26 RX ADMIN — ATORVASTATIN CALCIUM 20 MG: 20 TABLET, FILM COATED ORAL at 21:16

## 2024-04-26 RX ADMIN — MOMETASONE FUROATE AND FORMOTEROL FUMARATE DIHYDRATE 2 PUFF: 200; 5 AEROSOL RESPIRATORY (INHALATION) at 17:02

## 2024-04-26 RX ADMIN — GABAPENTIN 100 MG: 100 CAPSULE ORAL at 08:20

## 2024-04-26 RX ADMIN — SOTALOL HYDROCHLORIDE 120 MG: 120 TABLET ORAL at 17:03

## 2024-04-26 RX ADMIN — OXYCODONE HYDROCHLORIDE 10 MG: 10 TABLET ORAL at 17:02

## 2024-04-26 RX ADMIN — ACETAMINOPHEN 1000 MG: 500 TABLET, FILM COATED ORAL at 17:03

## 2024-04-26 RX ADMIN — OXYCODONE 5 MG: 5 TABLET ORAL at 21:16

## 2024-04-26 RX ADMIN — SODIUM CHLORIDE 24 MILLION UNITS: 9 INJECTION, SOLUTION INTRAVENOUS at 16:05

## 2024-04-26 RX ADMIN — GABAPENTIN 100 MG: 100 CAPSULE ORAL at 17:03

## 2024-04-26 RX ADMIN — SOTALOL HYDROCHLORIDE 120 MG: 120 TABLET ORAL at 06:06

## 2024-04-26 RX ADMIN — ACETAMINOPHEN 1000 MG: 500 TABLET, FILM COATED ORAL at 10:30

## 2024-04-26 RX ADMIN — ESCITALOPRAM OXALATE 10 MG: 10 TABLET ORAL at 04:52

## 2024-04-26 RX ADMIN — OXYCODONE 5 MG: 5 TABLET ORAL at 03:46

## 2024-04-26 RX ADMIN — OXYCODONE HYDROCHLORIDE 10 MG: 10 TABLET ORAL at 13:01

## 2024-04-26 ASSESSMENT — PAIN DESCRIPTION - PAIN TYPE
TYPE: ACUTE PAIN

## 2024-04-26 ASSESSMENT — ENCOUNTER SYMPTOMS
ABDOMINAL PAIN: 0
SHORTNESS OF BREATH: 0
RESPIRATORY NEGATIVE: 1
PSYCHIATRIC NEGATIVE: 1
NAUSEA: 0
GASTROINTESTINAL NEGATIVE: 1
EYES NEGATIVE: 1
HEADACHES: 0
CONSTITUTIONAL NEGATIVE: 1
CARDIOVASCULAR NEGATIVE: 1
NEUROLOGICAL NEGATIVE: 1
CONSTIPATION: 0

## 2024-04-26 ASSESSMENT — FIBROSIS 4 INDEX: FIB4 SCORE: 1.03

## 2024-04-26 NOTE — DISCHARGE SUMMARY
[facility] Discharge Summary    Attending: REID Elizabeth M.d.  Senior Resident: Dr. Chauhan  Intern:  Dr. Crowder  Contact Number: [phone]    CHIEF COMPLAINT ON ADMISSION  No chief complaint on file.      Reason for Admission  Strep bacteremia, LLE cellulitis w*     Admission Date  4/18/2024    CODE STATUS  Full Code    HPI & HOSPITAL COURSE  Savita Duggan is a 58 y.o. female who presented 4/18/2024 from outside hospital due to worsening infection of left lower extremity. Patient reports that symptom onset was approximately 3 weeks ago when she started to notice redness around her mid shin that slowly started to worsen.  Patient denies any trauma or injury to site of infection.  Due to increasing pain, erythema and swelling the patient elected to present to her nearest emergency department which is at [place].  Reportedly the patient was admitted on 4/14 and diagnosed with left lower extremity cellulitis and initiated on vancomycin and Levaquin without significant improvement and patient's blood cultures grew group C strep and antibiotics were escalated to cefepime but the patient still continues to have limited improvement.  Also reportedly the patient may have had a murmur subsequently plan was created to transfer the patient to [facility] for further evaluation and management.  Orthopedics was consulted on admission, pharmacies and drainage of left lower extremity on 4/19 due to concern for necrotizing fasciitis.  Consulted infectious disease regarding possible infective endocarditis, and transitioning antibiotics to IV penicillin.  TTE performed although unable to visualize valves, and PETER ordered which showed no evidence of IE, therefore continuing course of IV penicillin x 2 weeks following negative blood cultures.  She previously had reported history of alert allergy to penicillins although tolerated IV penicillin without any adverse allergic reactions and removed from allergy list. Following continued  treatment with IV penicillin and judicious diuretic use, her left lower extremity cellulitis and pain is improving.    Therefore, she is discharged in fair and stable condition to return to referring facility for IV penicillin and advanced wound vac care.    Please see medication reconciliation for medication changes.   Her home prednisone for rheumatoid arthritis was held per patient preference.   Following discussion with orthopedic surgery, apixaban is sufficient for DVT prophylaxis, ASA not needed.  Considering resuming home metoprolol, spironolactone, omeprazole, mag oxide, lisinopril, and dapagliflozin when appropriate.    The patient met 2-midnight criteria for an inpatient stay at the time of discharge.    Discharge Date  4/25/24    Physical Exam on Day of Discharge  Physical Exam    FOLLOW UP ITEMS POST DISCHARGE  Follow up with orthopedic surgery 14-21 days following fasciotomy   Continue 2 week course of IV penicillin from negative blood cultures on 4/19  Follow up with wound care  Follow up with PCP for workup of LONNIE    DISCHARGE DIAGNOSES  Principal Problem:    Cellulitis of left lower extremity (POA: Unknown)  Active Problems:    H/O renal cell carcinoma s/p nephrectomy (POA: Yes)    Anemia (POA: Yes)    Constipation (POA: Yes)    Obstructive sleep apnea (POA: Yes)    Bacteremia (POA: Yes)    Lactic acidosis (POA: Unknown)    Obesity (POA: Unknown)    A-fib (HCC) (POA: Unknown)    Mood disorder (HCC) (POA: Unknown)    Rheumatoid arthritis involving multiple sites (HCC) (POA: Unknown)  Resolved Problems:    * No resolved hospital problems. *      FOLLOW UP  No future appointments.  No follow-up provider specified.    MEDICATIONS ON DISCHARGE     Medication List        START taking these medications        Instructions   acetaminophen 500 MG Tabs  Commonly known as: Tylenol   Take 2 Tablets by mouth every 8 hours.  Dose: 1,000 mg     diphenhydrAMINE 50 MG/ML Soln  Commonly known as: Benadryl   Infuse 0.5  mL into a venous catheter every 6 hours as needed (Swelling if reaction from IV pencillin).  Dose: 25 mg     hydrOXYzine HCl 50 MG Tabs  Commonly known as: Atarax   Take 1 Tablet by mouth 3 times a day as needed for Anxiety.  Dose: 50 mg     lidocaine 2 % Soln  Commonly known as: Xylocaine   Apply 20 mL topically 1 time a day as needed (Administer 10-15 minutes prior to procedure either topically or injected into dressing/wound vac.).  Dose: 20 mL     lidocaine 4 % Soln  Commonly known as: Xylocaine   Apply 1 Application topically 1 time a day as needed (Administer 10-15 minutes prior to procedure either topically or injected into dressing/wound vac if 2% lidocaine ineffective.).  Dose: 1 Application     mometasone-formoterol 200-5 MCG/ACT Aero  Commonly known as: Dulera   Inhale 2 Puffs 2 times a day.  Dose: 2 Puff     NS SOLN 500 mL with penicillin G potassium [id number] UNIT SOLR 24 Million Units   Doctor's comments: Patient to continue to complete total of 2 week course from negative blood cultures for bacteremia (4/19-5/03)  Infuse 24 Million Units into a venous catheter continuous for 8 days.  Dose: 24 Million Units     * oxyCODONE immediate-release 5 MG Tabs  Commonly known as: Roxicodone   Take 1 Tablet by mouth every four hours as needed for Severe Pain for up to 5 days.  Dose: 5 mg     * oxyCODONE immediate release 10 MG immediate release tablet  Commonly known as: Roxicodone   Take 1 Tablet by mouth every four hours as needed for Severe Pain for up to 5 days.  Dose: 10 mg     polyethylene glycol/lytes Pack  Commonly known as: Miralax   Take 1 Packet by mouth 2 times a day.  Dose: 17 g     senna-docusate 8.6-50 MG Tabs  Commonly known as: Pericolace Or Senokot S   Take 2 Tablets by mouth every evening.  Dose: 2 Tablet           * This list has 2 medication(s) that are the same as other medications prescribed for you. Read the directions carefully, and ask your doctor or other care provider to review them  with you.                CHANGE how you take these medications        Instructions   ondansetron 4 MG Tbdp  What changed: when to take this  Commonly known as: Zofran ODT   Take 1 Tablet by mouth every four hours as needed for Nausea/Vomiting.  Dose: 4 mg            CONTINUE taking these medications        Instructions   apixaban 5mg Tabs  Commonly known as: Eliquis   Take 1 Tablet by mouth 2 times a day.  Dose: 5 mg     atorvastatin 20 MG Tabs  Commonly known as: Lipitor   Take 1 Tablet by mouth at bedtime.  Dose: 20 mg     escitalopram 10 MG Tabs  Commonly known as: Lexapro   Take 1 Tablet by mouth every day.  Dose: 10 mg     gabapentin 100 MG Caps  Commonly known as: Neurontin   Take 1 Capsule by mouth 4 times a day.  Dose: 100 mg     sotalol 120 MG tablet  Commonly known as: Betapace   Take 1 Tablet by mouth 2 times a day.  Dose: 120 mg            STOP taking these medications      budesonide-formoterol 160-4.5 MCG/ACT Aero  Commonly known as: Symbicort     dapagliflozin propanediol 10 MG Tabs  Commonly known as: Farxiga     Home Care Oxygen     lactulose 10 GM/15ML Soln     lisinopril 20 MG Tabs  Commonly known as: Prinivil     Magnesium Oxide -Mg Supplement 200 MG Tabs     metoprolol SR 25 MG Tb24  Commonly known as: Toprol XL     omeprazole 40 MG delayed-release capsule  Commonly known as: PriLOSEC     predniSONE 10 MG Tabs  Commonly known as: Deltasone     spironolactone 25 MG Tabs  Commonly known as: Aldactone              Allergies  Allergies   Allergen Reactions    Coconut Oil     Codeine Itching       DIET  Orders Placed This Encounter   Procedures    Diet Order Diet: Regular     Standing Status:   Standing     Number of Occurrences:   1     Order Specific Question:   Diet:     Answer:   Regular [1]       ACTIVITY  As tolerated.  Weight bearing as tolerated left lower extremity    CONSULTATIONS  Orthopedic surgery  Infectious disease    PROCEDURES  Left leg fasciotomy of anterior and superficial  posterior compartments on 4/19  PETER on 4/22    LABORATORY  Lab Results   Component Value Date    SODIUM 132 (L) 04/25/2024    POTASSIUM 4.8 04/25/2024    CHLORIDE 94 (L) 04/25/2024    CO2 26 04/25/2024    GLUCOSE 103 (H) 04/25/2024    BUN 9 04/25/2024    CREATININE 0.81 04/25/2024        Lab Results   Component Value Date    WBC 11.4 (H) 04/24/2024    HEMOGLOBIN 8.9 (L) 04/24/2024    HEMATOCRIT 28.7 (L) 04/24/2024    PLATELETCT 364 04/24/2024        Total time of the discharge process exceeds 40 minutes.

## 2024-04-26 NOTE — PROGRESS NOTES
58yoF with left lower extremity cellulitis with concern for necrotizing soft tissue infection s/p I&D/fasciotomies 4/19.    S: overall feeling much better than when she was admitted, still reports pain with weightbearing, having routine VAC changes    O:    Vitals:    04/26/24 1537   BP: 130/58   Pulse: 68   Resp: 16   Temp: 36.7 °C (98.1 °F)   SpO2: 92%     Exam:  General-NAD, alert and following commands  LLE-veraflo VAC in place with good seal, NVI distally    A: 58yoF with left lower extremity cellulitis with concern for necrotizing soft tissue infection s/p I&D/fasciotomies 4/19.    Recs:  --continue routine VAC changes  --continue abx therapy  --okay for transfer when otherwise appropriate with appropriate coordination of wound care and abx therapy  --We discussed that she will likely require skin grafting at some point in the future depending on how her wounds progress to achieve definitive wound healing.  We discussed that due to her infection and compromised skin integrity that she is not a candidate for secondary closure of her wounds.  She may need to be evaluated by wound care specialist and/or plastic surgery in [place] within the next few weeks to consider skin grafting

## 2024-04-26 NOTE — WOUND TEAM
[facility]  Inpatient Services  Wound and Skin Care Follow-up    Admission Date: 2024     Last order of IP CONSULT TO WOUND CARE was found on 2024 from Hospital Encounter on 2024     HPI, PMH, SH: Reviewed    Past Surgical History:   Procedure Laterality Date    IRRIGATION & DEBRIDEMENT GENERAL Left 2024    Procedure: IRRIGATION AND DEBRIDEMENT, WOUND - LEG;  Surgeon: Taco Ortiz M.D.;  Location: SURGERY [facility];  Service: Orthopedics     Social History     Tobacco Use    Smoking status: Former     Current packs/day: 0.00     Average packs/day: 1 pack/day for 38.0 years (38.0 ttl pk-yrs)     Types: Cigarettes     Start date: 1978     Quit date: 2016     Years since quittin.4    Smokeless tobacco: Never   Substance Use Topics    Alcohol use: Never     No chief complaint on file.    Diagnosis: Bacteremia [R78.81]    Unit where seen by Wound Team: S140/00     WOUND FOLLOW UP RELATED TO:  CHANDANA change L leg       WOUND TEAM PLAN OF CARE - Frequency of Follow-up:   Nursing to follow dressing orders written for wound care. Contact wound team if area fails to progress, deteriorates or with any questions/concerns if something comes up before next scheduled follow up (See below as to whether wound is following and frequency of wound follow up)                   NPWT change 3 times weekly -      WOUND HISTORY:       Pt is a 59 yo female who is a direct admit from [facility] for L leg cellulitis with necrotic bulla.  Hx includes A-fib on Eliquis.  Admitted for cellulitis, bacteremia.  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  Pt states that her leg started becoming red and swollen around the beginning of 24 I&D fasciotomy L leg Dr Ortiz - recommending NPWT when periwound amenable  24 ID following - Dr Warren           WOUND ASSESSMENT/LDA  Wound 24 Full Thickness Wound Leg Medial;Lower Left (Active)   Date First  Assessed/Time First Assessed: 04/20/24 1200   Present on Original Admission: No  Hand Hygiene Completed: Yes  Primary Wound Type: Full Thickness Wound  Location: Leg  Wound Orientation: Medial;Lower  Laterality: Left      Assessments 4/26/2024  3:00 PM   Wound Image      Periwound Assessment Purple;Edema;Blistered   Drainage Amount Small   Drainage Description Serosanguineous   Dressing Changed Changed   Dressing Options Wound Vac   Dressing Change/Treatment Frequency Monday, Wednesday, Friday, and As Needed   NEXT Dressing Change/Treatment Date 04/29/24   NEXT Weekly Photo (Inpatient Only) 05/03/24   Wound Length (cm) 15 cm   Wound Width (cm) 3 cm   Wound Depth (cm) 2.5 cm   Wound Surface Area (cm^2) 45 cm^2   Wound Volume (cm^3) 112.5 cm^3   Tunneling (cm) 1.5 cm   Tunneling Clock Position of Wound 11 o'clock   Tunneling - 2nd Location (cm) 1.5 cm   Tunneling Clock Position of Wound - 2nd Location 5 o'clock   Wound Odor None  Muscle present in wound bed   Pulses DP;2+;Left    60% granular 30% muscle  10% slough   Wound 04/20/24 Full Thickness Wound Leg Lower;Lateral Left (Active)   Date First Assessed/Time First Assessed: 04/20/24 1338   Present on Original Admission: No  Hand Hygiene Completed: Yes  Primary Wound Type: Full Thickness Wound  Location: Leg  Wound Orientation: Lower;Lateral  Laterality: Left      Assessments 4/26/2024  3:00 PM   Wound Image     Periwound Assessment Purple;Edema;Blistered   Drainage Amount Small   Drainage Description Serosanguineous   Dressing Changed Changed   Dressing Options Wound Vac   Dressing Change/Treatment Frequency Monday, Wednesday, Friday, and As Needed   NEXT Dressing Change/Treatment Date 04/29/24   NEXT Weekly Photo (Inpatient Only) 05/03/24   Wound Length (cm) 12.5 cm   Wound Width (cm) 3.5 cm   Wound Depth (cm) 2 cm   Wound Surface Area (cm^2) 43.75 cm^2   Wound Volume (cm^3) 87.5 cm^3   Tracts                                              6 cm at 11  oclock  Structures                                    muscle    50% grnular     40% muscle    10% slough   Negative Pressure Wound Therapy 04/22/24 Pretibial Medial;Lower Left (Active)   Placement Date/Time: 04/22/24 1556   Inserted by: Wound RN  Location: Pretibial  Wound Orientation: Medial;Lower  Laterality: Left      Assessments 4/26/2024  3:00 PM   VAC VeraFlo Irrigant Normal Saline   VAC VeraFlo Soak Time (mins) 2   VAC VeraFlo Instill Volume (ml) 20   VAC VeraFlo - Therapy Time (hrs) 2   VAC VeraFlo Pressure (mm/Hg) 125 mmHg        Vascular:    JUSTIN:   No results found.    Lab Values:    Lab Results   Component Value Date/Time    WBC 9.5 04/26/2024 01:37 AM    RBC 3.29 (L) 04/26/2024 01:37 AM    HEMOGLOBIN 8.2 (L) 04/26/2024 01:37 AM    HEMATOCRIT 26.8 (L) 04/26/2024 01:37 AM    CREACTPROT 27.55 (H) 04/19/2024 08:19 AM    SEDRATEWES 12 07/22/2023 10:39 AM    HBA1C 6.4 (H) 07/25/2023 12:25 AM         Culture Results show:  Recent Results (from the past 720 hour(s))   CULTURE WOUND W/ GRAM STAIN    Collection Time: 04/19/24  1:22 PM    Specimen: Wound   Result Value Ref Range    Significant Indicator NEG     Source WND     Site Left leg wound     Culture Result No growth at 72 hours.     Gram Stain Result Rare WBCs.  No organisms seen.          Pain Level/Medicated:  4% Lidocaine allowed to dwell on wound bed 15 prior and PO pain medications administered by bedside RN 60 prior       INTERVENTIONS BY WOUND TEAM:  C Performed standard wound care which includes appropriate positioning, dressing removal and non-selective debridement. Pictures and measurements obtained weekly if/when required.    Wound:  L LEG  Preparation for Dressing removal: Dressing soaked with Lidocaine  Cleansed/Non-selectively Debrided with:  Normal Saline and Gauze  Mary[place]uy wound: Cleansed with Normal Saline and Gauze, Prepped with No Sting, Paste Rings, Drape, and aquacel ag  Primary Dressing:  VF black foam - strip with long tail into 6cm  tract at 11 oclock lateral wound, sealed with dermatac TRAC pad  Secondary (Outer) Dressing: heel off loading foam to heel and anterior ankle, 2 layer compression wrap    Advanced Wound Care Discharge Planning  Number of Clinicians necessary to complete wound care: 1  Is patient requiring IV pain medications for dressing changes:  No   Length of time for dressing change 60 min. (This does not include chart review, pre-medication time, set up, clean up or time spent charting.)    Interdisciplinary consultation: Patient, Bedside RN (), .  Dr Chauhan updated on complexity of VAC change.  CM re in facility needs to lease ulta for ongoing VAC dressing changes.      EVALUATION / RATIONALE FOR TREATMENT:     Date:  04/26/24  Wound Status:  Wound improving    Wound team is using a complicated system to address L leg wounds to address extremely fragile leonela wound. There is muscle in both wounds and vera lyn is necessary to maintain hydrated muscle.  Dermatac drape used to seal sponge - regular VAC drape will not maintain a seal on the fragile tissue.  Wounds have improved.  Depth has imrproved in both L leg wounds.  There is a new tract of the lateral leg wound and some VAC sponge packed into tract keeping the sponge thick to avoid it tearing.  Leonela wound much improved and becoming drier with the use of dermatac.  Initiating increased compression with 2 layer compression wrap which will further expedite improvement in wounds and leonela wound.  .       Date:  04/24/24  Wound Status:  Wound progressing as expected    Wound beds with less adipose tissue present, more red.  Periwound less weepy and less blisters.  Continue VF NPWT, patient may benefit from a two layer compression wrap next dressing change.    Date:  04/22/24  Wound Status:  Initial evaluation    Patient's dressing had not been changed since previous assessment, on 4/20.  Wound bed x2 appears a little necrotic, but leonela-wound dry enough to attempt wound vac.   Hydrofiber silver applied over edges that were weeping, and dermatec used to prevent tissue damage. And 2 machines to prevent bridging over open skin.    Tubi  applied for a little initial compression.    Date:  04/20/24  Wound Status:  Initial evaluation    Clean wounds - 100% viable tissue. Leonela wound is fragile with lifting epithelial and weeping drainage - will re assess for NPWT when leonela improves and a seal can be achieved.  Erythema is receeding from sharpie lines, pt agrees the color is much improved.           Goals: Leonela wound intact, 100% granular wound beds.    NURSING PLAN OF CARE ORDERS:  Dressing changes: See Dressing Care orders    NUTRITION       PREVENTATIVE INTERVENTIONS:   Q shift Noah - performed per nursing policy  Q shift pressure point assessments - performed per nursing policy        Anticipated discharge plans:  Return to [facility] in [place].  Facility pt is transfering to will need to lease [facility] ulta VAC.  Given extent of wound, recommend continued VAC therapy.            Vac Discharge Needs:  Pt currently on Veraflo - recommend continuing VF until muscle bellies are granulated.  Dermatac used to protect leonela wound and should continue until leonela wound is intact.

## 2024-04-26 NOTE — CARE PLAN
The patient is Stable - Low risk of patient condition declining or worsening    Shift Goals  Clinical Goals: Monitor labs/VS, wound vac, possible tx to initial facility  Patient Goals: To get  to home  Family Goals: NA    Progress made toward(s) clinical / shift goals:      Problem: Knowledge Deficit - Standard  Goal: Patient and family/care givers will demonstrate understanding of plan of care, disease process/condition, diagnostic tests and medications  Description: Target End Date:  1-3 days or as soon as patient condition allows    Document in Patient Education    1.  Patient and family/caregiver oriented to unit, equipment, visitation policy and means for communicating concern  2.  Complete/review Learning Assessment  3.  Assess knowledge level of disease process/condition, treatment plan, diagnostic tests and medications  4.  Explain disease process/condition, treatment plan, diagnostic tests and medications  Outcome: Progressing     Problem: Pain - Standard  Goal: Alleviation of pain or a reduction in pain to the patient’s comfort goal  Description: Target End Date:  Prior to discharge or change in level of care    Document on Vitals flowsheet    1.  Document pain using the appropriate pain scale per order or unit policy  2.  Educate and implement non-pharmacologic comfort measures (i.e. relaxation, distraction, massage, cold/heat therapy, etc.)  3.  Pain management medications as ordered  4.  Reassess pain after pain med administration per policy  5.  If opiods administered assess patient's response to pain medication is appropriate per POSS sedation scale  6.  Follow pain management plan developed in collaboration with patient and interdisciplinary team (including palliative care or pain specialists if applicable)  Outcome: Progressing

## 2024-04-26 NOTE — PROGRESS NOTES
[facility] Internal Medicine Daily Progress Note    Date of Service  4/26/2024    [facility] Team: R IM Blue Team   Attending: REID Elizabeth M.d.  Senior Resident: Collin Chauhan DO  Intern: Ellie Crowder DO  Contact Number: [phone]    Chief Complaint  Savita Duggan is a 58 y.o. female admitted as direct admit from [facility] for worsening left lower extremity cellulitis and possible infective endocarditis.    Hospital Course  Savita Duggan is a 58 y.o. female who presented 4/18/2024 from outside hospital due to worsening infection of left lower extremity. Patient reports that symptom onset was approximately 3 weeks ago when she started to notice redness around her mid shin that slowly started to worsen.  Patient denies any trauma or injury to site of infection.  Due to increasing pain, erythema and swelling the patient elected to present to her nearest emergency department which is at [facility].  Reportedly the patient was admitted on 4/14 and diagnosed with left lower extremity cellulitis and initiated on vancomycin and Levaquin without significant improvement and patient's blood cultures grew group C strep and antibiotics were escalated to cefepime but the patient still continues to have limited improvement.  Also reportedly the patient may have had a murmur subsequently plan was created to transfer the patient to [facility] for further evaluation and management.  Orthopedics was consulted on admission, pharmacies and drainage of left lower extremity on 4/19 due to concern for necrotizing fasciitis.  Consulted infectious disease regarding possible infective endocarditis, and transitioning antibiotics to IV penicillin.  TTE performed although unable to visualize valves, and PETER ordered which showed no evidence of IE, therefore continuing course of IV penicillin x 2 weeks following negative blood cultures.  She previously had reported history of alert allergy to penicillins although tolerated IV penicillin without any adverse allergic  reactions and removed from allergy list.     Interval Problem Update  No acute events overnight. She is stable for transfer back to referring facility to finish 2-week course of Penicillin (4/19-5/3/2024) and advanced wound vac management, pending insurance approval.     Would still benefit from BiPAP trial IPAP 20/EPAP 5, for suspected LONNIE if tolerates.    Reports diffuse joint pain, would like to remain off home prednisone for now.    I have discussed this patient's plan of care and discharge plan at IDT rounds today with Case Management, Nursing, Nursing leadership, and other members of the IDT team.    Consultants/Specialty  infectious disease and orthopedics    Code Status  Full Code    Disposition  The patient is medically cleared for discharge to home or a post-acute facility.    PT/OT to evaluate, though notably, patient has historically declined SNF.   I have placed the appropriate orders for post-discharge needs.    Review of Systems  Review of Systems   Constitutional: Negative.    HENT: Negative.     Eyes: Negative.    Respiratory: Negative.  Negative for shortness of breath.    Cardiovascular: Negative.  Negative for chest pain.   Gastrointestinal: Negative.  Negative for abdominal pain, constipation and nausea.   Genitourinary: Negative.    Musculoskeletal:  Negative for joint pain.   Neurological: Negative.  Negative for headaches.   Endo/Heme/Allergies: Negative.    Psychiatric/Behavioral: Negative.        Physical Exam  Temp:  [36.4 °C (97.5 °F)-36.8 °C (98.2 °F)] 36.5 °C (97.7 °F)  Pulse:  [63-75] 63  Resp:  [16-20] 20  BP: (103-140)/(43-72) 112/56  SpO2:  [91 %-96 %] 94 %  On 2L NC    Physical Exam  Constitutional:       General: She is not in acute distress.     Appearance: She is obese.   HENT:      Head: Normocephalic.   Cardiovascular:      Rate and Rhythm: Normal rate and regular rhythm.      Heart sounds: Murmur (systolic murmur III/VI) heard.   Pulmonary:      Effort: Pulmonary effort is  normal. No respiratory distress.      Breath sounds: Rales (dry crackles heard on anterior thorax and subaxillary regions, likely atelectasis) present.   Abdominal:      General: Bowel sounds are normal.      Palpations: Abdomen is soft.      Tenderness: There is no abdominal tenderness. There is no guarding.   Musculoskeletal:         General: Tenderness and deformity (left lower extremity wrapped in ace bandage) present.      Right lower leg: Edema (improved, likely near baseline) present.      Left lower leg: Edema (improved, likely near baseline) present.   Skin:     Capillary Refill: Capillary refill takes more than 3 seconds.      Findings: Lesion (bullous lesion on anterior surface of left knee, non-infectious in appearance) present.   Neurological:      Mental Status: She is alert. Mental status is at baseline.      Motor: No weakness.       Fluids    Intake/Output Summary (Last 24 hours) at 4/26/2024 1207  Last data filed at 4/26/2024 0800  Gross per 24 hour   Intake 3238.84 ml   Output 1250 ml   Net 1988.84 ml       Laboratory  Recent Labs     04/24/24  0723 04/26/24  0137   WBC 11.4* 9.5   RBC 3.58* 3.29*   HEMOGLOBIN 8.9* 8.2*   HEMATOCRIT 28.7* 26.8*   MCV 80.2* 81.5   MCH 24.9* 24.9*   MCHC 31.0* 30.6*   RDW 42.2 42.8   PLATELETCT 364 348   MPV 9.1 8.9*     Recent Labs     04/24/24  0723 04/25/24  0117 04/26/24  0137   SODIUM 134* 132* 134*   POTASSIUM 4.8 4.8 4.5   CHLORIDE 97 94* 96   CO2 25 26 29   GLUCOSE 89 103* 99   BUN 9 9 9   CREATININE 0.71 0.81 0.79   CALCIUM 8.4* 8.2* 8.4*                     Imaging  EC-PETER W/O CONT   Final Result      US-EXTREMITY VENOUS LOWER BILAT   Final Result      EC-ECHOCARDIOGRAM COMPLETE W/ CONT   Final Result      CT-EXTREMITY, LOWER W/O LEFT   Final Result         1.  Diffuse subcutaneous fat stranding from the mid thigh to the ankle, appearance compatible with cellulitis and/or edema.   2.  No soft tissue gas identified to indicate necrotizing fasciitis.   3.   Prepatellar fluid collection, could represent hematoma, seroma, or possibly evolving abscess.   4.  Small knee joint effusion         Scheduled Medications   Medication Dose Frequency    senna-docusate  2 Tablet Q EVENING    And    polyethylene glycol/lytes  1 Packet BID    apixaban  5 mg BID    atorvastatin  20 mg QHS    mometasone-formoterol  2 Puff BID    escitalopram  10 mg DAILY    gabapentin  100 mg 4XDAY    penicillin G potassium 24 Million Units in  mL infusion  24 Million Units Continuous Abx    sotalol  120 mg BID    acetaminophen  1,000 mg Q8HRS      Assessment/Plan  Problem Representation:    * Cellulitis of left lower extremity  Assessment & Plan  LRINEC score 8 on admission, although did appear more like severe cellulitis during incision and drainage by orthopedics.  No abscess, fluid collections, or necrotic tissue.  CRP elevated 27.  - Telemetry monitoring  - Ortho following; s/p I&D with Dr. Ortiz, 4/19/2024  - Wound team following, s/p wet-to-dry with wound VAC placement (4/22/2024)  - Cont IV PCN for 2 weeks (4/19-5/3/2024) from first negative blood cultures per ID  - Removed penicillin from allergy list that she is tolerating without any itching or rash  - Completed Linezolid for 72 hours to reduce antitoxin, last dose 4/21    Rheumatoid arthritis involving multiple sites ([facility])  Assessment & Plan  History rheumatoid arthritis with joint pain  - Follows with rheumatology in [place]  - Prescribed prednisone 15 mg morning and 5 mg at night, currently holding per patient preference    Mood disorder ([facility])  Assessment & Plan  Continue home escitalopram 10 mg daily    A-fib ([facility])  Assessment & Plan  Remains overall rate controlled  - Sotalol 120 mg twice daily  - Reinitiated apixaban 5 mg twice daily 4/21    Obesity  Assessment & Plan  BMI of 59.45  Possible risk factor for underlying left lower extremity infection  Provide weight loss counseling when appropriate    Lactic acidosis  Assessment &  Plan  Likely due to underlying infection, improved after fluid bolus.  RESOLVED.    Bacteremia- (present on admission)  Assessment & Plan  Reportedly grew group C strep and 4 out of 4 vials  Repeat blood cultures ordered 4/19, NGTD  Murmur identified best appreciable at right upper sternal border 3 out of 6 holosystolic.  Does have moderate AS on TTE although unable to obtain great visualization of the valves.    Underwent PETER 4/22, no evidence of IE  - Cont IV PCN for 2 weeks (4/19-5/3/2024) from first negative blood cultures per ID  - Removed penicillin from allergy list that she is tolerating without any itching or rash  - Completed Linezolid for 72 hours, last dose 4/21    Obstructive sleep apnea- (present on admission)  Assessment & Plan  Not on CPAP/BiPAP. Wears nocturnal oxygen around 2L NC  - RT consutled given suspicion for LONNIE. Will trial on BiPAP tonight, 20/5 settings.    Constipation- (present on admission)  Assessment & Plan  Constipation no bowel movements since transfer hospital  - Resolved, continue to monitor    Anemia- (present on admission)  Assessment & Plan  Hgb dropped 8.6 from 11 following surgery, will continue to monitor. No active blood loss noted;  - Restart home apixaban  - Continue to trend hemoglobin and monitor for any acute bleeding    H/O renal cell carcinoma s/p nephrectomy- (present on admission)  Assessment & Plan  History of  Limit nephrotoxins       VTE prophylaxis: SCDs/TEDs, apixaban    I have performed a physical exam and reviewed and updated ROS and Plan today (4/26/2024). In review of yesterday's note (4/25/2024), there are no changes except as documented above.

## 2024-04-26 NOTE — PROGRESS NOTES
[facility] Internal Medicine Daily Progress Note    Date of Service  4/26/2024    [employer] Team: R IM Blue Team   Attending: REID Elizabeth M.d.  Senior Resident: Collin Chauhan DO  Intern: Ellie Crowder DO  Contact Number: [phone]    Chief Complaint  Savita Duggan is a 58 y.o. female admitted as direct admit from [facility] for worsening left lower extremity cellulitis and possible infective endocarditis.    Hospital Course  Savita Duggan is a 58 y.o. female who presented 4/18/2024 from outside hospital due to worsening infection of left lower extremity. Patient reports that symptom onset was approximately 3 weeks ago when she started to notice redness around her mid shin that slowly started to worsen.  Patient denies any trauma or injury to site of infection.  Due to increasing pain, erythema and swelling the patient elected to present to her nearest emergency department which is at [facility].  Reportedly the patient was admitted on 4/14 and diagnosed with left lower extremity cellulitis and initiated on vancomycin and Levaquin without significant improvement and patient's blood cultures grew group C strep and antibiotics were escalated to cefepime but the patient still continues to have limited improvement.  Also reportedly the patient may have had a murmur subsequently plan was created to transfer the patient to [facility] for further evaluation and management.  Orthopedics was consulted on admission, pharmacies and drainage of left lower extremity on 4/19 due to concern for necrotizing fasciitis.  Consulted infectious disease regarding possible infective endocarditis, and transitioning antibiotics to IV penicillin.  TTE performed although unable to visualize valves, and PETER ordered which showed no evidence of IE, therefore continuing course of IV penicillin x 2 weeks following negative blood cultures.  She previously had reported history of alert allergy to penicillins although tolerated IV penicillin without any adverse allergic  reactions and removed from allergy list.     Interval Problem Update  No acute events overnight. She is stable for transfer back to referring facility to finish 2-week course of Penicillin (4/19-5/3/2024) and advanced wound vac management, pending insurance approval.     Would still benefit from BiPAP trial IPAP 20/EPAP 5, for suspected LONNIE if tolerates.    I have discussed this patient's plan of care and discharge plan at IDT rounds today with Case Management, Nursing, Nursing leadership, and other members of the IDT team.    Consultants/Specialty  infectious disease and orthopedics    Code Status  Full Code    Disposition  The patient is medically cleared for discharge to home or a post-acute facility.    PT/OT to evaluate, though notably, patient has historically declined SNF.   I have placed the appropriate orders for post-discharge needs.    Review of Systems  Review of Systems   Constitutional: Negative.    HENT: Negative.     Eyes: Negative.    Respiratory: Negative.  Negative for shortness of breath.    Cardiovascular: Negative.  Negative for chest pain.   Gastrointestinal: Negative.  Negative for abdominal pain, constipation and nausea.   Genitourinary: Negative.    Musculoskeletal:  Negative for joint pain.   Neurological: Negative.  Negative for headaches.   Endo/Heme/Allergies: Negative.    Psychiatric/Behavioral: Negative.        Physical Exam  Temp:  [36.4 °C (97.5 °F)-36.8 °C (98.2 °F)] 36.5 °C (97.7 °F)  Pulse:  [63-75] 63  Resp:  [16-20] 20  BP: (103-140)/(43-72) 112/56  SpO2:  [91 %-96 %] 94 %  On 2L NC    Physical Exam  Constitutional:       General: She is not in acute distress.     Appearance: She is obese.   HENT:      Head: Normocephalic.   Cardiovascular:      Rate and Rhythm: Normal rate and regular rhythm.      Heart sounds: Murmur (systolic murmur III/VI) heard.   Pulmonary:      Effort: Pulmonary effort is normal. No respiratory distress.      Breath sounds: Rales (dry crackles heard on  anterior thorax and subaxillary regions, likely atelectasis) present.   Abdominal:      General: Bowel sounds are normal.      Palpations: Abdomen is soft.      Tenderness: There is no abdominal tenderness. There is no guarding.   Musculoskeletal:         General: Tenderness and deformity (left lower extremity wrapped in ace bandage) present.      Right lower leg: Edema (improved, likely near baseline) present.      Left lower leg: Edema (improved, likely near baseline) present.   Skin:     Capillary Refill: Capillary refill takes more than 3 seconds.      Findings: Lesion (bullous lesion on anterior surface of left knee, non-infectious in appearance) present.   Neurological:      Mental Status: She is alert. Mental status is at baseline.      Motor: No weakness.       Fluids    Intake/Output Summary (Last 24 hours) at 4/26/2024 1208  Last data filed at 4/26/2024 0800  Gross per 24 hour   Intake 3238.84 ml   Output 1250 ml   Net 1988.84 ml       Laboratory  Recent Labs     04/24/24  0723 04/26/24  0137   WBC 11.4* 9.5   RBC 3.58* 3.29*   HEMOGLOBIN 8.9* 8.2*   HEMATOCRIT 28.7* 26.8*   MCV 80.2* 81.5   MCH 24.9* 24.9*   MCHC 31.0* 30.6*   RDW 42.2 42.8   PLATELETCT 364 348   MPV 9.1 8.9*     Recent Labs     04/24/24  0723 04/25/24  0117 04/26/24  0137   SODIUM 134* 132* 134*   POTASSIUM 4.8 4.8 4.5   CHLORIDE 97 94* 96   CO2 25 26 29   GLUCOSE 89 103* 99   BUN 9 9 9   CREATININE 0.71 0.81 0.79   CALCIUM 8.4* 8.2* 8.4*                     Imaging  EC-PETER W/O CONT   Final Result      US-EXTREMITY VENOUS LOWER BILAT   Final Result      EC-ECHOCARDIOGRAM COMPLETE W/ CONT   Final Result      CT-EXTREMITY, LOWER W/O LEFT   Final Result         1.  Diffuse subcutaneous fat stranding from the mid thigh to the ankle, appearance compatible with cellulitis and/or edema.   2.  No soft tissue gas identified to indicate necrotizing fasciitis.   3.  Prepatellar fluid collection, could represent hematoma, seroma, or possibly  evolving abscess.   4.  Small knee joint effusion         Scheduled Medications   Medication Dose Frequency    senna-docusate  2 Tablet Q EVENING    And    polyethylene glycol/lytes  1 Packet BID    apixaban  5 mg BID    atorvastatin  20 mg QHS    mometasone-formoterol  2 Puff BID    escitalopram  10 mg DAILY    gabapentin  100 mg 4XDAY    penicillin G potassium 24 Million Units in  mL infusion  24 Million Units Continuous Abx    sotalol  120 mg BID    acetaminophen  1,000 mg Q8HRS      Assessment/Plan  Problem Representation:    * Cellulitis of left lower extremity  Assessment & Plan  LRINEC score 8 on admission, although did appear more like severe cellulitis during incision and drainage by orthopedics.  No abscess, fluid collections, or necrotic tissue.  CRP elevated 27.  - Telemetry monitoring  - Ortho following; s/p I&D with Dr. Ortiz, 4/19/2024  - Wound team following, s/p wet-to-dry with wound VAC placement (4/22/2024)  - Cont IV PCN for 2 weeks (4/19-5/3/2024) from first negative blood cultures per ID  - Removed penicillin from allergy list that she is tolerating without any itching or rash  - Completed Linezolid for 72 hours to reduce antitoxin, last dose 4/21    Rheumatoid arthritis involving multiple sites ([facility])  Assessment & Plan  History rheumatoid arthritis with joint pain  - Follows with rheumatology in [place]  - Prescribed prednisone 15 mg morning and 5 mg at night, currently holding per patient preference    Mood disorder ([facility])  Assessment & Plan  Continue home escitalopram 10 mg daily    A-fib ([facility])  Assessment & Plan  Remains overall rate controlled  - Sotalol 120 mg twice daily  - Reinitiated apixaban 5 mg twice daily 4/21    Obesity  Assessment & Plan  BMI of 59.45  Possible risk factor for underlying left lower extremity infection  Provide weight loss counseling when appropriate    Lactic acidosis  Assessment & Plan  Likely due to underlying infection, improved after fluid  bolus.  RESOLVED.    Bacteremia- (present on admission)  Assessment & Plan  Reportedly grew group C strep and 4 out of 4 vials  Repeat blood cultures ordered 4/19, NGTD  Murmur identified best appreciable at right upper sternal border 3 out of 6 holosystolic.  Does have moderate AS on TTE although unable to obtain great visualization of the valves.    Underwent PETER 4/22, no evidence of IE  - Cont IV PCN for 2 weeks (4/19-5/3/2024) from first negative blood cultures per ID  - Removed penicillin from allergy list that she is tolerating without any itching or rash  - Completed Linezolid for 72 hours, last dose 4/21    Obstructive sleep apnea- (present on admission)  Assessment & Plan  Not on CPAP/BiPAP. Wears nocturnal oxygen around 2L NC  - RT consutled given suspicion for LONNIE. Will trial on BiPAP tonight, 20/5 settings.    Constipation- (present on admission)  Assessment & Plan  Constipation no bowel movements since transfer hospital  - Resolved, continue to monitor    Anemia- (present on admission)  Assessment & Plan  Hgb dropped 8.6 from 11 following surgery, will continue to monitor. No active blood loss noted;  - Restart home apixaban  - Continue to trend hemoglobin and monitor for any acute bleeding    H/O renal cell carcinoma s/p nephrectomy- (present on admission)  Assessment & Plan  History of  Limit nephrotoxins       VTE prophylaxis: SCDs/TEDs, apixaban    I have performed a physical exam and reviewed and updated ROS and Plan today (4/26/2024). In review of yesterday's note (4/25/2024), there are no changes except as documented above.

## 2024-04-26 NOTE — CARE PLAN
Patient resting comfortably in bed, bed in lowest position, bed alarm on, bariatric bed. Patient repositioned to comfort level, patient refused Q2 turns throughout the night, education provided. Wound vac dressing reinforced. Patient rated 5/10 pain, medication administered, see MAR.Oral care offered. ABX given, see MAR. Monitor VS, patient at baseline 2L. Checked on patient hourly throughout the night. Elevated LLE, dressing clean, dry.     patient is Stable - Low risk of patient condition declining or worsening    Shift Goals  Clinical Goals: Pain control, wound care, ABX, nutrition  Patient Goals: go back to other hospital  Family Goals: KIANA    Progress made toward(s) clinical / shift goals:      Patient is not progressing towards the following goals:    Problem: Pain - Standard  Goal: Alleviation of pain or a reduction in pain to the patient’s comfort goal  Outcome: Progressing     Problem: Skin Integrity  Goal: Skin integrity is maintained or improved  Outcome: Progressing     Problem: Hemodynamics  Goal: Patient's hemodynamics, fluid balance and neurologic status will be stable or improve  Outcome: Progressing

## 2024-04-27 PROCEDURE — 99232 SBSQ HOSP IP/OBS MODERATE 35: CPT | Mod: GC | Performed by: HOSPITALIST

## 2024-04-27 PROCEDURE — 700102 HCHG RX REV CODE 250 W/ 637 OVERRIDE(OP)

## 2024-04-27 PROCEDURE — 770001 HCHG ROOM/CARE - MED/SURG/GYN PRIV*

## 2024-04-27 PROCEDURE — 700111 HCHG RX REV CODE 636 W/ 250 OVERRIDE (IP): Performed by: HOSPITALIST

## 2024-04-27 PROCEDURE — 700111 HCHG RX REV CODE 636 W/ 250 OVERRIDE (IP)

## 2024-04-27 PROCEDURE — 700105 HCHG RX REV CODE 258: Performed by: HOSPITALIST

## 2024-04-27 PROCEDURE — A9270 NON-COVERED ITEM OR SERVICE: HCPCS

## 2024-04-27 RX ORDER — FUROSEMIDE 10 MG/ML
40 INJECTION INTRAMUSCULAR; INTRAVENOUS
Status: DISCONTINUED | OUTPATIENT
Start: 2024-04-27 | End: 2024-04-28

## 2024-04-27 RX ADMIN — APIXABAN 5 MG: 5 TABLET, FILM COATED ORAL at 04:32

## 2024-04-27 RX ADMIN — ACETAMINOPHEN 1000 MG: 500 TABLET, FILM COATED ORAL at 00:59

## 2024-04-27 RX ADMIN — APIXABAN 5 MG: 5 TABLET, FILM COATED ORAL at 16:23

## 2024-04-27 RX ADMIN — ATORVASTATIN CALCIUM 20 MG: 20 TABLET, FILM COATED ORAL at 20:06

## 2024-04-27 RX ADMIN — SODIUM CHLORIDE 24 MILLION UNITS: 9 INJECTION, SOLUTION INTRAVENOUS at 16:25

## 2024-04-27 RX ADMIN — ACETAMINOPHEN 1000 MG: 500 TABLET, FILM COATED ORAL at 10:44

## 2024-04-27 RX ADMIN — OXYCODONE HYDROCHLORIDE 10 MG: 10 TABLET ORAL at 07:59

## 2024-04-27 RX ADMIN — MOMETASONE FUROATE AND FORMOTEROL FUMARATE DIHYDRATE 2 PUFF: 200; 5 AEROSOL RESPIRATORY (INHALATION) at 16:24

## 2024-04-27 RX ADMIN — OXYCODONE HYDROCHLORIDE 10 MG: 10 TABLET ORAL at 18:18

## 2024-04-27 RX ADMIN — GABAPENTIN 100 MG: 100 CAPSULE ORAL at 14:06

## 2024-04-27 RX ADMIN — ACETAMINOPHEN 1000 MG: 500 TABLET, FILM COATED ORAL at 18:18

## 2024-04-27 RX ADMIN — OXYCODONE HYDROCHLORIDE 10 MG: 10 TABLET ORAL at 14:06

## 2024-04-27 RX ADMIN — OXYCODONE HYDROCHLORIDE 10 MG: 10 TABLET ORAL at 00:59

## 2024-04-27 RX ADMIN — ESCITALOPRAM OXALATE 10 MG: 10 TABLET ORAL at 04:32

## 2024-04-27 RX ADMIN — SOTALOL HYDROCHLORIDE 120 MG: 120 TABLET ORAL at 04:32

## 2024-04-27 RX ADMIN — MOMETASONE FUROATE AND FORMOTEROL FUMARATE DIHYDRATE 2 PUFF: 200; 5 AEROSOL RESPIRATORY (INHALATION) at 07:59

## 2024-04-27 RX ADMIN — GABAPENTIN 100 MG: 100 CAPSULE ORAL at 18:18

## 2024-04-27 RX ADMIN — SOTALOL HYDROCHLORIDE 120 MG: 120 TABLET ORAL at 16:23

## 2024-04-27 RX ADMIN — FUROSEMIDE 40 MG: 10 INJECTION INTRAMUSCULAR; INTRAVENOUS at 10:44

## 2024-04-27 RX ADMIN — GABAPENTIN 100 MG: 100 CAPSULE ORAL at 00:02

## 2024-04-27 RX ADMIN — ONDANSETRON 4 MG: 4 TABLET, ORALLY DISINTEGRATING ORAL at 01:00

## 2024-04-27 RX ADMIN — GABAPENTIN 100 MG: 100 CAPSULE ORAL at 07:59

## 2024-04-27 ASSESSMENT — ENCOUNTER SYMPTOMS
CONSTITUTIONAL NEGATIVE: 1
RESPIRATORY NEGATIVE: 1
NEUROLOGICAL NEGATIVE: 1
PSYCHIATRIC NEGATIVE: 1
CARDIOVASCULAR NEGATIVE: 1
EYES NEGATIVE: 1
NAUSEA: 0
SHORTNESS OF BREATH: 0
GASTROINTESTINAL NEGATIVE: 1
ABDOMINAL PAIN: 0
HEADACHES: 0
CONSTIPATION: 0

## 2024-04-27 ASSESSMENT — PAIN DESCRIPTION - PAIN TYPE
TYPE: ACUTE PAIN

## 2024-04-27 ASSESSMENT — FIBROSIS 4 INDEX: FIB4 SCORE: 1.03

## 2024-04-27 NOTE — CARE PLAN
The patient is Stable - Low risk of patient condition declining or worsening    Shift Goals  Clinical Goals: monitor labs, vs, pain control  Patient Goals: rest  Family Goals: NA    Progress made toward(s) clinical / shift goals:    Problem: Pain - Standard  Goal: Alleviation of pain or a reduction in pain to the patient’s comfort goal  Description: Target End Date:  Prior to discharge or change in level of care    Document on Vitals flowsheet    1.  Document pain using the appropriate pain scale per order or unit policy  2.  Educate and implement non-pharmacologic comfort measures (i.e. relaxation, distraction, massage, cold/heat therapy, etc.)  3.  Pain management medications as ordered  4.  Reassess pain after pain med administration per policy  5.  If opiods administered assess patient's response to pain medication is appropriate per POSS sedation scale  6.  Follow pain management plan developed in collaboration with patient and interdisciplinary team (including palliative care or pain specialists if applicable)  Outcome: Progressing     Problem: Skin Integrity  Goal: Skin integrity is maintained or improved  Description: Target End Date:  Prior to discharge or change in level of care    Document interventions on Skin Risk/Noah flowsheet groups and corresponding LDA    1.  Assess and monitor skin integrity, appearance and/or temperature  2.  Assess risk factors for impaired skin integrity and/or pressures ulcers  3.  Implement precautions to protect skin integrity in collaboration with interdisciplinary team  4.  Implement pressure ulcer prevention protocol if at risk for skin breakdown  5.  Confirm wound care consult if at risk for skin breakdown  6.  Ensure patient use of pressure relieving devices  (Low air loss bed, waffle overlay, heel protectors, ROHO cushion, etc)  Outcome: Progressing     Problem: Fall Risk  Goal: Patient will remain free from falls  Description: Target End Date:  Prior to discharge or  change in level of care    Document interventions on the Spencer Eyad Fall Risk Assessment    1.  Assess for fall risk factors  2.  Implement fall precautions  Outcome: Progressing

## 2024-04-27 NOTE — PROGRESS NOTES
[facility] Internal Medicine Daily Progress Note    Date of Service  4/27/2024    [employer] Team: R IM Blue Team   Attending: REID Elizabeth M.d.  Senior Resident: Collin Chauhan DO  Intern: Ellie Crowder DO  Contact Number: [phone]    Chief Complaint  Savita Duggan is a 58 y.o. female admitted as direct admit from [facility] for worsening left lower extremity cellulitis and possible infective endocarditis.    Hospital Course  Savita Duggan is a 58 y.o. female who presented 4/18/2024 from outside hospital due to worsening infection of left lower extremity. Patient reports that symptom onset was approximately 3 weeks ago when she started to notice redness around her mid shin that slowly started to worsen.  Patient denies any trauma or injury to site of infection.  Due to increasing pain, erythema and swelling the patient elected to present to her nearest emergency department which is at [facility].  Reportedly the patient was admitted on 4/14 and diagnosed with left lower extremity cellulitis and initiated on vancomycin and Levaquin without significant improvement and patient's blood cultures grew group C strep and antibiotics were escalated to cefepime but the patient still continues to have limited improvement.  Also reportedly the patient may have had a murmur subsequently plan was created to transfer the patient to [facility] for further evaluation and management.  Orthopedics was consulted on admission, pharmacies and drainage of left lower extremity on 4/19 due to concern for necrotizing fasciitis.  Consulted infectious disease regarding possible infective endocarditis, and transitioning antibiotics to IV penicillin.  TTE performed although unable to visualize valves, and PETER ordered which showed no evidence of IE, therefore continuing course of IV penicillin x 2 weeks following negative blood cultures.  She previously had reported history of alert allergy to penicillins although tolerated IV penicillin without any adverse allergic  reactions and removed from allergy list. With continued treatment with IV penicillin and judicious diuretic use, her left lower extremity cellulitis and pain is improving.    Interval Problem Update  No acute events overnight. Will give Lasix 40 IV to help with edema. She remains medically stable for transfer back to referring facility to finish 2-week course of Penicillin (4/19-5/3/2024). Can transfer back once cleared by wound care team (she needs 2-3 more advanced wound care dressing changes), likely next week. Orthopedic surgery recommended plastic surgery and/or wound care consult in the next few weeks for skin grafting as she is not a candidate for secondary closure. She declines PAP use.    I have discussed this patient's plan of care and discharge plan at IDT rounds today with Case Management, Nursing, Nursing leadership, and other members of the IDT team.    Consultants/Specialty  infectious disease and orthopedics    Code Status  Full Code    Disposition  The patient is medically cleared for discharge to home or a post-acute facility.    PT/OT to evaluate, though notably, patient has historically declined SNF.   I have placed the appropriate orders for post-discharge needs.    Review of Systems  Review of Systems   Constitutional: Negative.    HENT: Negative.     Eyes: Negative.    Respiratory: Negative.  Negative for shortness of breath.    Cardiovascular: Negative.  Negative for chest pain.   Gastrointestinal: Negative.  Negative for abdominal pain, constipation and nausea.   Genitourinary: Negative.    Musculoskeletal:  Negative for joint pain.   Neurological: Negative.  Negative for headaches.   Endo/Heme/Allergies: Negative.    Psychiatric/Behavioral: Negative.        Physical Exam  Temp:  [36.5 °C (97.7 °F)-36.8 °C (98.2 °F)] 36.8 °C (98.2 °F)  Pulse:  [61-78] 65  Resp:  [15-20] 20  BP: (111-139)/(48-58) 123/48  SpO2:  [92 %-96 %] 96 %  On 2L NC    Physical Exam  Constitutional:       General: She is  not in acute distress.     Appearance: She is obese.   HENT:      Head: Normocephalic.   Cardiovascular:      Rate and Rhythm: Normal rate and regular rhythm.      Heart sounds: Murmur (systolic murmur III/VI) heard.   Pulmonary:      Effort: Pulmonary effort is normal. No respiratory distress.      Breath sounds: Rales (dry crackles heard on anterior thorax and subaxillary regions, likely atelectasis) present.   Abdominal:      General: Bowel sounds are normal.      Palpations: Abdomen is soft.      Tenderness: There is no abdominal tenderness. There is no guarding.   Musculoskeletal:         General: Tenderness and deformity (left lower extremity wrapped in ace bandage) present.      Right lower leg: Edema (improved, likely near baseline) present.      Left lower leg: Edema (improved, likely near baseline) present.   Skin:     Capillary Refill: Capillary refill takes more than 3 seconds.      Findings: Lesion (bullous lesion on anterior surface of left knee, non-infectious in appearance) present.   Neurological:      Mental Status: She is alert. Mental status is at baseline.      Motor: No weakness.       Fluids    Intake/Output Summary (Last 24 hours) at 4/27/2024 1226  Last data filed at 4/27/2024 1200  Gross per 24 hour   Intake 2010.36 ml   Output 2150 ml   Net -139.64 ml       Laboratory  Recent Labs     04/26/24  0137   WBC 9.5   RBC 3.29*   HEMOGLOBIN 8.2*   HEMATOCRIT 26.8*   MCV 81.5   MCH 24.9*   MCHC 30.6*   RDW 42.8   PLATELETCT 348   MPV 8.9*     Recent Labs     04/25/24  0117 04/26/24  0137   SODIUM 132* 134*   POTASSIUM 4.8 4.5   CHLORIDE 94* 96   CO2 26 29   GLUCOSE 103* 99   BUN 9 9   CREATININE 0.81 0.79   CALCIUM 8.2* 8.4*                     Imaging  EC-PETER W/O CONT   Final Result      US-EXTREMITY VENOUS LOWER BILAT   Final Result      EC-ECHOCARDIOGRAM COMPLETE W/ CONT   Final Result      CT-EXTREMITY, LOWER W/O LEFT   Final Result         1.  Diffuse subcutaneous fat stranding from the mid  thigh to the ankle, appearance compatible with cellulitis and/or edema.   2.  No soft tissue gas identified to indicate necrotizing fasciitis.   3.  Prepatellar fluid collection, could represent hematoma, seroma, or possibly evolving abscess.   4.  Small knee joint effusion         Scheduled Medications   Medication Dose Frequency    furosemide  40 mg Q DAY    senna-docusate  2 Tablet Q EVENING    And    polyethylene glycol/lytes  1 Packet BID    apixaban  5 mg BID    atorvastatin  20 mg QHS    mometasone-formoterol  2 Puff BID    escitalopram  10 mg DAILY    gabapentin  100 mg 4XDAY    penicillin G potassium 24 Million Units in  mL infusion  24 Million Units Continuous Abx    sotalol  120 mg BID    acetaminophen  1,000 mg Q8HRS      Assessment/Plan  Problem Representation:    * Cellulitis of left lower extremity  Assessment & Plan  Severe LLE cellulitis, s/p fasciotomy with ortho Dr. Ortiz on 4/19 and wound vac placement on 4/22. Improved with antibiotics and judicious use of diuretics.    - Appreciate wound care help, dressings changes 3x weekly  - Ortho follow 2-3 weeks post-op  - Plastics/wound care follow up in few weeks for skin graft consult as not a candidate for secondary closure  - Give Lasix 40 IV to help with edema    Bacteremia- (present on admission)  Assessment & Plan  Grew group C strep and 4 out of 4 vials at referring hospital. No growth on repeat blood cultures 4/19. Transferred here for 3/6 right upper sternal border murmur to rule out infectious endocarditis. PETER performed 4/22, negative.    - Finish IV PCN for 2 weeks (4/19-5/3/2024) from first negative blood cultures per ID  - Completed Linezolid for 72 hours, last dose 4/21  - Penicillin allergy removed from list since she is tolerating without any itching or rash    Rheumatoid arthritis involving multiple sites (H[facility])  Assessment & Plan  History rheumatoid arthritis with joint pain  - Follows with rheumatology in [place]  - Prescribed  prednisone 15 mg morning and 5 mg at night, currently holding per patient preference    A-fib (HCC)  Assessment & Plan  Remains overall rate controlled  - Sotalol 120 mg twice daily  - Reinitiated apixaban 5 mg twice daily 4/21    Obesity  Assessment & Plan  BMI of 59.45  Possible risk factor for underlying left lower extremity infection  Provide weight loss counseling when appropriate    Lactic acidosis  Assessment & Plan  Likely due to underlying infection, improved after fluid bolus.  RESOLVED.    Obstructive sleep apnea- (present on admission)  Assessment & Plan  Not on CPAP/BiPAP. Wears nocturnal oxygen around 2L NC.   - Declined PAP use this admission    Constipation- (present on admission)  Assessment & Plan  Constipation no bowel movements since transfer hospital  - Resolved, continue to monitor    Anemia- (present on admission)  Assessment & Plan  Hgb dropped 8.6 from 11 following surgery, stable. No active blood loss noted    H/O renal cell carcinoma s/p nephrectomy- (present on admission)  Assessment & Plan  History of, limit nephrotoxins    Mood disorder (HCC)  Assessment & Plan  Continue home escitalopram 10 mg daily       VTE prophylaxis: SCDs/TEDs, apixaban    I have performed a physical exam and reviewed and updated ROS and Plan today (4/27/2024). In review of yesterday's note (4/26/2024), there are no changes except as documented above.

## 2024-04-27 NOTE — CARE PLAN
The patient is Stable - Low risk of patient condition declining or worsening    Shift Goals  Clinical Goals: Monitor labs/VS, wound vac, IV ABX  Patient Goals: Pain control  Family Goals: NA    Progress made toward(s) clinical / shift goals:      Problem: Fall Risk  Goal: Patient will remain free from falls  Description: Target End Date:  Prior to discharge or change in level of care    Document interventions on the Johanna Castano Fall Risk Assessment    1.  Assess for fall risk factors  2.  Implement fall precautions  Outcome: Progressing     Problem: Hemodynamics  Goal: Patient's hemodynamics, fluid balance and neurologic status will be stable or improve  Description: Target End Date:  Prior to discharge or change in level of care    Document on Assessment and I/O flowsheet templates    1.  Monitor vital signs, pulse oximetry and cardiac monitor per provider order and/or policy  2.  Maintain blood pressure per provider order  3.  Hemodynamic monitoring per provider order  4.  Manage IV fluids and IV infusions  5.  Monitor intake and output  6.  Daily weights per unit policy or provider order  7.  Assess peripheral pulses and capillary refill  8.  Assess color and body temperature  9.  Position patient for maximum circulation/cardiac output  10. Monitor for signs/symptoms of excessive bleeding  11. Assess mental status, restlessness and changes in level of consciousness  12. Monitor temperature and report fever or hypothermia to provider immediately. Consideration of targeted temperature management.  Outcome: Progressing

## 2024-04-27 NOTE — DISCHARGE PLANNING
"Discussed pt during morning rounds. SW informed medical team, per RTOC notes, \"MD to MD and Bed Assignment. Unable to accept transfer over the weekend d/t no wound care availability, will reach back out on Monday 4/29. Auth should not be needed....\" Per medical team, pt is not stable to d/c yet d/t needing wound care team to see pt. Anticipated dc date is next week.  "

## 2024-04-27 NOTE — DISCHARGE SUMMARY
[facility] Internal [facility] Discharge Summary    Attending: REID Elizabeth M.d.  Senior Resident: Dr. Alarcon  Intern:  Dr. Crowder  Contact Number: [phone]    CHIEF COMPLAINT ON ADMISSION  No chief complaint on file.      Reason for Admission  Strep bacteremia, LLE cellulitis    Admission Date  4/18/2024    CODE STATUS  Full Code    HPI & HOSPITAL COURSE  Savita Duggan is a 58 y.o. female who presented 4/18/2024 from outside hospital due to worsening infection of left lower extremity. Patient reports that symptom onset was approximately 3 weeks ago when she started to notice redness around her mid shin that slowly started to worsen.  Patient denies any trauma or injury to site of infection.  Due to increasing pain, erythema and swelling the patient elected to present to her nearest emergency department which is at [place].  Reportedly the patient was admitted on 4/14 and diagnosed with left lower extremity cellulitis and initiated on vancomycin and Levaquin without significant improvement and patient's blood cultures grew group C strep and antibiotics were escalated to cefepime but the patient still continues to have limited improvement.  Also reportedly the patient may have had a murmur subsequently plan was created to transfer the patient to [facility] for further evaluation and management.  Orthopedics was consulted on admission, pharmacies and drainage of left lower extremity on 4/19 due to concern for necrotizing fasciitis.  Consulted infectious disease regarding possible infective endocarditis, and transitioning antibiotics to IV penicillin.  TTE performed although unable to visualize valves, and PETER ordered which showed no evidence of IE, therefore continuing course of IV penicillin x 2 weeks following negative blood cultures.  She previously had reported history of alert allergy to penicillins although tolerated IV penicillin without any adverse allergic reactions and removed from allergy list. Following continued  treatment with IV penicillin and judicious diuretic use, her left lower extremity cellulitis and pain is improving.    Therefore, she is discharged in fair and stable condition to referring facility for continued IV penicillin and advanced wound care.     Medication changes:  Her home prednisone for rheumatoid arthritis has been held during hospitalization per patient preference.   Following discussion with orthopedic surgery, apixaban is sufficient for DVT prophylaxis, ASA not needed.  Considering resuming home metoprolol, spironolactone, omeprazole, mag oxide, lisinopril, and dapagliflozin when appropriate.  Please see medication reconciliation for medication changes.    The patient met 2-midnight criteria for an inpatient stay at the time of discharge.    Discharge Date  4/29/24    Physical Exam on Day of Discharge  Physical Exam    FOLLOW UP ITEMS POST DISCHARGE  Follow up with orthopedic surgery 14-21 days following fasciotomy   Continue 2 week course of IV penicillin from negative blood cultures on 4/19 (4/19-5/03)  Follow up with wound care  Follow up with PCP for workup of LONNIE    DISCHARGE DIAGNOSES  Principal Problem:    Cellulitis of left lower extremity (POA: Unknown)  Active Problems:    H/O renal cell carcinoma s/p nephrectomy (POA: Yes)    Anemia (POA: Yes)    Constipation (POA: Yes)    Obstructive sleep apnea (POA: Yes)    Bacteremia (POA: Yes)    Lactic acidosis (POA: Unknown)    Obesity (POA: Unknown)    A-fib (HCC) (POA: Unknown)    Mood disorder (HCC) (POA: Unknown)    Rheumatoid arthritis involving multiple sites (HCC) (POA: Unknown)  Resolved Problems:    * No resolved hospital problems. *      FOLLOW UP  No future appointments.  No follow-up provider specified.    MEDICATIONS ON DISCHARGE     Medication List        START taking these medications        Instructions   acetaminophen 500 MG Tabs  Commonly known as: Tylenol   Take 2 Tablets by mouth every 8 hours.  Dose: 1,000 mg      diphenhydrAMINE 50 MG/ML Soln  Commonly known as: Benadryl   Infuse 0.5 mL into a venous catheter every 6 hours as needed (Swelling if reaction from IV pencillin).  Dose: 25 mg     hydrOXYzine HCl 50 MG Tabs  Commonly known as: Atarax   Take 1 Tablet by mouth 3 times a day as needed for Anxiety.  Dose: 50 mg     lidocaine 2 % Soln  Commonly known as: Xylocaine   Apply 20 mL topically 1 time a day as needed (Administer 10-15 minutes prior to procedure either topically or injected into dressing/wound vac.).  Dose: 20 mL     lidocaine 4 % Soln  Commonly known as: Xylocaine   Apply 1 Application topically 1 time a day as needed (Administer 10-15 minutes prior to procedure either topically or injected into dressing/wound vac if 2% lidocaine ineffective.).  Dose: 1 Application     mometasone-formoterol 200-5 MCG/ACT Aero  Commonly known as: Dulera   Inhale 2 Puffs 2 times a day.  Dose: 2 Puff     NS SOLN 500 mL with penicillin G potassium [id number] UNIT SOLR 24 Million Units   Doctor's comments: Patient to continue to complete total of 2 week course from negative blood cultures for bacteremia (4/19-5/03)  Infuse 24 Million Units into a venous catheter continuous for 8 days.  Dose: 24 Million Units     * oxyCODONE immediate-release 5 MG Tabs  Commonly known as: Roxicodone   Take 1 Tablet by mouth every four hours as needed for Severe Pain for up to 5 days.  Dose: 5 mg     * oxyCODONE immediate release 10 MG immediate release tablet  Commonly known as: Roxicodone   Take 1 Tablet by mouth every four hours as needed for Severe Pain for up to 5 days.  Dose: 10 mg     polyethylene glycol/lytes Pack  Commonly known as: Miralax   Take 1 Packet by mouth 2 times a day.  Dose: 17 g     senna-docusate 8.6-50 MG Tabs  Commonly known as: Pericolace Or Senokot S   Take 2 Tablets by mouth every evening.  Dose: 2 Tablet           * This list has 2 medication(s) that are the same as other medications prescribed for you. Read the  directions carefully, and ask your doctor or other care provider to review them with you.                CHANGE how you take these medications        Instructions   ondansetron 4 MG Tbdp  What changed: when to take this  Commonly known as: Zofran ODT   Take 1 Tablet by mouth every four hours as needed for Nausea/Vomiting.  Dose: 4 mg            CONTINUE taking these medications        Instructions   apixaban 5mg Tabs  Commonly known as: Eliquis   Take 1 Tablet by mouth 2 times a day.  Dose: 5 mg     atorvastatin 20 MG Tabs  Commonly known as: Lipitor   Take 1 Tablet by mouth at bedtime.  Dose: 20 mg     escitalopram 10 MG Tabs  Commonly known as: Lexapro   Take 1 Tablet by mouth every day.  Dose: 10 mg     gabapentin 100 MG Caps  Commonly known as: Neurontin   Take 1 Capsule by mouth 4 times a day.  Dose: 100 mg     sotalol 120 MG tablet  Commonly known as: Betapace   Take 1 Tablet by mouth 2 times a day.  Dose: 120 mg            STOP taking these medications      budesonide-formoterol 160-4.5 MCG/ACT Aero  Commonly known as: Symbicort     dapagliflozin propanediol 10 MG Tabs  Commonly known as: Farxiga     Home Care Oxygen     lactulose 10 GM/15ML Soln     lisinopril 20 MG Tabs  Commonly known as: Prinivil     Magnesium Oxide -Mg Supplement 200 MG Tabs     metoprolol SR 25 MG Tb24  Commonly known as: Toprol XL     omeprazole 40 MG delayed-release capsule  Commonly known as: PriLOSEC     predniSONE 10 MG Tabs  Commonly known as: Deltasone     spironolactone 25 MG Tabs  Commonly known as: Aldactone              Allergies  Allergies   Allergen Reactions    Coconut Oil     Codeine Itching       DIET  Orders Placed This Encounter   Procedures    Diet Order Diet: Regular     Standing Status:   Standing     Number of Occurrences:   1     Order Specific Question:   Diet:     Answer:   Regular [1]       ACTIVITY  As tolerated.  Weight bearing as tolerated left lower extremity    CONSULTATIONS  Orthopedic surgery, infectious  disease    PROCEDURES  Left leg fasciotomy of anterior and superficial posterior compartments on 4/19  PETER on 4/22    LABORATORY  Lab Results   Component Value Date    SODIUM 134 (L) 04/26/2024    POTASSIUM 4.5 04/26/2024    CHLORIDE 96 04/26/2024    CO2 29 04/26/2024    GLUCOSE 99 04/26/2024    BUN 9 04/26/2024    CREATININE 0.79 04/26/2024        Lab Results   Component Value Date    WBC 9.5 04/26/2024    HEMOGLOBIN 8.2 (L) 04/26/2024    HEMATOCRIT 26.8 (L) 04/26/2024    PLATELETCT 348 04/26/2024        Total time of the discharge process exceeds 40 minutes.

## 2024-04-28 PROCEDURE — 700111 HCHG RX REV CODE 636 W/ 250 OVERRIDE (IP): Performed by: HOSPITALIST

## 2024-04-28 PROCEDURE — 99232 SBSQ HOSP IP/OBS MODERATE 35: CPT | Mod: GC | Performed by: HOSPITALIST

## 2024-04-28 PROCEDURE — A9270 NON-COVERED ITEM OR SERVICE: HCPCS | Performed by: INTERNAL MEDICINE

## 2024-04-28 PROCEDURE — 700102 HCHG RX REV CODE 250 W/ 637 OVERRIDE(OP): Performed by: INTERNAL MEDICINE

## 2024-04-28 PROCEDURE — 770001 HCHG ROOM/CARE - MED/SURG/GYN PRIV*

## 2024-04-28 PROCEDURE — A9270 NON-COVERED ITEM OR SERVICE: HCPCS

## 2024-04-28 PROCEDURE — 700105 HCHG RX REV CODE 258: Performed by: HOSPITALIST

## 2024-04-28 PROCEDURE — 700111 HCHG RX REV CODE 636 W/ 250 OVERRIDE (IP)

## 2024-04-28 PROCEDURE — 700102 HCHG RX REV CODE 250 W/ 637 OVERRIDE(OP)

## 2024-04-28 RX ADMIN — SOTALOL HYDROCHLORIDE 120 MG: 120 TABLET ORAL at 16:44

## 2024-04-28 RX ADMIN — MOMETASONE FUROATE AND FORMOTEROL FUMARATE DIHYDRATE 2 PUFF: 200; 5 AEROSOL RESPIRATORY (INHALATION) at 16:38

## 2024-04-28 RX ADMIN — ACETAMINOPHEN 1000 MG: 500 TABLET, FILM COATED ORAL at 16:36

## 2024-04-28 RX ADMIN — GABAPENTIN 100 MG: 100 CAPSULE ORAL at 00:16

## 2024-04-28 RX ADMIN — SOTALOL HYDROCHLORIDE 120 MG: 120 TABLET ORAL at 05:01

## 2024-04-28 RX ADMIN — GABAPENTIN 100 MG: 100 CAPSULE ORAL at 12:06

## 2024-04-28 RX ADMIN — ONDANSETRON 4 MG: 4 TABLET, ORALLY DISINTEGRATING ORAL at 10:45

## 2024-04-28 RX ADMIN — FUROSEMIDE 40 MG: 10 INJECTION INTRAMUSCULAR; INTRAVENOUS at 05:02

## 2024-04-28 RX ADMIN — APIXABAN 5 MG: 5 TABLET, FILM COATED ORAL at 05:01

## 2024-04-28 RX ADMIN — OXYCODONE 5 MG: 5 TABLET ORAL at 05:56

## 2024-04-28 RX ADMIN — SODIUM CHLORIDE 24 MILLION UNITS: 9 INJECTION, SOLUTION INTRAVENOUS at 16:41

## 2024-04-28 RX ADMIN — ACETAMINOPHEN 1000 MG: 500 TABLET, FILM COATED ORAL at 01:29

## 2024-04-28 RX ADMIN — ESCITALOPRAM OXALATE 10 MG: 10 TABLET ORAL at 05:01

## 2024-04-28 RX ADMIN — OXYCODONE 5 MG: 5 TABLET ORAL at 00:16

## 2024-04-28 RX ADMIN — OXYCODONE HYDROCHLORIDE 10 MG: 10 TABLET ORAL at 16:34

## 2024-04-28 RX ADMIN — GABAPENTIN 100 MG: 100 CAPSULE ORAL at 21:00

## 2024-04-28 RX ADMIN — ACETAMINOPHEN 1000 MG: 500 TABLET, FILM COATED ORAL at 10:45

## 2024-04-28 RX ADMIN — GABAPENTIN 100 MG: 100 CAPSULE ORAL at 08:41

## 2024-04-28 RX ADMIN — ATORVASTATIN CALCIUM 20 MG: 20 TABLET, FILM COATED ORAL at 21:00

## 2024-04-28 RX ADMIN — HYDROXYZINE HYDROCHLORIDE 50 MG: 25 TABLET, FILM COATED ORAL at 16:34

## 2024-04-28 RX ADMIN — MOMETASONE FUROATE AND FORMOTEROL FUMARATE DIHYDRATE 2 PUFF: 200; 5 AEROSOL RESPIRATORY (INHALATION) at 05:02

## 2024-04-28 RX ADMIN — APIXABAN 5 MG: 5 TABLET, FILM COATED ORAL at 16:34

## 2024-04-28 ASSESSMENT — PAIN DESCRIPTION - PAIN TYPE
TYPE: ACUTE PAIN

## 2024-04-28 ASSESSMENT — ENCOUNTER SYMPTOMS
SHORTNESS OF BREATH: 0
HEADACHES: 0
NAUSEA: 0
CONSTITUTIONAL NEGATIVE: 1
CARDIOVASCULAR NEGATIVE: 1
CONSTIPATION: 0
PSYCHIATRIC NEGATIVE: 1
ABDOMINAL PAIN: 0
EYES NEGATIVE: 1
NEUROLOGICAL NEGATIVE: 1
GASTROINTESTINAL NEGATIVE: 1
RESPIRATORY NEGATIVE: 1

## 2024-04-28 ASSESSMENT — FIBROSIS 4 INDEX: FIB4 SCORE: 1.03

## 2024-04-28 NOTE — CARE PLAN
The patient is Stable - Low risk of patient condition declining or worsening    Shift Goals  Clinical Goals: IV ABX, monitor labs/VS, wound vac  Patient Goals: Pain control  Family Goals: NA    Progress made toward(s) clinical / shift goals:      Problem: Physical Regulation  Goal: Diagnostic test results will improve  Description: Target End Date:  Prior to discharge or change in level of care    1.  Monitor lactic acid levels  2.  Monitor ABG's  3.  Monitor diagnostic test results  Outcome: Progressing  Goal: Signs and symptoms of infection will decrease  Description: Target End Date:  Prior to discharge or change in level of care    1.  Remove potential routes of infection, such as central lines and urinary catheter  2.  Follow facility protocol for changing IV tubing and sites  3.  Collaborate with Infectious Disease  4.  Antibiotic therapy per provider order  5.  Note drug effects and monitor for antibiotic toxicity  Outcome: Progressing

## 2024-04-28 NOTE — PROGRESS NOTES
[facility] Internal Medicine Daily Progress Note    Date of Service  4/28/2024    [facility] Team: R IM Blue Team   Attending: REID Elizabeth M.d.  Senior Resident: Collin Chauhan DO  Intern: Ellie Crowder DO  Contact Number: [phone]    Chief Complaint  Savita Duggan is a 58 y.o. female admitted as direct admit from [place] for worsening left lower extremity cellulitis and possible infective endocarditis.    Hospital Course  Savita Duggan is a 58 y.o. female who presented 4/18/2024 from outside hospital due to worsening infection of left lower extremity. Patient reports that symptom onset was approximately 3 weeks ago when she started to notice redness around her mid shin that slowly started to worsen.  Patient denies any trauma or injury to site of infection.  Due to increasing pain, erythema and swelling the patient elected to present to her nearest emergency department which is at [place].  Reportedly the patient was admitted on 4/14 and diagnosed with left lower extremity cellulitis and initiated on vancomycin and Levaquin without significant improvement and patient's blood cultures grew group C strep and antibiotics were escalated to cefepime but the patient still continues to have limited improvement.  Also reportedly the patient may have had a murmur subsequently plan was created to transfer the patient to [facility] for further evaluation and management.  Orthopedics was consulted on admission, pharmacies and drainage of left lower extremity on 4/19 due to concern for necrotizing fasciitis.  Consulted infectious disease regarding possible infective endocarditis, and transitioning antibiotics to IV penicillin.  TTE performed although unable to visualize valves, and PETER ordered which showed no evidence of IE, therefore continuing course of IV penicillin x 2 weeks following negative blood cultures.  She previously had reported history of alert allergy to penicillins although tolerated IV penicillin without any adverse allergic  reactions and removed from allergy list. With continued treatment with IV penicillin and judicious diuretic use, her left lower extremity cellulitis and pain is improving.    Interval Problem Update  No acute events overnight. Edema improved with Lasix 40 IV. Notably she received a dose this morning as well, which was discontinued. She remains medically stable for transfer back to referring facility to finish 2-week course of Penicillin (4/19-5/3/2024). Can transfer back once cleared by wound care team, likely next week.    I have discussed this patient's plan of care and discharge plan at IDT rounds today with Case Management, Nursing, Nursing leadership, and other members of the IDT team.    Consultants/Specialty  infectious disease and orthopedics    Code Status  Full Code    Disposition  The patient is medically cleared for discharge to home or a post-acute facility.    PT/OT to evaluate, though notably, patient has historically declined SNF.   I have placed the appropriate orders for post-discharge needs.    Review of Systems  Review of Systems   Constitutional: Negative.    HENT: Negative.     Eyes: Negative.    Respiratory: Negative.  Negative for shortness of breath.    Cardiovascular: Negative.  Negative for chest pain.   Gastrointestinal: Negative.  Negative for abdominal pain, constipation and nausea.   Genitourinary: Negative.    Musculoskeletal:  Positive for joint pain (diffuse).        LLE pain   Neurological: Negative.  Negative for headaches.   Endo/Heme/Allergies: Negative.    Psychiatric/Behavioral: Negative.        Physical Exam  Temp:  [36.5 °C (97.7 °F)-37.2 °C (99 °F)] 36.6 °C (97.8 °F)  Pulse:  [65-77] 71  Resp:  [20] 20  BP: (106-122)/(45-51) 122/50  SpO2:  [92 %-96 %] 96 %  On 2L NC    Physical Exam  Constitutional:       General: She is not in acute distress.     Appearance: She is obese.   HENT:      Head: Normocephalic.   Cardiovascular:      Rate and Rhythm: Normal rate and regular  rhythm.      Heart sounds: Murmur (systolic murmur III/VI) heard.   Pulmonary:      Effort: Pulmonary effort is normal. No respiratory distress.      Breath sounds: No rales.   Abdominal:      General: Bowel sounds are normal.      Palpations: Abdomen is soft.      Tenderness: There is no abdominal tenderness. There is no guarding.   Musculoskeletal:         General: Tenderness and deformity (left lower extremity wrapped in ace bandage) present.      Right lower leg: Edema (improved, likely near baseline) present.      Left lower leg: Edema (improved, likely near baseline) present.   Skin:     Capillary Refill: Capillary refill takes more than 3 seconds.      Findings: Lesion (Bullae resolved) present.   Neurological:      Mental Status: She is alert. Mental status is at baseline.      Motor: No weakness.       Fluids    Intake/Output Summary (Last 24 hours) at 4/28/2024 1303  Last data filed at 4/28/2024 1200  Gross per 24 hour   Intake 2154.65 ml   Output 3230 ml   Net -1075.35 ml       Laboratory  Recent Labs     04/26/24  0137   WBC 9.5   RBC 3.29*   HEMOGLOBIN 8.2*   HEMATOCRIT 26.8*   MCV 81.5   MCH 24.9*   MCHC 30.6*   RDW 42.8   PLATELETCT 348   MPV 8.9*     Recent Labs     04/26/24  0137   SODIUM 134*   POTASSIUM 4.5   CHLORIDE 96   CO2 29   GLUCOSE 99   BUN 9   CREATININE 0.79   CALCIUM 8.4*                     Imaging  EC-PETER W/O CONT   Final Result      US-EXTREMITY VENOUS LOWER BILAT   Final Result      EC-ECHOCARDIOGRAM COMPLETE W/ CONT   Final Result      CT-EXTREMITY, LOWER W/O LEFT   Final Result         1.  Diffuse subcutaneous fat stranding from the mid thigh to the ankle, appearance compatible with cellulitis and/or edema.   2.  No soft tissue gas identified to indicate necrotizing fasciitis.   3.  Prepatellar fluid collection, could represent hematoma, seroma, or possibly evolving abscess.   4.  Small knee joint effusion         Scheduled Medications   Medication Dose Frequency    senna-docusate   2 Tablet Q EVENING    And    polyethylene glycol/lytes  1 Packet BID    apixaban  5 mg BID    atorvastatin  20 mg QHS    mometasone-formoterol  2 Puff BID    escitalopram  10 mg DAILY    gabapentin  100 mg 4XDAY    penicillin G potassium 24 Million Units in  mL infusion  24 Million Units Continuous Abx    sotalol  120 mg BID    acetaminophen  1,000 mg Q8HRS      Assessment/Plan  Problem Representation:    * Cellulitis of left lower extremity  Assessment & Plan  Severe LLE cellulitis, s/p fasciotomy with ortho Dr. Ortiz on 4/19 and wound vac placement on 4/22. Improved with antibiotics and judicious use of diuretics.    - Appreciate wound care help, dressings changes 3x weekly  - Ortho follow 2-3 weeks post-op  - Plastics/wound care follow up in few weeks for skin graft consult as not a candidate for secondary closure    Bacteremia- (present on admission)  Assessment & Plan  Grew group C strep and 4 out of 4 vials at referring hospital. No growth on repeat blood cultures 4/19. Transferred here for 3/6 right upper sternal border murmur to rule out infectious endocarditis. PETER performed 4/22, negative.    - Finish IV PCN for 2 weeks (4/19-5/3/2024) from first negative blood cultures per ID  - Completed Linezolid for 72 hours, last dose 4/21  - Penicillin allergy removed from list since she is tolerating without any itching or rash    Rheumatoid arthritis involving multiple sites (HCC)  Assessment & Plan  History rheumatoid arthritis with joint pain  - Follows with rheumatology in [place]  - Prescribed prednisone 15 mg morning and 5 mg at night, currently holding per patient preference    A-fib (HCC)  Assessment & Plan  Remains overall rate controlled  - Sotalol 120 mg twice daily  - Reinitiated apixaban 5 mg twice daily 4/21    Obesity  Assessment & Plan  BMI of 59.45  Possible risk factor for underlying left lower extremity infection  Provide weight loss counseling when appropriate    Lactic  acidosis  Assessment & Plan  Likely due to underlying infection, improved after fluid bolus.  RESOLVED.    Obstructive sleep apnea- (present on admission)  Assessment & Plan  Not on CPAP/BiPAP. Wears nocturnal oxygen around 2L NC.   - Declined PAP use this admission    Constipation- (present on admission)  Assessment & Plan  Constipation no bowel movements since transfer hospital  - Resolved, continue to monitor    Anemia- (present on admission)  Assessment & Plan  Hgb dropped 8.6 from 11 following surgery, stable. No active blood loss noted    H/O renal cell carcinoma s/p nephrectomy- (present on admission)  Assessment & Plan  History of, limit nephrotoxins    Mood disorder (HCC)  Assessment & Plan  Continue home escitalopram 10 mg daily       VTE prophylaxis: SCDs/TEDs, apixaban    I have performed a physical exam and reviewed and updated ROS and Plan today (4/28/2024). In review of yesterday's note (4/27/2024), there are no changes except as documented above.

## 2024-04-28 NOTE — CARE PLAN
The patient is Stable - Low risk of patient condition declining or worsening    Shift Goals  Clinical Goals: IV abx, improve skin integrity, wound vac mgt  Patient Goals: pain control  Family Goals: nirmala    Progress made toward(s) clinical / shift goals:     Problem: Pain - Standard  Goal: Alleviation of pain or a reduction in pain to the patient’s comfort goal  Outcome: Progressing  Patient educated on POC.  Documented pain using the appropriate pain scale (0-10 pain scale and Non-verbal descriptors)  Education provided on non-pharmacologic comfort measures (relaxation, distraction, repositioning)  Administered pain management medications as ordered (PRN - oxycodone/scheduled- tylenol)  Reassessed pain after pain medication administration per policy  Assessed patient's response to pain medication per POSS sedation scale       Problem: Fall Risk  Goal: Patient will remain free from falls  Outcome: Progressing  Fall Prevention Protocol  To be followed on all patients at risk for fall  Patient Name: Savita PARKS    Guidelines for patients at risk to fall       Environmental precautions in use:       Bed is locked and in lowest position       Personal belongings, wastebasket, call bell are in easy reach       Transferred patient toward stronger side       Report is given to CNA regarding patient's fall risk    The following are considered:     The IV pole is on the L side, same side of bed as the bathroom     The side rail closest to bathroom is down     Bed rails:  [facility] Fall program emphasizes bed rail reduction.  Bed rails contribute to patient fall risk by creating barriers to patient transfer in and out of beds.  Use of bed rails must be assessed specifically to individual patient needs.  When possible, use alternative pillows and positioning devices to avail the use of bed rails.  (Remember:  Four (4) side rails are considered a restraint).    Guidelines for high fall risk patients    Fall risk guidelines as  outlined above      Fall risk armband on patient      Fall risk sign is placed by the door - High Fall Risk      Female wick in use    Consider the following:      Bed frame alarm on      Physical Therapy/Occupational Therapy consultation for strengthening and mobility, including assessment of home care needs          Problem: Hemodynamics  Goal: Patient's hemodynamics, fluid balance and neurologic status will be stable or improve  Outcome: Progressing  Patient educated on POC. Pt will remain NAEO.  Monitor vital signs (q8h/or as needed)  Continuous pulse oximetry in use. report abnormalities to the provider  Manage IV infusions (administered penicillin as ordered)  Monitored intake and output q4h through the shift  Peripheral pulses and capillary refill were assessed  Color and body temperature were assessed  Positioned patient for maximum circulation/cardiac output  Signs/symptoms of excessive bleeding were monitored  Assessed mental status, restlessness and changes in level of consciousness  Monitored temperature (report fever or hypothermia to provider immediately).      Patient is not progressing towards the following goals:    Problem: Skin Integrity  Goal: Skin integrity is maintained or improved  Outcome: Not Progressing  Patient educated on POC.  Assessed skin integrity, appearance and/or temperature   Assessed risk factors for impaired skin integrity and/or pressures ulcers   Implemented precautions to protect skin integrity  Implemented pressure ulcer prevention protocol   Confirmed wound care order  Patient use of pressure relieving devices (JOLENE, silicone NC with gray foam)  Q2 hour turns with pillows/wedges if patient allows. Patient refused to be turned overnight.

## 2024-04-28 NOTE — PROGRESS NOTES
Report received BSR from day shift nurse, updated on POC. Patient denies any pain or s/s of distress/discomfort. Nurse to resume care, call light in reach.  Bed is locked and in lowest position, frame alarm is on, patient on 2L NC.

## 2024-04-29 VITALS
WEIGHT: 293 LBS | SYSTOLIC BLOOD PRESSURE: 128 MMHG | DIASTOLIC BLOOD PRESSURE: 57 MMHG | RESPIRATION RATE: 16 BRPM | OXYGEN SATURATION: 92 % | HEART RATE: 69 BPM | BODY MASS INDEX: 43.4 KG/M2 | HEIGHT: 69 IN | TEMPERATURE: 98.4 F

## 2024-04-29 LAB
ANION GAP SERPL CALC-SCNC: 11 MMOL/L (ref 7–16)
BUN SERPL-MCNC: 10 MG/DL (ref 8–22)
CALCIUM SERPL-MCNC: 8.5 MG/DL (ref 8.5–10.5)
CHLORIDE SERPL-SCNC: 94 MMOL/L (ref 96–112)
CO2 SERPL-SCNC: 29 MMOL/L (ref 20–33)
CREAT SERPL-MCNC: 0.92 MG/DL (ref 0.5–1.4)
GFR SERPLBLD CREATININE-BSD FMLA CKD-EPI: 71 ML/MIN/1.73 M 2
GLUCOSE SERPL-MCNC: 105 MG/DL (ref 65–99)
POTASSIUM SERPL-SCNC: 4.7 MMOL/L (ref 3.6–5.5)
SODIUM SERPL-SCNC: 134 MMOL/L (ref 135–145)

## 2024-04-29 PROCEDURE — 97605 NEG PRS WND THER DME<=50SQCM: CPT

## 2024-04-29 PROCEDURE — 97602 WOUND(S) CARE NON-SELECTIVE: CPT

## 2024-04-29 PROCEDURE — A9270 NON-COVERED ITEM OR SERVICE: HCPCS

## 2024-04-29 PROCEDURE — 302098 PASTE RING (FLAT)

## 2024-04-29 PROCEDURE — 700105 HCHG RX REV CODE 258: Performed by: HOSPITALIST

## 2024-04-29 PROCEDURE — 97606 NEG PRS WND THER DME>50 SQCM: CPT

## 2024-04-29 PROCEDURE — 700111 HCHG RX REV CODE 636 W/ 250 OVERRIDE (IP): Performed by: HOSPITALIST

## 2024-04-29 PROCEDURE — 700102 HCHG RX REV CODE 250 W/ 637 OVERRIDE(OP)

## 2024-04-29 PROCEDURE — 36415 COLL VENOUS BLD VENIPUNCTURE: CPT

## 2024-04-29 PROCEDURE — 700101 HCHG RX REV CODE 250

## 2024-04-29 PROCEDURE — 80048 BASIC METABOLIC PNL TOTAL CA: CPT

## 2024-04-29 PROCEDURE — 99239 HOSP IP/OBS DSCHRG MGMT >30: CPT | Mod: GC | Performed by: HOSPITALIST

## 2024-04-29 RX ADMIN — GABAPENTIN 100 MG: 100 CAPSULE ORAL at 13:47

## 2024-04-29 RX ADMIN — OXYCODONE 5 MG: 5 TABLET ORAL at 05:21

## 2024-04-29 RX ADMIN — SOTALOL HYDROCHLORIDE 120 MG: 120 TABLET ORAL at 05:22

## 2024-04-29 RX ADMIN — OXYCODONE HYDROCHLORIDE 10 MG: 10 TABLET ORAL at 16:13

## 2024-04-29 RX ADMIN — LIDOCAINE HYDROCHLORIDE 20 ML: 20 INJECTION, SOLUTION INFILTRATION; PERINEURAL at 11:34

## 2024-04-29 RX ADMIN — SODIUM CHLORIDE 24 MILLION UNITS: 9 INJECTION, SOLUTION INTRAVENOUS at 16:16

## 2024-04-29 RX ADMIN — APIXABAN 5 MG: 5 TABLET, FILM COATED ORAL at 05:22

## 2024-04-29 RX ADMIN — ESCITALOPRAM OXALATE 10 MG: 10 TABLET ORAL at 05:22

## 2024-04-29 RX ADMIN — OXYCODONE HYDROCHLORIDE 10 MG: 10 TABLET ORAL at 00:49

## 2024-04-29 RX ADMIN — GABAPENTIN 100 MG: 100 CAPSULE ORAL at 08:02

## 2024-04-29 RX ADMIN — ACETAMINOPHEN 1000 MG: 500 TABLET, FILM COATED ORAL at 00:54

## 2024-04-29 RX ADMIN — ACETAMINOPHEN 1000 MG: 500 TABLET, FILM COATED ORAL at 08:02

## 2024-04-29 RX ADMIN — GABAPENTIN 100 MG: 100 CAPSULE ORAL at 00:49

## 2024-04-29 RX ADMIN — OXYCODONE HYDROCHLORIDE 10 MG: 10 TABLET ORAL at 10:42

## 2024-04-29 ASSESSMENT — ENCOUNTER SYMPTOMS
HEADACHES: 0
NEUROLOGICAL NEGATIVE: 1
NAUSEA: 0
RESPIRATORY NEGATIVE: 1
ABDOMINAL PAIN: 0
CARDIOVASCULAR NEGATIVE: 1
GASTROINTESTINAL NEGATIVE: 1
PSYCHIATRIC NEGATIVE: 1
CONSTITUTIONAL NEGATIVE: 1
CONSTIPATION: 0
SHORTNESS OF BREATH: 0
EYES NEGATIVE: 1

## 2024-04-29 ASSESSMENT — PAIN DESCRIPTION - PAIN TYPE
TYPE: ACUTE PAIN

## 2024-04-29 NOTE — PROGRESS NOTES
"    Orthopedic PA Progress Note    Interval changes:  Patient doing well. Aware of possible need for skin grafting down the road  Wound team to see today  LLE dressings are CDI  WBAT LLE  Recommend follow up in [place]  - Please leave wound vac in place and clamp for transport  - Receiving facility will need to change vac upon arrival and attach to their own vac  Cleared for DC from orthopedic standpoint     ROS - Patient denies any new issues.  Denies any numbness or tingling. Pain well controlled.    /57   Pulse 69   Temp 36.9 °C (98.4 °F) (Temporal)   Resp 17   Ht 1.753 m (5' 9\")   Wt (!) 185 kg (406 lb 12 oz)   SpO2 92%     Patient seen and examined  No acute distress  Breathing non labored  RRR  LLE: Dressings CDI, vac intact without leak.           Active Hospital Problems    Diagnosis     H/O renal cell carcinoma s/p nephrectomy [Z85.528]      Priority: Medium    Anemia [D64.9]      Priority: Low    Rheumatoid arthritis involving multiple sites (HCC) [M06.9]     Cellulitis of left lower extremity [L03.116]     Lactic acidosis [E87.20]     Obesity [E66.9]     A-fib (HCC) [I48.91]     Mood disorder (HCC) [F39]     Bacteremia [R78.81]     Obstructive sleep apnea [G47.33]     Constipation [K59.00]        Assessment/Plan:  Patient doing well. Aware of possible need for skin grafting down the road  Wound team to see today  LLE dressings are CDI  WBAT LLE  Recommend follow up in [place]  - Please leave wound vac in place and clamp for transport  - Receiving facility will need to change vac upon arrival and attach to their own vac  Cleared for DC from orthopedic standpoint    POD#9 S/p  Left leg fasciotomy of anterior and superficial posterior   compartments.  Wt bearing status - WBAT LLE  Wound care/Drains - Dressings to be changed per protocol by wound team  Future Procedures - None planned   Sutures/Staples out- 14-21 days post operatively. Removal will completed by ortho MAME's unless transferred.  DVT " Prophylaxis outpatient: ASA 81 mg PO BID x4 weeks  PT/OT-initiated  Antibiotics: PCN   DVT Prophylaxis- TEDS/SCDs/Foot pumps/eliquis  [place]-not needed per ortho  Case Coordination for Discharge Planning - Disposition per therapy recs.

## 2024-04-29 NOTE — DISCHARGE PLANNING
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  Recent hospital admission 11/23-12/4/2021 for PsA pneuomonia with plan to complete IV ABX 12/22/2021.  The patient was admitted on 12/23/2021 for dyspnea and acute on chronic hypoxic respiratory failure and concern for infection.  Move to higher level of care 12/24 because of increased O2 needs and continuous bipap.  Continues to tolerate bipap. Fever resolved and leukocytosis improving but no change in dyspnea or O2 reuirements.      Today's recommendations:   - Blood cultures 12/23, 12/24 NGTD  - Sputum cultures 12/24 PA, enterococcus, Staph Epi               - communicated with micro lab 12/26 to add extended antibiotic sensitivities similar to 11/24 culture (pending)  - Aspergillus galactomannan 12/23 negative  - BDG fungitell (12/23) Indeterminate  - ABX per transplant ID,                Agree with Vanco, Cefiderocol, tigecycline, inhalational coly  - Tacrolimus level 12/26 subtherapeutic               - dose increased               - repeat level 12/29 ordered          - hold trikafta to avoid side effects and potential for drug induced liver injury   - Continue prednisone 10 BID  - EBV 1/20/22 (not yet ordered)  - Considered CT sinus for further infectious work up  - Confirmed with pharmacy 12/25 that ceftolozane/tazo is not available     Acute on chronic hypoxic respiratory failure:  Concern for pneumonia:  H/o  Pt accepted back to [facility]. PCS faxed to [facility] per their request.    Addendum @[phone]  Cobra completed and given to RN along with discs. Pt updated at bedside.   recurrent MDR PsA pneumonia: Recent admission for acute on chronic hypoxic respiratory failure in setting of MDR PsA pneumonia as above, scheduled to complete IV ABX course and prednisone increased 12/22.  Worsening LIMA 12/21, HD run 12/22 and then with ongoing dyspnea even at rest.  Also with worsening hypoxia, recent baseline 3L NC increased to 5L to maintain SpO2 high 80s to low 90s.  VBG stable on admission.  Denies chest pain, palpitations, cough, or sputum. Febrile on presentation to ER, afebrile since. Mildly tachycardic. Leukocytosis (16.1), elevated procal (0.38), and elevated lactic acid (2.5 --> 0.8 with IV fluids).  Troponin negative.  Chest CT 12/20 with decreased but persistent patchy areas of consolidation bilaterally with BLL bronchiectasis, borderline trace right pleural effusion, mild bibasilar pleural thickening, and left renal stone.  CXR on admission with bilateral infiltrates increased in [place], worsened on 12/24 CXR . COVID x2, respiratory panel, MRSA/MSSA nares, Strep pneumo Ag, and Legionella Ag all negative.  DDx include infection/pneumonia (imaging, leukocytosis, procal elevation), DVT/PE (on subcutaneous heparin), rejection (recently negative DSA as below), hypervolemia/cardiac (appers euvolemic on exam).  BLE US negative for DVT and echo with normal RV 12/23.  Increased to 6L via oxymask and febrile overnight 12/23 to 12/24. Leukocytosis improving but no improvement in dyspnea or O2 requirements.  - Consider repeat Chest CT if no clinical improvement in next 24-48h. Hypoxic respiratory failure due to presumed  earlier in this year.  Did not respond to abx, eventually required high dose steroids.  - VBG 7.26/52  - Blood cultures (12/23, 12/24 ) NGTD  - Sputum 12/24 - bacterial PA, enterococcus, Staph epi               - Fungal NGTD                - Nocardia NGTD                - AFB- needs to be collected  - Aspergillus galactomannan 12/23 negative  - BDG fungitell (12/23)  indeterminate  - Defer additional infectious work up to transplant ID  - ABX: IV tobramycin (12/23), IV vancomycin (12/23), PTA IV cefiderocol (11/24), PTA Coli nebs BID (11/27), Tigecycline (12/24-). Agree with ID to discharge IV vancomycin.  - Volume management per nephrology and primary team  - Encourage IS and Aerobika q1-2h while awake  - Nebs: Xopenex and ipratropium QID (PTA TID), defer Mucomyst as currently without cough/sputum  - Supplemental oxygen as needed to maintain SpO2 >92%  - Bipap prn and qHS     S/p bilateral sequent lung transplant (BSLT) for CF (10/21/16):  Seen in pulmonary clinic with 12/20, PFTs with very severe obstructive ventilatory defect and decreased from 12/7 and well below recent best.  DSA negative 12/7.  IgG adequate at 1,249 on 12/23, no indication for IVIG.   - DSA (12/23) negative     Immunosuppression:  - Tacrolimus  Goal level 7-9.  Next level on 12/29 (ordered)  - Myfortic 180 mg BID  - Prednisone 10 mg BID (12/22 as OP with plan to reevaluate after 2 weeks), revisit pending clinical course (chronic dosing 5 mg qAM / 2.5 mg qPM)     Prophylaxis:   - Dapsone for PJP ppx (methemoglobin normal 12/23)  - CMV D-/R-, no indication for CMV ppx, CMV negative 12/20, repeat (12/24) negative     CLAD: PTA azithromycin (EKG with QTc 438 12/23), Singulair, Advair ([facility] equivalent).      EBV viremia: Markedly elevated to 193k with log 5.3 on 3/15, levels variable since and most recently 15k with log 4.2 on 12/20, grossly stable from 12/7 although up from 9/27.  - EBV 1/20/22 (not yet ordered), sooner if indicated     ID:   - Work up and management as above     Recent cavitary lung lesion concerning for fungal infection: Presumed fungal infection with RUL cavitary lesion on chest CT 2/17, remove h/o Aspergillus fumigatus (2016) and Paecilomyces (2017).  Voriconazole course discontinued 11/30 during prior hospitalization per transplant ID in setting of elevated LFTs.  BDG fungitell  negative 12/7 and Aspergillus galactomannan negative 11/24.   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  Plan to begin as OP (scheduled to be delivered to home 12/23) with one orange tablet every morning.  LFTs normal 12/23 and CK 18 on 12/23.   - Tolerated first dose 12/24  - dose held 12/25 given change in status in ordered to avoid side effects and liver injury     Other relevant problems managed by primary team:     H/o line associated DVT: Initially noted 2/5 (left PICC line).  Repeat LUE US 4/6 with persistent nonocclusive DVT.  RUE US 4/24 with nonocclusive DVT, of note patient was subtherapeutic on warfarin.  Hematology consulted 4/27 and felt fluctuation of INR made warfarin an unreliable form of AC, transitioned to subcutaneous heparin 5,000 units BID with plan to continue while lines in place.  Repeat RUE US 12/23 with nonocclusive DVT of axillary, brachial, and basilic veins and LUE US 12/23 with nonocclusive DVT of subclavian and axillary veins.  - AC management per primary team  - PTA vitamin K 1 mg daily to stabilize INR given poor absorption 2/2 CF     We appreciate the excellent care provided by the [facility] 3 team.  Recommendations communicated via telephone and this note.  Will continue to follow along closely, please do not hesitate to call with any questions or concerns.      Theodore Melara MD  [phone]            Interval History:   Increased dyspnea with dialysis. Improved with BiPAP  Dyspnea at rest: mild-mod  Dyspnea on exertion:with any activity  Cough: rare  Sputum: minimal  Hemoptysis: none   Chest Pain: None           Review of Systems:   C: NEGATIVE for fever, chills  INTEGUMENTARY/SKIN: no rash or obvious new lesions  ENT/MOUTH: no sore throat, new sinus pain or nasal drainage  RESP: see interval history  CV: NEGATIVE for chest pain, palpitations or peripheral edema  GI: no nausea, vomiting, change in  stools  : no dysuria  MUSCULOSKELETAL: no myalgias, arthralgias            Medications:       amylase-lipase-protease  6 capsule Oral TID w/meals     azithromycin  250 mg Oral Daily     biotin  3,000 mcg Oral Daily     calcium carbonate  600 mg Oral BID w/meals     carvedilol  25 mg Oral BID w/meals     cefiderocol (FETROJA) intermittent infusion  750 mg Intravenous Q12H     colistimethate/colistin-base activity  150 mg Nebulization BID     dapsone  50 mg Oral Daily     doxazosin  8 mg Oral At Bedtime     [Held by provider] elexacaftor-tezacaftor-ivacaftor & ivacaftor  1 tablet Oral QAM     fludrocortisone  0.1 mg Oral Daily     fluticasone-vilanterol  1 puff Inhalation Daily     heparin ANTICOAGULANT  5,000 Units Subcutaneous Q12H     hydrALAZINE  25 mg Oral TID     insulin aspart  0.5-2.5 Units Subcutaneous Q4H     insulin detemir  4 Units Subcutaneous QAM     ipratropium  0.5 mg Nebulization 4x Daily     levalbuterol  1 ampule Nebulization 4x Daily     melatonin  3 mg Oral At Bedtime     mirtazapine  15 mg Oral At Bedtime     montelukast  10 mg Oral QPM     mycophenolic acid  180 mg Oral BID     pantoprazole  40 mg Oral QAM AC     PARoxetine  40 mg Oral QAM     phytonadione  1 mg Oral Daily     predniSONE  10 mg Oral BID     prenatal multivitamin w/iron  1 tablet Oral Daily     sevelamer carbonate  800 mg Oral BID w/meals     sodium chloride (PF)  3 mL Intracatheter Q8H     sodium chloride (PF)  9 mL Intracatheter During Dialysis/CRRT (from stock)     sodium chloride (PF)  9 mL Intracatheter During Dialysis/CRRT (from stock)     tacrolimus  2 mg Oral QAM     tacrolimus  2.5 mg Oral QPM     tigecycline (TYGACIL) intermittent infusion  50 mg Intravenous Q12H     tobramycin (NEBCIN) place duarte - receiving intermittent dosing  1 each Intravenous See Admin Instructions     vancomycin  500 mg Intravenous Once     vancomycin place duarte - receiving intermittent dosing  1 each Intravenous See Admin Instructions      vitamin C  500 mg Oral BID     vitamin D3  100 mcg Oral Daily     vitamin E  400 Units Oral Daily     sodium chloride 0.9%, acetaminophen **OR** acetaminophen, alteplase, alteplase, artificial tears ophthalmic solution, glucose **OR** dextrose **OR** glucagon, hydrOXYzine **OR** hydrOXYzine, Injection Device for insulin, labetalol, lidocaine 4%, lidocaine (buffered or not buffered), LORazepam, - MEDICATION INSTRUCTIONS -, ondansetron **OR** ondansetron, - MEDICATION INSTRUCTIONS -, polyethylene glycol, sodium chloride (PF), sodium chloride (PF), sodium chloride (PF)         Physical Exam:   Temp:  [97.2  F (36.2  C)-98.6  F (37  C)] 98.1  F (36.7  C)  Pulse:  [] 80  Resp:  [18-24] 24  BP: (113-157)/() 147/110  Cuff Mean (mmHg):  [85] 85  FiO2 (%):  [50 %] 50 %  SpO2:  [92 %-97 %] 96 %    Intake/Output Summary (Last 24 hours) at 12/27/2021 1034  Last data filed at 12/27/2021 0400  Gross per 24 hour   Intake 600 ml   Output 800 ml   Net -200 ml     Constitutional:   Awake, alert and in no apparent distress on BiPAP     Eyes:   Nonicteric, PERRL     Neck:   Supple without supraclavicular or cervical lymphadenopathy     Lungs:   Diminished air flow.  Bibasilar crackles. No rhonchi.  No wheezes.     Cardiovascular:   Normal S1 and S2.  RRR.  II/VI sys murmur. No gallop. No rub.     Abdomen:   NABS, soft, nondistended, nontender.  No HSM.     Musculoskeletal:   No edema     Neurologic:   Alert and conversant.     Skin:   Warm, dry.  No rash on limited exam.             Data:   All laboratory and imaging data reviewed.    Results for orders placed or performed during the hospital encounter of 12/23/21 (from the past 24 hour(s))   Glucose by meter   Result Value Ref Range    GLUCOSE BY METER POCT 76 70 - 99 mg/dL   Glucose by meter   Result Value Ref Range    GLUCOSE BY METER POCT 93 70 - 99 mg/dL   Glucose by meter   Result Value Ref Range    GLUCOSE BY METER POCT 126 (H) 70 - 99 mg/dL   Glucose by meter    Result Value Ref Range    GLUCOSE BY METER POCT 152 (H) 70 - 99 mg/dL   Glucose by meter   Result Value Ref Range    GLUCOSE BY METER POCT 106 (H) 70 - 99 mg/dL   Vancomycin level   Result Value Ref Range    Vancomycin 23.1   mg/L   CBC with platelets   Result Value Ref Range    WBC Count 12.9 (H) 4.0 - 11.0 10e3/uL    RBC Count 2.39 (L) 3.80 - 5.20 10e6/uL    Hemoglobin 7.5 (L) 11.7 - 15.7 g/dL    Hematocrit 25.7 (L) 35.0 - 47.0 %     (H) 78 - 100 fL    MCH 31.4 26.5 - 33.0 pg    MCHC 29.2 (L) 31.5 - 36.5 g/dL    RDW 14.2 10.0 - 15.0 %    Platelet Count 287 150 - 450 10e3/uL   CRP inflammation   Result Value Ref Range    CRP Inflammation 100.0 (H) 0.0 - 8.0 mg/L   Basic metabolic panel   Result Value Ref Range    Sodium 143 133 - 144 mmol/L    Potassium 4.9 3.4 - 5.3 mmol/L    Chloride 112 (H) 94 - 109 mmol/L    Carbon Dioxide (CO2) 22 20 - 32 mmol/L    Anion Gap 9 3 - 14 mmol/L    Urea Nitrogen 91 (H) 7 - 30 mg/dL    Creatinine 3.52 (H) 0.52 - 1.04 mg/dL    Calcium 8.6 8.5 - 10.1 mg/dL    Glucose 102 (H) 70 - 99 mg/dL    GFR Estimate 16 (L) >60 mL/min/1.73m2   Blood gas venous   Result Value Ref Range    pH Venous 7.26 (L) 7.32 - 7.43    pCO2 Venous 52 (H) 40 - 50 mm Hg    pO2 Venous 91 (H) 25 - 47 mm Hg    Bicarbonate Venous 23 21 - 28 mmol/L    Base Excess/Deficit (+/-) -4.0 -7.7 - 1.9 mmol/L    FIO2 50    Glucose by meter   Result Value Ref Range    GLUCOSE BY METER POCT 106 (H) 70 - 99 mg/dL     *Note: Due to a large number of results and/or encounters for the requested time period, some results have not been displayed. A complete set of results can be found in Results Review.

## 2024-04-29 NOTE — CARE PLAN
The patient is Watcher - Medium risk of patient condition declining or worsening    Shift Goals  Clinical Goals: IV ABX, monitor labs/VS, wound vac  Patient Goals: Pain control  Family Goals: NA    Progress made toward(s) clinical / shift goals:  Understands the fall risk prevention interventions, Q2 turning, changing bed linen sheets, wound care team seeing patient today, monitoring wound vac    Patient is not progressing towards the following goals:      Problem: Fall Risk  Goal: Patient will remain free from falls  Outcome: Progressing  Note: Educated about using the call light, and fall prevention interventions     Problem: Skin Integrity  Goal: Skin integrity is maintained or improved  Outcome: Progressing  Note: Wound team planning to change wound vac dressing 4/29, Encouraging Q2 turns, Off loading heels and elbows, Checking on linen if it needs to be changed

## 2024-04-29 NOTE — CARE PLAN
The patient is Stable - Low risk of patient condition declining or worsening    Shift Goals  Clinical Goals: IV ABX, monitor labs/VS, wound vac  Patient Goals: Pain control  Family Goals: NA    Progress made toward(s) clinical / shift goals:     Problem: Pain - Standard  Goal: Alleviation of pain or a reduction in pain to the patient’s comfort goal  Outcome: Progressing  Patient educated on POC.  Documented pain using the appropriate pain scale (0-10 pain scale and Non-verbal descriptors)  Education provided on non-pharmacologic comfort measures (relaxation, distraction, massage, cold/heat therapy, repositioning)  Administered pain management medications as ordered (PRN/scheduled)  Reassessed pain after pain medication administration per policy  Assessed patient's response to pain medication per POSS sedation scale  Pt complained of 6-10/10 pain on her BLE and joints. Offered to reposition multiple times, but patient refused.  Patient also refused to RN and CNA to change her purewick.       Problem: Fall Risk  Goal: Patient will remain free from falls  Outcome: Progressing  Fall Prevention Protocol  To be followed on all patients at risk for fall  Patient Name: Savita CIrish    Guidelines for patients at risk to fall       Environmental precautions in use:       Bed is locked and in lowest position       Personal belongings, wastebasket, call bell, are in easy reach       Transferred patient toward stronger side       Report is given to CNA regarding patient's fall risk    The following are considered:     The IV pole is on the L side, same side of bed as the bathroom     The side rail closest to bathroom is down     Bed rails:  [facility] Fall program emphasizes bed rail reduction.  Bed rails contribute to patient fall risk by creating barriers to patient transfer in and out of beds.  Use of bed rails must be assessed specifically to individual patient needs.  When possible, use alternative pillows and positioning  devices to avail the use of bed rails.  (Remember:  Four (4) side rails are considered a restraint).    Guidelines for high fall risk patients    Fall risk guidelines as outlined above      Fall risk armband on patient      Fall risk sign is placed by the door - High Fall Risk      Purewick in use    Consider the following:      Bed frame alarm on      Physical Therapy/Occupational Therapy consultation for strengthening and mobility, including assessment of home care needs            Problem: Hemodynamics  Goal: Patient's hemodynamics, fluid balance and neurologic status will be stable or improve  Outcome: Progressing  Patient educated on POC. Pt will remain NAEO.  Monitor vital signs (q4h/q8h/or as needed)  Pulse oximetry and continuous cardiac monitor are in use. report abnormalities to the provider  Hemodynamic monitoring (BMP)  Manage IV infusions (Penicillin infusing at 21 mL/hr)  Monitored intake and output q4h through the shift  Peripheral pulses and capillary refill were assessed  Color and body temperature were assessed  Positioned patient for maximum circulation/cardiac output  Signs/symptoms of excessive bleeding were monitored  Assessed mental status, restlessness and changes in level of consciousness  Monitored temperature (report fever or hypothermia to provider immediately).

## 2024-04-29 NOTE — DISCHARGE INSTRUCTIONS
Discharge Instructions    Discharged to other by medical transportation with escort. Discharged via ambulance, hospital escort: Yes.  Special equipment needed: Oxygen    Be sure to schedule a follow-up appointment with your primary care doctor or any specialists as instructed.     Discharge Plan:   Diet Plan: Discussed  Activity Level: Discussed  Confirmed Follow up Appointment: Patient to Call and Schedule Appointment  Confirmed Symptoms Management: Discussed  Medication Reconciliation Updated: Yes    I understand that a diet low in cholesterol, fat, and sodium is recommended for good health. Unless I have been given specific instructions below for another diet, I accept this instruction as my diet prescription.   Other diet: regular    Special Instructions: None    -Is this patient being discharged with medication to prevent blood clots?  No    Is patient discharged on Warfarin / Coumadin?   No

## 2024-04-29 NOTE — PROGRESS NOTES
[facility] Internal Medicine Daily Progress Note    Date of Service  4/29/2024    R Team: R IM Blue Team   Attending: REID Elizabeth M.d.  Senior Resident: Dr. Alarcon  Intern: Dr. Crowder  Contact Number: [phone]    Chief Complaint  Savita Duggan is a 58 y.o. female admitted as direct admit on 4/18 from [facility] for workup of infectious endocarditis in the setting of murmur, bacteremia and severe left lower extremity cellulitis    Hospital Course  Savita Duggan is a 58 y.o. female who presented 4/18/2024 from outside hospital due to worsening infection of left lower extremity. Patient reports that symptom onset was approximately 3 weeks ago when she started to notice redness around her mid shin that slowly started to worsen.  Patient denies any trauma or injury to site of infection.  Due to increasing pain, erythema and swelling the patient elected to present to her nearest emergency department which is at [facility].  Reportedly the patient was admitted on 4/14 and diagnosed with left lower extremity cellulitis and initiated on vancomycin and Levaquin without significant improvement and patient's blood cultures grew group C strep and antibiotics were escalated to cefepime but the patient still continues to have limited improvement.  Also reportedly the patient may have had a murmur subsequently plan was created to transfer the patient to [facility] for further evaluation and management.  Orthopedics was consulted on admission, pharmacies and drainage of left lower extremity on 4/19 due to concern for necrotizing fasciitis.  Consulted infectious disease regarding possible infective endocarditis, and transitioning antibiotics to IV penicillin.  TTE performed although unable to visualize valves, and PETER ordered which showed no evidence of IE, therefore continuing course of IV penicillin x 2 weeks following negative blood cultures.  She previously had reported history of alert allergy to penicillins although tolerated IV penicillin  without any adverse allergic reactions and removed from allergy list. With continued treatment with IV penicillin and judicious diuretic use, her left lower extremity cellulitis and pain is improving.    Interval Problem Update  No acute events overnight. Edema improved with Lasix 40 IV. She remains medically stable for transfer back to referring facility to finish 2-week course of Penicillin (4/19-5/3/2024). Can transfer back once cleared by wound care team sometime this week.    I have discussed this patient's plan of care and discharge plan at IDT rounds today with Case Management, Nursing, Nursing leadership, and other members of the IDT team.    Consultants/Specialty  infectious disease and orthopedics    Code Status  Full Code    Disposition  The patient is medically cleared for discharge to home or a post-acute facility.    PT/OT to evaluate, though notably, patient has historically declined SNF.   I have placed the appropriate orders for post-discharge needs.    Review of Systems  Review of Systems   Constitutional: Negative.    HENT: Negative.     Eyes: Negative.    Respiratory: Negative.  Negative for shortness of breath.    Cardiovascular: Negative.  Negative for chest pain.   Gastrointestinal: Negative.  Negative for abdominal pain, constipation and nausea.   Genitourinary: Negative.    Musculoskeletal:  Positive for joint pain (diffuse).        LLE pain   Neurological: Negative.  Negative for headaches.   Endo/Heme/Allergies: Negative.    Psychiatric/Behavioral: Negative.        Physical Exam  Temp:  [36.2 °C (97.1 °F)-37.4 °C (99.3 °F)] 36.9 °C (98.4 °F)  Pulse:  [65-75] 69  Resp:  [17-20] 17  BP: ()/(49-78) 128/57  SpO2:  [92 %-97 %] 92 %  On 2L NC    Physical Exam  Constitutional:       General: She is not in acute distress.     Appearance: She is obese.   HENT:      Head: Normocephalic.   Cardiovascular:      Rate and Rhythm: Normal rate and regular rhythm.      Heart sounds: Murmur (systolic  murmur III/VI) heard.   Pulmonary:      Effort: Pulmonary effort is normal. No respiratory distress.      Breath sounds: No rales.   Abdominal:      General: Bowel sounds are normal.      Palpations: Abdomen is soft.      Tenderness: There is no abdominal tenderness. There is no guarding.   Musculoskeletal:         General: Tenderness and deformity (left lower extremity wrapped in ace bandage) present.      Right lower leg: Edema (improved, likely near baseline) present.      Left lower leg: Edema (improved, likely near baseline) present.   Skin:     Capillary Refill: Capillary refill takes more than 3 seconds.      Findings: Lesion (Bullae resolved) present.   Neurological:      Mental Status: She is alert. Mental status is at baseline.      Motor: No weakness.       Fluids    Intake/Output Summary (Last 24 hours) at 4/29/2024 0920  Last data filed at 4/29/2024 0827  Gross per 24 hour   Intake 1645.07 ml   Output 2780 ml   Net -1134.93 ml       Laboratory        Recent Labs     04/29/24  0036   SODIUM 134*   POTASSIUM 4.7   CHLORIDE 94*   CO2 29   GLUCOSE 105*   BUN 10   CREATININE 0.92   CALCIUM 8.5                     Imaging  EC-PETER W/O CONT   Final Result      US-EXTREMITY VENOUS LOWER BILAT   Final Result      EC-ECHOCARDIOGRAM COMPLETE W/ CONT   Final Result      CT-EXTREMITY, LOWER W/O LEFT   Final Result         1.  Diffuse subcutaneous fat stranding from the mid thigh to the ankle, appearance compatible with cellulitis and/or edema.   2.  No soft tissue gas identified to indicate necrotizing fasciitis.   3.  Prepatellar fluid collection, could represent hematoma, seroma, or possibly evolving abscess.   4.  Small knee joint effusion         Scheduled Medications   Medication Dose Frequency    senna-docusate  2 Tablet Q EVENING    And    polyethylene glycol/lytes  1 Packet BID    apixaban  5 mg BID    atorvastatin  20 mg QHS    mometasone-formoterol  2 Puff BID    escitalopram  10 mg DAILY    gabapentin  100  mg 4XDAY    penicillin G potassium 24 Million Units in  mL infusion  24 Million Units Continuous Abx    sotalol  120 mg BID    acetaminophen  1,000 mg Q8HRS      Assessment/Plan  Problem Representation:    * Cellulitis of left lower extremity  Assessment & Plan  Severe LLE cellulitis, s/p fasciotomy with ortho Dr. Ortiz on 4/19 and wound vac placement on 4/22. Improved with antibiotics and judicious use of diuretics.    - Appreciate wound care help, dressings changes 3x weekly  - Ortho follow 2-3 weeks post-op  - Plastics/wound care follow up in few weeks for skin graft consult as not a candidate for secondary closure    Bacteremia- (present on admission)  Assessment & Plan  Grew group C strep and 4 out of 4 vials at referring hospital. No growth on repeat blood cultures 4/19. Transferred here for 3/6 right upper sternal border murmur to rule out infectious endocarditis. PETER performed 4/22, negative.    - Finish IV PCN for 2 weeks (4/19-5/3/2024) from first negative blood cultures per ID  - Completed Linezolid for 72 hours, last dose 4/21  - Penicillin allergy removed from list since she is tolerating without any itching or rash    Rheumatoid arthritis involving multiple sites ([facility])  Assessment & Plan  History rheumatoid arthritis with joint pain  - Follows with rheumatology in [place]  - Prescribed prednisone 15 mg morning and 5 mg at night, currently holding per patient preference    A-fib ([facility])  Assessment & Plan  Remains overall rate controlled  - Sotalol 120 mg twice daily  - Reinitiated apixaban 5 mg twice daily 4/21    Obesity  Assessment & Plan  BMI of 59.45  Possible risk factor for underlying left lower extremity infection  Provide weight loss counseling when appropriate    Lactic acidosis  Assessment & Plan  Likely due to underlying infection, improved after fluid bolus.  RESOLVED.    Obstructive sleep apnea- (present on admission)  Assessment & Plan  Not on CPAP/BiPAP. Wears nocturnal oxygen  around 2L NC.   - Declined PAP use this admission    Constipation- (present on admission)  Assessment & Plan  Constipation no bowel movements since transfer hospital  - Resolved, continue to monitor    Anemia- (present on admission)  Assessment & Plan  Hgb dropped 8.6 from 11 following surgery, stable. No active blood loss noted    H/O renal cell carcinoma s/p nephrectomy- (present on admission)  Assessment & Plan  History of, limit nephrotoxins    Mood disorder (HCC)  Assessment & Plan  Continue home escitalopram 10 mg daily       VTE prophylaxis: SCDs/TEDs, apixaban    I have performed a physical exam and reviewed and updated ROS and Plan today (4/29/2024). In review of yesterday's note (4/28/2024), there are no changes except as documented above.

## 2024-04-29 NOTE — WOUND TEAM
Assisted Alina CARRENO (Wound RN), with wound care, non-selective debridement performed using wound cleanser/NS and gauze. Please see Alina CARRENO (Wound RN) wound note for further wound care details.

## 2024-04-29 NOTE — WOUND TEAM
[facility]  Inpatient Services  Wound and Skin Care Follow-up    Admission Date: 2024     Last order of IP CONSULT TO WOUND CARE was found on 2024 from Hospital Encounter on 2024     HPI, PMH, SH: Reviewed    Past Surgical History:   Procedure Laterality Date    IRRIGATION & DEBRIDEMENT GENERAL Left 2024    Procedure: IRRIGATION AND DEBRIDEMENT, WOUND - LEG;  Surgeon: Taco Ortiz M.D.;  Location: SURGERY [facility];  Service: Orthopedics     Social History     Tobacco Use    Smoking status: Former     Current packs/day: 0.00     Average packs/day: 1 pack/day for 38.0 years (38.0 ttl pk-yrs)     Types: Cigarettes     Start date: 1978     Quit date: 2016     Years since quittin.4    Smokeless tobacco: Never   Substance Use Topics    Alcohol use: Never     No chief complaint on file.    Diagnosis: Bacteremia [R78.81]    Unit where seen by Wound Team: S140/00     WOUND FOLLOW UP RELATED TO:  Left leg medial and lateral wounds       WOUND TEAM PLAN OF CARE - Frequency of Follow-up:   Nursing to follow dressing orders written for wound care. Contact wound team if area fails to progress, deteriorates or with any questions/concerns if something comes up before next scheduled follow up (See below as to whether wound is following and frequency of wound follow up)  Dressing changes by wound team:                   NPWT change 3 times weekly - left LE lateral and medial     WOUND HISTORY:       Pt is a 57 yo female who is a direct admit from [facility] for L leg cellulitis with necrotic bulla.  Hx includes A-fib on Eliquis.  Admitted for cellulitis, bacteremia.  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  Pt states that her leg started becoming red and swollen around the beginning of 24 I&D fasciotomy L leg Dr Ortiz - recommending NPWT when periwound amenable  24 ID following - Dr Warren           WOUND ASSESSMENT/LDA  Wound  04/20/24 Full Thickness Wound Leg Medial;Lower Left (Active)   Date First Assessed/Time First Assessed: 04/20/24 1200   Present on Original Admission: No  Hand Hygiene Completed: Yes  Primary Wound Type: Full Thickness Wound  Location: Leg  Wound Orientation: Medial;Lower  Laterality: Left      Assessments 4/29/2024 12:00 PM   Site Assessment Red   Periwound Assessment Red;Pink   Margins Defined edges   Closure Secondary intention;Dermabond   Drainage Amount Small   Drainage Description Serosanguineous   Treatments Cleansed;Irrigation;Nonselective debridement;Site care;Offloading   Wound Cleansing Approved Wound Cleanser   Periwound Protectant Skin Protectant Wipes to Periwound;Paste Ring;Drape   Dressing Status Clean;Dry   Dressing Changed Changed   Dressing Cleansing/Solutions Normal Saline   Dressing Options Wound Vac   Dressing Change/Treatment Frequency Monday, Wednesday, Friday, and As Needed   NEXT Dressing Change/Treatment Date 05/01/24   Non-staged Wound Description Full thickness   Shape oval   Wound Odor None   Pulses 2+   WOUND NURSE ONLY - Time Spent with Patient (mins) 30       Wound 04/20/24 Full Thickness Wound Leg Lower;Lateral Left (Active)   Date First Assessed/Time First Assessed: 04/20/24 1338   Present on Original Admission: No  Hand Hygiene Completed: Yes  Primary Wound Type: Full Thickness Wound  Location: Leg  Wound Orientation: Lower;Lateral  Laterality: Left      Assessments 4/29/2024 12:00 PM   Site Assessment Red   Periwound Assessment Clean;Dry   Margins Defined edges;Attached edges   Drainage Amount Small   Drainage Description Maroon;Serosanguineous   Treatments Cleansed;Irrigation;Nonselective debridement   Wound Cleansing Approved Wound Cleanser   Periwound Protectant Skin Protectant Wipes to Periwound;Paste Ring;Drape   Dressing Status Clean;Dry;Intact   Dressing Changed Changed   Dressing Cleansing/Solutions Normal Saline   Dressing Options Wound Vac   Dressing Change/Treatment  Frequency Monday, Wednesday, Friday, and As Needed   NEXT Dressing Change/Treatment Date 05/01/24   Exposed Structures Muscle;Tendon;Adipose   WOUND NURSE ONLY - Time Spent with Patient (mins) 30       Negative Pressure Wound Therapy 04/22/24 Pretibial Medial;Lower Left (Active)   Placement Date/Time: 04/22/24 1556   Inserted by: Wound RN  Location: Pretibial  Wound Orientation: Medial;Lower  Laterality: Left      Assessments 4/29/2024 12:00 PM   NPWT Pump Mode / Pressure Setting Ulta;Intermittent;125 mmHg   Dressing Type Medium;Black Foam (Veraflo)   Number of Foam Pieces Used 2   Canister Changed No   NEXT Dressing Change/Treatment Date 05/01/24   VAC VeraFlo Irrigant Normal Saline   VAC VeraFlo Soak Time (mins) 2   VAC VeraFlo Instill Volume (ml) 20   VAC VeraFlo - Therapy Time (hrs) 2   VAC VeraFlo Pressure (mm/Hg) 125 mmHg   WOUND NURSE ONLY - Time Spent with Patient (mins) 30       Negative Pressure Wound Therapy 04/22/24 Leg Lower;Lateral Left (Active)   Placement Date/Time: 04/22/24 1557   Present on Original Admission: No  Inserted by: Wound RN  Location: Leg  Wound Orientation: Lower;Lateral  Laterality: Left      Assessments 4/29/2024 12:00 PM   NPWT Pump Mode / Pressure Setting Ulta;Intermittent;125 mmHg   Dressing Type Medium;Black Foam (Veraflo)   NEXT Dressing Change/Treatment Date 05/01/24   VAC VeraFlo Irrigant Normal Saline   VAC VeraFlo Soak Time (mins) 2   VAC VeraFlo Instill Volume (ml) 18   VAC VeraFlo - Therapy Time (hrs) 2   VAC VeraFlo Pressure (mm/Hg) 125 mmHg   WOUND NURSE ONLY - Time Spent with Patient (mins) 30        Vascular:    JUSTIN:   No results found.    Lab Values:    Lab Results   Component Value Date/Time    WBC 9.5 04/26/2024 01:37 AM    RBC 3.29 (L) 04/26/2024 01:37 AM    HEMOGLOBIN 8.2 (L) 04/26/2024 01:37 AM    HEMATOCRIT 26.8 (L) 04/26/2024 01:37 AM    CREACTPROT 27.55 (H) 04/19/2024 08:19 AM    SEDRATEWES 12 07/22/2023 10:39 AM    HBA1C 6.4 (H) 07/25/2023 12:25 AM          Culture Results show:  Recent Results (from the past 720 hour(s))   CULTURE WOUND W/ GRAM STAIN    Collection Time: 04/19/24  1:22 PM    Specimen: Wound   Result Value Ref Range    Significant Indicator NEG     Source WND     Site Left leg wound     Culture Result No growth at 72 hours.     Gram Stain Result Rare WBCs.  No organisms seen.          Pain Level/Medicated:  4% Lidocaine allowed to dwell on wound bed 40mins prior and PO pain medications administered by bedside RN 40 mins prior       INTERVENTIONS BY WOUND TEAM:  Chart and images reviewed. Discussed with bedside RN. All areas of concern (based on picture review, LDA review and discussion with bedside RN) have been thoroughly assessed. Documentation of areas based on significant findings. This RN in to assess patient. Performed standard wound care which includes appropriate positioning, dressing removal and non-selective debridement. Pictures and measurements obtained weekly if/when required.    Wound:  LEFT LE MEDIAL AND LATERAL  Preparation for Dressing removal: Dressing soaked with Lidocaine and wound cleanser  Cleansed/Non-selectively Debrided with:  Wound cleanser and Gauze  Marychuy wound: Cleansed with Wound cleanser and Gauze, Prepped with No Sting, Paste Rings, Drape  Primary Dressing:  VF black foam - strip with long tail into 6cm tract at 11 oclock lateral wound, sealed with dermatac TRAC pad  Secondary (Outer) Dressing: heel off loading foam to heel and anterior ankle, tubigrib size E (2 layer wrap unavailable)     Advanced Wound Care Discharge Planning  Number of Clinicians necessary to complete wound care: 1  Is patient requiring IV pain medications for dressing changes:  No   Length of time for dressing change 40 min. (This does not include chart review, pre-medication time, set up, clean up or time spent charting.)    Interdisciplinary consultation: Patient, Bedside RN (Gagan), Phyllis TREVIÑO (Wound RN).  Pressure injury and staging reviewed  with N/A.    EVALUATION / RATIONALE FOR TREATMENT:     Date:  04/29/24  Wound Status:  Wound progressing as expected    Leonela-skin is supple and dry at this intervention, no hydrofiber silver used. Continue with VF NPWT to help with growth over the muscle and tendon to the lateral wound bed.  Wound bed are moist and red with small granulation buds.  Patient tolerated dressing change well with soaking the wound beds.  2 layer wrap unavailable at this time, tubigrib size E applied.    Date:  04/26/24  Wound Status:  Wound improving    Wound team is using a complicated system to address L leg wounds to address extremely fragile leonela wound. There is muscle in both wounds and vera lyn is necessary to maintain hydrated muscle.  Dermatac drape used to seal sponge - regular VAC drape will not maintain a seal on the fragile tissue.  Wounds have improved.  Depth has imrproved in both L leg wounds.  There is a new tract of the lateral leg wound and some VAC sponge packed into tract keeping the sponge thick to avoid it tearing.  Leonela wound much improved and becoming drier with the use of dermatac.  Initiating increased compression with 2 layer compression wrap which will further expedite improvement in wounds and leonela wound.  .       Date:  04/24/24  Wound Status:  Wound progressing as expected    Wound beds with less adipose tissue present, more red.  Periwound less weepy and less blisters.  Continue VF NPWT, patient may benefit from a two layer compression wrap next dressing change.    Date:  04/22/24  Wound Status:  Initial evaluation    Patient's dressing had not been changed since previous assessment, on 4/20.  Wound bed x2 appears a little necrotic, but leonela-wound dry enough to attempt wound vac.  Hydrofiber silver applied over edges that were weeping, and dermatec used to prevent tissue damage. And 2 machines to prevent bridging over open skin.    Tubi  applied for a little initial compression.    Date:   04/20/24  Wound Status:  Initial evaluation    Clean wounds - 100% viable tissue. Marychuy wound is fragile with lifting epithelial and weeping drainage - will re assess for NPWT when marychuy improves and a seal can be achieved.  Erythema is receeding from sharpie lines, pt agrees the color is much improved.           Goals: Steady decrease in wound area and depth weekly.    NURSING PLAN OF CARE ORDERS:  No new orders this visit    NUTRITION RECOMMENDATIONS   Wound Team Recommendations:  Protein supplements  Arginine powder       DIET ORDERS (From admission to next 24h)       Start     Ordered    04/22/24 1142  Diet Order Diet: Regular  ALL MEALS        Question:  Diet:  Answer:  Regular    04/22/24 1141    04/21/24 1220  Supplements  ALL MEALS        Comments: Riley BID and Ensure Max x1/d to support wound healing.   Question:  Which Supplement  Answer:  Other (Specify in Comments)    04/21/24 1219                    PREVENTATIVE INTERVENTIONS:   Q shift Noah - performed per nursing policy  Q shift pressure point assessments - performed per nursing policy    Surface/Positioning  Bariatric JOLENE - Currently in Place  Reposition q 2 hours - Currently in Place  TAPs Turning system - Currently in Place    Offloading/Redistribution  Sacral offloading dressing (Silicone dressing) - Currently in Place  Heel offloading dressing (Silicone dressing) - Currently in Place  Float Heels off Bed with Pillows - Currently in Place           Respiratory  Silicone O2 tubing - Currently in Place  Gray Foam Ear protectors - Currently in Place    Containment/Moisture Prevention    Krueger Catheter - Currently in Place    Mobilization      Unable to assess     Anticipated discharge plans:  Skilled Nursing        Vac Discharge Needs:  Vac Discharge plan is purely a recommendation from wound team and not a requirement for discharge unless otherwise stated by physician.  Veraflo Vac while inpatient, will need to remain on Veraflo Vac upon discharge

## 2024-04-29 NOTE — PROGRESS NOTES
Assumed care of pt at 0750. Report received and bedside rounding completed with night RN. Pt is calm no SOB, or in any acute distress noted.     Fall precautions in place,  bed alarm. - Treaded non slip socks. Bed locked. Communication board updated with POC. Call light and pt belongings within reach - hourly rounding in place.     1200: Wound care team at bedside changing wound vac dressing.

## 2024-04-29 NOTE — PROGRESS NOTES
Report given to [facility] SARAH Lucero. No further questions needed. Notified about the two wound vacs. Will be clamping them when [facility] arrives.     1400: Cobra packet ready. Teresita,  mentioned that she did not need a prescription for pain.     1514: Called unit pharmacist Wilber Davis and informed if the Penicillin needs to be disconnected or continuous running when [facility] transport arrives. Per wilber, keep the Penicillin running.       1635: Called SARAH Lucero back and informed that our wound team RN that changed the wound vac recommends to clamp it and when she arrives to the facility at [facility] to connect the wound vac to that facility.Dressing is CD & I.     1700: Called Charge RN and notified her [facility]. Okay for [facility] team to take the IV pump. Both wound vac lines have been clamped and disconnected.     1714: patient off the unit with [facility]. Report given to the [facility] team.

## 2024-04-30 NOTE — PROGRESS NOTES
Discontinued two wound vacs on the orders. Called [facility] x [phone] and spoke to Romeo. Left both wound vacs in the front of the unit. Per Romeo, They will be up to pick them up.

## 2024-05-03 LAB
BACTERIA SPEC ANAEROBE CULT: NORMAL
SIGNIFICANT IND 70042: NORMAL
SITE SITE: NORMAL
SOURCE SOURCE: NORMAL

## 2024-07-02 ENCOUNTER — HOSPITAL ENCOUNTER (INPATIENT)
Facility: MEDICAL CENTER | Age: 59
LOS: 6 days | DRG: 682 | End: 2024-07-08
Attending: STUDENT IN AN ORGANIZED HEALTH CARE EDUCATION/TRAINING PROGRAM | Admitting: INTERNAL MEDICINE
Payer: COMMERCIAL

## 2024-07-02 DIAGNOSIS — G47.33 OBSTRUCTIVE SLEEP APNEA: ICD-10-CM

## 2024-07-02 DIAGNOSIS — R91.8 MASS OF LEFT LUNG: ICD-10-CM

## 2024-07-02 DIAGNOSIS — Z75.8 DISCHARGE PLANNING ISSUES: ICD-10-CM

## 2024-07-02 DIAGNOSIS — R53.1 WEAKNESS: ICD-10-CM

## 2024-07-02 DIAGNOSIS — R21 RASH: ICD-10-CM

## 2024-07-02 DIAGNOSIS — F41.9 ANXIETY AND DEPRESSION: ICD-10-CM

## 2024-07-02 DIAGNOSIS — K59.00 CONSTIPATION, UNSPECIFIED CONSTIPATION TYPE: ICD-10-CM

## 2024-07-02 DIAGNOSIS — J96.11 CHRONIC HYPOXEMIC RESPIRATORY FAILURE (HCC): ICD-10-CM

## 2024-07-02 DIAGNOSIS — Z85.528 H/O RENAL CELL CARCINOMA: ICD-10-CM

## 2024-07-02 DIAGNOSIS — M06.9 RHEUMATOID ARTHRITIS INVOLVING MULTIPLE SITES, UNSPECIFIED WHETHER RHEUMATOID FACTOR PRESENT (HCC): ICD-10-CM

## 2024-07-02 DIAGNOSIS — E87.20 LACTIC ACIDOSIS: ICD-10-CM

## 2024-07-02 DIAGNOSIS — R11.0 NAUSEA: ICD-10-CM

## 2024-07-02 DIAGNOSIS — R57.9 SHOCK (HCC): ICD-10-CM

## 2024-07-02 DIAGNOSIS — F32.A ANXIETY AND DEPRESSION: ICD-10-CM

## 2024-07-02 DIAGNOSIS — F39 MOOD DISORDER (HCC): ICD-10-CM

## 2024-07-02 DIAGNOSIS — E78.5 DYSLIPIDEMIA: ICD-10-CM

## 2024-07-02 DIAGNOSIS — Z90.5 H/O RIGHT NEPHRECTOMY: Chronic | ICD-10-CM

## 2024-07-02 DIAGNOSIS — R31.21 ASYMPTOMATIC MICROSCOPIC HEMATURIA: ICD-10-CM

## 2024-07-02 DIAGNOSIS — M19.90 INFLAMMATORY ARTHRITIS: ICD-10-CM

## 2024-07-02 DIAGNOSIS — L03.116 CELLULITIS OF LEFT LOWER EXTREMITY: ICD-10-CM

## 2024-07-02 DIAGNOSIS — E66.01 CLASS 3 SEVERE OBESITY DUE TO EXCESS CALORIES WITH SERIOUS COMORBIDITY AND BODY MASS INDEX (BMI) OF 50.0 TO 59.9 IN ADULT (HCC): Chronic | ICD-10-CM

## 2024-07-02 DIAGNOSIS — R78.81 BACTEREMIA: ICD-10-CM

## 2024-07-02 DIAGNOSIS — D64.9 ANEMIA, UNSPECIFIED TYPE: ICD-10-CM

## 2024-07-02 LAB
ALBUMIN SERPL BCP-MCNC: 2.9 G/DL (ref 3.2–4.9)
ALBUMIN/GLOB SERPL: 0.9 G/DL
ALP SERPL-CCNC: 43 U/L (ref 30–99)
ALT SERPL-CCNC: <5 U/L (ref 2–50)
ANION GAP SERPL CALC-SCNC: 11 MMOL/L (ref 7–16)
AST SERPL-CCNC: 16 U/L (ref 12–45)
BASOPHILS # BLD AUTO: 0.6 % (ref 0–1.8)
BASOPHILS # BLD: 0.05 K/UL (ref 0–0.12)
BILIRUB SERPL-MCNC: 0.2 MG/DL (ref 0.1–1.5)
BUN SERPL-MCNC: 18 MG/DL (ref 8–22)
CALCIUM ALBUM COR SERPL-MCNC: 9.2 MG/DL (ref 8.5–10.5)
CALCIUM SERPL-MCNC: 8.3 MG/DL (ref 8.4–10.2)
CHLORIDE SERPL-SCNC: 94 MMOL/L (ref 96–112)
CO2 SERPL-SCNC: 27 MMOL/L (ref 20–33)
CREAT SERPL-MCNC: 2.33 MG/DL (ref 0.5–1.4)
EOSINOPHIL # BLD AUTO: 0.63 K/UL (ref 0–0.51)
EOSINOPHIL NFR BLD: 7.1 % (ref 0–6.9)
ERYTHROCYTE [DISTWIDTH] IN BLOOD BY AUTOMATED COUNT: 47.4 FL (ref 35.9–50)
GFR SERPLBLD CREATININE-BSD FMLA CKD-EPI: 23 ML/MIN/1.73 M 2
GLOBULIN SER CALC-MCNC: 3.1 G/DL (ref 1.9–3.5)
GLUCOSE SERPL-MCNC: 91 MG/DL (ref 65–99)
HCT VFR BLD AUTO: 30.4 % (ref 37–47)
HGB BLD-MCNC: 8.9 G/DL (ref 12–16)
IMM GRANULOCYTES # BLD AUTO: 0.02 K/UL (ref 0–0.11)
IMM GRANULOCYTES NFR BLD AUTO: 0.2 % (ref 0–0.9)
LYMPHOCYTES # BLD AUTO: 2.16 K/UL (ref 1–4.8)
LYMPHOCYTES NFR BLD: 24.3 % (ref 22–41)
MAGNESIUM SERPL-MCNC: 1.7 MG/DL (ref 1.5–2.5)
MCH RBC QN AUTO: 21.7 PG (ref 27–33)
MCHC RBC AUTO-ENTMCNC: 29.3 G/DL (ref 32.2–35.5)
MCV RBC AUTO: 74 FL (ref 81.4–97.8)
MONOCYTES # BLD AUTO: 0.75 K/UL (ref 0–0.85)
MONOCYTES NFR BLD AUTO: 8.4 % (ref 0–13.4)
NEUTROPHILS # BLD AUTO: 5.27 K/UL (ref 1.82–7.42)
NEUTROPHILS NFR BLD: 59.4 % (ref 44–72)
NRBC # BLD AUTO: 0 K/UL
NRBC BLD-RTO: 0 /100 WBC (ref 0–0.2)
PLATELET # BLD AUTO: 351 K/UL (ref 164–446)
PMV BLD AUTO: 10.2 FL (ref 9–12.9)
POTASSIUM SERPL-SCNC: 3.4 MMOL/L (ref 3.6–5.5)
PROT SERPL-MCNC: 6 G/DL (ref 6–8.2)
RBC # BLD AUTO: 4.11 M/UL (ref 4.2–5.4)
SODIUM SERPL-SCNC: 132 MMOL/L (ref 135–145)
WBC # BLD AUTO: 8.9 K/UL (ref 4.8–10.8)

## 2024-07-02 PROCEDURE — 83735 ASSAY OF MAGNESIUM: CPT

## 2024-07-02 PROCEDURE — 700105 HCHG RX REV CODE 258: Performed by: STUDENT IN AN ORGANIZED HEALTH CARE EDUCATION/TRAINING PROGRAM

## 2024-07-02 PROCEDURE — 770020 HCHG ROOM/CARE - TELE (206)

## 2024-07-02 PROCEDURE — 80053 COMPREHEN METABOLIC PANEL: CPT

## 2024-07-02 PROCEDURE — A9270 NON-COVERED ITEM OR SERVICE: HCPCS | Performed by: INTERNAL MEDICINE

## 2024-07-02 PROCEDURE — 51798 US URINE CAPACITY MEASURE: CPT

## 2024-07-02 PROCEDURE — 99223 1ST HOSP IP/OBS HIGH 75: CPT | Performed by: INTERNAL MEDICINE

## 2024-07-02 PROCEDURE — 700102 HCHG RX REV CODE 250 W/ 637 OVERRIDE(OP): Performed by: INTERNAL MEDICINE

## 2024-07-02 PROCEDURE — 85025 COMPLETE CBC W/AUTO DIFF WBC: CPT

## 2024-07-02 PROCEDURE — A9270 NON-COVERED ITEM OR SERVICE: HCPCS | Performed by: STUDENT IN AN ORGANIZED HEALTH CARE EDUCATION/TRAINING PROGRAM

## 2024-07-02 PROCEDURE — 700102 HCHG RX REV CODE 250 W/ 637 OVERRIDE(OP): Performed by: STUDENT IN AN ORGANIZED HEALTH CARE EDUCATION/TRAINING PROGRAM

## 2024-07-02 PROCEDURE — 94760 N-INVAS EAR/PLS OXIMETRY 1: CPT

## 2024-07-02 RX ORDER — NYSTATIN 100000 [USP'U]/G
POWDER TOPICAL 2 TIMES DAILY
Status: COMPLETED | OUTPATIENT
Start: 2024-07-02 | End: 2024-07-06

## 2024-07-02 RX ORDER — AMOXICILLIN 250 MG
2 CAPSULE ORAL EVERY EVENING
Status: DISCONTINUED | OUTPATIENT
Start: 2024-07-02 | End: 2024-07-08 | Stop reason: HOSPADM

## 2024-07-02 RX ORDER — HYDROXYZINE HYDROCHLORIDE 25 MG/1
50 TABLET, FILM COATED ORAL 3 TIMES DAILY PRN
Status: DISCONTINUED | OUTPATIENT
Start: 2024-07-02 | End: 2024-07-08 | Stop reason: HOSPADM

## 2024-07-02 RX ORDER — GABAPENTIN 100 MG/1
100 CAPSULE ORAL 4 TIMES DAILY
Status: DISCONTINUED | OUTPATIENT
Start: 2024-07-02 | End: 2024-07-08 | Stop reason: HOSPADM

## 2024-07-02 RX ORDER — ONDANSETRON 2 MG/ML
4 INJECTION INTRAMUSCULAR; INTRAVENOUS EVERY 4 HOURS PRN
Status: DISCONTINUED | OUTPATIENT
Start: 2024-07-02 | End: 2024-07-08 | Stop reason: HOSPADM

## 2024-07-02 RX ORDER — PROCHLORPERAZINE EDISYLATE 5 MG/ML
5-10 INJECTION INTRAMUSCULAR; INTRAVENOUS EVERY 4 HOURS PRN
Status: DISCONTINUED | OUTPATIENT
Start: 2024-07-02 | End: 2024-07-08 | Stop reason: HOSPADM

## 2024-07-02 RX ORDER — ONDANSETRON 4 MG/1
4 TABLET, ORALLY DISINTEGRATING ORAL EVERY 4 HOURS PRN
Status: DISCONTINUED | OUTPATIENT
Start: 2024-07-02 | End: 2024-07-08 | Stop reason: HOSPADM

## 2024-07-02 RX ORDER — SOTALOL HYDROCHLORIDE 80 MG/1
120 TABLET ORAL 2 TIMES DAILY
Status: DISCONTINUED | OUTPATIENT
Start: 2024-07-02 | End: 2024-07-08 | Stop reason: HOSPADM

## 2024-07-02 RX ORDER — PROMETHAZINE HYDROCHLORIDE 25 MG/1
12.5-25 TABLET ORAL EVERY 4 HOURS PRN
Status: DISCONTINUED | OUTPATIENT
Start: 2024-07-02 | End: 2024-07-08 | Stop reason: HOSPADM

## 2024-07-02 RX ORDER — ATORVASTATIN CALCIUM 20 MG/1
20 TABLET, FILM COATED ORAL
Status: DISCONTINUED | OUTPATIENT
Start: 2024-07-02 | End: 2024-07-08 | Stop reason: HOSPADM

## 2024-07-02 RX ORDER — ESCITALOPRAM OXALATE 10 MG/1
10 TABLET ORAL DAILY
Status: DISCONTINUED | OUTPATIENT
Start: 2024-07-02 | End: 2024-07-02

## 2024-07-02 RX ORDER — GABAPENTIN 100 MG/1
100 CAPSULE ORAL 4 TIMES DAILY
Status: DISCONTINUED | OUTPATIENT
Start: 2024-07-02 | End: 2024-07-02

## 2024-07-02 RX ORDER — ACETAMINOPHEN 325 MG/1
650 TABLET ORAL EVERY 6 HOURS PRN
Status: DISCONTINUED | OUTPATIENT
Start: 2024-07-02 | End: 2024-07-08 | Stop reason: HOSPADM

## 2024-07-02 RX ORDER — PROMETHAZINE HYDROCHLORIDE 25 MG/1
12.5-25 SUPPOSITORY RECTAL EVERY 4 HOURS PRN
Status: DISCONTINUED | OUTPATIENT
Start: 2024-07-02 | End: 2024-07-08 | Stop reason: HOSPADM

## 2024-07-02 RX ORDER — SODIUM CHLORIDE, SODIUM LACTATE, POTASSIUM CHLORIDE, AND CALCIUM CHLORIDE .6; .31; .03; .02 G/100ML; G/100ML; G/100ML; G/100ML
250 INJECTION, SOLUTION INTRAVENOUS ONCE
Status: DISCONTINUED | OUTPATIENT
Start: 2024-07-02 | End: 2024-07-03

## 2024-07-02 RX ORDER — ESCITALOPRAM OXALATE 10 MG/1
10 TABLET ORAL DAILY
Status: DISCONTINUED | OUTPATIENT
Start: 2024-07-02 | End: 2024-07-08 | Stop reason: HOSPADM

## 2024-07-02 RX ORDER — POLYETHYLENE GLYCOL 3350 17 G/17G
1 POWDER, FOR SOLUTION ORAL
Status: DISCONTINUED | OUTPATIENT
Start: 2024-07-02 | End: 2024-07-08 | Stop reason: HOSPADM

## 2024-07-02 RX ORDER — SODIUM CHLORIDE, SODIUM LACTATE, POTASSIUM CHLORIDE, AND CALCIUM CHLORIDE .6; .31; .03; .02 G/100ML; G/100ML; G/100ML; G/100ML
1000 INJECTION, SOLUTION INTRAVENOUS ONCE
Status: COMPLETED | OUTPATIENT
Start: 2024-07-02 | End: 2024-07-02

## 2024-07-02 RX ADMIN — GABAPENTIN 100 MG: 100 CAPSULE ORAL at 20:20

## 2024-07-02 RX ADMIN — SODIUM CHLORIDE, POTASSIUM CHLORIDE, SODIUM LACTATE AND CALCIUM CHLORIDE 1000 ML: 600; 310; 30; 20 INJECTION, SOLUTION INTRAVENOUS at 16:15

## 2024-07-02 RX ADMIN — GABAPENTIN 100 MG: 100 CAPSULE ORAL at 12:06

## 2024-07-02 RX ADMIN — GABAPENTIN 100 MG: 100 CAPSULE ORAL at 17:23

## 2024-07-02 RX ADMIN — NYSTATIN: 100000 POWDER TOPICAL at 06:19

## 2024-07-02 RX ADMIN — ACETAMINOPHEN 650 MG: 325 TABLET ORAL at 17:27

## 2024-07-02 RX ADMIN — ESCITALOPRAM OXALATE 10 MG: 10 TABLET ORAL at 12:05

## 2024-07-02 RX ADMIN — ATORVASTATIN CALCIUM 20 MG: 20 TABLET, FILM COATED ORAL at 20:20

## 2024-07-02 RX ADMIN — APIXABAN 5 MG: 5 TABLET, FILM COATED ORAL at 17:26

## 2024-07-02 RX ADMIN — APIXABAN 5 MG: 5 TABLET, FILM COATED ORAL at 08:33

## 2024-07-02 RX ADMIN — NYSTATIN: 100000 POWDER TOPICAL at 17:27

## 2024-07-02 ASSESSMENT — CHA2DS2 SCORE
AGE 75 OR GREATER: NO
CHA2DS2 VASC SCORE: 3
DIABETES: NO
SEX: FEMALE
CHF OR LEFT VENTRICULAR DYSFUNCTION: NO
HYPERTENSION: YES
AGE 65 TO 74: NO
PRIOR STROKE OR TIA OR THROMBOEMBOLISM: NO
VASCULAR DISEASE: YES

## 2024-07-02 ASSESSMENT — PAIN DESCRIPTION - PAIN TYPE
TYPE: ACUTE PAIN
TYPE: CHRONIC PAIN

## 2024-07-02 ASSESSMENT — ENCOUNTER SYMPTOMS
FALLS: 0
DIARRHEA: 0
VOMITING: 0
COUGH: 0
STRIDOR: 0
DEPRESSION: 0
TINGLING: 0
NAUSEA: 0
EYE REDNESS: 0
DIZZINESS: 1
FOCAL WEAKNESS: 0
SPUTUM PRODUCTION: 0
PALPITATIONS: 0
SHORTNESS OF BREATH: 0
HEADACHES: 0
MYALGIAS: 1
ABDOMINAL PAIN: 0
WEAKNESS: 1
CONSTIPATION: 0
FEVER: 0
CHILLS: 0
LOSS OF CONSCIOUSNESS: 0

## 2024-07-02 ASSESSMENT — COGNITIVE AND FUNCTIONAL STATUS - GENERAL
STANDING UP FROM CHAIR USING ARMS: TOTAL
STANDING UP FROM CHAIR USING ARMS: TOTAL
DRESSING REGULAR LOWER BODY CLOTHING: TOTAL
DAILY ACTIVITIY SCORE: 15
DRESSING REGULAR LOWER BODY CLOTHING: TOTAL
TOILETING: A LOT
MOBILITY SCORE: 8
MOBILITY SCORE: 8
TOILETING: A LOT
MOVING TO AND FROM BED TO CHAIR: A LOT
MOVING FROM LYING ON BACK TO SITTING ON SIDE OF FLAT BED: TOTAL
MOVING TO AND FROM BED TO CHAIR: A LOT
SUGGESTED CMS G CODE MODIFIER DAILY ACTIVITY: CK
MOVING FROM LYING ON BACK TO SITTING ON SIDE OF FLAT BED: TOTAL
DAILY ACTIVITIY SCORE: 15
CLIMB 3 TO 5 STEPS WITH RAILING: TOTAL
CLIMB 3 TO 5 STEPS WITH RAILING: TOTAL
TURNING FROM BACK TO SIDE WHILE IN FLAT BAD: A LOT
SUGGESTED CMS G CODE MODIFIER MOBILITY: CM
DRESSING REGULAR UPPER BODY CLOTHING: A LOT
WALKING IN HOSPITAL ROOM: TOTAL
WALKING IN HOSPITAL ROOM: TOTAL
SUGGESTED CMS G CODE MODIFIER MOBILITY: CM
HELP NEEDED FOR BATHING: A LOT
DRESSING REGULAR UPPER BODY CLOTHING: A LOT
HELP NEEDED FOR BATHING: A LOT
TURNING FROM BACK TO SIDE WHILE IN FLAT BAD: A LOT
SUGGESTED CMS G CODE MODIFIER DAILY ACTIVITY: CK

## 2024-07-02 ASSESSMENT — LIFESTYLE VARIABLES
ALCOHOL_USE: NO
CONSUMPTION TOTAL: NEGATIVE
TOTAL SCORE: 0
HOW MANY TIMES IN THE PAST YEAR HAVE YOU HAD 5 OR MORE DRINKS IN A DAY: 0
HAVE PEOPLE ANNOYED YOU BY CRITICIZING YOUR DRINKING: NO
TOTAL SCORE: 0
HAVE YOU EVER FELT YOU SHOULD CUT DOWN ON YOUR DRINKING: NO
EVER FELT BAD OR GUILTY ABOUT YOUR DRINKING: NO
AVERAGE NUMBER OF DAYS PER WEEK YOU HAVE A DRINK CONTAINING ALCOHOL: 0
EVER HAD A DRINK FIRST THING IN THE MORNING TO STEADY YOUR NERVES TO GET RID OF A HANGOVER: NO
ON A TYPICAL DAY WHEN YOU DRINK ALCOHOL HOW MANY DRINKS DO YOU HAVE: 0
TOTAL SCORE: 0

## 2024-07-02 ASSESSMENT — SOCIAL DETERMINANTS OF HEALTH (SDOH)
WITHIN THE LAST YEAR, HAVE YOU BEEN KICKED, HIT, SLAPPED, OR OTHERWISE PHYSICALLY HURT BY YOUR PARTNER OR EX-PARTNER?: NO
WITHIN THE LAST YEAR, HAVE YOU BEEN AFRAID OF YOUR PARTNER OR EX-PARTNER?: NO
WITHIN THE LAST YEAR, HAVE YOU BEEN HUMILIATED OR EMOTIONALLY ABUSED IN OTHER WAYS BY YOUR PARTNER OR EX-PARTNER?: NO
WITHIN THE LAST YEAR, HAVE TO BEEN RAPED OR FORCED TO HAVE ANY KIND OF SEXUAL ACTIVITY BY YOUR PARTNER OR EX-PARTNER?: NO

## 2024-07-02 ASSESSMENT — FIBROSIS 4 INDEX: FIB4 SCORE: 1.03

## 2024-07-03 ENCOUNTER — APPOINTMENT (OUTPATIENT)
Dept: RADIOLOGY | Facility: MEDICAL CENTER | Age: 59
DRG: 682 | End: 2024-07-03
Attending: INTERNAL MEDICINE
Payer: COMMERCIAL

## 2024-07-03 ENCOUNTER — APPOINTMENT (OUTPATIENT)
Dept: CARDIOLOGY | Facility: MEDICAL CENTER | Age: 59
DRG: 682 | End: 2024-07-03
Attending: INTERNAL MEDICINE
Payer: COMMERCIAL

## 2024-07-03 PROBLEM — N17.9 ACUTE RENAL FAILURE (HCC): Status: ACTIVE | Noted: 2024-07-03

## 2024-07-03 PROBLEM — Z90.5 H/O RIGHT NEPHRECTOMY: Chronic | Status: ACTIVE | Noted: 2024-07-03

## 2024-07-03 PROBLEM — Z75.8 DISCHARGE PLANNING ISSUES: Status: ACTIVE | Noted: 2024-07-03

## 2024-07-03 PROBLEM — E66.01 CLASS 3 SEVERE OBESITY DUE TO EXCESS CALORIES WITH SERIOUS COMORBIDITY AND BODY MASS INDEX (BMI) OF 50.0 TO 59.9 IN ADULT (HCC): Chronic | Status: ACTIVE | Noted: 2024-04-19

## 2024-07-03 LAB
ALBUMIN SERPL BCP-MCNC: 2.7 G/DL (ref 3.2–4.9)
ANISOCYTOSIS BLD QL SMEAR: ABNORMAL
BASOPHILS # BLD AUTO: 0.6 % (ref 0–1.8)
BASOPHILS # BLD: 0.04 K/UL (ref 0–0.12)
BUN SERPL-MCNC: 24 MG/DL (ref 8–22)
CALCIUM ALBUM COR SERPL-MCNC: 9.3 MG/DL (ref 8.5–10.5)
CALCIUM SERPL-MCNC: 8.3 MG/DL (ref 8.4–10.2)
CHLORIDE SERPL-SCNC: 96 MMOL/L (ref 96–112)
CK SERPL-CCNC: 23 U/L (ref 0–154)
CO2 SERPL-SCNC: 24 MMOL/L (ref 20–33)
CREAT SERPL-MCNC: 2.23 MG/DL (ref 0.5–1.4)
CREAT UR-MCNC: 260.89 MG/DL
D DIMER PPP IA.FEU-MCNC: 3.31 UG/ML (FEU) (ref 0–0.5)
DACRYOCYTES BLD QL SMEAR: NORMAL
EKG IMPRESSION: NORMAL
EOSINOPHIL # BLD AUTO: 0.6 K/UL (ref 0–0.51)
EOSINOPHIL NFR BLD: 8.3 % (ref 0–6.9)
ERYTHROCYTE [DISTWIDTH] IN BLOOD BY AUTOMATED COUNT: 47.8 FL (ref 35.9–50)
FERRITIN SERPL-MCNC: 52.6 NG/ML (ref 10–291)
GFR SERPLBLD CREATININE-BSD FMLA CKD-EPI: 25 ML/MIN/1.73 M 2
GIANT PLATELETS BLD QL SMEAR: NORMAL
GLUCOSE SERPL-MCNC: 85 MG/DL (ref 65–99)
HCT VFR BLD AUTO: 28.7 % (ref 37–47)
HGB BLD-MCNC: 8.4 G/DL (ref 12–16)
HYPOCHROMIA BLD QL SMEAR: ABNORMAL
IMM GRANULOCYTES # BLD AUTO: 0.02 K/UL (ref 0–0.11)
IMM GRANULOCYTES NFR BLD AUTO: 0.3 % (ref 0–0.9)
INR PPP: 1.64 (ref 0.87–1.13)
IRON SATN MFR SERPL: 9 % (ref 15–55)
IRON SERPL-MCNC: 23 UG/DL (ref 40–170)
LACTATE SERPL-SCNC: 0.9 MMOL/L (ref 0.5–2)
LG PLATELETS BLD QL SMEAR: NORMAL
LV EJECT FRACT  99904: 75
LV EJECT FRACT MOD 2C 99903: 91.69
LV EJECT FRACT MOD 4C 99902: 86.65
LV EJECT FRACT MOD BP 99901: 89.45
LYMPHOCYTES # BLD AUTO: 2.3 K/UL (ref 1–4.8)
LYMPHOCYTES NFR BLD: 31.7 % (ref 22–41)
MAGNESIUM SERPL-MCNC: 1.9 MG/DL (ref 1.5–2.5)
MCH RBC QN AUTO: 21.6 PG (ref 27–33)
MCHC RBC AUTO-ENTMCNC: 29.3 G/DL (ref 32.2–35.5)
MCV RBC AUTO: 74 FL (ref 81.4–97.8)
MICROCYTES BLD QL SMEAR: ABNORMAL
MONOCYTES # BLD AUTO: 0.65 K/UL (ref 0–0.85)
MONOCYTES NFR BLD AUTO: 9 % (ref 0–13.4)
NEUTROPHILS # BLD AUTO: 3.64 K/UL (ref 1.82–7.42)
NEUTROPHILS NFR BLD: 50.1 % (ref 44–72)
NRBC # BLD AUTO: 0 K/UL
NRBC BLD-RTO: 0 /100 WBC (ref 0–0.2)
OVALOCYTES BLD QL SMEAR: NORMAL
PHOSPHATE SERPL-MCNC: 5.7 MG/DL (ref 2.5–4.5)
PLATELET # BLD AUTO: 310 K/UL (ref 164–446)
PLATELET BLD QL SMEAR: NORMAL
PMV BLD AUTO: 10.2 FL (ref 9–12.9)
POLYCHROMASIA BLD QL SMEAR: NORMAL
POTASSIUM SERPL-SCNC: 3.7 MMOL/L (ref 3.6–5.5)
PROCALCITONIN SERPL-MCNC: 0.11 NG/ML
PROT UR-MCNC: 14 MG/DL (ref 0–15)
PROT/CREAT UR: 54 MG/G (ref 10–107)
PROTHROMBIN TIME: 20 SEC (ref 12–14.6)
RBC # BLD AUTO: 3.88 M/UL (ref 4.2–5.4)
RBC BLD AUTO: PRESENT
SCHISTOCYTES BLD QL SMEAR: NORMAL
SODIUM SERPL-SCNC: 132 MMOL/L (ref 135–145)
SODIUM UR-SCNC: 22 MMOL/L
TIBC SERPL-MCNC: 258 UG/DL (ref 250–450)
TROPONIN T SERPL-MCNC: 17 NG/L (ref 6–19)
UIBC SERPL-MCNC: 235 UG/DL (ref 110–370)
WBC # BLD AUTO: 7.3 K/UL (ref 4.8–10.8)

## 2024-07-03 PROCEDURE — 51798 US URINE CAPACITY MEASURE: CPT

## 2024-07-03 PROCEDURE — A9270 NON-COVERED ITEM OR SERVICE: HCPCS | Performed by: INTERNAL MEDICINE

## 2024-07-03 PROCEDURE — 94760 N-INVAS EAR/PLS OXIMETRY 1: CPT

## 2024-07-03 PROCEDURE — 306000 HYDROGEL 1.0 OZ: Performed by: INTERNAL MEDICINE

## 2024-07-03 PROCEDURE — 84484 ASSAY OF TROPONIN QUANT: CPT

## 2024-07-03 PROCEDURE — A9270 NON-COVERED ITEM OR SERVICE: HCPCS | Performed by: STUDENT IN AN ORGANIZED HEALTH CARE EDUCATION/TRAINING PROGRAM

## 2024-07-03 PROCEDURE — 84145 PROCALCITONIN (PCT): CPT

## 2024-07-03 PROCEDURE — 700101 HCHG RX REV CODE 250: Performed by: INTERNAL MEDICINE

## 2024-07-03 PROCEDURE — 84300 ASSAY OF URINE SODIUM: CPT

## 2024-07-03 PROCEDURE — 82570 ASSAY OF URINE CREATININE: CPT

## 2024-07-03 PROCEDURE — 84156 ASSAY OF PROTEIN URINE: CPT

## 2024-07-03 PROCEDURE — 93306 TTE W/DOPPLER COMPLETE: CPT

## 2024-07-03 PROCEDURE — 83540 ASSAY OF IRON: CPT

## 2024-07-03 PROCEDURE — 82728 ASSAY OF FERRITIN: CPT

## 2024-07-03 PROCEDURE — 76775 US EXAM ABDO BACK WALL LIM: CPT

## 2024-07-03 PROCEDURE — 85025 COMPLETE CBC W/AUTO DIFF WBC: CPT

## 2024-07-03 PROCEDURE — 97162 PT EVAL MOD COMPLEX 30 MIN: CPT

## 2024-07-03 PROCEDURE — 700117 HCHG RX CONTRAST REV CODE 255: Performed by: INTERNAL MEDICINE

## 2024-07-03 PROCEDURE — 700102 HCHG RX REV CODE 250 W/ 637 OVERRIDE(OP): Performed by: INTERNAL MEDICINE

## 2024-07-03 PROCEDURE — 83550 IRON BINDING TEST: CPT

## 2024-07-03 PROCEDURE — 99233 SBSQ HOSP IP/OBS HIGH 50: CPT | Performed by: INTERNAL MEDICINE

## 2024-07-03 PROCEDURE — 82550 ASSAY OF CK (CPK): CPT

## 2024-07-03 PROCEDURE — 80069 RENAL FUNCTION PANEL: CPT

## 2024-07-03 PROCEDURE — 93010 ELECTROCARDIOGRAM REPORT: CPT | Performed by: STUDENT IN AN ORGANIZED HEALTH CARE EDUCATION/TRAINING PROGRAM

## 2024-07-03 PROCEDURE — 94664 DEMO&/EVAL PT USE INHALER: CPT

## 2024-07-03 PROCEDURE — 93306 TTE W/DOPPLER COMPLETE: CPT | Mod: 26 | Performed by: INTERNAL MEDICINE

## 2024-07-03 PROCEDURE — 93005 ELECTROCARDIOGRAM TRACING: CPT | Performed by: INTERNAL MEDICINE

## 2024-07-03 PROCEDURE — 700111 HCHG RX REV CODE 636 W/ 250 OVERRIDE (IP): Performed by: INTERNAL MEDICINE

## 2024-07-03 PROCEDURE — 700102 HCHG RX REV CODE 250 W/ 637 OVERRIDE(OP): Performed by: STUDENT IN AN ORGANIZED HEALTH CARE EDUCATION/TRAINING PROGRAM

## 2024-07-03 PROCEDURE — 83605 ASSAY OF LACTIC ACID: CPT

## 2024-07-03 PROCEDURE — 94640 AIRWAY INHALATION TREATMENT: CPT

## 2024-07-03 PROCEDURE — 85610 PROTHROMBIN TIME: CPT

## 2024-07-03 PROCEDURE — 97602 WOUND(S) CARE NON-SELECTIVE: CPT

## 2024-07-03 PROCEDURE — 87040 BLOOD CULTURE FOR BACTERIA: CPT | Mod: 91

## 2024-07-03 PROCEDURE — 85379 FIBRIN DEGRADATION QUANT: CPT

## 2024-07-03 PROCEDURE — 83735 ASSAY OF MAGNESIUM: CPT

## 2024-07-03 PROCEDURE — 770020 HCHG ROOM/CARE - TELE (206)

## 2024-07-03 PROCEDURE — 700105 HCHG RX REV CODE 258: Performed by: INTERNAL MEDICINE

## 2024-07-03 RX ORDER — HYDROCODONE BITARTRATE AND ACETAMINOPHEN 5; 325 MG/1; MG/1
1 TABLET ORAL EVERY 6 HOURS PRN
Status: DISCONTINUED | OUTPATIENT
Start: 2024-07-03 | End: 2024-07-08 | Stop reason: HOSPADM

## 2024-07-03 RX ORDER — SODIUM CHLORIDE 9 MG/ML
30 INJECTION, SOLUTION INTRAVENOUS
Status: COMPLETED | OUTPATIENT
Start: 2024-07-03 | End: 2024-07-03

## 2024-07-03 RX ORDER — HYDROCODONE BITARTRATE AND ACETAMINOPHEN 10; 325 MG/1; MG/1
1 TABLET ORAL EVERY 6 HOURS PRN
Status: DISCONTINUED | OUTPATIENT
Start: 2024-07-03 | End: 2024-07-08 | Stop reason: HOSPADM

## 2024-07-03 RX ADMIN — SENNOSIDES AND DOCUSATE SODIUM 2 TABLET: 50; 8.6 TABLET ORAL at 17:49

## 2024-07-03 RX ADMIN — HYDROCODONE BITARTRATE AND ACETAMINOPHEN 1 TABLET: 10; 325 TABLET ORAL at 17:58

## 2024-07-03 RX ADMIN — GABAPENTIN 100 MG: 100 CAPSULE ORAL at 20:43

## 2024-07-03 RX ADMIN — APIXABAN 5 MG: 5 TABLET, FILM COATED ORAL at 17:49

## 2024-07-03 RX ADMIN — APIXABAN 5 MG: 5 TABLET, FILM COATED ORAL at 05:46

## 2024-07-03 RX ADMIN — ATORVASTATIN CALCIUM 20 MG: 20 TABLET, FILM COATED ORAL at 20:43

## 2024-07-03 RX ADMIN — GABAPENTIN 100 MG: 100 CAPSULE ORAL at 17:49

## 2024-07-03 RX ADMIN — GABAPENTIN 100 MG: 100 CAPSULE ORAL at 13:17

## 2024-07-03 RX ADMIN — MOMETASONE FUROATE AND FORMOTEROL FUMARATE DIHYDRATE 2 PUFF: 200; 5 AEROSOL RESPIRATORY (INHALATION) at 06:34

## 2024-07-03 RX ADMIN — GABAPENTIN 100 MG: 100 CAPSULE ORAL at 09:13

## 2024-07-03 RX ADMIN — NYSTATIN: 100000 POWDER TOPICAL at 17:49

## 2024-07-03 RX ADMIN — SODIUM CHLORIDE 250 MG: 9 INJECTION, SOLUTION INTRAVENOUS at 17:48

## 2024-07-03 RX ADMIN — HYDROXYZINE HYDROCHLORIDE 50 MG: 25 TABLET, FILM COATED ORAL at 09:28

## 2024-07-03 RX ADMIN — NYSTATIN: 100000 POWDER TOPICAL at 05:46

## 2024-07-03 RX ADMIN — VANCOMYCIN HYDROCHLORIDE 3 G: 5 INJECTION, POWDER, LYOPHILIZED, FOR SOLUTION INTRAVENOUS at 20:48

## 2024-07-03 RX ADMIN — ESCITALOPRAM OXALATE 10 MG: 10 TABLET ORAL at 05:46

## 2024-07-03 RX ADMIN — SODIUM CHLORIDE: 9 INJECTION, SOLUTION INTRAVENOUS at 20:43

## 2024-07-03 RX ADMIN — HUMAN ALBUMIN MICROSPHERES AND PERFLUTREN 3 ML: 10; .22 INJECTION, SOLUTION INTRAVENOUS at 18:30

## 2024-07-03 ASSESSMENT — COGNITIVE AND FUNCTIONAL STATUS - GENERAL
WALKING IN HOSPITAL ROOM: TOTAL
MOVING FROM LYING ON BACK TO SITTING ON SIDE OF FLAT BED: A LOT
STANDING UP FROM CHAIR USING ARMS: TOTAL
TURNING FROM BACK TO SIDE WHILE IN FLAT BAD: A LOT
MOBILITY SCORE: 8
MOVING TO AND FROM BED TO CHAIR: TOTAL
CLIMB 3 TO 5 STEPS WITH RAILING: TOTAL
SUGGESTED CMS G CODE MODIFIER MOBILITY: CM

## 2024-07-03 ASSESSMENT — PAIN DESCRIPTION - PAIN TYPE
TYPE: ACUTE PAIN
TYPE: ACUTE PAIN

## 2024-07-03 ASSESSMENT — ENCOUNTER SYMPTOMS
NERVOUS/ANXIOUS: 1
WEAKNESS: 1

## 2024-07-03 ASSESSMENT — FIBROSIS 4 INDEX: FIB4 SCORE: 1.27

## 2024-07-03 ASSESSMENT — GAIT ASSESSMENTS: GAIT LEVEL OF ASSIST: UNABLE TO PARTICIPATE

## 2024-07-04 ENCOUNTER — APPOINTMENT (OUTPATIENT)
Dept: RADIOLOGY | Facility: MEDICAL CENTER | Age: 59
DRG: 682 | End: 2024-07-04
Attending: INTERNAL MEDICINE
Payer: COMMERCIAL

## 2024-07-04 PROBLEM — I05.0 MILD MITRAL STENOSIS BY PRIOR ECHOCARDIOGRAM: Chronic | Status: ACTIVE | Noted: 2024-07-04

## 2024-07-04 PROBLEM — I35.0 AORTIC STENOSIS, MODERATE: Chronic | Status: ACTIVE | Noted: 2024-07-04

## 2024-07-04 LAB
ABO + RH BLD: NORMAL
ABO GROUP BLD: NORMAL
ALBUMIN SERPL BCP-MCNC: 2.5 G/DL (ref 3.2–4.9)
APPEARANCE UR: CLEAR
BARCODED ABORH UBTYP: 1700
BARCODED PRD CODE UBPRD: NORMAL
BARCODED UNIT NUM UBUNT: NORMAL
BILIRUB UR QL STRIP.AUTO: NEGATIVE
BLD GP AB SCN SERPL QL: NORMAL
BUN SERPL-MCNC: 27 MG/DL (ref 8–22)
CALCIUM ALBUM COR SERPL-MCNC: 9 MG/DL (ref 8.5–10.5)
CALCIUM SERPL-MCNC: 7.8 MG/DL (ref 8.4–10.2)
CHLORIDE SERPL-SCNC: 101 MMOL/L (ref 96–112)
CO2 SERPL-SCNC: 26 MMOL/L (ref 20–33)
COLOR UR: YELLOW
COMPONENT R 8504R: NORMAL
CREAT SERPL-MCNC: 1.91 MG/DL (ref 0.5–1.4)
ERYTHROCYTE [DISTWIDTH] IN BLOOD BY AUTOMATED COUNT: 48 FL (ref 35.9–50)
ERYTHROCYTE [DISTWIDTH] IN BLOOD BY AUTOMATED COUNT: 48.4 FL (ref 35.9–50)
GFR SERPLBLD CREATININE-BSD FMLA CKD-EPI: 30 ML/MIN/1.73 M 2
GLUCOSE SERPL-MCNC: 98 MG/DL (ref 65–99)
GLUCOSE UR STRIP.AUTO-MCNC: NEGATIVE MG/DL
HCT VFR BLD AUTO: 25.4 % (ref 37–47)
HCT VFR BLD AUTO: 31 % (ref 37–47)
HGB BLD-MCNC: 7.4 G/DL (ref 12–16)
HGB BLD-MCNC: 9.2 G/DL (ref 12–16)
KETONES UR STRIP.AUTO-MCNC: NEGATIVE MG/DL
LEUKOCYTE ESTERASE UR QL STRIP.AUTO: NEGATIVE
MAGNESIUM SERPL-MCNC: 2 MG/DL (ref 1.5–2.5)
MCH RBC QN AUTO: 21.8 PG (ref 27–33)
MCH RBC QN AUTO: 22.4 PG (ref 27–33)
MCHC RBC AUTO-ENTMCNC: 29.1 G/DL (ref 32.2–35.5)
MCHC RBC AUTO-ENTMCNC: 29.7 G/DL (ref 32.2–35.5)
MCV RBC AUTO: 74.9 FL (ref 81.4–97.8)
MCV RBC AUTO: 75.6 FL (ref 81.4–97.8)
MICRO URNS: NORMAL
NITRITE UR QL STRIP.AUTO: NEGATIVE
PH UR STRIP.AUTO: 5.5 [PH] (ref 5–8)
PHOSPHATE SERPL-MCNC: 4.9 MG/DL (ref 2.5–4.5)
PLATELET # BLD AUTO: 294 K/UL (ref 164–446)
PLATELET # BLD AUTO: 329 K/UL (ref 164–446)
PMV BLD AUTO: 10 FL (ref 9–12.9)
PMV BLD AUTO: 10.2 FL (ref 9–12.9)
POTASSIUM SERPL-SCNC: 3.5 MMOL/L (ref 3.6–5.5)
PRODUCT TYPE UPROD: NORMAL
PROT UR QL STRIP: NEGATIVE MG/DL
RBC # BLD AUTO: 3.39 M/UL (ref 4.2–5.4)
RBC # BLD AUTO: 4.1 M/UL (ref 4.2–5.4)
RBC UR QL AUTO: NEGATIVE
RH BLD: NORMAL
SCCMEC + MECA PNL NOSE NAA+PROBE: NEGATIVE
SODIUM SERPL-SCNC: 136 MMOL/L (ref 135–145)
SP GR UR STRIP.AUTO: 1.02
UNIT STATUS USTAT: NORMAL
WBC # BLD AUTO: 6.2 K/UL (ref 4.8–10.8)
WBC # BLD AUTO: 6.3 K/UL (ref 4.8–10.8)

## 2024-07-04 PROCEDURE — 80069 RENAL FUNCTION PANEL: CPT

## 2024-07-04 PROCEDURE — A9270 NON-COVERED ITEM OR SERVICE: HCPCS | Performed by: STUDENT IN AN ORGANIZED HEALTH CARE EDUCATION/TRAINING PROGRAM

## 2024-07-04 PROCEDURE — P9016 RBC LEUKOCYTES REDUCED: HCPCS

## 2024-07-04 PROCEDURE — 86923 COMPATIBILITY TEST ELECTRIC: CPT

## 2024-07-04 PROCEDURE — 770020 HCHG ROOM/CARE - TELE (206)

## 2024-07-04 PROCEDURE — A9270 NON-COVERED ITEM OR SERVICE: HCPCS | Performed by: INTERNAL MEDICINE

## 2024-07-04 PROCEDURE — 700111 HCHG RX REV CODE 636 W/ 250 OVERRIDE (IP): Mod: JZ | Performed by: INTERNAL MEDICINE

## 2024-07-04 PROCEDURE — 97166 OT EVAL MOD COMPLEX 45 MIN: CPT

## 2024-07-04 PROCEDURE — 81003 URINALYSIS AUTO W/O SCOPE: CPT

## 2024-07-04 PROCEDURE — 86900 BLOOD TYPING SEROLOGIC ABO: CPT

## 2024-07-04 PROCEDURE — 93970 EXTREMITY STUDY: CPT

## 2024-07-04 PROCEDURE — 87641 MR-STAPH DNA AMP PROBE: CPT

## 2024-07-04 PROCEDURE — 83735 ASSAY OF MAGNESIUM: CPT

## 2024-07-04 PROCEDURE — 99233 SBSQ HOSP IP/OBS HIGH 50: CPT | Performed by: INTERNAL MEDICINE

## 2024-07-04 PROCEDURE — 700105 HCHG RX REV CODE 258: Performed by: INTERNAL MEDICINE

## 2024-07-04 PROCEDURE — 86901 BLOOD TYPING SEROLOGIC RH(D): CPT

## 2024-07-04 PROCEDURE — 700102 HCHG RX REV CODE 250 W/ 637 OVERRIDE(OP): Performed by: INTERNAL MEDICINE

## 2024-07-04 PROCEDURE — 86850 RBC ANTIBODY SCREEN: CPT

## 2024-07-04 PROCEDURE — 36430 TRANSFUSION BLD/BLD COMPNT: CPT

## 2024-07-04 PROCEDURE — 700102 HCHG RX REV CODE 250 W/ 637 OVERRIDE(OP): Performed by: STUDENT IN AN ORGANIZED HEALTH CARE EDUCATION/TRAINING PROGRAM

## 2024-07-04 PROCEDURE — 30243N1 TRANSFUSION OF NONAUTOLOGOUS RED BLOOD CELLS INTO CENTRAL VEIN, PERCUTANEOUS APPROACH: ICD-10-PCS | Performed by: INTERNAL MEDICINE

## 2024-07-04 PROCEDURE — 85027 COMPLETE CBC AUTOMATED: CPT | Mod: 91

## 2024-07-04 RX ORDER — SODIUM CHLORIDE, SODIUM LACTATE, POTASSIUM CHLORIDE, AND CALCIUM CHLORIDE .6; .31; .03; .02 G/100ML; G/100ML; G/100ML; G/100ML
500 INJECTION, SOLUTION INTRAVENOUS ONCE
Status: COMPLETED | OUTPATIENT
Start: 2024-07-04 | End: 2024-07-04

## 2024-07-04 RX ORDER — SODIUM CHLORIDE 9 MG/ML
INJECTION, SOLUTION INTRAVENOUS CONTINUOUS
Status: ACTIVE | OUTPATIENT
Start: 2024-07-04 | End: 2024-07-04

## 2024-07-04 RX ADMIN — SODIUM CHLORIDE 250 MG: 9 INJECTION, SOLUTION INTRAVENOUS at 17:35

## 2024-07-04 RX ADMIN — GABAPENTIN 100 MG: 100 CAPSULE ORAL at 17:35

## 2024-07-04 RX ADMIN — AMPICILLIN AND SULBACTAM 3 G: 1; 2 INJECTION, POWDER, FOR SOLUTION INTRAMUSCULAR; INTRAVENOUS at 14:01

## 2024-07-04 RX ADMIN — AMPICILLIN AND SULBACTAM 3 G: 1; 2 INJECTION, POWDER, FOR SOLUTION INTRAMUSCULAR; INTRAVENOUS at 17:48

## 2024-07-04 RX ADMIN — SENNOSIDES AND DOCUSATE SODIUM 2 TABLET: 50; 8.6 TABLET ORAL at 17:35

## 2024-07-04 RX ADMIN — GABAPENTIN 100 MG: 100 CAPSULE ORAL at 10:11

## 2024-07-04 RX ADMIN — ATORVASTATIN CALCIUM 20 MG: 20 TABLET, FILM COATED ORAL at 20:40

## 2024-07-04 RX ADMIN — HYDROCODONE BITARTRATE AND ACETAMINOPHEN 1 TABLET: 10; 325 TABLET ORAL at 20:40

## 2024-07-04 RX ADMIN — GABAPENTIN 100 MG: 100 CAPSULE ORAL at 20:40

## 2024-07-04 RX ADMIN — GABAPENTIN 100 MG: 100 CAPSULE ORAL at 13:44

## 2024-07-04 RX ADMIN — SODIUM CHLORIDE 250 MG: 9 INJECTION, SOLUTION INTRAVENOUS at 07:38

## 2024-07-04 RX ADMIN — SOTALOL HYDROCHLORIDE 120 MG: 80 TABLET ORAL at 17:36

## 2024-07-04 RX ADMIN — NYSTATIN: 100000 POWDER TOPICAL at 06:14

## 2024-07-04 RX ADMIN — SODIUM CHLORIDE: 9 INJECTION, SOLUTION INTRAVENOUS at 10:09

## 2024-07-04 RX ADMIN — NYSTATIN: 100000 POWDER TOPICAL at 19:39

## 2024-07-04 RX ADMIN — HYDROCODONE BITARTRATE AND ACETAMINOPHEN 1 TABLET: 10; 325 TABLET ORAL at 01:04

## 2024-07-04 RX ADMIN — ESCITALOPRAM OXALATE 10 MG: 10 TABLET ORAL at 06:13

## 2024-07-04 RX ADMIN — SODIUM CHLORIDE, POTASSIUM CHLORIDE, SODIUM LACTATE AND CALCIUM CHLORIDE 500 ML: 600; 310; 30; 20 INJECTION, SOLUTION INTRAVENOUS at 04:26

## 2024-07-04 ASSESSMENT — COGNITIVE AND FUNCTIONAL STATUS - GENERAL
HELP NEEDED FOR BATHING: TOTAL
TOILETING: TOTAL
SUGGESTED CMS G CODE MODIFIER DAILY ACTIVITY: CL
DAILY ACTIVITIY SCORE: 13
DRESSING REGULAR UPPER BODY CLOTHING: A LOT
DRESSING REGULAR LOWER BODY CLOTHING: TOTAL

## 2024-07-04 ASSESSMENT — ENCOUNTER SYMPTOMS
NERVOUS/ANXIOUS: 1
WEAKNESS: 1

## 2024-07-04 ASSESSMENT — PAIN DESCRIPTION - PAIN TYPE
TYPE: ACUTE PAIN

## 2024-07-04 ASSESSMENT — ACTIVITIES OF DAILY LIVING (ADL): TOILETING: REQUIRES ASSIST

## 2024-07-05 ENCOUNTER — APPOINTMENT (OUTPATIENT)
Dept: RADIOLOGY | Facility: MEDICAL CENTER | Age: 59
DRG: 682 | End: 2024-07-05
Attending: INTERNAL MEDICINE
Payer: COMMERCIAL

## 2024-07-05 PROBLEM — N17.0 ACUTE RENAL FAILURE WITH TUBULAR NECROSIS (HCC): Status: ACTIVE | Noted: 2024-07-03

## 2024-07-05 PROBLEM — R79.89 D-DIMER, ELEVATED: Status: ACTIVE | Noted: 2024-07-05

## 2024-07-05 LAB
ALBUMIN SERPL BCP-MCNC: 2.4 G/DL (ref 3.2–4.9)
BUN SERPL-MCNC: 24 MG/DL (ref 8–22)
CALCIUM ALBUM COR SERPL-MCNC: 9.6 MG/DL (ref 8.5–10.5)
CALCIUM SERPL-MCNC: 8.3 MG/DL (ref 8.4–10.2)
CHLORIDE SERPL-SCNC: 105 MMOL/L (ref 96–112)
CO2 SERPL-SCNC: 23 MMOL/L (ref 20–33)
CREAT SERPL-MCNC: 1.35 MG/DL (ref 0.5–1.4)
ERYTHROCYTE [DISTWIDTH] IN BLOOD BY AUTOMATED COUNT: 48 FL (ref 35.9–50)
GFR SERPLBLD CREATININE-BSD FMLA CKD-EPI: 45 ML/MIN/1.73 M 2
GLUCOSE SERPL-MCNC: 96 MG/DL (ref 65–99)
HCT VFR BLD AUTO: 28.5 % (ref 37–47)
HGB BLD-MCNC: 8.5 G/DL (ref 12–16)
MAGNESIUM SERPL-MCNC: 1.9 MG/DL (ref 1.5–2.5)
MCH RBC QN AUTO: 22.4 PG (ref 27–33)
MCHC RBC AUTO-ENTMCNC: 29.8 G/DL (ref 32.2–35.5)
MCV RBC AUTO: 75 FL (ref 81.4–97.8)
PHOSPHATE SERPL-MCNC: 3.5 MG/DL (ref 2.5–4.5)
PLATELET # BLD AUTO: 321 K/UL (ref 164–446)
PMV BLD AUTO: 10.8 FL (ref 9–12.9)
POTASSIUM SERPL-SCNC: 4.1 MMOL/L (ref 3.6–5.5)
RBC # BLD AUTO: 3.8 M/UL (ref 4.2–5.4)
SODIUM SERPL-SCNC: 136 MMOL/L (ref 135–145)
WBC # BLD AUTO: 7.1 K/UL (ref 4.8–10.8)

## 2024-07-05 PROCEDURE — 700102 HCHG RX REV CODE 250 W/ 637 OVERRIDE(OP): Performed by: STUDENT IN AN ORGANIZED HEALTH CARE EDUCATION/TRAINING PROGRAM

## 2024-07-05 PROCEDURE — 700102 HCHG RX REV CODE 250 W/ 637 OVERRIDE(OP): Performed by: INTERNAL MEDICINE

## 2024-07-05 PROCEDURE — 700111 HCHG RX REV CODE 636 W/ 250 OVERRIDE (IP): Mod: JZ | Performed by: INTERNAL MEDICINE

## 2024-07-05 PROCEDURE — 99233 SBSQ HOSP IP/OBS HIGH 50: CPT | Performed by: INTERNAL MEDICINE

## 2024-07-05 PROCEDURE — 71275 CT ANGIOGRAPHY CHEST: CPT

## 2024-07-05 PROCEDURE — 85027 COMPLETE CBC AUTOMATED: CPT

## 2024-07-05 PROCEDURE — A9270 NON-COVERED ITEM OR SERVICE: HCPCS | Performed by: INTERNAL MEDICINE

## 2024-07-05 PROCEDURE — 700105 HCHG RX REV CODE 258: Performed by: INTERNAL MEDICINE

## 2024-07-05 PROCEDURE — A9270 NON-COVERED ITEM OR SERVICE: HCPCS | Performed by: STUDENT IN AN ORGANIZED HEALTH CARE EDUCATION/TRAINING PROGRAM

## 2024-07-05 PROCEDURE — 83735 ASSAY OF MAGNESIUM: CPT

## 2024-07-05 PROCEDURE — 94760 N-INVAS EAR/PLS OXIMETRY 1: CPT

## 2024-07-05 PROCEDURE — 770020 HCHG ROOM/CARE - TELE (206)

## 2024-07-05 PROCEDURE — 80069 RENAL FUNCTION PANEL: CPT

## 2024-07-05 PROCEDURE — 700117 HCHG RX CONTRAST REV CODE 255: Performed by: INTERNAL MEDICINE

## 2024-07-05 RX ADMIN — AMPICILLIN AND SULBACTAM 3 G: 1; 2 INJECTION, POWDER, FOR SOLUTION INTRAMUSCULAR; INTRAVENOUS at 23:29

## 2024-07-05 RX ADMIN — AMPICILLIN AND SULBACTAM 3 G: 1; 2 INJECTION, POWDER, FOR SOLUTION INTRAMUSCULAR; INTRAVENOUS at 00:16

## 2024-07-05 RX ADMIN — POLYETHYLENE GLYCOL 3350 1 PACKET: 17 POWDER, FOR SOLUTION ORAL at 05:45

## 2024-07-05 RX ADMIN — GABAPENTIN 100 MG: 100 CAPSULE ORAL at 12:22

## 2024-07-05 RX ADMIN — GABAPENTIN 100 MG: 100 CAPSULE ORAL at 17:52

## 2024-07-05 RX ADMIN — SODIUM CHLORIDE 250 MG: 9 INJECTION, SOLUTION INTRAVENOUS at 07:36

## 2024-07-05 RX ADMIN — IOHEXOL 70 ML: 350 INJECTION, SOLUTION INTRAVENOUS at 17:23

## 2024-07-05 RX ADMIN — ESCITALOPRAM OXALATE 10 MG: 10 TABLET ORAL at 05:44

## 2024-07-05 RX ADMIN — AMPICILLIN AND SULBACTAM 3 G: 1; 2 INJECTION, POWDER, FOR SOLUTION INTRAMUSCULAR; INTRAVENOUS at 05:49

## 2024-07-05 RX ADMIN — GABAPENTIN 100 MG: 100 CAPSULE ORAL at 09:28

## 2024-07-05 RX ADMIN — AMPICILLIN AND SULBACTAM 3 G: 1; 2 INJECTION, POWDER, FOR SOLUTION INTRAMUSCULAR; INTRAVENOUS at 18:00

## 2024-07-05 RX ADMIN — NYSTATIN: 100000 POWDER TOPICAL at 18:03

## 2024-07-05 RX ADMIN — GABAPENTIN 100 MG: 100 CAPSULE ORAL at 21:05

## 2024-07-05 RX ADMIN — SENNOSIDES AND DOCUSATE SODIUM 2 TABLET: 50; 8.6 TABLET ORAL at 17:53

## 2024-07-05 RX ADMIN — SOTALOL HYDROCHLORIDE 120 MG: 80 TABLET ORAL at 18:01

## 2024-07-05 RX ADMIN — HYDROCODONE BITARTRATE AND ACETAMINOPHEN 1 TABLET: 10; 325 TABLET ORAL at 17:53

## 2024-07-05 RX ADMIN — ATORVASTATIN CALCIUM 20 MG: 20 TABLET, FILM COATED ORAL at 21:05

## 2024-07-05 RX ADMIN — AMPICILLIN AND SULBACTAM 3 G: 1; 2 INJECTION, POWDER, FOR SOLUTION INTRAMUSCULAR; INTRAVENOUS at 12:22

## 2024-07-05 RX ADMIN — NYSTATIN: 100000 POWDER TOPICAL at 05:45

## 2024-07-05 ASSESSMENT — ENCOUNTER SYMPTOMS
WEAKNESS: 1
NERVOUS/ANXIOUS: 1

## 2024-07-05 ASSESSMENT — FIBROSIS 4 INDEX: FIB4 SCORE: 1.39

## 2024-07-05 ASSESSMENT — PAIN DESCRIPTION - PAIN TYPE
TYPE: ACUTE PAIN
TYPE: ACUTE PAIN

## 2024-07-06 PROBLEM — Z78.9 IMPAIRED INSTRUMENTAL ACTIVITIES OF DAILY LIVING (IADL): Status: ACTIVE | Noted: 2024-07-06

## 2024-07-06 LAB
ALBUMIN SERPL BCP-MCNC: 2.4 G/DL (ref 3.2–4.9)
BUN SERPL-MCNC: 19 MG/DL (ref 8–22)
CALCIUM ALBUM COR SERPL-MCNC: 9.6 MG/DL (ref 8.5–10.5)
CALCIUM SERPL-MCNC: 8.3 MG/DL (ref 8.4–10.2)
CHLORIDE SERPL-SCNC: 104 MMOL/L (ref 96–112)
CO2 SERPL-SCNC: 20 MMOL/L (ref 20–33)
CREAT SERPL-MCNC: 0.97 MG/DL (ref 0.5–1.4)
ERYTHROCYTE [DISTWIDTH] IN BLOOD BY AUTOMATED COUNT: 49.9 FL (ref 35.9–50)
GFR SERPLBLD CREATININE-BSD FMLA CKD-EPI: 67 ML/MIN/1.73 M 2
GLUCOSE SERPL-MCNC: 82 MG/DL (ref 65–99)
HCT VFR BLD AUTO: 31.1 % (ref 37–47)
HEMOCCULT STL QL: NEGATIVE
HGB BLD-MCNC: 9 G/DL (ref 12–16)
MAGNESIUM SERPL-MCNC: 1.9 MG/DL (ref 1.5–2.5)
MCH RBC QN AUTO: 22.1 PG (ref 27–33)
MCHC RBC AUTO-ENTMCNC: 28.9 G/DL (ref 32.2–35.5)
MCV RBC AUTO: 76.4 FL (ref 81.4–97.8)
PHOSPHATE SERPL-MCNC: 3.9 MG/DL (ref 2.5–4.5)
PLATELET # BLD AUTO: 327 K/UL (ref 164–446)
PMV BLD AUTO: 10.4 FL (ref 9–12.9)
POTASSIUM SERPL-SCNC: 5.1 MMOL/L (ref 3.6–5.5)
RBC # BLD AUTO: 4.07 M/UL (ref 4.2–5.4)
SODIUM SERPL-SCNC: 134 MMOL/L (ref 135–145)
WBC # BLD AUTO: 7.2 K/UL (ref 4.8–10.8)

## 2024-07-06 PROCEDURE — A9270 NON-COVERED ITEM OR SERVICE: HCPCS | Performed by: INTERNAL MEDICINE

## 2024-07-06 PROCEDURE — 700102 HCHG RX REV CODE 250 W/ 637 OVERRIDE(OP): Performed by: INTERNAL MEDICINE

## 2024-07-06 PROCEDURE — 85027 COMPLETE CBC AUTOMATED: CPT

## 2024-07-06 PROCEDURE — A9270 NON-COVERED ITEM OR SERVICE: HCPCS | Performed by: STUDENT IN AN ORGANIZED HEALTH CARE EDUCATION/TRAINING PROGRAM

## 2024-07-06 PROCEDURE — 83735 ASSAY OF MAGNESIUM: CPT

## 2024-07-06 PROCEDURE — 99233 SBSQ HOSP IP/OBS HIGH 50: CPT | Performed by: INTERNAL MEDICINE

## 2024-07-06 PROCEDURE — 82272 OCCULT BLD FECES 1-3 TESTS: CPT

## 2024-07-06 PROCEDURE — 94760 N-INVAS EAR/PLS OXIMETRY 1: CPT

## 2024-07-06 PROCEDURE — 80069 RENAL FUNCTION PANEL: CPT

## 2024-07-06 PROCEDURE — 700111 HCHG RX REV CODE 636 W/ 250 OVERRIDE (IP): Mod: JZ | Performed by: INTERNAL MEDICINE

## 2024-07-06 PROCEDURE — 700102 HCHG RX REV CODE 250 W/ 637 OVERRIDE(OP): Performed by: STUDENT IN AN ORGANIZED HEALTH CARE EDUCATION/TRAINING PROGRAM

## 2024-07-06 PROCEDURE — 770001 HCHG ROOM/CARE - MED/SURG/GYN PRIV*

## 2024-07-06 PROCEDURE — 700105 HCHG RX REV CODE 258: Performed by: INTERNAL MEDICINE

## 2024-07-06 RX ORDER — LACTULOSE 20 G/30ML
15 SOLUTION ORAL ONCE
Status: COMPLETED | OUTPATIENT
Start: 2024-07-06 | End: 2024-07-06

## 2024-07-06 RX ORDER — AMOXICILLIN AND CLAVULANATE POTASSIUM 875; 125 MG/1; MG/1
1 TABLET, FILM COATED ORAL EVERY 12 HOURS
Status: DISCONTINUED | OUTPATIENT
Start: 2024-07-06 | End: 2024-07-08 | Stop reason: HOSPADM

## 2024-07-06 RX ORDER — BISACODYL 10 MG
10 SUPPOSITORY, RECTAL RECTAL DAILY
Status: DISCONTINUED | OUTPATIENT
Start: 2024-07-06 | End: 2024-07-07

## 2024-07-06 RX ADMIN — AMOXICILLIN AND CLAVULANATE POTASSIUM 1 TABLET: 875; 125 TABLET, FILM COATED ORAL at 10:27

## 2024-07-06 RX ADMIN — ATORVASTATIN CALCIUM 20 MG: 20 TABLET, FILM COATED ORAL at 21:01

## 2024-07-06 RX ADMIN — GABAPENTIN 100 MG: 100 CAPSULE ORAL at 10:27

## 2024-07-06 RX ADMIN — GABAPENTIN 100 MG: 100 CAPSULE ORAL at 21:01

## 2024-07-06 RX ADMIN — APIXABAN 5 MG: 5 TABLET, FILM COATED ORAL at 17:26

## 2024-07-06 RX ADMIN — SENNOSIDES AND DOCUSATE SODIUM 2 TABLET: 50; 8.6 TABLET ORAL at 17:26

## 2024-07-06 RX ADMIN — AMPICILLIN AND SULBACTAM 3 G: 1; 2 INJECTION, POWDER, FOR SOLUTION INTRAMUSCULAR; INTRAVENOUS at 05:10

## 2024-07-06 RX ADMIN — POLYETHYLENE GLYCOL 3350 1 PACKET: 17 POWDER, FOR SOLUTION ORAL at 05:07

## 2024-07-06 RX ADMIN — NYSTATIN: 100000 POWDER TOPICAL at 05:08

## 2024-07-06 RX ADMIN — GABAPENTIN 100 MG: 100 CAPSULE ORAL at 17:26

## 2024-07-06 RX ADMIN — HYDROCODONE BITARTRATE AND ACETAMINOPHEN 1 TABLET: 10; 325 TABLET ORAL at 02:28

## 2024-07-06 RX ADMIN — NYSTATIN: 100000 POWDER TOPICAL at 17:34

## 2024-07-06 RX ADMIN — LACTULOSE 15 ML: 20 SOLUTION ORAL at 10:27

## 2024-07-06 RX ADMIN — AMOXICILLIN AND CLAVULANATE POTASSIUM 1 TABLET: 875; 125 TABLET, FILM COATED ORAL at 17:35

## 2024-07-06 RX ADMIN — BISACODYL 10 MG: 10 SUPPOSITORY RECTAL at 17:19

## 2024-07-06 RX ADMIN — GABAPENTIN 100 MG: 100 CAPSULE ORAL at 13:45

## 2024-07-06 RX ADMIN — ESCITALOPRAM OXALATE 10 MG: 10 TABLET ORAL at 05:08

## 2024-07-06 ASSESSMENT — PAIN DESCRIPTION - PAIN TYPE
TYPE: ACUTE PAIN
TYPE: ACUTE PAIN

## 2024-07-06 ASSESSMENT — FIBROSIS 4 INDEX: FIB4 SCORE: 1.39

## 2024-07-06 ASSESSMENT — ENCOUNTER SYMPTOMS
NERVOUS/ANXIOUS: 0
WEAKNESS: 1

## 2024-07-07 LAB — EKG IMPRESSION: NORMAL

## 2024-07-07 PROCEDURE — 700102 HCHG RX REV CODE 250 W/ 637 OVERRIDE(OP): Performed by: STUDENT IN AN ORGANIZED HEALTH CARE EDUCATION/TRAINING PROGRAM

## 2024-07-07 PROCEDURE — 700111 HCHG RX REV CODE 636 W/ 250 OVERRIDE (IP): Mod: JZ | Performed by: INTERNAL MEDICINE

## 2024-07-07 PROCEDURE — 93005 ELECTROCARDIOGRAM TRACING: CPT | Performed by: INTERNAL MEDICINE

## 2024-07-07 PROCEDURE — A9270 NON-COVERED ITEM OR SERVICE: HCPCS | Performed by: INTERNAL MEDICINE

## 2024-07-07 PROCEDURE — 770001 HCHG ROOM/CARE - MED/SURG/GYN PRIV*

## 2024-07-07 PROCEDURE — 700102 HCHG RX REV CODE 250 W/ 637 OVERRIDE(OP): Performed by: INTERNAL MEDICINE

## 2024-07-07 PROCEDURE — A9270 NON-COVERED ITEM OR SERVICE: HCPCS | Performed by: STUDENT IN AN ORGANIZED HEALTH CARE EDUCATION/TRAINING PROGRAM

## 2024-07-07 PROCEDURE — 93010 ELECTROCARDIOGRAM REPORT: CPT | Performed by: INTERNAL MEDICINE

## 2024-07-07 PROCEDURE — 94760 N-INVAS EAR/PLS OXIMETRY 1: CPT

## 2024-07-07 PROCEDURE — 99232 SBSQ HOSP IP/OBS MODERATE 35: CPT | Performed by: INTERNAL MEDICINE

## 2024-07-07 RX ORDER — LACTULOSE 20 G/30ML
15 SOLUTION ORAL ONCE
Status: DISPENSED | OUTPATIENT
Start: 2024-07-07 | End: 2024-07-08

## 2024-07-07 RX ADMIN — SOTALOL HYDROCHLORIDE 120 MG: 80 TABLET ORAL at 17:34

## 2024-07-07 RX ADMIN — APIXABAN 5 MG: 5 TABLET, FILM COATED ORAL at 17:33

## 2024-07-07 RX ADMIN — ATORVASTATIN CALCIUM 20 MG: 20 TABLET, FILM COATED ORAL at 19:55

## 2024-07-07 RX ADMIN — AMOXICILLIN AND CLAVULANATE POTASSIUM 1 TABLET: 875; 125 TABLET, FILM COATED ORAL at 17:35

## 2024-07-07 RX ADMIN — GABAPENTIN 100 MG: 100 CAPSULE ORAL at 08:37

## 2024-07-07 RX ADMIN — HYDROCODONE BITARTRATE AND ACETAMINOPHEN 1 TABLET: 5; 325 TABLET ORAL at 19:55

## 2024-07-07 RX ADMIN — ONDANSETRON 4 MG: 2 INJECTION INTRAMUSCULAR; INTRAVENOUS at 08:37

## 2024-07-07 RX ADMIN — GABAPENTIN 100 MG: 100 CAPSULE ORAL at 19:55

## 2024-07-07 RX ADMIN — ESCITALOPRAM OXALATE 10 MG: 10 TABLET ORAL at 06:40

## 2024-07-07 RX ADMIN — AMOXICILLIN AND CLAVULANATE POTASSIUM 1 TABLET: 875; 125 TABLET, FILM COATED ORAL at 06:40

## 2024-07-07 RX ADMIN — APIXABAN 5 MG: 5 TABLET, FILM COATED ORAL at 06:40

## 2024-07-07 RX ADMIN — GABAPENTIN 100 MG: 100 CAPSULE ORAL at 12:49

## 2024-07-07 RX ADMIN — SENNOSIDES AND DOCUSATE SODIUM 2 TABLET: 50; 8.6 TABLET ORAL at 17:34

## 2024-07-07 RX ADMIN — GABAPENTIN 100 MG: 100 CAPSULE ORAL at 17:34

## 2024-07-07 ASSESSMENT — PAIN DESCRIPTION - PAIN TYPE
TYPE: ACUTE PAIN
TYPE: ACUTE PAIN

## 2024-07-07 ASSESSMENT — FIBROSIS 4 INDEX: FIB4 SCORE: 1.36

## 2024-07-07 ASSESSMENT — ENCOUNTER SYMPTOMS
NERVOUS/ANXIOUS: 0
WEAKNESS: 1

## 2024-07-08 ENCOUNTER — PHARMACY VISIT (OUTPATIENT)
Dept: PHARMACY | Facility: MEDICAL CENTER | Age: 59
End: 2024-07-08
Payer: COMMERCIAL

## 2024-07-08 VITALS
OXYGEN SATURATION: 93 % | HEART RATE: 69 BPM | WEIGHT: 293 LBS | RESPIRATION RATE: 18 BRPM | DIASTOLIC BLOOD PRESSURE: 64 MMHG | BODY MASS INDEX: 44.41 KG/M2 | SYSTOLIC BLOOD PRESSURE: 103 MMHG | HEIGHT: 68 IN | TEMPERATURE: 97.3 F

## 2024-07-08 LAB
ALBUMIN SERPL BCP-MCNC: 2.4 G/DL (ref 3.2–4.9)
BACTERIA BLD CULT: NORMAL
BACTERIA BLD CULT: NORMAL
BUN SERPL-MCNC: 10 MG/DL (ref 8–22)
CALCIUM ALBUM COR SERPL-MCNC: 10 MG/DL (ref 8.5–10.5)
CALCIUM SERPL-MCNC: 8.7 MG/DL (ref 8.4–10.2)
CHLORIDE SERPL-SCNC: 102 MMOL/L (ref 96–112)
CO2 SERPL-SCNC: 25 MMOL/L (ref 20–33)
CREAT SERPL-MCNC: 0.8 MG/DL (ref 0.5–1.4)
ERYTHROCYTE [DISTWIDTH] IN BLOOD BY AUTOMATED COUNT: 51.5 FL (ref 35.9–50)
GFR SERPLBLD CREATININE-BSD FMLA CKD-EPI: 84 ML/MIN/1.73 M 2
GLUCOSE SERPL-MCNC: 90 MG/DL (ref 65–99)
HCT VFR BLD AUTO: 29.8 % (ref 37–47)
HGB BLD-MCNC: 8.5 G/DL (ref 12–16)
MAGNESIUM SERPL-MCNC: 1.8 MG/DL (ref 1.5–2.5)
MCH RBC QN AUTO: 22 PG (ref 27–33)
MCHC RBC AUTO-ENTMCNC: 28.5 G/DL (ref 32.2–35.5)
MCV RBC AUTO: 77.2 FL (ref 81.4–97.8)
PHOSPHATE SERPL-MCNC: 4.5 MG/DL (ref 2.5–4.5)
PLATELET # BLD AUTO: 343 K/UL (ref 164–446)
PMV BLD AUTO: 10.4 FL (ref 9–12.9)
POTASSIUM SERPL-SCNC: 4.3 MMOL/L (ref 3.6–5.5)
RBC # BLD AUTO: 3.86 M/UL (ref 4.2–5.4)
SIGNIFICANT IND 70042: NORMAL
SIGNIFICANT IND 70042: NORMAL
SITE SITE: NORMAL
SITE SITE: NORMAL
SODIUM SERPL-SCNC: 137 MMOL/L (ref 135–145)
SOURCE SOURCE: NORMAL
SOURCE SOURCE: NORMAL
WBC # BLD AUTO: 7 K/UL (ref 4.8–10.8)

## 2024-07-08 PROCEDURE — RXMED WILLOW AMBULATORY MEDICATION CHARGE: Performed by: INTERNAL MEDICINE

## 2024-07-08 PROCEDURE — 700102 HCHG RX REV CODE 250 W/ 637 OVERRIDE(OP): Performed by: STUDENT IN AN ORGANIZED HEALTH CARE EDUCATION/TRAINING PROGRAM

## 2024-07-08 PROCEDURE — 80069 RENAL FUNCTION PANEL: CPT

## 2024-07-08 PROCEDURE — A9270 NON-COVERED ITEM OR SERVICE: HCPCS | Performed by: INTERNAL MEDICINE

## 2024-07-08 PROCEDURE — A9270 NON-COVERED ITEM OR SERVICE: HCPCS | Performed by: STUDENT IN AN ORGANIZED HEALTH CARE EDUCATION/TRAINING PROGRAM

## 2024-07-08 PROCEDURE — 700102 HCHG RX REV CODE 250 W/ 637 OVERRIDE(OP): Performed by: INTERNAL MEDICINE

## 2024-07-08 PROCEDURE — 94760 N-INVAS EAR/PLS OXIMETRY 1: CPT

## 2024-07-08 PROCEDURE — 83735 ASSAY OF MAGNESIUM: CPT

## 2024-07-08 PROCEDURE — 85027 COMPLETE CBC AUTOMATED: CPT

## 2024-07-08 PROCEDURE — 99239 HOSP IP/OBS DSCHRG MGMT >30: CPT | Performed by: INTERNAL MEDICINE

## 2024-07-08 RX ORDER — ONDANSETRON 4 MG/1
4 TABLET, ORALLY DISINTEGRATING ORAL EVERY 4 HOURS PRN
Qty: 30 TABLET | Refills: 0 | Status: SHIPPED | OUTPATIENT
Start: 2024-07-08

## 2024-07-08 RX ORDER — AMOXICILLIN AND CLAVULANATE POTASSIUM 875; 125 MG/1; MG/1
1 TABLET, FILM COATED ORAL EVERY 12 HOURS
Qty: 6 TABLET | Refills: 0 | Status: ACTIVE | OUTPATIENT
Start: 2024-07-08 | End: 2024-07-11

## 2024-07-08 RX ORDER — LACTULOSE 20 G/30ML
15 SOLUTION ORAL
Qty: 473 ML | Refills: 0 | Status: SHIPPED | OUTPATIENT
Start: 2024-07-08

## 2024-07-08 RX ORDER — HYDROCODONE BITARTRATE AND ACETAMINOPHEN 5; 325 MG/1; MG/1
1 TABLET ORAL EVERY 8 HOURS PRN
Qty: 9 TABLET | Refills: 0 | Status: SHIPPED | OUTPATIENT
Start: 2024-07-08 | End: 2024-07-11

## 2024-07-08 RX ADMIN — HYDROCODONE BITARTRATE AND ACETAMINOPHEN 1 TABLET: 5; 325 TABLET ORAL at 05:24

## 2024-07-08 RX ADMIN — ESCITALOPRAM OXALATE 10 MG: 10 TABLET ORAL at 05:22

## 2024-07-08 RX ADMIN — HYDROCODONE BITARTRATE AND ACETAMINOPHEN 1 TABLET: 5; 325 TABLET ORAL at 12:34

## 2024-07-08 RX ADMIN — APIXABAN 5 MG: 5 TABLET, FILM COATED ORAL at 05:22

## 2024-07-08 RX ADMIN — GABAPENTIN 100 MG: 100 CAPSULE ORAL at 08:55

## 2024-07-08 RX ADMIN — GABAPENTIN 100 MG: 100 CAPSULE ORAL at 12:34

## 2024-07-08 RX ADMIN — AMOXICILLIN AND CLAVULANATE POTASSIUM 1 TABLET: 875; 125 TABLET, FILM COATED ORAL at 05:22

## 2024-07-08 RX ADMIN — SOTALOL HYDROCHLORIDE 120 MG: 80 TABLET ORAL at 05:22

## 2024-07-08 SDOH — ECONOMIC STABILITY: TRANSPORTATION INSECURITY
IN THE PAST 12 MONTHS, HAS LACK OF RELIABLE TRANSPORTATION KEPT YOU FROM MEDICAL APPOINTMENTS, MEETINGS, WORK OR FROM GETTING THINGS NEEDED FOR DAILY LIVING?: PATIENT DECLINED

## 2024-07-08 SDOH — ECONOMIC STABILITY: TRANSPORTATION INSECURITY
IN THE PAST 12 MONTHS, HAS THE LACK OF TRANSPORTATION KEPT YOU FROM MEDICAL APPOINTMENTS OR FROM GETTING MEDICATIONS?: PATIENT DECLINED

## 2024-07-08 ASSESSMENT — SOCIAL DETERMINANTS OF HEALTH (SDOH)
WITHIN THE PAST 12 MONTHS, YOU WORRIED THAT YOUR FOOD WOULD RUN OUT BEFORE YOU GOT THE MONEY TO BUY MORE: PATIENT DECLINED
WITHIN THE PAST 12 MONTHS, THE FOOD YOU BOUGHT JUST DIDN'T LAST AND YOU DIDN'T HAVE MONEY TO GET MORE: PATIENT DECLINED
IN THE PAST 12 MONTHS, HAS THE ELECTRIC, GAS, OIL, OR WATER COMPANY THREATENED TO SHUT OFF SERVICE IN YOUR HOME?: PATIENT DECLINED

## 2024-07-08 ASSESSMENT — PAIN DESCRIPTION - PAIN TYPE
TYPE: ACUTE PAIN
TYPE: ACUTE PAIN;CHRONIC PAIN
TYPE: ACUTE PAIN

## (undated) DEVICE — TOWELS CLOTH SURGICAL - (4/PK 20PK/CA)

## (undated) DEVICE — GLOVE BIOGEL INDICATOR SZ 7.5 SURGICAL PF LTX - (50PR/BX 4BX/CA)

## (undated) DEVICE — TUBE E-T HI-LO CUFF 7.0MM (10EA/PK)

## (undated) DEVICE — DRESSING ABDOMINAL PAD STERILE 8 X 10" (360EA/CA)"

## (undated) DEVICE — PACK LOWER EXTREMITY - (2/CA)

## (undated) DEVICE — COVER LIGHT HANDLE ALC PLUS DISP (18EA/BX)

## (undated) DEVICE — DRAPE SURG STERI-DRAPE 7X11OD - (40EA/CA)

## (undated) DEVICE — GLOVE BIOGEL PI ORTHO SZ 7.5 PF LF (40PR/BX)

## (undated) DEVICE — LACTATED RINGERS INJ 1000 ML - (14EA/CA 60CA/PF)

## (undated) DEVICE — CONTAINER SPECIMEN BAG OR - STERILE 4 OZ W/LID (100EA/CA)

## (undated) DEVICE — DRAPE SURGICAL U 77X120 - (10/CA)

## (undated) DEVICE — SODIUM CHL IRRIGATION 0.9% 1000ML (12EA/CA)

## (undated) DEVICE — GLOVE BIOGEL PI INDICATOR SZ 7.5 SURGICAL PF LF -(50/BX 4BX/CA)

## (undated) DEVICE — SPONGE GAUZESTER 4 X 4 4PLY - (128PK/CA)

## (undated) DEVICE — GOWN WARMING STANDARD FLEX - (30/CA)

## (undated) DEVICE — SWAB CULTURE AMIES ESWAB (50EA/PK)

## (undated) DEVICE — PAD LAP STERILE 18 X 18 - (5/PK 40PK/CA)

## (undated) DEVICE — ELECTRODE DUAL RETURN W/ CORD - (50/PK)

## (undated) DEVICE — SUCTION TIP STRAIGHT ARGYLE - 50EA/CA

## (undated) DEVICE — SET LEADWIRE 5 LEAD BEDSIDE DISPOSABLE ECG (1SET OF 5/EA)

## (undated) DEVICE — SUCTION INSTRUMENT YANKAUER BULBOUS TIP W/O VENT (50EA/CA)

## (undated) DEVICE — CANISTER SUCTION 3000ML MECHANICAL FILTER AUTO SHUTOFF MEDI-VAC NONSTERILE LF DISP  (40EA/CA)

## (undated) DEVICE — TUBING CLEARLINK DUO-VENT - C-FLO (48EA/CA)

## (undated) DEVICE — SENSOR OXIMETER ADULT SPO2 RD SET (20EA/BX)

## (undated) DEVICE — GLOVE SZ 7 BIOGEL PI MICRO - PF LF (50PR/BX 4BX/CA)

## (undated) DEVICE — CANNULA O2 COMFORT SOFT EAR ADULT 7 FT TUBING (50/CA)

## (undated) DEVICE — SWAB ANAEROBIC SPEC.COLLECTOR - (25/PK 4PK/CA 100EA/CA)

## (undated) DEVICE — DRAPE LARGE 3 QUARTER - (20/CA)

## (undated) DEVICE — DRESSING PETROLEUM GAUZE 5 X 9" (50EA/BX 4BX/CA)"

## (undated) DEVICE — TIP INTPLS HFLO ML ORFC BTRY - (12/CS)  FOR SURGILAV

## (undated) DEVICE — TUBE CONNECTING SUCTION - CLEAR PLASTIC STERILE 72 IN (50EA/CA)

## (undated) DEVICE — SET EXTENSION WITH 2 PORTS (48EA/CA) ***PART #2C8610 IS A SUBSTITUTE*****

## (undated) DEVICE — GLOVE BIOGEL PI INDICATOR SZ 7.0 SURGICAL PF LF - (50/BX 4BX/CA)

## (undated) DEVICE — CANISTER SUCTION RIGID RED 1500CC (40EA/CA)

## (undated) DEVICE — BLADE SURGICAL #10 - (50/BX)

## (undated) DEVICE — BANDAGE ROLL STERILE BULKEE 4.5 IN X 4 YD (100EA/CA)

## (undated) DEVICE — HANDPIECE 10FT INTPLS SCT PLS IRRIGATION HAND CONTROL SET (6/PK)

## (undated) DEVICE — MASK OXYGEN VNYL ADLT MED CONC WITH 7 FOOT TUBING  - (50EA/CA)

## (undated) DEVICE — SODIUM CHL. IRRIGATION 0.9% 3000ML (4EA/CA 65CA/PF)